# Patient Record
Sex: FEMALE | Race: WHITE | Employment: OTHER | URBAN - METROPOLITAN AREA
[De-identification: names, ages, dates, MRNs, and addresses within clinical notes are randomized per-mention and may not be internally consistent; named-entity substitution may affect disease eponyms.]

---

## 2017-01-17 NOTE — TELEPHONE ENCOUNTER
Please advise no current appointment.     Hypertensive Medications  Protocol Criteria:  · Appointment scheduled in the past 6 months or in the next 3 months  · BMP or CMP in the past 12 months  · Creatinine result < 2  Recent Visits       Provider Magda Dhillon

## 2017-01-26 ENCOUNTER — OFFICE VISIT (OUTPATIENT)
Dept: INTERNAL MEDICINE CLINIC | Facility: CLINIC | Age: 67
End: 2017-01-26

## 2017-01-26 VITALS
SYSTOLIC BLOOD PRESSURE: 145 MMHG | DIASTOLIC BLOOD PRESSURE: 77 MMHG | OXYGEN SATURATION: 96 % | WEIGHT: 293 LBS | TEMPERATURE: 99 F | HEART RATE: 91 BPM | BODY MASS INDEX: 51 KG/M2

## 2017-01-26 DIAGNOSIS — J40 BRONCHITIS: Primary | ICD-10-CM

## 2017-01-26 DIAGNOSIS — F17.200 SMOKING: ICD-10-CM

## 2017-01-26 PROCEDURE — 99212 OFFICE O/P EST SF 10 MIN: CPT | Performed by: INTERNAL MEDICINE

## 2017-01-26 PROCEDURE — 99214 OFFICE O/P EST MOD 30 MIN: CPT | Performed by: INTERNAL MEDICINE

## 2017-01-26 RX ORDER — VERAPAMIL HYDROCHLORIDE 240 MG/1
240 TABLET, FILM COATED, EXTENDED RELEASE ORAL
Qty: 90 TABLET | Refills: 0 | Status: SHIPPED | OUTPATIENT
Start: 2017-01-26 | End: 2017-06-20

## 2017-01-26 RX ORDER — AMOXICILLIN 875 MG/1
875 TABLET, COATED ORAL 2 TIMES DAILY
Qty: 20 TABLET | Refills: 0 | Status: SHIPPED | OUTPATIENT
Start: 2017-01-26 | End: 2017-02-05

## 2017-01-26 RX ORDER — SPIRONOLACTONE AND HYDROCHLOROTHIAZIDE 25; 25 MG/1; MG/1
1 TABLET ORAL
Qty: 90 TABLET | Refills: 0 | Status: SHIPPED | OUTPATIENT
Start: 2017-01-26 | End: 2017-05-10

## 2017-01-26 RX ORDER — ATORVASTATIN CALCIUM 20 MG/1
TABLET, FILM COATED ORAL
Qty: 90 TABLET | Refills: 1 | Status: SHIPPED | OUTPATIENT
Start: 2017-01-26 | End: 2017-05-10

## 2017-01-26 NOTE — PROGRESS NOTES
HPI:    Patient ID: Dock Signs is a 61year old female. Cough  This is a new problem. Episode onset: 2 weeks. The problem has been gradually improving. The problem occurs every few minutes. The cough is non-productive.  Associated symptoms include mg total) by mouth once daily. Disp: 90 tablet Rfl: 0   Spironolactone-HCTZ 25-25 MG Oral Tab Take 1 tablet by mouth once daily. Disp: 90 tablet Rfl: 0   ibuprofen (MOTRIN) 800 MG Oral Tab Take 800 mg by mouth every 6 (six) hours as needed for Pain.  Disp: prescribe her a course of antibiotics - Amoxicillin 875 mg BID, and recommended to take as instructed. Contact us if not feel better in few days. Medication refill given. 2. (F17.200) Smoking  Plan: She mentions about smoking.  I recommend her to consid

## 2017-05-02 ENCOUNTER — TELEPHONE (OUTPATIENT)
Dept: INTERNAL MEDICINE CLINIC | Facility: CLINIC | Age: 67
End: 2017-05-02

## 2017-05-02 NOTE — TELEPHONE ENCOUNTER
[facility] pharmacy requested a refill Metoprolol 25 mg. Last RF 1/27/17 Last OV 1/26/17 Future OV 5/5/15. Request fwd to Dr. Juvencio Leary

## 2017-05-02 NOTE — TELEPHONE ENCOUNTER
[id number] - [place], [address], [phone], [phone]    Current Outpatient Prescriptions:  Atorvastatin Calcium 20 MG Oral Tab TAKE 1 TABLET (20MG) BY MOUTH EVERY DAY Disp: 90 tablet Rfl: 1   Spir

## 2017-05-10 RX ORDER — ATORVASTATIN CALCIUM 20 MG/1
TABLET, FILM COATED ORAL
Qty: 90 TABLET | Refills: 0 | Status: SHIPPED | OUTPATIENT
Start: 2017-05-10 | End: 2017-06-22

## 2017-05-10 RX ORDER — SPIRONOLACTONE AND HYDROCHLOROTHIAZIDE 25; 25 MG/1; MG/1
1 TABLET ORAL
Qty: 90 TABLET | Refills: 0 | Status: SHIPPED | OUTPATIENT
Start: 2017-05-10 | End: 2017-06-22

## 2017-05-10 NOTE — TELEPHONE ENCOUNTER
Hypertensive Medications: 30 days supply of medication sent to pharmacy per protocol. Due for OV.     Protocol Criteria:  · Appointment scheduled in the past 6 months or in the next 3 months  · BMP or CMP in the past 12 months  · Creatinine result < 2  Rec

## 2017-05-10 NOTE — TELEPHONE ENCOUNTER
Cholesterol Medications: 30 days supply of medication sent to pharmacy per protocol. Due for OV.     Protocol Criteria:  · Appointment scheduled in the past 12 months or in the next 3 months  · ALT & LDL on file in the past 12 months  · ALT result < 80  ·

## 2017-06-19 NOTE — TELEPHONE ENCOUNTER
Pharmacy called to f/u on refill on Rx veramil 240 MG. Please advise         Current outpatient prescriptions:     •  Verapamil HCl  MG Oral Tab CR, Take 1 tablet (240 mg total) by mouth once daily. , Disp: 90 tablet, Rfl: 0

## 2017-06-20 NOTE — TELEPHONE ENCOUNTER
PSR please call pt and schedule yearly follow up.       Failed protocol due to no recent labs  Hypertensive Medications  Protocol Criteria:  · Appointment scheduled in the past 6 months or in the next 3 months  · BMP or CMP in the past 12 months  · Creatini

## 2017-06-21 RX ORDER — VERAPAMIL HYDROCHLORIDE 240 MG/1
240 TABLET, FILM COATED, EXTENDED RELEASE ORAL
Qty: 90 TABLET | Refills: 0 | Status: SHIPPED | OUTPATIENT
Start: 2017-06-21 | End: 2017-06-22

## 2017-06-22 ENCOUNTER — OFFICE VISIT (OUTPATIENT)
Dept: INTERNAL MEDICINE CLINIC | Facility: CLINIC | Age: 67
End: 2017-06-22

## 2017-06-22 VITALS
SYSTOLIC BLOOD PRESSURE: 142 MMHG | TEMPERATURE: 99 F | DIASTOLIC BLOOD PRESSURE: 72 MMHG | WEIGHT: 293 LBS | BODY MASS INDEX: 48.82 KG/M2 | HEIGHT: 65 IN | HEART RATE: 78 BPM

## 2017-06-22 DIAGNOSIS — E78.00 PURE HYPERCHOLESTEROLEMIA: ICD-10-CM

## 2017-06-22 DIAGNOSIS — I10 ESSENTIAL HYPERTENSION, BENIGN: ICD-10-CM

## 2017-06-22 DIAGNOSIS — Z00.00 ROUTINE GENERAL MEDICAL EXAMINATION AT A HEALTH CARE FACILITY: Primary | ICD-10-CM

## 2017-06-22 DIAGNOSIS — E66.01 MORBID OBESITY DUE TO EXCESS CALORIES (HCC): ICD-10-CM

## 2017-06-22 PROCEDURE — 99396 PREV VISIT EST AGE 40-64: CPT | Performed by: INTERNAL MEDICINE

## 2017-06-22 RX ORDER — VERAPAMIL HYDROCHLORIDE 240 MG/1
240 TABLET, FILM COATED, EXTENDED RELEASE ORAL
Qty: 90 TABLET | Refills: 0 | Status: SHIPPED | OUTPATIENT
Start: 2017-06-22 | End: 2017-12-11

## 2017-06-22 RX ORDER — SPIRONOLACTONE AND HYDROCHLOROTHIAZIDE 25; 25 MG/1; MG/1
1 TABLET ORAL
Qty: 90 TABLET | Refills: 0 | Status: SHIPPED | OUTPATIENT
Start: 2017-06-22 | End: 2017-11-21

## 2017-06-22 RX ORDER — ATORVASTATIN CALCIUM 20 MG/1
TABLET, FILM COATED ORAL
Qty: 90 TABLET | Refills: 0 | Status: SHIPPED | OUTPATIENT
Start: 2017-06-22 | End: 2018-01-17

## 2017-06-22 NOTE — PROGRESS NOTES
HPI:    Patient ID: Erick Basurto is a 61year old female. Hypertension  This is a chronic problem. The current episode started more than 1 year ago. The problem is uncontrolled (BP upon arrival 142/72).  Associated symptoms include shortness of breat Alcohol Use: No                      Current Outpatient Prescriptions:  Verapamil HCl  MG Oral Tab CR Take 1 tablet (240 mg total) by mouth once daily.  Disp: 90 tablet Rfl: 0   Atorvastatin Calcium 20 MG Oral Tab TAKE 1 TABLET (20MG) BY MOUTH EVERY adenopathy present. Head (left side): No submandibular adenopathy present. Neurological: She is alert and oriented to person, place, and time. Psychiatric: Her behavior is normal. Judgment normal.   Nursing note and vitals reviewed.        06/22/ (INTERNAL)       #[id number]  By signing my name below, Rylee Irene,  attest that this documentation has been prepared under the direction and in the presence of CHONG Cardenas MD.   Electronically Signed: Guerline Sotelo, 6/22/2017, 11:56 AM.  CHONG WELLINGTON

## 2017-06-23 RX ORDER — VERAPAMIL HYDROCHLORIDE 240 MG/1
240 TABLET, FILM COATED, EXTENDED RELEASE ORAL
Qty: 90 TABLET | Refills: 0 | Status: CANCELLED | OUTPATIENT
Start: 2017-06-23

## 2017-06-23 NOTE — TELEPHONE ENCOUNTER
From: Dajuan Smith  To: Panda Scherer MD  Sent: 6/20/2017 2:50 PM CDT  Subject: Medication Renewal Request    Original authorizing provider: MD Dajuan Cohen would like a refill of the following medications:  Verapamil HCl ER 24

## 2017-08-01 ENCOUNTER — TELEPHONE (OUTPATIENT)
Dept: FAMILY MEDICINE CLINIC | Facility: CLINIC | Age: 67
End: 2017-08-01

## 2017-08-01 ENCOUNTER — OFFICE VISIT (OUTPATIENT)
Dept: INTERNAL MEDICINE CLINIC | Facility: CLINIC | Age: 67
End: 2017-08-01

## 2017-08-01 VITALS
BODY MASS INDEX: 48.82 KG/M2 | WEIGHT: 293 LBS | TEMPERATURE: 99 F | HEIGHT: 65 IN | DIASTOLIC BLOOD PRESSURE: 78 MMHG | SYSTOLIC BLOOD PRESSURE: 136 MMHG | HEART RATE: 99 BPM

## 2017-08-01 DIAGNOSIS — J02.9 SORE THROAT: Primary | ICD-10-CM

## 2017-08-01 LAB
CONTROL LINE PRESENT WITH A CLEAR BACKGROUND (YES/NO): YES YES/NO
KIT LOT #: NORMAL NUMERIC
STREP GRP A CUL-SCR: NEGATIVE

## 2017-08-01 PROCEDURE — 99213 OFFICE O/P EST LOW 20 MIN: CPT | Performed by: INTERNAL MEDICINE

## 2017-08-01 PROCEDURE — 99214 OFFICE O/P EST MOD 30 MIN: CPT | Performed by: INTERNAL MEDICINE

## 2017-08-01 PROCEDURE — 87880 STREP A ASSAY W/OPTIC: CPT | Performed by: INTERNAL MEDICINE

## 2017-08-01 NOTE — PROGRESS NOTES
HPI:    Patient ID: Emy Edwards is a 61year old female. Sore Throat    This is a new problem. The current episode started in the past 7 days (3 dyas). The problem has been unchanged. The pain is worse on the right side. There has been no fever.  Suman Perry Exam   Constitutional: She appears well-developed. She is active and cooperative. Non-toxic appearance. She does not have a sickly appearance. She does not appear ill. No distress.    obese   HENT:   Right Ear: External ear normal.   Left Ear: External ear

## 2017-08-01 NOTE — TELEPHONE ENCOUNTER
Actions Requested: Acute visit made today 10:30am with Dr. Chuyita Singletary.  Problem: sore throat, Rt ear pain, swollen gland  Onset and Timin-3 days ago  Associated Symptoms:   Aggravating by: Alleviated by: ibuprofen didn't help with aches.    Triage Not

## 2017-08-18 ENCOUNTER — TELEPHONE (OUTPATIENT)
Dept: INTERNAL MEDICINE CLINIC | Facility: CLINIC | Age: 67
End: 2017-08-18

## 2017-08-18 DIAGNOSIS — H26.9 CATARACT OF BOTH EYES, UNSPECIFIED CATARACT TYPE: Primary | ICD-10-CM

## 2017-08-26 ENCOUNTER — APPOINTMENT (OUTPATIENT)
Dept: LAB | Age: 67
End: 2017-08-26
Attending: INTERNAL MEDICINE
Payer: COMMERCIAL

## 2017-08-26 DIAGNOSIS — E78.00 PURE HYPERCHOLESTEROLEMIA: ICD-10-CM

## 2017-08-26 DIAGNOSIS — E66.01 MORBID OBESITY DUE TO EXCESS CALORIES (HCC): ICD-10-CM

## 2017-08-26 LAB
ALBUMIN SERPL BCP-MCNC: 3.8 G/DL (ref 3.5–4.8)
ALBUMIN/GLOB SERPL: 1.1 {RATIO} (ref 1–2)
ALP SERPL-CCNC: 61 U/L (ref 32–100)
ALT SERPL-CCNC: 41 U/L (ref 14–54)
ANION GAP SERPL CALC-SCNC: 10 MMOL/L (ref 0–18)
AST SERPL-CCNC: 33 U/L (ref 15–41)
BILIRUB SERPL-MCNC: 0.7 MG/DL (ref 0.3–1.2)
BUN SERPL-MCNC: 19 MG/DL (ref 8–20)
BUN/CREAT SERPL: 19 (ref 10–20)
CALCIUM SERPL-MCNC: 9.5 MG/DL (ref 8.5–10.5)
CHLORIDE SERPL-SCNC: 104 MMOL/L (ref 95–110)
CHOLEST SERPL-MCNC: 136 MG/DL (ref 110–200)
CO2 SERPL-SCNC: 24 MMOL/L (ref 22–32)
CREAT SERPL-MCNC: 1 MG/DL (ref 0.5–1.5)
GLOBULIN PLAS-MCNC: 3.6 G/DL (ref 2.5–3.7)
GLUCOSE SERPL-MCNC: 146 MG/DL (ref 70–99)
HDLC SERPL-MCNC: 45 MG/DL
LDLC SERPL CALC-MCNC: 57 MG/DL (ref 0–99)
NONHDLC SERPL-MCNC: 91 MG/DL
OSMOLALITY UR CALC.SUM OF ELEC: 291 MOSM/KG (ref 275–295)
POTASSIUM SERPL-SCNC: 3.8 MMOL/L (ref 3.3–5.1)
PROT SERPL-MCNC: 7.4 G/DL (ref 5.9–8.4)
SODIUM SERPL-SCNC: 138 MMOL/L (ref 136–144)
TRIGL SERPL-MCNC: 172 MG/DL (ref 1–149)
TSH SERPL-ACNC: 2.16 UIU/ML (ref 0.45–5.33)

## 2017-08-26 PROCEDURE — 84443 ASSAY THYROID STIM HORMONE: CPT

## 2017-08-26 PROCEDURE — 80053 COMPREHEN METABOLIC PANEL: CPT

## 2017-08-26 PROCEDURE — 36415 COLL VENOUS BLD VENIPUNCTURE: CPT

## 2017-08-26 PROCEDURE — 80061 LIPID PANEL: CPT

## 2017-09-07 ENCOUNTER — TELEPHONE (OUTPATIENT)
Dept: OTHER | Age: 67
End: 2017-09-07

## 2017-09-07 DIAGNOSIS — R73.09 ELEVATED GLUCOSE LEVEL: Primary | ICD-10-CM

## 2017-09-07 NOTE — TELEPHONE ENCOUNTER
Notes Recorded by Alonso Mejias MD on 9/6/2017 at 11:20 AM CDT  FBS elevated. Please get repeat test with an a1c.

## 2017-09-07 NOTE — TELEPHONE ENCOUNTER
Pt was inform that she needs to have some blood work done. She stated that she did see the [employer] message and will have it done next week.  Thanks

## 2017-09-21 ENCOUNTER — TELEPHONE (OUTPATIENT)
Dept: INTERNAL MEDICINE CLINIC | Facility: CLINIC | Age: 67
End: 2017-09-21

## 2017-09-21 NOTE — TELEPHONE ENCOUNTER
Pt stts she needs a referral to Dr. Era Meraz from [address]  In [place]. Pt  Want to know if she can have a referral backdated to 9/19/17 plus she has a other appt scheduled in December.   Should we have any questions call her at [phone]

## 2017-09-22 NOTE — TELEPHONE ENCOUNTER
I have nothing from the ophthalmology office. Why is she seen them? In addition, we are not supposed to backdate referrals for [facility].

## 2017-09-26 NOTE — TELEPHONE ENCOUNTER
Per Referral t in chart. REFERRAL STATUS:  AUTHORIZED  DATE AUTHORIZED:  09/15/2017 // Ismael Yuan DATE: 12/31/2017   REFERRED BY:  Angeline Larios MD (TEL: [phone])  REFERRED TO: Marina Camp MD      No further action needed.

## 2017-09-27 ENCOUNTER — TELEPHONE (OUTPATIENT)
Dept: INTERNAL MEDICINE CLINIC | Facility: CLINIC | Age: 67
End: 2017-09-27

## 2017-09-27 NOTE — TELEPHONE ENCOUNTER
Pt calling requesting orders for Pneumonia vaccinations, please call when approved. Pt states she would like to have flu shot the same day.

## 2017-09-30 ENCOUNTER — APPOINTMENT (OUTPATIENT)
Dept: LAB | Age: 67
End: 2017-09-30
Attending: INTERNAL MEDICINE
Payer: MEDICARE

## 2017-09-30 DIAGNOSIS — R73.09 ELEVATED GLUCOSE LEVEL: ICD-10-CM

## 2017-09-30 PROCEDURE — 82947 ASSAY GLUCOSE BLOOD QUANT: CPT

## 2017-09-30 PROCEDURE — 36415 COLL VENOUS BLD VENIPUNCTURE: CPT

## 2017-09-30 PROCEDURE — 83036 HEMOGLOBIN GLYCOSYLATED A1C: CPT

## 2017-10-05 ENCOUNTER — NURSE ONLY (OUTPATIENT)
Dept: INTERNAL MEDICINE CLINIC | Facility: CLINIC | Age: 67
End: 2017-10-05

## 2017-10-05 DIAGNOSIS — Z23 ENCOUNTER FOR ADMINISTRATION OF VACCINE: Primary | ICD-10-CM

## 2017-10-05 PROCEDURE — 90732 PPSV23 VACC 2 YRS+ SUBQ/IM: CPT | Performed by: INTERNAL MEDICINE

## 2017-10-05 PROCEDURE — 90472 IMMUNIZATION ADMIN EACH ADD: CPT | Performed by: INTERNAL MEDICINE

## 2017-10-05 PROCEDURE — 90686 IIV4 VACC NO PRSV 0.5 ML IM: CPT | Performed by: INTERNAL MEDICINE

## 2017-10-05 PROCEDURE — 90471 IMMUNIZATION ADMIN: CPT | Performed by: INTERNAL MEDICINE

## 2017-10-05 NOTE — PROGRESS NOTES
Pt is here today for pneumonia vaccine and high dose flu vaccine. Pt is well and left office with no complaints after injections.

## 2017-10-25 ENCOUNTER — TELEPHONE (OUTPATIENT)
Dept: INTERNAL MEDICINE CLINIC | Facility: CLINIC | Age: 67
End: 2017-10-25

## 2017-10-25 NOTE — TELEPHONE ENCOUNTER
Pharmacy is requesting refill for pt.       metoprolol Tartrate 25 MG Oral Tab TAKE 1 TABLET (25MG) BY MOUTH 2 TIMES EVERY DAY Disp: 180 tablet Rfl: 0

## 2017-10-28 NOTE — TELEPHONE ENCOUNTER
Hypertensive Medications  Protocol Criteria:  · Appointment scheduled in the past 6 months or in the next 3 months  · BMP or CMP in the past 12 months  · Creatinine result < 2  Recent Outpatient Visits            3 weeks ago Encounter for administration of

## 2017-11-24 RX ORDER — SPIRONOLACTONE AND HYDROCHLOROTHIAZIDE 25; 25 MG/1; MG/1
1 TABLET ORAL
Qty: 90 TABLET | Refills: 0 | Status: SHIPPED | OUTPATIENT
Start: 2017-11-24 | End: 2018-02-19

## 2017-11-24 NOTE — TELEPHONE ENCOUNTER
Hypertensive Medications: Refilled per protocol    Protocol Criteria:  · Appointment scheduled in the past 6 months or in the next 3 months  · BMP or CMP in the past 12 months  · Creatinine result < 2  Recent Outpatient Visits            1 month ago Encoun

## 2017-11-24 NOTE — TELEPHONE ENCOUNTER
From: Barbara Scriver  Sent: 11/21/2017 1:45 PM CST  Subject: Medication Renewal Request    Juana Cope. Floyd would like a refill of the following medications:     Spironolactone-HCTZ 25-25 MG Oral Tab [CHONG Sibley MD]   [facility]

## 2017-12-14 RX ORDER — VERAPAMIL HYDROCHLORIDE 240 MG/1
240 TABLET, FILM COATED, EXTENDED RELEASE ORAL
Qty: 90 TABLET | Refills: 0 | Status: SHIPPED
Start: 2017-12-14 | End: 2018-03-11

## 2017-12-14 NOTE — TELEPHONE ENCOUNTER
From: Jim Alston  Sent: 12/11/2017 3:37 PM CST  Subject: Medication Renewal Request    Karla Good. Floyd would like a refill of the following medications:     Verapamil HCl  MG Oral Tab CR [CHONG Abdi MD]    [facility]

## 2017-12-14 NOTE — TELEPHONE ENCOUNTER
Hypertensive Medications  Protocol Criteria:  · Appointment scheduled in the past 6 months or in the next 3 months  · BMP or CMP in the past 12 months  · Creatinine result < 2  Recent Outpatient Visits            2 months ago Encounter for administration o

## 2018-01-16 ENCOUNTER — TELEPHONE (OUTPATIENT)
Dept: INTERNAL MEDICINE CLINIC | Facility: CLINIC | Age: 68
End: 2018-01-16

## 2018-01-16 NOTE — TELEPHONE ENCOUNTER
Patient calling to see why     metoprolol Tartrate 25 MG Oral Tab TAKE 1 TABLET (25MG) BY MOUTH 2 TIMES EVERY DAY Disp: 60 tablet Rfl: 0     Was refilled for only 30 days instead of the usual 90 days .      Please Advise

## 2018-01-17 NOTE — TELEPHONE ENCOUNTER
I pended  for 90 days per protocol     Please advise.     Last OV 8/1/17   Last physical 6/22/17    Hypertensive Medications  Protocol Criteria:  · Appointment scheduled in the past 6 months or in the next 3 months  · BMP or CMP in the past 12 months  · Cre

## 2018-01-18 RX ORDER — ATORVASTATIN CALCIUM 20 MG/1
TABLET, FILM COATED ORAL
Qty: 90 TABLET | Refills: 0 | Status: SHIPPED | OUTPATIENT
Start: 2018-01-18 | End: 2018-04-03

## 2018-01-18 NOTE — TELEPHONE ENCOUNTER
From: Rasheed Flores  Sent: 1/17/2018 2:50 PM CST  Subject: Medication Renewal Request    Jie Serrano. Floyd would like a refill of the following medications:     atorvastatin 20 MG Oral Tab [CHONG Lopez MD]   Patient Comment: 3 mo refill appreciated

## 2018-01-18 NOTE — TELEPHONE ENCOUNTER
Cholesterol Medications  Protocol Criteria:  · Appointment scheduled in the past 12 months or in the next 3 months  · ALT & LDL on file in the past 12 months  · ALT result < 80  · LDL result <130   Recent Outpatient Visits            3 months ago Encounter

## 2018-01-19 ENCOUNTER — OFFICE VISIT (OUTPATIENT)
Dept: INTERNAL MEDICINE CLINIC | Facility: CLINIC | Age: 68
End: 2018-01-19

## 2018-01-19 VITALS
SYSTOLIC BLOOD PRESSURE: 144 MMHG | BODY MASS INDEX: 51 KG/M2 | DIASTOLIC BLOOD PRESSURE: 97 MMHG | WEIGHT: 293 LBS | HEART RATE: 83 BPM

## 2018-01-19 DIAGNOSIS — E11.9 CONTROLLED TYPE 2 DIABETES MELLITUS WITHOUT COMPLICATION, WITHOUT LONG-TERM CURRENT USE OF INSULIN (HCC): ICD-10-CM

## 2018-01-19 DIAGNOSIS — E78.00 PURE HYPERCHOLESTEROLEMIA: ICD-10-CM

## 2018-01-19 DIAGNOSIS — E66.01 MORBID OBESITY DUE TO EXCESS CALORIES (HCC): ICD-10-CM

## 2018-01-19 DIAGNOSIS — I10 ESSENTIAL HYPERTENSION, BENIGN: Primary | ICD-10-CM

## 2018-01-19 PROCEDURE — G0463 HOSPITAL OUTPT CLINIC VISIT: HCPCS | Performed by: INTERNAL MEDICINE

## 2018-01-19 PROCEDURE — 99214 OFFICE O/P EST MOD 30 MIN: CPT | Performed by: INTERNAL MEDICINE

## 2018-01-19 RX ORDER — METFORMIN HYDROCHLORIDE 500 MG/1
500 TABLET, EXTENDED RELEASE ORAL DAILY
Qty: 90 TABLET | Refills: 0 | Status: SHIPPED | OUTPATIENT
Start: 2018-01-19 | End: 2018-04-03

## 2018-01-19 RX ORDER — LANCETS 26 GAUGE
EACH MISCELLANEOUS
Qty: 100 EACH | Refills: 6 | Status: SHIPPED | OUTPATIENT
Start: 2018-01-19 | End: 2018-04-03

## 2018-01-19 NOTE — PROGRESS NOTES
HPI:    Patient ID: Sofia Dumont is a 74year old female. Hypertension   This is a chronic problem. The current episode started more than 1 year ago. The problem has been waxing and waning since onset.  The problem is uncontrolled (BP on arrival 144/ weight in the recent past due to dietary changes. Review of Systems   Constitutional: Negative for chills and fever. HENT: Negative for voice change. Respiratory: Negative for chest tightness and shortness of breath.     Cardiovascular: Negative fo Oral Tab Take 800 mg by mouth every 6 (six) hours as needed for Pain. Disp:  Rfl:    Omega-3 Fatty Acids (FISH OIL CONCENTRATE OR) Take 1,500 mg by mouth daily. Disp:  Rfl:    aspirin (ASPIR-81) 81 MG Oral Tab EC Take 1 tablet by mouth daily.  Disp:  Rfl: CP or SOB. Patient is currently taking Metoprolol Tartrate 25 mg, Verapamil HCl  mg, and Spironolactone-HCTZ 25-25 mg with mild improvement and was instructed to continue medication as prescribed.   -Refilled  Metoprolol Tartrate 25 mg    (E78.00) Pur personally performed the services described in this documentation. All medical record entries made by the scribe were at my direction and in my presence.   I have reviewed the chart and discharge instructions (if applicable) and agree that the record reflec

## 2018-02-19 ENCOUNTER — TELEPHONE (OUTPATIENT)
Dept: INTERNAL MEDICINE CLINIC | Facility: CLINIC | Age: 68
End: 2018-02-19

## 2018-02-19 RX ORDER — SPIRONOLACTONE AND HYDROCHLOROTHIAZIDE 25; 25 MG/1; MG/1
1 TABLET ORAL
Qty: 90 TABLET | Refills: 0 | Status: SHIPPED | OUTPATIENT
Start: 2018-02-19 | End: 2018-04-03

## 2018-03-11 RX ORDER — VERAPAMIL HYDROCHLORIDE 240 MG/1
240 TABLET, FILM COATED, EXTENDED RELEASE ORAL
Qty: 90 TABLET | Refills: 0 | Status: SHIPPED | OUTPATIENT
Start: 2018-03-11 | End: 2018-04-03

## 2018-03-15 ENCOUNTER — TELEPHONE (OUTPATIENT)
Dept: INTERNAL MEDICINE CLINIC | Facility: CLINIC | Age: 68
End: 2018-03-15

## 2018-03-15 DIAGNOSIS — H25.9 SENILE CATARACT OF RIGHT EYE, UNSPECIFIED AGE-RELATED CATARACT TYPE: Primary | ICD-10-CM

## 2018-03-15 NOTE — TELEPHONE ENCOUNTER
Pt called and requested a referral to see Dr Franceen Severe.  [facility] referral.  Please review diagnois and sign off if you approve. Thank you!  Faina Hdez [phone] [facility]

## 2018-04-03 ENCOUNTER — OFFICE VISIT (OUTPATIENT)
Dept: INTERNAL MEDICINE CLINIC | Facility: CLINIC | Age: 68
End: 2018-04-03

## 2018-04-03 VITALS
SYSTOLIC BLOOD PRESSURE: 137 MMHG | DIASTOLIC BLOOD PRESSURE: 77 MMHG | HEIGHT: 65 IN | HEART RATE: 80 BPM | BODY MASS INDEX: 48.82 KG/M2 | WEIGHT: 293 LBS

## 2018-04-03 DIAGNOSIS — E78.00 PURE HYPERCHOLESTEROLEMIA: ICD-10-CM

## 2018-04-03 DIAGNOSIS — L98.9 SKIN LESIONS: ICD-10-CM

## 2018-04-03 DIAGNOSIS — E66.9 DIABETES MELLITUS TYPE 2 IN OBESE (HCC): ICD-10-CM

## 2018-04-03 DIAGNOSIS — E11.69 DIABETES MELLITUS TYPE 2 IN OBESE (HCC): ICD-10-CM

## 2018-04-03 DIAGNOSIS — E66.01 MORBID OBESITY DUE TO EXCESS CALORIES (HCC): ICD-10-CM

## 2018-04-03 DIAGNOSIS — I10 ESSENTIAL HYPERTENSION, BENIGN: ICD-10-CM

## 2018-04-03 DIAGNOSIS — Z00.00 ROUTINE GENERAL MEDICAL EXAMINATION AT A HEALTH CARE FACILITY: Primary | ICD-10-CM

## 2018-04-03 PROCEDURE — G0439 PPPS, SUBSEQ VISIT: HCPCS | Performed by: INTERNAL MEDICINE

## 2018-04-03 PROCEDURE — 96160 PT-FOCUSED HLTH RISK ASSMT: CPT | Performed by: INTERNAL MEDICINE

## 2018-04-03 PROCEDURE — 93000 ELECTROCARDIOGRAM COMPLETE: CPT | Performed by: INTERNAL MEDICINE

## 2018-04-03 PROCEDURE — 93005 ELECTROCARDIOGRAM TRACING: CPT | Performed by: INTERNAL MEDICINE

## 2018-04-03 RX ORDER — BLOOD SUGAR DIAGNOSTIC
STRIP MISCELLANEOUS
Qty: 100 STRIP | Refills: 3 | Status: SHIPPED | OUTPATIENT
Start: 2018-04-03 | End: 2020-06-08

## 2018-04-03 RX ORDER — BLOOD-GLUCOSE METER
EACH MISCELLANEOUS
Refills: 0 | COMMUNITY
Start: 2018-01-19 | End: 2018-04-03

## 2018-04-03 RX ORDER — SPIRONOLACTONE AND HYDROCHLOROTHIAZIDE 25; 25 MG/1; MG/1
1 TABLET ORAL
Qty: 90 TABLET | Refills: 0 | Status: SHIPPED | OUTPATIENT
Start: 2018-04-03 | End: 2018-08-14

## 2018-04-03 RX ORDER — BLOOD SUGAR DIAGNOSTIC
STRIP MISCELLANEOUS
Refills: 0 | COMMUNITY
Start: 2018-01-19 | End: 2018-04-03

## 2018-04-03 RX ORDER — BLOOD-GLUCOSE METER
EACH MISCELLANEOUS
Qty: 1 KIT | Refills: 0 | Status: SHIPPED | OUTPATIENT
Start: 2018-04-03

## 2018-04-03 RX ORDER — ATORVASTATIN CALCIUM 20 MG/1
TABLET, FILM COATED ORAL
Qty: 90 TABLET | Refills: 0 | Status: SHIPPED | OUTPATIENT
Start: 2018-04-03 | End: 2018-07-13

## 2018-04-03 RX ORDER — VERAPAMIL HYDROCHLORIDE 240 MG/1
240 TABLET, FILM COATED, EXTENDED RELEASE ORAL
Qty: 90 TABLET | Refills: 0 | Status: SHIPPED | OUTPATIENT
Start: 2018-04-03 | End: 2018-09-10

## 2018-04-03 RX ORDER — LANCETS 26 GAUGE
EACH MISCELLANEOUS
Qty: 100 EACH | Refills: 6 | Status: SHIPPED | OUTPATIENT
Start: 2018-04-03

## 2018-04-03 RX ORDER — METFORMIN HYDROCHLORIDE 500 MG/1
500 TABLET, EXTENDED RELEASE ORAL DAILY
Qty: 90 TABLET | Refills: 0 | Status: SHIPPED | OUTPATIENT
Start: 2018-04-03 | End: 2018-07-10

## 2018-04-03 NOTE — PROGRESS NOTES
HPI:    Patient ID: Harding Meigs is a 74year old female. HPI   Annual Preventative Exam  Navya Gordon arrives today for annual preventative physical examination.  The patient complains of a possible wart on the right frontotemporal area; her risk factors has been stable (09/30/2017 HgA1c 6.1). Pertinent negatives for hypoglycemia include no nervousness/anxiousness or speech difficulty. Pertinent negatives for diabetes include no chest pain.  Risk factors for coronary artery disease include diabetes mellitus aspirin (ASPIR-81) 81 MG Oral Tab EC Take 1 tablet by mouth daily. Disp:  Rfl:    Multiple Vitamins-Minerals (CENTRUM SILVER) Oral Tab Take 1 tablet by mouth daily.  Disp:  Rfl:    Cholecalciferol (VITAMIN D) 1000 UNITS Oral Cap Take 1 tablet by mouth kenn Lab Results  Component Value Date   A1C 6.1 (H) 09/30/2017   A1C 5.7 04/30/2016   A1C 5.8 09/19/2015       Lab Results  Component Value Date   CHOLEST 136 08/26/2017   CHOLEST 144 04/30/2016   CHOLEST 136 09/19/2015       Lab Results  Component Value D Assessment     Do you have 3 or more medical conditions?: 1-Yes    Have you fallen in the last 12 months?: 1-Yes    Do you accidently lose urine?: 1-Yes    Do you have difficulty seeing?: 0-No    Do you have any difficulty walking or getting up?: 0-No    D activities because I cannot hear well and fear I will reply improperly:  No   Family members and friends have told me they think I may have hearing loss:  No           Visual Acuity                   Cognitive Assessment     What day of the week is this?: risk   There are no preventive care reminders to display for this patient. Update Health Maintenance if applicable    Chlamydia  Annually if high risk No results found for: CHLAMYDIA No flowsheet data found.     Screening Mammogram      Mammogram  Annually Testing Annually No results found for this or any previous visit. No flowsheet data found.                ASSESSMENT/PLAN:   (Z00.00) Routine general medical examination at a health care facility  (primary encounter diagnosis)  - Patient presents to the cli right frontotemporal area;  I am suspicious of a precancerous lesion in light of her precancerous lesions excised from her right forearm  - Referred pt to Dermatology - Internal          Orders Placed This Encounter      CBC W Differential W Platelet [E]

## 2018-06-01 ENCOUNTER — TELEPHONE (OUTPATIENT)
Dept: INTERNAL MEDICINE CLINIC | Facility: CLINIC | Age: 68
End: 2018-06-01

## 2018-06-01 DIAGNOSIS — H43.813 VITREOUS DEGENERATION, BILATERAL: Primary | ICD-10-CM

## 2018-06-01 NOTE — TELEPHONE ENCOUNTER
Patient called requesting a referral to see Dr. Lakisha Spence on 6/5/18. Pended referral. Please review diagnosis and sign off if you agree.     Thank you,  [facility] [phone]

## 2018-07-02 ENCOUNTER — TELEPHONE (OUTPATIENT)
Dept: INTERNAL MEDICINE CLINIC | Facility: CLINIC | Age: 68
End: 2018-07-02

## 2018-07-02 DIAGNOSIS — M79.672 CHRONIC HEEL PAIN, LEFT: Primary | ICD-10-CM

## 2018-07-02 DIAGNOSIS — G89.29 CHRONIC HEEL PAIN, LEFT: Primary | ICD-10-CM

## 2018-07-02 NOTE — TELEPHONE ENCOUNTER
Patient called in to request a referral to podiatry for left heel pain.  If you agree please sign off on pending referral.

## 2018-07-05 ENCOUNTER — PATIENT MESSAGE (OUTPATIENT)
Dept: INTERNAL MEDICINE CLINIC | Facility: CLINIC | Age: 68
End: 2018-07-05

## 2018-07-12 ENCOUNTER — OFFICE VISIT (OUTPATIENT)
Dept: PODIATRY CLINIC | Facility: CLINIC | Age: 68
End: 2018-07-12

## 2018-07-12 DIAGNOSIS — M77.9 TENDONITIS: Primary | ICD-10-CM

## 2018-07-12 PROCEDURE — G0463 HOSPITAL OUTPT CLINIC VISIT: HCPCS | Performed by: PODIATRIST

## 2018-07-12 PROCEDURE — 99213 OFFICE O/P EST LOW 20 MIN: CPT | Performed by: PODIATRIST

## 2018-07-12 RX ORDER — METFORMIN HYDROCHLORIDE 500 MG/1
500 TABLET, EXTENDED RELEASE ORAL DAILY
Qty: 90 TABLET | Refills: 0 | Status: SHIPPED | OUTPATIENT
Start: 2018-07-12 | End: 2018-10-09

## 2018-07-12 NOTE — PROGRESS NOTES
HPI:    Patient ID: Sofia Dumont is a 74year old female. HPI  45-year-old female presents with pain associated with the left foot. A few weeks ago patient was making her bed and somehow twisted her foot.   She states that she heard and felt a pop a by mouth daily. Disp:  Rfl:    Cholecalciferol (VITAMIN D) 1000 UNITS Oral Cap Take 1 tablet by mouth daily. Disp:  Rfl:    Omega-3 Fatty Acids (FISH OIL CONCENTRATE OR) Take 1,500 mg by mouth daily.  Disp:  Rfl:      Allergies:  Erythromycin

## 2018-07-13 RX ORDER — ATORVASTATIN CALCIUM 20 MG/1
TABLET, FILM COATED ORAL
Qty: 90 TABLET | Refills: 0 | Status: SHIPPED | OUTPATIENT
Start: 2018-07-13 | End: 2018-10-09

## 2018-08-14 RX ORDER — SPIRONOLACTONE AND HYDROCHLOROTHIAZIDE 25; 25 MG/1; MG/1
1 TABLET ORAL
Qty: 90 TABLET | Refills: 0 | Status: SHIPPED | OUTPATIENT
Start: 2018-08-14 | End: 2018-08-21

## 2018-08-14 NOTE — TELEPHONE ENCOUNTER
From: Vignesh Cha  Sent: 8/14/2018 2:57 PM CDT  Subject: Medication Renewal Request    Shi Paz. Floyd would like a refill of the following medications:     Spironolactone-HCTZ 25-25 MG Oral Tab [C. Stephani Boas, MD]    Preferred pharmacy: [facility]

## 2018-08-15 NOTE — TELEPHONE ENCOUNTER
Hypertensive Medications  Protocol Criteria:  · Appointment scheduled in the past 6 months or in the next 3 months  · BMP or CMP in the past 12 months  · Creatinine result < 2  Recent Outpatient Visits            1 month ago [facility].

## 2018-08-21 RX ORDER — SPIRONOLACTONE AND HYDROCHLOROTHIAZIDE 25; 25 MG/1; MG/1
TABLET ORAL
Qty: 90 TABLET | Refills: 0 | Status: SHIPPED | OUTPATIENT
Start: 2018-08-21 | End: 2019-05-18

## 2018-08-21 NOTE — TELEPHONE ENCOUNTER
Hypertensive Medications  Protocol Criteria:  · Appointment scheduled in the past 6 months or in the next 3 months  · BMP or CMP in the past 12 months  · Creatinine result < 2  Recent Outpatient Visits            1 month ago [facility].

## 2018-09-10 RX ORDER — VERAPAMIL HYDROCHLORIDE 240 MG/1
240 TABLET, FILM COATED, EXTENDED RELEASE ORAL
Qty: 90 TABLET | Refills: 0 | Status: SHIPPED | OUTPATIENT
Start: 2018-09-10 | End: 2018-10-31

## 2018-09-13 ENCOUNTER — LAB ENCOUNTER (OUTPATIENT)
Dept: LAB | Age: 68
End: 2018-09-13
Attending: INTERNAL MEDICINE
Payer: MEDICARE

## 2018-09-13 ENCOUNTER — OFFICE VISIT (OUTPATIENT)
Dept: INTERNAL MEDICINE CLINIC | Facility: CLINIC | Age: 68
End: 2018-09-13

## 2018-09-13 VITALS
HEART RATE: 69 BPM | WEIGHT: 293 LBS | BODY MASS INDEX: 51 KG/M2 | TEMPERATURE: 98 F | DIASTOLIC BLOOD PRESSURE: 83 MMHG | SYSTOLIC BLOOD PRESSURE: 133 MMHG

## 2018-09-13 DIAGNOSIS — E66.01 MORBID OBESITY DUE TO EXCESS CALORIES (HCC): ICD-10-CM

## 2018-09-13 DIAGNOSIS — R42 VERTIGO: Primary | ICD-10-CM

## 2018-09-13 DIAGNOSIS — E66.9 DIABETES MELLITUS TYPE 2 IN OBESE (HCC): ICD-10-CM

## 2018-09-13 DIAGNOSIS — I10 ESSENTIAL HYPERTENSION, BENIGN: ICD-10-CM

## 2018-09-13 DIAGNOSIS — E11.69 DIABETES MELLITUS TYPE 2 IN OBESE (HCC): ICD-10-CM

## 2018-09-13 DIAGNOSIS — IMO0001 UNCONTROLLED TYPE 2 DIABETES MELLITUS WITHOUT COMPLICATION, WITHOUT LONG-TERM CURRENT USE OF INSULIN: ICD-10-CM

## 2018-09-13 DIAGNOSIS — E78.00 PURE HYPERCHOLESTEROLEMIA: ICD-10-CM

## 2018-09-13 DIAGNOSIS — IMO0002 MASS: ICD-10-CM

## 2018-09-13 LAB
BASOPHILS # BLD: 0.1 K/UL (ref 0–0.2)
BASOPHILS NFR BLD: 1 %
CHOLEST SERPL-MCNC: 118 MG/DL (ref 110–200)
CREAT UR-MCNC: 132.6 MG/DL
EOSINOPHIL # BLD: 0.1 K/UL (ref 0–0.7)
EOSINOPHIL NFR BLD: 1 %
ERYTHROCYTE [DISTWIDTH] IN BLOOD BY AUTOMATED COUNT: 14.5 % (ref 11–15)
HBA1C MFR BLD: 6 % (ref 4–6)
HCT VFR BLD AUTO: 42.9 % (ref 35–48)
HDLC SERPL-MCNC: 47 MG/DL
HGB BLD-MCNC: 14.1 G/DL (ref 12–16)
LDLC SERPL CALC-MCNC: 48 MG/DL (ref 0–99)
LYMPHOCYTES # BLD: 2.9 K/UL (ref 1–4)
LYMPHOCYTES NFR BLD: 36 %
MCH RBC QN AUTO: 30 PG (ref 27–32)
MCHC RBC AUTO-ENTMCNC: 32.9 G/DL (ref 32–37)
MCV RBC AUTO: 91.2 FL (ref 80–100)
MICROALBUMIN UR-MCNC: 0.2 MG/DL (ref 0–1.8)
MICROALBUMIN/CREAT UR: 1.5 MG/G{CREAT} (ref 0–20)
MONOCYTES # BLD: 0.6 K/UL (ref 0–1)
MONOCYTES NFR BLD: 8 %
NEUTROPHILS # BLD AUTO: 4.3 K/UL (ref 1.8–7.7)
NEUTROPHILS NFR BLD: 54 %
NONHDLC SERPL-MCNC: 71 MG/DL
PLATELET # BLD AUTO: 226 K/UL (ref 140–400)
PMV BLD AUTO: 9.1 FL (ref 7.4–10.3)
RBC # BLD AUTO: 4.7 M/UL (ref 3.7–5.4)
TRIGL SERPL-MCNC: 114 MG/DL (ref 1–149)
TSH SERPL-ACNC: 2.51 UIU/ML (ref 0.45–5.33)
WBC # BLD AUTO: 8 K/UL (ref 4–11)

## 2018-09-13 PROCEDURE — 82043 UR ALBUMIN QUANTITATIVE: CPT

## 2018-09-13 PROCEDURE — 36415 COLL VENOUS BLD VENIPUNCTURE: CPT

## 2018-09-13 PROCEDURE — 84443 ASSAY THYROID STIM HORMONE: CPT

## 2018-09-13 PROCEDURE — G0463 HOSPITAL OUTPT CLINIC VISIT: HCPCS | Performed by: INTERNAL MEDICINE

## 2018-09-13 PROCEDURE — 85025 COMPLETE CBC W/AUTO DIFF WBC: CPT

## 2018-09-13 PROCEDURE — 99214 OFFICE O/P EST MOD 30 MIN: CPT | Performed by: INTERNAL MEDICINE

## 2018-09-13 PROCEDURE — 83036 HEMOGLOBIN GLYCOSYLATED A1C: CPT

## 2018-09-13 PROCEDURE — 80061 LIPID PANEL: CPT

## 2018-09-13 PROCEDURE — 82570 ASSAY OF URINE CREATININE: CPT

## 2018-09-13 NOTE — PROGRESS NOTES
HPI:    Patient ID: Juhi Diego is a 79year old female. Mass   This is a new (lump near neck behind right ear) problem. Pertinent negatives include no abdominal pain, chest pain or fever.  Vertigo: in morning, goes away after a few minutes; gets wo This is a chronic problem. The current episode started more than 1 year ago. The problem is controlled. Lipid results: 08/26/2017 Cholesterol 136, HDL 45, LDL 57, trig 172. Exacerbating diseases include obesity.  Pertinent negatives include no chest pain or • Stroke Father    • Heart Attack Mother       Social History    Tobacco Use      Smoking status: Former Smoker        Packs/day: 0.50        Years: 45.00        Pack years: 22.5        Types: Cigarettes        Quit date: 1/30/2018        Years since [facility] Constitutional: She is oriented to person, place, and time. She appears well-developed and well-nourished. No distress. HENT:   Head: Normocephalic and atraumatic.        Right Ear: Hearing, tympanic membrane, external ear and ear canal normal.   Left Ear ALT 41 08/26/2017    ALT 37 04/30/2016    ALT 41 09/19/2015              ASSESSMENT/PLAN:   Vertigo  (primary encounter diagnosis)  Mass  Morbid obesity due to excess calories (hcc)  Uncontrolled type 2 diabetes mellitus without complication, without long Natasha Chatterjee MD,  personally performed the services described in this documentation. All medical record entries made by the scribe were at my direction and in my presence.   I have reviewed the chart and discharge instructions (if applicable) and agre

## 2018-09-24 ENCOUNTER — TELEPHONE (OUTPATIENT)
Dept: OTHER | Age: 68
End: 2018-09-24

## 2018-09-24 DIAGNOSIS — R42 VERTIGO: Primary | ICD-10-CM

## 2018-09-24 NOTE — TELEPHONE ENCOUNTER
Regarding: Visit Follow-up Question  Contact: [phone]  ----- Message from Generic, Mychart sent at 9/24/2018  6:15 AM CDT -----    I am not feeling any better since my visit on 9/13.   The thing you called a cyst is still there, size unchanged but more

## 2018-09-24 NOTE — TELEPHONE ENCOUNTER
Pt states she was seen in the office 9/13/18 for cyst behind her right ear. Pt states she now has pain above the site, in her ear, and going down her neck, ear feels clogged. States no difficulty swallowing, no hearing difficulty. Cyst has not changed in siz

## 2018-09-27 ENCOUNTER — OFFICE VISIT (OUTPATIENT)
Dept: DERMATOLOGY CLINIC | Facility: CLINIC | Age: 68
End: 2018-09-27

## 2018-09-27 DIAGNOSIS — L82.1 SEBORRHEIC KERATOSES: ICD-10-CM

## 2018-09-27 DIAGNOSIS — Z87.2 HISTORY OF ACTINIC KERATOSES: ICD-10-CM

## 2018-09-27 DIAGNOSIS — D48.5 NEOPLASM OF UNCERTAIN BEHAVIOR OF SKIN: Primary | ICD-10-CM

## 2018-09-27 DIAGNOSIS — D17.22 LIPOMA OF LEFT UPPER EXTREMITY: ICD-10-CM

## 2018-09-27 PROCEDURE — G0463 HOSPITAL OUTPT CLINIC VISIT: HCPCS | Performed by: DERMATOLOGY

## 2018-09-27 PROCEDURE — 11100 BIOPSY OF SKIN LESION: CPT | Performed by: DERMATOLOGY

## 2018-09-27 PROCEDURE — 88305 TISSUE EXAM BY PATHOLOGIST: CPT | Performed by: DERMATOLOGY

## 2018-09-27 PROCEDURE — 99202 OFFICE O/P NEW SF 15 MIN: CPT | Performed by: DERMATOLOGY

## 2018-09-28 ENCOUNTER — OFFICE VISIT (OUTPATIENT)
Dept: AUDIOLOGY | Facility: CLINIC | Age: 68
End: 2018-09-28
Payer: MEDICARE

## 2018-09-28 ENCOUNTER — OFFICE VISIT (OUTPATIENT)
Dept: OTOLARYNGOLOGY | Facility: CLINIC | Age: 68
End: 2018-09-28

## 2018-09-28 VITALS
SYSTOLIC BLOOD PRESSURE: 138 MMHG | BODY MASS INDEX: 48.82 KG/M2 | TEMPERATURE: 99 F | HEIGHT: 65 IN | DIASTOLIC BLOOD PRESSURE: 68 MMHG | WEIGHT: 293 LBS

## 2018-09-28 DIAGNOSIS — M54.2 NECK PAIN: ICD-10-CM

## 2018-09-28 DIAGNOSIS — H90.3 SENSORINEURAL HEARING LOSS, BILATERAL: ICD-10-CM

## 2018-09-28 DIAGNOSIS — R42 DIZZINESS: ICD-10-CM

## 2018-09-28 DIAGNOSIS — R42 DIZZINESS: Primary | ICD-10-CM

## 2018-09-28 PROCEDURE — 92567 TYMPANOMETRY: CPT | Performed by: AUDIOLOGIST

## 2018-09-28 PROCEDURE — 92557 COMPREHENSIVE HEARING TEST: CPT | Performed by: AUDIOLOGIST

## 2018-09-28 PROCEDURE — 99203 OFFICE O/P NEW LOW 30 MIN: CPT | Performed by: OTOLARYNGOLOGY

## 2018-09-28 NOTE — PROGRESS NOTES
AUDIOLOGY REPORT      Nilda Lyons is a 79year old female     Referring Provider: Harjinder Coates   YOB: 1950  Medical Record: [id number]      Patient Hearing History:   Patient reported dizziness.     She does not complain of hearing loss

## 2018-09-28 NOTE — PROGRESS NOTES
Kishan Sousa is a 79year old female.  Patient presents with:  Lump: on right side of back of head, pain travels down to neck for 3-4 wks  Dizziness: early morning, gets dizzy when turns head through the day  Nose Problem: nasal congestion,   Ear Proble daily. Disp:  Rfl:       Past Medical History:   Diagnosis Date   • Diabetes ([address]    • Obesity, unspecified    • Osteoarthritis     In knees   • Other and unspecified hyperlipidemia    • Prediabetes    • Unspecified essential hypertension       Social His cervical. Supraclavicular.    Eyes Normal Conjunctiva - Right: Normal, Left: Normal. Pupil - Right: Normal, Left: Normal.    Ears Normal Inspection - Right: Normal, Left: Normal. Canal - Left: Normal. TM - Right: Normal, Left: Normal.     ASSESSMENT AND HANS

## 2018-10-01 ENCOUNTER — TELEPHONE (OUTPATIENT)
Dept: INTERNAL MEDICINE CLINIC | Facility: CLINIC | Age: 68
End: 2018-10-01

## 2018-10-01 DIAGNOSIS — L98.9 SKIN LESION: Primary | ICD-10-CM

## 2018-10-01 NOTE — PROGRESS NOTES
Patient informed of test results and all LSS' recommendations. Voiced understanding.   Results logged in, path faxed to Dr. Génesis Tilley' office

## 2018-10-01 NOTE — TELEPHONE ENCOUNTER
Dr. Anabel Paul,    Patient called requesting a referral for Dr. Mag Gasca. Per patient she was referred by Pastorajuan luis Roman.  Please advise and sign off on referral.      Thank you, Cassie Arcos Small-Referral Specialist.

## 2018-10-04 ENCOUNTER — TELEPHONE (OUTPATIENT)
Dept: INTERNAL MEDICINE CLINIC | Facility: CLINIC | Age: 68
End: 2018-10-04

## 2018-10-04 ENCOUNTER — IMMUNIZATION (OUTPATIENT)
Dept: INTERNAL MEDICINE CLINIC | Facility: CLINIC | Age: 68
End: 2018-10-04
Payer: MEDICARE

## 2018-10-04 PROCEDURE — 90653 IIV ADJUVANT VACCINE IM: CPT | Performed by: INTERNAL MEDICINE

## 2018-10-04 PROCEDURE — G0009 ADMIN PNEUMOCOCCAL VACCINE: HCPCS | Performed by: INTERNAL MEDICINE

## 2018-10-04 PROCEDURE — G0008 ADMIN INFLUENZA VIRUS VAC: HCPCS | Performed by: INTERNAL MEDICINE

## 2018-10-04 PROCEDURE — 90670 PCV13 VACCINE IM: CPT | Performed by: INTERNAL MEDICINE

## 2018-10-04 NOTE — TELEPHONE ENCOUNTER
Dr. Sagrario Turner pt is asking if she can get prenvar 13 vaccine, pls advise.     Last pneumovax was on Pneumovax 2310/5/2017

## 2018-10-09 RX ORDER — METFORMIN HYDROCHLORIDE 500 MG/1
TABLET, EXTENDED RELEASE ORAL
Qty: 90 TABLET | Refills: 0 | Status: SHIPPED | OUTPATIENT
Start: 2018-10-09 | End: 2019-01-04

## 2018-10-09 RX ORDER — ATORVASTATIN CALCIUM 20 MG/1
TABLET, FILM COATED ORAL
Qty: 90 TABLET | Refills: 0 | Status: SHIPPED | OUTPATIENT
Start: 2018-10-09 | End: 2018-10-31

## 2018-10-22 ENCOUNTER — OFFICE VISIT (OUTPATIENT)
Dept: SURGERY | Facility: CLINIC | Age: 68
End: 2018-10-22
Payer: MEDICARE

## 2018-10-22 DIAGNOSIS — C44.329 SQUAMOUS CELL CARCINOMA OF SKIN OF OTHER PARTS OF FACE: Primary | ICD-10-CM

## 2018-10-22 PROCEDURE — 99203 OFFICE O/P NEW LOW 30 MIN: CPT | Performed by: PLASTIC SURGERY

## 2018-10-22 NOTE — PROGRESS NOTES
Pt request for surgery signed by pt and witnessed and signed by RN. Pt to take OTC meds post-operatively; pt instructed to call the office if post-op pain is not relieved w/OTC meds. Pre-Surgical Instruction Handout given to and reviewed w/pt.   All quest

## 2018-10-22 NOTE — H&P
Dajuan Smith is a 79year old female that presents with   Lesion: SCC to R temple.     REFERRED BY:  JU Sherman    Pacemaker: No  Latex Allergy: no  Coumadin: No  Plavix: No  Other anticoagulants: Asprin 81 mg  Cardiac stents: No    HAND DOMINANCE: w/Device Does not apply Kit USE ONCE DAILY IN AM BEFORE BREAKFAST Disp: 1 kit Rfl: 0   Glucose Blood (ACCU-CHEK ADITYA PLUS) In Vitro Strip USE ONCE DAILY IN THE MORNING BEFORE BREAKFAST Disp: 100 strip Rfl: 3   LANCETS ULTRA THIN Does not apply Misc Use on every 3 days    Substance and Sexual Activity      Alcohol use:  Yes        Alcohol/week: 0.0 oz        Comment: very rarely      Drug use: No      Sexual activity: Not on file    Other Topics      Concerns:         Service: Not Asked        Blood T Physical Exam:     Right temple at hairline: 1 cm pale indented area with minimal lax skin    ASSESSMENT/PLAN:     IMPRESSION:    SCC    We had a long discussion.   I explained to the patient the nature of this lesion / these lesions, as well as treatment o

## 2018-10-22 NOTE — H&P (VIEW-ONLY)
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     REFERRED BY:  JU Baeza    Pacemaker: No  Latex Allergy: no  Coumadin: No  Plavix: No  Other anticoagulants: Asprin 81 mg  Cardiac stents: No    HAND DOMINANCE: w/Device Does not apply Kit USE ONCE DAILY IN AM BEFORE BREAKFAST Disp: 1 kit Rfl: 0   Glucose Blood (ACCU-CHEK ADITYA PLUS) In Vitro Strip USE ONCE DAILY IN THE MORNING BEFORE BREAKFAST Disp: 100 strip Rfl: 3   LANCETS ULTRA THIN Does not apply Misc Use on every 3 days    Substance and Sexual Activity      Alcohol use:  Yes        Alcohol/week: 0.0 oz        Comment: very rarely      Drug use: No      Sexual activity: Not on file    Other Topics      Concerns:         Service: Not Asked        Blood T Physical Exam:     Right temple at hairline: 1 cm pale indented area with minimal lax skin    ASSESSMENT/PLAN:     IMPRESSION:    SCC    We had a long discussion.   I explained to the patient the nature of this lesion / these lesions, as well as treatment o

## 2018-10-24 ENCOUNTER — TELEPHONE (OUTPATIENT)
Dept: SURGERY | Facility: CLINIC | Age: 68
End: 2018-10-24

## 2018-10-24 DIAGNOSIS — C44.329 SQUAMOUS CELL CARCINOMA OF SKIN OF RIGHT TEMPLE: Primary | ICD-10-CM

## 2018-10-31 RX ORDER — ATORVASTATIN CALCIUM 20 MG/1
20 TABLET, FILM COATED ORAL NIGHTLY
COMMUNITY
End: 2019-03-14

## 2018-10-31 RX ORDER — VERAPAMIL HYDROCHLORIDE 240 MG/1
240 TABLET, FILM COATED, EXTENDED RELEASE ORAL NIGHTLY
COMMUNITY
End: 2018-12-05

## 2018-11-06 ENCOUNTER — HOSPITAL DOCUMENTATION (OUTPATIENT)
Dept: SURGERY | Facility: CLINIC | Age: 68
End: 2018-11-06

## 2018-11-06 ENCOUNTER — ANESTHESIA EVENT (OUTPATIENT)
Dept: SURGERY | Facility: HOSPITAL | Age: 68
End: 2018-11-06
Payer: MEDICARE

## 2018-11-06 ENCOUNTER — ANESTHESIA (OUTPATIENT)
Dept: SURGERY | Facility: HOSPITAL | Age: 68
End: 2018-11-06
Payer: MEDICARE

## 2018-11-06 ENCOUNTER — HOSPITAL ENCOUNTER (OUTPATIENT)
Facility: HOSPITAL | Age: 68
Setting detail: HOSPITAL OUTPATIENT SURGERY
Discharge: HOME | End: 2018-11-06
Attending: PLASTIC SURGERY | Admitting: PLASTIC SURGERY
Payer: MEDICARE

## 2018-11-06 VITALS
HEIGHT: 65 IN | WEIGHT: 293 LBS | BODY MASS INDEX: 48.82 KG/M2 | HEART RATE: 78 BPM | OXYGEN SATURATION: 96 % | SYSTOLIC BLOOD PRESSURE: 151 MMHG | DIASTOLIC BLOOD PRESSURE: 81 MMHG | TEMPERATURE: 98 F | RESPIRATION RATE: 16 BRPM

## 2018-11-06 DIAGNOSIS — C44.329 SQUAMOUS CELL CARCINOMA OF SKIN OF RIGHT TEMPLE: Primary | ICD-10-CM

## 2018-11-06 PROCEDURE — 0HX1XZZ TRANSFER FACE SKIN, EXTERNAL APPROACH: ICD-10-PCS | Performed by: PLASTIC SURGERY

## 2018-11-06 PROCEDURE — 14040 TIS TRNFR F/C/C/M/N/A/G/H/F: CPT | Performed by: PLASTIC SURGERY

## 2018-11-06 PROCEDURE — 0HB1XZZ EXCISION OF FACE SKIN, EXTERNAL APPROACH: ICD-10-PCS | Performed by: PLASTIC SURGERY

## 2018-11-06 RX ORDER — METOCLOPRAMIDE 10 MG/1
10 TABLET ORAL ONCE
Status: COMPLETED | OUTPATIENT
Start: 2018-11-06 | End: 2018-11-06

## 2018-11-06 RX ORDER — HYDROCODONE BITARTRATE AND ACETAMINOPHEN 5; 325 MG/1; MG/1
1 TABLET ORAL AS NEEDED
Status: DISCONTINUED | OUTPATIENT
Start: 2018-11-06 | End: 2018-11-06

## 2018-11-06 RX ORDER — SODIUM CHLORIDE, SODIUM LACTATE, POTASSIUM CHLORIDE, CALCIUM CHLORIDE 600; 310; 30; 20 MG/100ML; MG/100ML; MG/100ML; MG/100ML
INJECTION, SOLUTION INTRAVENOUS CONTINUOUS
Status: DISCONTINUED | OUTPATIENT
Start: 2018-11-06 | End: 2018-11-06

## 2018-11-06 RX ORDER — MORPHINE SULFATE 4 MG/ML
4 INJECTION, SOLUTION INTRAMUSCULAR; INTRAVENOUS EVERY 10 MIN PRN
Status: DISCONTINUED | OUTPATIENT
Start: 2018-11-06 | End: 2018-11-06

## 2018-11-06 RX ORDER — DEXTROSE MONOHYDRATE 25 G/50ML
50 INJECTION, SOLUTION INTRAVENOUS
Status: DISCONTINUED | OUTPATIENT
Start: 2018-11-06 | End: 2018-11-06

## 2018-11-06 RX ORDER — METOPROLOL TARTRATE 5 MG/5ML
2.5 INJECTION INTRAVENOUS ONCE
Status: DISCONTINUED | OUTPATIENT
Start: 2018-11-06 | End: 2018-11-06

## 2018-11-06 RX ORDER — LIDOCAINE HYDROCHLORIDE 10 MG/ML
INJECTION, SOLUTION EPIDURAL; INFILTRATION; INTRACAUDAL; PERINEURAL AS NEEDED
Status: DISCONTINUED | OUTPATIENT
Start: 2018-11-06 | End: 2018-11-06 | Stop reason: SURG

## 2018-11-06 RX ORDER — MORPHINE SULFATE 10 MG/ML
6 INJECTION, SOLUTION INTRAMUSCULAR; INTRAVENOUS EVERY 10 MIN PRN
Status: DISCONTINUED | OUTPATIENT
Start: 2018-11-06 | End: 2018-11-06

## 2018-11-06 RX ORDER — HALOPERIDOL 5 MG/ML
0.25 INJECTION INTRAMUSCULAR ONCE AS NEEDED
Status: DISCONTINUED | OUTPATIENT
Start: 2018-11-06 | End: 2018-11-06

## 2018-11-06 RX ORDER — HYDROCODONE BITARTRATE AND ACETAMINOPHEN 5; 325 MG/1; MG/1
2 TABLET ORAL AS NEEDED
Status: DISCONTINUED | OUTPATIENT
Start: 2018-11-06 | End: 2018-11-06

## 2018-11-06 RX ORDER — NALOXONE HYDROCHLORIDE 0.4 MG/ML
80 INJECTION, SOLUTION INTRAMUSCULAR; INTRAVENOUS; SUBCUTANEOUS AS NEEDED
Status: DISCONTINUED | OUTPATIENT
Start: 2018-11-06 | End: 2018-11-06

## 2018-11-06 RX ORDER — LIDOCAINE HYDROCHLORIDE AND EPINEPHRINE 5; 5 MG/ML; UG/ML
INJECTION, SOLUTION INFILTRATION; PERINEURAL AS NEEDED
Status: DISCONTINUED | OUTPATIENT
Start: 2018-11-06 | End: 2018-11-06 | Stop reason: HOSPADM

## 2018-11-06 RX ORDER — FAMOTIDINE 20 MG/1
20 TABLET ORAL ONCE
Status: COMPLETED | OUTPATIENT
Start: 2018-11-06 | End: 2018-11-06

## 2018-11-06 RX ORDER — ONDANSETRON 2 MG/ML
4 INJECTION INTRAMUSCULAR; INTRAVENOUS ONCE AS NEEDED
Status: DISCONTINUED | OUTPATIENT
Start: 2018-11-06 | End: 2018-11-06

## 2018-11-06 RX ORDER — MORPHINE SULFATE 4 MG/ML
2 INJECTION, SOLUTION INTRAMUSCULAR; INTRAVENOUS EVERY 10 MIN PRN
Status: DISCONTINUED | OUTPATIENT
Start: 2018-11-06 | End: 2018-11-06

## 2018-11-06 RX ORDER — ACETAMINOPHEN 500 MG
1000 TABLET ORAL ONCE
Status: COMPLETED | OUTPATIENT
Start: 2018-11-06 | End: 2018-11-06

## 2018-11-06 RX ADMIN — SODIUM CHLORIDE, SODIUM LACTATE, POTASSIUM CHLORIDE, CALCIUM CHLORIDE: 600; 310; 30; 20 INJECTION, SOLUTION INTRAVENOUS at 14:27:00

## 2018-11-06 RX ADMIN — LIDOCAINE HYDROCHLORIDE 50 MG: 10 INJECTION, SOLUTION EPIDURAL; INFILTRATION; INTRACAUDAL; PERINEURAL at 14:27:00

## 2018-11-06 RX ADMIN — SODIUM CHLORIDE, SODIUM LACTATE, POTASSIUM CHLORIDE, CALCIUM CHLORIDE: 600; 310; 30; 20 INJECTION, SOLUTION INTRAVENOUS at 15:33:00

## 2018-11-06 NOTE — INTERVAL H&P NOTE
Pre-op Diagnosis: Squamous cell carcinoma    The above referenced H&P was reviewed by Sheldon Ayon MD on 11/6/2018, the patient was examined and no significant changes have occurred in the patient's condition since the H&P was performed.   I discu

## 2018-11-06 NOTE — BRIEF OP NOTE
Pre-Operative Diagnosis: Squamous cell carcinoma     Post-Operative Diagnosis: Squamous cell carcinoma      Procedure Performed:   Procedure(s):   Wide excision lesion(s) right temple, frozen section, flap reconstruction    Surgeon(s) and Role:     Karina Rushing

## 2018-11-06 NOTE — ANESTHESIA POSTPROCEDURE EVALUATION
Patient: Cristino Marr    Procedure Summary     Date:  11/06/18 Room / Location:  [address] [place] [address] MAIN OR 01 / 300 [facility] MAIN OR    Anesthesia Start:  6028 Anesthesia Stop:  9316    Procedure:  EXCISION LESION HEAD/NECK/FACE (Right ) Diagnosis:  (Squamous cell carc

## 2018-11-06 NOTE — ANESTHESIA PREPROCEDURE EVALUATION
Anesthesia PreOp Note    HPI:     Karla Valero is a 79year old female who presents for preoperative consultation requested by: Alessia Sandoval MD    Date of Surgery: 11/6/2018    Procedure(s):  EXCISION LESION HEAD/NECK/FACE  Indication: Oral Tablet 24 Hr TAKE 1 TABLET BY MOUTH EVERY DAY Disp: 90 tablet Rfl: 0 11/5/2018 at 1700   SPIRONOLACTONE-HCTZ 25-25 MG Oral Tab TAKE 1 TABLET BY MOUTH EVERY DAY Disp: 90 tablet Rfl: 0 11/5/2018 at 0500   metoprolol Tartrate 25 MG Oral Tab TAKE 1 TABLET mcg/kg/min at 11/06/18 1427     No current [facility]-ordered outpatient medications on file.       Erythromycin                Family History   Problem Relation Age of Onset   • Hypertension Father    • Stroke Father    • Heart Attack Mother      Social History great deal of time in the sun: No        Past Sunlamp Treatments for Acne: Not Asked        Hx of Spending [place] of Time in Sun: No        Bad sunburns in the past: Yes        Tanning Salons in the Past: No        Hx of Radiation Treatments: No including possible dental damage if relevant, major complications, and any alternative forms of anesthetic management. All of the patient's questions were answered to the best of my ability. The patient desires the anesthetic management as planned.   BARBARA

## 2018-11-07 ENCOUNTER — TELEPHONE (OUTPATIENT)
Dept: SURGERY | Facility: CLINIC | Age: 68
End: 2018-11-07

## 2018-11-07 NOTE — OPERATIVE REPORT
[facility]    PATIENT'S NAME: Ines Drew   ATTENDING PHYSICIAN: Wendy Metzger MD   OPERATING PHYSICIAN: Wendy Metzger MD   PATIENT ACCOUNT#:   [id number]    LOCATION:  [place] [address]  MEDICAL RECORD #:    MD JU Castaneda. MD Corina Ambrosio.  Emily Shirley MD

## 2018-11-07 NOTE — TELEPHONE ENCOUNTER
Spoke with patient for surgical follow up from surgery 11/6/18 for removal of SCC to R temple  Has mild discomfort to surgical site rates 2/10 taking Tylenol as needed last dose at 2pm 500mg by mouth, discomfort tolerable  Denies s/s infection, steri-strip

## 2018-11-09 ENCOUNTER — NURSE ONLY (OUTPATIENT)
Dept: SURGERY | Facility: CLINIC | Age: 68
End: 2018-11-09
Payer: MEDICARE

## 2018-11-09 DIAGNOSIS — C44.329 SQUAMOUS CELL CARCINOMA OF SKIN OF RIGHT TEMPLE: ICD-10-CM

## 2018-11-09 DIAGNOSIS — Z48.02 ENCOUNTER FOR REMOVAL OF SUTURES: Primary | ICD-10-CM

## 2018-11-09 PROCEDURE — G0463 HOSPITAL OUTPT CLINIC VISIT: HCPCS | Performed by: PLASTIC SURGERY

## 2018-11-09 NOTE — PROGRESS NOTES
Surgery 1: R temple SCC  - Date: 11/06/18  - Days Since: 3    Pt here for suture removal to R temple . Pt identified w/2 identifiers and orders verified. Pt presents w/steri-strip C/D/I. Pt denies c/o pain, use of analgesics, and s/s infection.   Steri-s

## 2018-11-14 ENCOUNTER — PATIENT MESSAGE (OUTPATIENT)
Dept: INTERNAL MEDICINE CLINIC | Facility: CLINIC | Age: 68
End: 2018-11-14

## 2018-11-14 DIAGNOSIS — C44.329 SQUAMOUS CELL CARCINOMA OF SKIN OF OTHER PARTS OF FACE: Primary | ICD-10-CM

## 2018-11-15 NOTE — TELEPHONE ENCOUNTER
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  Jourdan Arreaga

## 2018-11-16 ENCOUNTER — PATIENT MESSAGE (OUTPATIENT)
Dept: INTERNAL MEDICINE CLINIC | Facility: CLINIC | Age: 68
End: 2018-11-16

## 2018-11-16 DIAGNOSIS — H26.9 CATARACT OF BOTH EYES, UNSPECIFIED CATARACT TYPE: ICD-10-CM

## 2018-11-16 DIAGNOSIS — Z09 FOLLOW UP: Primary | ICD-10-CM

## 2018-11-17 NOTE — TELEPHONE ENCOUNTER
From: Elizbeth Gosselin  To: Alla Dean MD  Sent: 11/16/2018 3:43 PM CST  Subject: Referral Request    On 11/14 I sent two different referral requests - got an answer back for the dermatologist (thank you) but still haven't heard about my request

## 2018-12-05 NOTE — TELEPHONE ENCOUNTER
Pharmacy/Jacinda     They are troubling faxing the refill request.       Current Outpatient Medications:  Verapamil HCl  MG Oral Tab CR Take 240 mg by mouth nightly.  Disp:  Rfl:

## 2018-12-07 RX ORDER — VERAPAMIL HYDROCHLORIDE 240 MG/1
240 TABLET, FILM COATED, EXTENDED RELEASE ORAL NIGHTLY
Qty: 90 TABLET | Refills: 0 | Status: SHIPPED | OUTPATIENT
Start: 2018-12-07 | End: 2019-03-08

## 2018-12-07 NOTE — TELEPHONE ENCOUNTER
Please review; protocol failed.     Hypertensive Medications  Protocol Criteria:  · Appointment scheduled in the past 6 months or in the next 3 months  · BMP or CMP in the past 12 months  · Creatinine result < 2  Recent Outpatient Visits            3 weeks

## 2019-01-13 RX ORDER — METFORMIN HYDROCHLORIDE 500 MG/1
500 TABLET, EXTENDED RELEASE ORAL
Qty: 90 TABLET | Refills: 0 | OUTPATIENT
Start: 2019-01-13

## 2019-01-13 RX ORDER — METFORMIN HYDROCHLORIDE 500 MG/1
500 TABLET, EXTENDED RELEASE ORAL
Qty: 90 TABLET | Refills: 1 | Status: SHIPPED | OUTPATIENT
Start: 2019-01-13 | End: 2019-11-08

## 2019-01-13 RX ORDER — METFORMIN HYDROCHLORIDE 500 MG/1
TABLET, EXTENDED RELEASE ORAL
Qty: 90 TABLET | Refills: 0 | Status: SHIPPED | OUTPATIENT
Start: 2019-01-13 | End: 2019-08-07

## 2019-01-21 ENCOUNTER — PATIENT MESSAGE (OUTPATIENT)
Dept: INTERNAL MEDICINE CLINIC | Facility: CLINIC | Age: 69
End: 2019-01-21

## 2019-01-22 RX ORDER — ATORVASTATIN CALCIUM 20 MG/1
TABLET, FILM COATED ORAL
Qty: 90 TABLET | Refills: 0 | Status: SHIPPED | OUTPATIENT
Start: 2019-01-22 | End: 2019-04-14

## 2019-01-22 NOTE — TELEPHONE ENCOUNTER
From: Destiny Francisco  To: Abdi Castillo MD  Sent: 1/21/2019 5:09 PM CST  Subject: Prescription Question    I just tried to refill my Rx for Atorvastatin 20mg tablet only to find that I am unable to do so via My Chart and [facility] says my refills there

## 2019-02-01 NOTE — TELEPHONE ENCOUNTER
Please review; protocol failed.   Hypertensive Medications  Protocol Criteria:  · Appointment scheduled in the past 6 months or in the next 3 months  · BMP or CMP in the past 12 months  · Creatinine result < 2  Recent Outpatient Visits            2 months a

## 2019-02-04 ENCOUNTER — TELEPHONE (OUTPATIENT)
Dept: OTHER | Age: 69
End: 2019-02-04

## 2019-02-04 ENCOUNTER — TELEPHONE (OUTPATIENT)
Dept: INTERNAL MEDICINE CLINIC | Facility: CLINIC | Age: 69
End: 2019-02-04

## 2019-02-04 ENCOUNTER — OFFICE VISIT (OUTPATIENT)
Dept: INTERNAL MEDICINE CLINIC | Facility: CLINIC | Age: 69
End: 2019-02-04
Payer: MEDICARE

## 2019-02-04 ENCOUNTER — HOSPITAL ENCOUNTER (OUTPATIENT)
Dept: GENERAL RADIOLOGY | Age: 69
Discharge: HOME | End: 2019-02-04
Attending: INTERNAL MEDICINE
Payer: MEDICARE

## 2019-02-04 VITALS
DIASTOLIC BLOOD PRESSURE: 84 MMHG | SYSTOLIC BLOOD PRESSURE: 150 MMHG | TEMPERATURE: 101 F | WEIGHT: 293 LBS | HEART RATE: 99 BPM | BODY MASS INDEX: 51 KG/M2

## 2019-02-04 DIAGNOSIS — R05.9 COUGH: ICD-10-CM

## 2019-02-04 DIAGNOSIS — R05.9 COUGH: Primary | ICD-10-CM

## 2019-02-04 PROBLEM — N18.30 CKD STAGE 3 DUE TO TYPE 2 DIABETES MELLITUS (HCC): Chronic | Status: ACTIVE | Noted: 2019-02-04

## 2019-02-04 PROBLEM — N18.30 CKD (CHRONIC KIDNEY DISEASE) STAGE 3, GFR 30-59 ML/MIN (HCC): Chronic | Status: ACTIVE | Noted: 2019-02-04

## 2019-02-04 PROBLEM — E11.22 CKD STAGE 3 DUE TO TYPE 2 DIABETES MELLITUS (HCC): Chronic | Status: ACTIVE | Noted: 2019-02-04

## 2019-02-04 PROCEDURE — G0463 HOSPITAL OUTPT CLINIC VISIT: HCPCS | Performed by: INTERNAL MEDICINE

## 2019-02-04 PROCEDURE — 99214 OFFICE O/P EST MOD 30 MIN: CPT | Performed by: INTERNAL MEDICINE

## 2019-02-04 PROCEDURE — 71046 X-RAY EXAM CHEST 2 VIEWS: CPT | Performed by: INTERNAL MEDICINE

## 2019-02-04 RX ORDER — LEVOFLOXACIN 500 MG/1
500 TABLET, FILM COATED ORAL DAILY
Qty: 10 TABLET | Refills: 0 | Status: SHIPPED | OUTPATIENT
Start: 2019-02-04 | End: 2019-02-14

## 2019-02-04 RX ORDER — ALBUTEROL SULFATE 90 UG/1
2 AEROSOL, METERED RESPIRATORY (INHALATION) EVERY 4 HOURS PRN
Qty: 1 INHALER | Refills: 6 | Status: SHIPPED | OUTPATIENT
Start: 2019-02-04 | End: 2019-04-22

## 2019-02-04 NOTE — PROGRESS NOTES
HPI:    Patient ID: Apollo Gallagher is a 79year old female. HPI  Sore Throat  Pt c/o sore throat. This is a new problem; Onset: 01/30/2019. Associated Sx: cough, wheezing on 02/03/2019, voice change, and fevers of 99, 101.5.  Pt reports that she Performed by Margarito Cohen MD at [facility] MAIN OR      Family History   Problem Relation Age of Onset   • Hypertension Father    • Stroke Father    • Heart Attack Mother       Social History    Tobacco Use      Smoking status: Former Smoker        Pack (MOTRIN) 800 MG Oral Tab Take 800 mg by mouth every 6 (six) hours as needed for Pain. Disp:  Rfl:    aspirin (ASPIR-81) 81 MG Oral Tab EC Take 1 tablet by mouth daily.  Disp:  Rfl:    Multiple Vitamins-Minerals (CENTRUM SILVER) Oral Tab Take 1 tablet by brianna CHOLEST 118 09/13/2018    CHOLEST 136 08/26/2017    CHOLEST 144 04/30/2016     Lab Results   Component Value Date    HDL 47 09/13/2018    HDL 45 08/26/2017    HDL 42 04/30/2016     Lab Results   Component Value Date    LDL 48 09/13/2018    LDL 57 08/26/201 the chart and discharge instructions (if applicable) and agree that the record reflects my personal performance and is accurate and complete.   Eitan Jeffries MD, 2/4/2019, 8:51 PM

## 2019-02-04 NOTE — TELEPHONE ENCOUNTER
Patient stated she was seen today in the office and an antibiotic was to be called in. She is at the pharmacy and no antibiotic noted. Please advise.

## 2019-02-08 ENCOUNTER — OFFICE VISIT (OUTPATIENT)
Dept: INTERNAL MEDICINE CLINIC | Facility: CLINIC | Age: 69
End: 2019-02-08
Payer: MEDICARE

## 2019-02-08 VITALS
WEIGHT: 293 LBS | BODY MASS INDEX: 51 KG/M2 | TEMPERATURE: 98 F | SYSTOLIC BLOOD PRESSURE: 136 MMHG | HEART RATE: 99 BPM | OXYGEN SATURATION: 98 % | DIASTOLIC BLOOD PRESSURE: 83 MMHG

## 2019-02-08 DIAGNOSIS — E11.69 DIABETES MELLITUS TYPE 2 IN OBESE (HCC): ICD-10-CM

## 2019-02-08 DIAGNOSIS — E66.9 DIABETES MELLITUS TYPE 2 IN OBESE (HCC): ICD-10-CM

## 2019-02-08 DIAGNOSIS — J40 BRONCHITIS: Primary | ICD-10-CM

## 2019-02-08 DIAGNOSIS — E66.01 MORBID OBESITY DUE TO EXCESS CALORIES (HCC): ICD-10-CM

## 2019-02-08 PROCEDURE — G0463 HOSPITAL OUTPT CLINIC VISIT: HCPCS | Performed by: INTERNAL MEDICINE

## 2019-02-08 PROCEDURE — 99213 OFFICE O/P EST LOW 20 MIN: CPT | Performed by: INTERNAL MEDICINE

## 2019-02-08 NOTE — PROGRESS NOTES
HPI:    Patient ID: Destiny Francisco is a 79year old female. Patient presents with:  Cough: fup     HPI  Cough  Pt c/o non-productive, \"hacking\" cough with associated voice change.  Pt reports that since her last Office Visit on 02/04/2019, she ha Negative for behavioral problems, hallucinations, self-injury and suicidal ideas. Current Outpatient Medications:  Dextromethorphan Polistirex (ROBITUSSIN 12 HOUR COUGH OR) Take by mouth.  Disp:  Rfl:    Albuterol Sulfate HFA (VENTOLIN HFA) 108 (90 Rfl:      Allergies:  Erythromycin               PHYSICAL EXAM:   Physical Exam   Constitutional: She is oriented to person, place, and time. She appears well-developed and well-nourished. Face mask in place. HENT:   Head: Normocephalic and atraumatic. 08/26/2017    LDL 68 04/30/2016     Lab Results   Component Value Date    TRIG 114 09/13/2018    TRIG 172 (H) 08/26/2017    TRIG 170 (H) 04/30/2016     Lab Results   Component Value Date    AST 33 08/26/2017    AST 27 04/30/2016    AST 27 09/19/2015     La Visit:  Requested Prescriptions      No prescriptions requested or ordered in this encounter       Imaging & Referrals:  None       ID#[id number]  By signing my name below, Estuardo Lim,  attest that this documentation has been prepared under the directio

## 2019-02-15 ENCOUNTER — PATIENT OUTREACH (OUTPATIENT)
Dept: CASE MANAGEMENT | Age: 69
End: 2019-02-15

## 2019-02-15 NOTE — PROGRESS NOTES
Pt informed is eligible for 2019 Medicare Annual Health Assessment visit, requested a call back in mid March to schedule.

## 2019-02-19 RX ORDER — SPIRONOLACTONE AND HYDROCHLOROTHIAZIDE 25; 25 MG/1; MG/1
TABLET ORAL
Qty: 90 TABLET | Refills: 0 | Status: SHIPPED | OUTPATIENT
Start: 2019-02-19 | End: 2019-05-18

## 2019-03-09 NOTE — TELEPHONE ENCOUNTER
Please review; protocol failed.     Hypertensive Medications  Protocol Criteria:  · Appointment scheduled in the past 6 months or in the next 3 months  · BMP or CMP in the past 12 months  · Creatinine result < 2  Recent Outpatient Visits            4 weeks

## 2019-03-12 RX ORDER — VERAPAMIL HYDROCHLORIDE 240 MG/1
240 TABLET, FILM COATED, EXTENDED RELEASE ORAL NIGHTLY
Qty: 90 TABLET | Refills: 0 | Status: SHIPPED | OUTPATIENT
Start: 2019-03-12 | End: 2019-06-06

## 2019-03-14 ENCOUNTER — OFFICE VISIT (OUTPATIENT)
Dept: DERMATOLOGY CLINIC | Facility: CLINIC | Age: 69
End: 2019-03-14
Payer: MEDICARE

## 2019-03-14 DIAGNOSIS — D23.30 BENIGN NEOPLASM OF SKIN OF FACE: ICD-10-CM

## 2019-03-14 DIAGNOSIS — D17.22 LIPOMA OF LEFT UPPER EXTREMITY: ICD-10-CM

## 2019-03-14 DIAGNOSIS — L57.8 SUN-DAMAGED SKIN: ICD-10-CM

## 2019-03-14 DIAGNOSIS — D23.60 BENIGN NEOPLASM OF SKIN OF UPPER EXTREMITY AND SHOULDER, UNSPECIFIED LATERALITY: ICD-10-CM

## 2019-03-14 DIAGNOSIS — D23.4 BENIGN NEOPLASM OF SCALP AND SKIN OF NECK: ICD-10-CM

## 2019-03-14 DIAGNOSIS — D23.5 BENIGN NEOPLASM OF SKIN OF TRUNK, EXCEPT SCROTUM: ICD-10-CM

## 2019-03-14 DIAGNOSIS — Z85.828 PERSONAL HISTORY OF SKIN CANCER: Primary | ICD-10-CM

## 2019-03-14 DIAGNOSIS — L82.1 SEBORRHEIC KERATOSES: ICD-10-CM

## 2019-03-14 DIAGNOSIS — L73.2 HIDRADENITIS SUPPURATIVA: ICD-10-CM

## 2019-03-14 PROCEDURE — 99213 OFFICE O/P EST LOW 20 MIN: CPT | Performed by: DERMATOLOGY

## 2019-03-14 PROCEDURE — G0463 HOSPITAL OUTPT CLINIC VISIT: HCPCS | Performed by: DERMATOLOGY

## 2019-03-14 RX ORDER — CLINDAMYCIN PHOSPHATE 10 MG/ML
1 SOLUTION TOPICAL DAILY
Qty: 60 EACH | Refills: 3 | Status: SHIPPED | OUTPATIENT
Start: 2019-03-14

## 2019-03-14 NOTE — PROGRESS NOTES
HPI:     Chief Complaint     Full Skin Exam        HPI     Full Skin Exam      Additional comments: LOV 9/27/2018. Pt preseting for full body skin exam. Pt has a personal hx of AKs and SCC to R temple (2018).            Last edited by Miles Licona LPN MOUTH EVERY DAY Disp: 90 tablet Rfl: 0   MetFORMIN HCl  MG Oral Tablet 24 Hr Take 1 tablet (500 mg total) by mouth once daily.  Disp: 90 tablet Rfl: 1   METFORMIN HCL  MG Oral Tablet 24 Hr TAKE 1 TABLET BY MOUTH EVERY DAY Disp: 90 tablet Rfl: 0 Dav Ryan MD at [address] [place] [address] MAIN OR     Social History    Socioeconomic History      Marital status:       Spouse name: Not on file      Number of children: Not on file      Years of education: Not on file      Highest education level: Not on file    Occupat Special Diet: Not Asked        Back Care: Not Asked        Exercise: Not Asked        Bike Helmet: Not Asked        Seat Belt: Not Asked        Self-Exams: Not Asked        Grew up on a farm: Not Asked        History of tanning: No        Outdoor occupa suspicious lesions. In light of recent squamous cell excision would like to do upper body check in 6 months.   Patient understands to come sooner if notes something new or changing  Seborrheic keratoses diagnosis discussed–reassurance–no treatment-  Lipoma

## 2019-03-19 ENCOUNTER — PATIENT OUTREACH (OUTPATIENT)
Dept: CASE MANAGEMENT | Age: 69
End: 2019-03-19

## 2019-04-16 NOTE — TELEPHONE ENCOUNTER
Requested Prescriptions     Pending Prescriptions Disp Refills   • atorvastatin 20 MG Oral Tab 90 tablet 1     Sig: TAKE 1 TABLET (20MG) BY MOUTH EVERY DAY         Recent Visits  Date Type Provider Dept   02/08/19 Office Visit Clara Mcguire MD [facility]

## 2019-04-17 ENCOUNTER — MA CHART PREP (OUTPATIENT)
Dept: FAMILY MEDICINE CLINIC | Facility: CLINIC | Age: 69
End: 2019-04-17

## 2019-04-17 PROBLEM — N18.30 TYPE 2 DIABETES MELLITUS WITH STAGE 3 CHRONIC KIDNEY DISEASE, WITHOUT LONG-TERM CURRENT USE OF INSULIN (HCC): Status: ACTIVE | Noted: 2019-04-17

## 2019-04-17 PROBLEM — I70.0 AORTIC ATHEROSCLEROSIS: Status: ACTIVE | Noted: 2019-04-17

## 2019-04-17 PROBLEM — E11.22 TYPE 2 DIABETES MELLITUS WITH STAGE 3 CHRONIC KIDNEY DISEASE, WITHOUT LONG-TERM CURRENT USE OF INSULIN (HCC): Status: ACTIVE | Noted: 2019-04-17

## 2019-04-17 PROBLEM — I70.0 AORTIC ATHEROSCLEROSIS (HCC): Status: ACTIVE | Noted: 2019-04-17

## 2019-04-19 RX ORDER — ATORVASTATIN CALCIUM 20 MG/1
TABLET, FILM COATED ORAL
Qty: 90 TABLET | Refills: 1 | Status: SHIPPED | OUTPATIENT
Start: 2019-04-19 | End: 2019-08-07

## 2019-04-22 ENCOUNTER — HOSPITAL ENCOUNTER (OUTPATIENT)
Dept: GENERAL RADIOLOGY | Age: 69
Discharge: HOME | End: 2019-04-22
Attending: INTERNAL MEDICINE
Payer: MEDICARE

## 2019-04-22 ENCOUNTER — OFFICE VISIT (OUTPATIENT)
Dept: INTERNAL MEDICINE CLINIC | Facility: CLINIC | Age: 69
End: 2019-04-22
Payer: MEDICARE

## 2019-04-22 VITALS
DIASTOLIC BLOOD PRESSURE: 80 MMHG | BODY MASS INDEX: 48.82 KG/M2 | WEIGHT: 293 LBS | SYSTOLIC BLOOD PRESSURE: 139 MMHG | HEIGHT: 65 IN | TEMPERATURE: 99 F | HEART RATE: 81 BPM

## 2019-04-22 DIAGNOSIS — G89.29 CHRONIC PAIN OF BOTH KNEES: ICD-10-CM

## 2019-04-22 DIAGNOSIS — M25.562 LEFT KNEE PAIN, UNSPECIFIED CHRONICITY: ICD-10-CM

## 2019-04-22 DIAGNOSIS — M54.2 NECK PAIN: ICD-10-CM

## 2019-04-22 DIAGNOSIS — G89.29 OTHER CHRONIC PAIN: ICD-10-CM

## 2019-04-22 DIAGNOSIS — E11.22 TYPE 2 DIABETES MELLITUS WITH STAGE 3 CHRONIC KIDNEY DISEASE, WITHOUT LONG-TERM CURRENT USE OF INSULIN (HCC): ICD-10-CM

## 2019-04-22 DIAGNOSIS — Z00.00 ROUTINE GENERAL MEDICAL EXAMINATION AT A HEALTH CARE FACILITY: Primary | ICD-10-CM

## 2019-04-22 DIAGNOSIS — M25.561 CHRONIC PAIN OF BOTH KNEES: ICD-10-CM

## 2019-04-22 DIAGNOSIS — N18.30 CKD (CHRONIC KIDNEY DISEASE) STAGE 3, GFR 30-59 ML/MIN (HCC): ICD-10-CM

## 2019-04-22 DIAGNOSIS — Z20.5 EXPOSURE TO HEPATITIS C: ICD-10-CM

## 2019-04-22 DIAGNOSIS — E78.00 PURE HYPERCHOLESTEROLEMIA: ICD-10-CM

## 2019-04-22 DIAGNOSIS — M25.562 CHRONIC PAIN OF BOTH KNEES: ICD-10-CM

## 2019-04-22 DIAGNOSIS — N18.30 TYPE 2 DIABETES MELLITUS WITH STAGE 3 CHRONIC KIDNEY DISEASE, WITHOUT LONG-TERM CURRENT USE OF INSULIN (HCC): ICD-10-CM

## 2019-04-22 DIAGNOSIS — E66.01 MORBID OBESITY DUE TO EXCESS CALORIES (HCC): ICD-10-CM

## 2019-04-22 DIAGNOSIS — I70.0 AORTIC ATHEROSCLEROSIS (HCC): ICD-10-CM

## 2019-04-22 DIAGNOSIS — M25.561 RIGHT KNEE PAIN, UNSPECIFIED CHRONICITY: ICD-10-CM

## 2019-04-22 PROCEDURE — G0439 PPPS, SUBSEQ VISIT: HCPCS | Performed by: INTERNAL MEDICINE

## 2019-04-22 PROCEDURE — 73562 X-RAY EXAM OF KNEE 3: CPT | Performed by: INTERNAL MEDICINE

## 2019-04-22 PROCEDURE — 72050 X-RAY EXAM NECK SPINE 4/5VWS: CPT | Performed by: INTERNAL MEDICINE

## 2019-04-22 NOTE — PROGRESS NOTES
HPI:    Patient ID: Jm Bello is a 79year old female. Patient presents with:  Physical: Medicare px. needs all meds refilled. HPI  Medicare Annual Wellness Visit  Patient presents today for her Medicare Annual Wellness Examination.  Gabriela 02/04/2019 XR CHEST PA + LAT CHEST, \"Atherosclerosis. Hyperinflation.) Patient is currently taking MetFORMIN HCl  MG Oral Tab for this condition. There are no compliance problems.  Risk factors for CAD include dyslipidemia, obesity, tobacco exposure suggests BPPV [with] slowly improving [symptoms]. .. symptoms are at least partially stress related and the dizziness may even be a potion of the same. \" Patient does not take any nonsteroidal medications or allergy medications, per recommendation of Dr. Burke Barahona CHOLECYSTECTOMY     • EXCISION LESION HEAD/NECK/FACE Right 11/6/2018    Performed by Brittany Couch MD at [address] [place] [address] MAIN OR      Family History   Problem Relation Age of Onset   • Hypertension Father    • Stroke Father    • Heart Attack Mother       Soci 0   ibuprofen (MOTRIN) 800 MG Oral Tab Take 800 mg by mouth every 6 (six) hours as needed for Pain. Disp:  Rfl:    aspirin (ASPIR-81) 81 MG Oral Tab EC Take 1 tablet by mouth daily.  Disp:  Rfl:    Multiple Vitamins-Minerals (CENTRUM SILVER) Oral Tab Take 1 Cervical back: She exhibits no tenderness. Lymphadenopathy:     She has no cervical adenopathy. Neurological: She is alert and oriented to person, place, and time. She has normal reflexes. She displays normal reflexes.  No cranial nerve deficit or sens do you maintain positive mental well-being?: Social Interaction;Puzzles; Visiting Friends; Visiting Family;Games    If you are a male age 38-65 or a female age 47-67, do you take aspirin?: Yes    Have you had any immunizations at another office such as Influ over the telephone:  No I have trouble following the conversations when two or more people are talking at the same time:  Sometimes   I have trouble understanding things on the TV:  No I have to strain to understand conversations:  No   I have to worry abo (FSB)Annually Glucose (mg/dL)   Date Value   09/30/2017 158 (H)     GLUCOSE (P) (mg/dL)   Date Value   04/30/2016 121 (H)    No flowsheet data found.    Cardiovascular Disease Screening     LDL Annually LDL Cholesterol (mg/dL)   Date Value   09/13/2018 48 applicable     SPECIFIC DISEASE MONITORING Internal Lab or Procedure External Lab or Procedure   Annual Monitoring of Persistent     Medications (ACE/ARB, digoxin, diuretics)    Potassium  Annually Potassium (mmol/L)   Date Value   08/26/2017 3.8     SOFIE flights up stairs to front door with groceries in hand during warm/humid season. Patient reports smoking cessation successfully since 01/30/2018; Patient is a Former Smoker, 22.5 pack years.  Patient continues to F/U with Dermatologist Dr. Patricia Bedoya for hypercholesterolemia  Plan: 09/13/2018 Lipid Panel: CHOLEST 118, HDL 47, LDL 48, TRIG 114. Pertinent negatives include no associated CP nor unexpected weight change.  Patient is currently taking atorvastatin 20 MG Oral Tab qd for this condition, and was ins Platelet [E]      Meds This Visit:  Requested Prescriptions      No prescriptions requested or ordered in this encounter       Imaging & Referrals:  XR KNEES (BILATERAL) - THREE VIEWS [facility] [place]) (MIX=[id number])       #[phone]  By signing my name below, I, Ace Roberts

## 2019-05-03 ENCOUNTER — LAB ENCOUNTER (OUTPATIENT)
Dept: LAB | Facility: HOSPITAL | Age: 69
End: 2019-05-03
Attending: INTERNAL MEDICINE
Payer: MEDICARE

## 2019-05-03 DIAGNOSIS — N18.30 TYPE 2 DIABETES MELLITUS WITH STAGE 3 CHRONIC KIDNEY DISEASE, WITHOUT LONG-TERM CURRENT USE OF INSULIN (HCC): ICD-10-CM

## 2019-05-03 DIAGNOSIS — E66.01 MORBID OBESITY DUE TO EXCESS CALORIES (HCC): ICD-10-CM

## 2019-05-03 DIAGNOSIS — E78.00 PURE HYPERCHOLESTEROLEMIA: ICD-10-CM

## 2019-05-03 DIAGNOSIS — E11.22 TYPE 2 DIABETES MELLITUS WITH STAGE 3 CHRONIC KIDNEY DISEASE, WITHOUT LONG-TERM CURRENT USE OF INSULIN (HCC): ICD-10-CM

## 2019-05-03 PROCEDURE — 83036 HEMOGLOBIN GLYCOSYLATED A1C: CPT

## 2019-05-03 PROCEDURE — 80061 LIPID PANEL: CPT

## 2019-05-03 PROCEDURE — 85025 COMPLETE CBC W/AUTO DIFF WBC: CPT

## 2019-05-03 PROCEDURE — 84443 ASSAY THYROID STIM HORMONE: CPT

## 2019-05-03 PROCEDURE — 36415 COLL VENOUS BLD VENIPUNCTURE: CPT

## 2019-05-03 PROCEDURE — 80053 COMPREHEN METABOLIC PANEL: CPT

## 2019-05-18 RX ORDER — SPIRONOLACTONE AND HYDROCHLOROTHIAZIDE 25; 25 MG/1; MG/1
TABLET ORAL
Qty: 90 TABLET | Refills: 1 | Status: SHIPPED | OUTPATIENT
Start: 2019-05-18 | End: 2019-08-07

## 2019-06-06 RX ORDER — VERAPAMIL HYDROCHLORIDE 240 MG/1
240 TABLET, FILM COATED, EXTENDED RELEASE ORAL NIGHTLY
Qty: 90 TABLET | Refills: 1 | Status: SHIPPED | OUTPATIENT
Start: 2019-06-06 | End: 2019-08-07

## 2019-08-01 NOTE — TELEPHONE ENCOUNTER
Current Outpatient Medications:   ••  METOPROLOL TARTRATE 25 MG Oral Tab, TAKE 1 TABLET (25MG) BY MOUTH 2 TIMES EVERY DAY, Disp: 180 tablet, Rfl: 1  •

## 2019-08-02 NOTE — TELEPHONE ENCOUNTER
No appointment noted in chart that established care with Dr. Terri Yoon.    Dr. Cherylene League, please advise on refill request.     Hypertensive Medications  Protocol Criteria:  · Appointment scheduled in the past 6 months or in the next 3 months  · BMP or CMP in the

## 2019-08-02 NOTE — TELEPHONE ENCOUNTER
Pt states established care with Katlin Oropeza in [facility]

## 2019-08-07 ENCOUNTER — OFFICE VISIT (OUTPATIENT)
Dept: FAMILY MEDICINE CLINIC | Facility: CLINIC | Age: 69
End: 2019-08-07
Payer: MEDICARE

## 2019-08-07 VITALS
SYSTOLIC BLOOD PRESSURE: 138 MMHG | BODY MASS INDEX: 48.82 KG/M2 | DIASTOLIC BLOOD PRESSURE: 84 MMHG | RESPIRATION RATE: 20 BRPM | HEIGHT: 65 IN | TEMPERATURE: 98 F | WEIGHT: 293 LBS | HEART RATE: 79 BPM

## 2019-08-07 DIAGNOSIS — E11.22 TYPE 2 DIABETES MELLITUS WITH STAGE 3 CHRONIC KIDNEY DISEASE, WITHOUT LONG-TERM CURRENT USE OF INSULIN (HCC): ICD-10-CM

## 2019-08-07 DIAGNOSIS — E11.22 CKD STAGE 3 DUE TO TYPE 2 DIABETES MELLITUS (HCC): Chronic | ICD-10-CM

## 2019-08-07 DIAGNOSIS — N18.30 TYPE 2 DIABETES MELLITUS WITH STAGE 3 CHRONIC KIDNEY DISEASE, WITHOUT LONG-TERM CURRENT USE OF INSULIN (HCC): ICD-10-CM

## 2019-08-07 DIAGNOSIS — E78.00 PURE HYPERCHOLESTEROLEMIA: ICD-10-CM

## 2019-08-07 DIAGNOSIS — I10 ESSENTIAL HYPERTENSION, BENIGN: Primary | ICD-10-CM

## 2019-08-07 DIAGNOSIS — N18.30 CKD STAGE 3 DUE TO TYPE 2 DIABETES MELLITUS (HCC): Chronic | ICD-10-CM

## 2019-08-07 DIAGNOSIS — I70.0 AORTIC ATHEROSCLEROSIS (HCC): ICD-10-CM

## 2019-08-07 DIAGNOSIS — N18.30 CKD (CHRONIC KIDNEY DISEASE) STAGE 3, GFR 30-59 ML/MIN (HCC): Chronic | ICD-10-CM

## 2019-08-07 PROCEDURE — 99204 OFFICE O/P NEW MOD 45 MIN: CPT | Performed by: NURSE PRACTITIONER

## 2019-08-07 RX ORDER — OMEGA-3-ACID ETHYL ESTERS 1 G/1
1 CAPSULE, LIQUID FILLED ORAL DAILY
COMMUNITY

## 2019-08-07 RX ORDER — VERAPAMIL HYDROCHLORIDE 240 MG/1
240 TABLET, FILM COATED, EXTENDED RELEASE ORAL NIGHTLY
Qty: 90 TABLET | Refills: 1 | Status: SHIPPED | OUTPATIENT
Start: 2019-08-07 | End: 2020-02-26

## 2019-08-07 RX ORDER — METFORMIN HYDROCHLORIDE 500 MG/1
500 TABLET, EXTENDED RELEASE ORAL
Qty: 90 TABLET | Refills: 1 | Status: SHIPPED | OUTPATIENT
Start: 2019-08-07 | End: 2020-03-28

## 2019-08-07 RX ORDER — SPIRONOLACTONE AND HYDROCHLOROTHIAZIDE 25; 25 MG/1; MG/1
1 TABLET ORAL
Qty: 90 TABLET | Refills: 1 | Status: SHIPPED | OUTPATIENT
Start: 2019-08-07 | End: 2019-11-08

## 2019-08-07 RX ORDER — ATORVASTATIN CALCIUM 20 MG/1
TABLET, FILM COATED ORAL
Qty: 90 TABLET | Refills: 1 | Status: SHIPPED | OUTPATIENT
Start: 2019-08-07 | End: 2020-04-25

## 2019-08-07 NOTE — PROGRESS NOTES
HPI    Patient presents for refill on medications. Has seen Dr Yin Salgado in the past and transferring care to Dr Gamal West. Did not get refills of medications when she has labs completed in may.       Review of Systems   Constitutional: Negative for acti Prediabetes    • Squamous cell carcinoma in situ (SCCIS) 2018    right temple       .   Past Surgical History:   Procedure Laterality Date   • Adj tiss xfer head,fac,hand <10sqcm Right 11/06/2018    Wide excision lesion right temple, flap reconstruction   • Physically abused: Not on file        Forced sexual activity: Not on file    Other Topics      Concerns:         Service: Not Asked        Blood Transfusions: Not Asked        Caffeine Concern: Yes          Coffee, 2 cups per day         Occupat PLUS) w/Device Does not apply Kit USE ONCE DAILY IN AM BEFORE BREAKFAST Disp: 1 kit Rfl: 0   Glucose Blood (ACCU-CHEK ADITYA PLUS) In Vitro Strip USE ONCE DAILY IN THE MORNING BEFORE BREAKFAST Disp: 100 strip Rfl: 3   LANCETS ULTRA THIN Does not apply Misc

## 2019-10-07 ENCOUNTER — IMMUNIZATION (OUTPATIENT)
Dept: FAMILY MEDICINE CLINIC | Facility: CLINIC | Age: 69
End: 2019-10-07
Payer: MEDICARE

## 2019-10-07 DIAGNOSIS — Z23 NEED FOR VACCINATION: ICD-10-CM

## 2019-10-07 PROCEDURE — 90662 IIV NO PRSV INCREASED AG IM: CPT | Performed by: FAMILY MEDICINE

## 2019-10-07 PROCEDURE — G0008 ADMIN INFLUENZA VIRUS VAC: HCPCS | Performed by: FAMILY MEDICINE

## 2019-10-22 ENCOUNTER — PATIENT MESSAGE (OUTPATIENT)
Dept: FAMILY MEDICINE CLINIC | Facility: CLINIC | Age: 69
End: 2019-10-22

## 2019-10-22 DIAGNOSIS — Z09 STATUS POST EYE SURGERY, FOLLOW-UP EXAM: Primary | ICD-10-CM

## 2019-10-22 NOTE — TELEPHONE ENCOUNTER
Dr. Narciso Huitron, Please see patient email and advise.     Pended referral for review/approval, thanks

## 2019-10-22 NOTE — TELEPHONE ENCOUNTER
From: Anahy Pierre  To: Zak Clark.  DO Wilfred  Sent: 10/22/2019 11:42 AM CDT  Subject: Referral Request    I have an appointment coming up on 12/2/19 for my year follow up for cataract surgery with Dr Sood[facility] in [place] and will

## 2019-11-08 ENCOUNTER — OFFICE VISIT (OUTPATIENT)
Dept: FAMILY MEDICINE CLINIC | Facility: CLINIC | Age: 69
End: 2019-11-08
Payer: MEDICARE

## 2019-11-08 ENCOUNTER — APPOINTMENT (OUTPATIENT)
Dept: LAB | Age: 69
End: 2019-11-08
Attending: FAMILY MEDICINE
Payer: MEDICARE

## 2019-11-08 VITALS
WEIGHT: 293 LBS | SYSTOLIC BLOOD PRESSURE: 178 MMHG | HEART RATE: 83 BPM | RESPIRATION RATE: 22 BRPM | BODY MASS INDEX: 48.82 KG/M2 | HEIGHT: 65 IN | DIASTOLIC BLOOD PRESSURE: 64 MMHG

## 2019-11-08 DIAGNOSIS — N18.30 TYPE 2 DIABETES MELLITUS WITH STAGE 3 CHRONIC KIDNEY DISEASE, WITHOUT LONG-TERM CURRENT USE OF INSULIN (HCC): ICD-10-CM

## 2019-11-08 DIAGNOSIS — Z85.828 PERSONAL HISTORY OF SKIN CANCER: ICD-10-CM

## 2019-11-08 DIAGNOSIS — M79.10 MUSCLE ACHE: ICD-10-CM

## 2019-11-08 DIAGNOSIS — E11.22 TYPE 2 DIABETES MELLITUS WITH STAGE 3 CHRONIC KIDNEY DISEASE, WITHOUT LONG-TERM CURRENT USE OF INSULIN (HCC): ICD-10-CM

## 2019-11-08 DIAGNOSIS — N18.30 CKD STAGE 3 DUE TO TYPE 2 DIABETES MELLITUS (HCC): Chronic | ICD-10-CM

## 2019-11-08 DIAGNOSIS — Z12.31 VISIT FOR SCREENING MAMMOGRAM: ICD-10-CM

## 2019-11-08 DIAGNOSIS — I10 ESSENTIAL HYPERTENSION, BENIGN: ICD-10-CM

## 2019-11-08 DIAGNOSIS — N18.30 CKD STAGE 3 DUE TO TYPE 2 DIABETES MELLITUS (HCC): Primary | Chronic | ICD-10-CM

## 2019-11-08 DIAGNOSIS — Z23 ENCOUNTER FOR IMMUNIZATION: ICD-10-CM

## 2019-11-08 DIAGNOSIS — Z13.820 SCREENING FOR OSTEOPOROSIS: ICD-10-CM

## 2019-11-08 DIAGNOSIS — E11.22 CKD STAGE 3 DUE TO TYPE 2 DIABETES MELLITUS (HCC): Primary | Chronic | ICD-10-CM

## 2019-11-08 DIAGNOSIS — L98.9 SKIN LESION OF RIGHT ARM: ICD-10-CM

## 2019-11-08 DIAGNOSIS — E11.22 CKD STAGE 3 DUE TO TYPE 2 DIABETES MELLITUS (HCC): Chronic | ICD-10-CM

## 2019-11-08 PROCEDURE — 99215 OFFICE O/P EST HI 40 MIN: CPT | Performed by: FAMILY MEDICINE

## 2019-11-08 PROCEDURE — 80061 LIPID PANEL: CPT

## 2019-11-08 PROCEDURE — 36415 COLL VENOUS BLD VENIPUNCTURE: CPT

## 2019-11-08 PROCEDURE — 80053 COMPREHEN METABOLIC PANEL: CPT

## 2019-11-08 PROCEDURE — 83036 HEMOGLOBIN GLYCOSYLATED A1C: CPT

## 2019-11-08 PROCEDURE — 82550 ASSAY OF CK (CPK): CPT

## 2019-11-08 RX ORDER — PEN NEEDLE, DIABETIC 30 GX3/16"
1 NEEDLE, DISPOSABLE MISCELLANEOUS DAILY
Qty: 90 EACH | Refills: 3 | Status: SHIPPED | OUTPATIENT
Start: 2019-11-08 | End: 2020-01-24

## 2019-11-08 RX ORDER — MULTIVIT-MIN/IRON/FOLIC ACID/K 18-600-40
CAPSULE ORAL
COMMUNITY
End: 2021-09-29

## 2019-11-08 NOTE — PROGRESS NOTES
Tracking food on \"loose it\" camilo  1855 nikolas a day      Stress level high due to  with 2 cancers    Sugars good. Interested in loosing weight  Gave info on cancer support. Muscle aches.     bp out of control      Patient denies problems with the

## 2020-01-17 ENCOUNTER — HOSPITAL ENCOUNTER (OUTPATIENT)
Dept: MAMMOGRAPHY | Age: 70
Discharge: HOME | End: 2020-01-17
Attending: FAMILY MEDICINE
Payer: MEDICARE

## 2020-01-17 ENCOUNTER — HOSPITAL ENCOUNTER (OUTPATIENT)
Dept: BONE DENSITY | Age: 70
Discharge: HOME | End: 2020-01-17
Attending: FAMILY MEDICINE
Payer: MEDICARE

## 2020-01-17 DIAGNOSIS — R92.8 ABNORMAL MAMMOGRAM OF LEFT BREAST: Primary | ICD-10-CM

## 2020-01-17 DIAGNOSIS — Z12.31 VISIT FOR SCREENING MAMMOGRAM: ICD-10-CM

## 2020-01-17 DIAGNOSIS — Z13.820 SCREENING FOR OSTEOPOROSIS: ICD-10-CM

## 2020-01-17 PROCEDURE — 77080 DXA BONE DENSITY AXIAL: CPT | Performed by: FAMILY MEDICINE

## 2020-01-17 PROCEDURE — 77063 BREAST TOMOSYNTHESIS BI: CPT | Performed by: FAMILY MEDICINE

## 2020-01-17 PROCEDURE — 77067 SCR MAMMO BI INCL CAD: CPT | Performed by: FAMILY MEDICINE

## 2020-01-18 RX ORDER — ALENDRONATE SODIUM 70 MG/1
70 TABLET ORAL WEEKLY
Qty: 12 TABLET | Refills: 3 | Status: SHIPPED | OUTPATIENT
Start: 2020-01-18 | End: 2020-06-16

## 2020-01-24 ENCOUNTER — HOSPITAL ENCOUNTER (OUTPATIENT)
Dept: MAMMOGRAPHY | Facility: HOSPITAL | Age: 70
Discharge: HOME | End: 2020-01-24
Attending: FAMILY MEDICINE
Payer: MEDICARE

## 2020-01-24 ENCOUNTER — HOSPITAL ENCOUNTER (OUTPATIENT)
Dept: ULTRASOUND IMAGING | Facility: HOSPITAL | Age: 70
Discharge: HOME | End: 2020-01-24
Attending: FAMILY MEDICINE
Payer: MEDICARE

## 2020-01-24 DIAGNOSIS — R92.8 ABNORMAL MAMMOGRAM OF LEFT BREAST: ICD-10-CM

## 2020-01-24 PROCEDURE — 77061 BREAST TOMOSYNTHESIS UNI: CPT | Performed by: FAMILY MEDICINE

## 2020-01-24 PROCEDURE — 76641 ULTRASOUND BREAST COMPLETE: CPT | Performed by: FAMILY MEDICINE

## 2020-01-24 PROCEDURE — 77065 DX MAMMO INCL CAD UNI: CPT | Performed by: FAMILY MEDICINE

## 2020-01-27 ENCOUNTER — TELEPHONE (OUTPATIENT)
Dept: CASE MANAGEMENT | Age: 70
End: 2020-01-27

## 2020-01-27 NOTE — TELEPHONE ENCOUNTER
Patient is eligible for a 2020 Medicare Advantage Supervisit. Discussed in detail w/patient. A[[t scheduled for 4/3/2020.

## 2020-02-13 ENCOUNTER — PATIENT MESSAGE (OUTPATIENT)
Dept: FAMILY MEDICINE CLINIC | Facility: CLINIC | Age: 70
End: 2020-02-13

## 2020-02-13 DIAGNOSIS — E11.22 CKD STAGE 3 DUE TO TYPE 2 DIABETES MELLITUS (HCC): Primary | ICD-10-CM

## 2020-02-13 DIAGNOSIS — N18.30 CKD STAGE 3 DUE TO TYPE 2 DIABETES MELLITUS (HCC): Primary | ICD-10-CM

## 2020-02-13 NOTE — TELEPHONE ENCOUNTER
From: Sarina Skelton  To: Deny Hardin DO  Sent: 2/13/2020 5:10 PM CST  Subject: Prescription Question    My Spironolactone-HCTZ 25-25 is not appearing on my Rx list and is due for refill. Not sure what happened, please refill. Thank you.

## 2020-02-14 DIAGNOSIS — I10 ESSENTIAL HYPERTENSION, BENIGN: ICD-10-CM

## 2020-02-14 RX ORDER — SPIRONOLACTONE AND HYDROCHLOROTHIAZIDE 25; 25 MG/1; MG/1
1 TABLET ORAL
Qty: 90 TABLET | Refills: 1 | OUTPATIENT
Start: 2020-02-14

## 2020-02-14 RX ORDER — SPIRONOLACTONE AND HYDROCHLOROTHIAZIDE 25; 25 MG/1; MG/1
1 TABLET ORAL DAILY
Qty: 30 TABLET | Refills: 0 | Status: SHIPPED | OUTPATIENT
Start: 2020-02-14 | End: 2020-02-18

## 2020-02-14 RX ORDER — SPIRONOLACTONE AND HYDROCHLOROTHIAZIDE 25; 25 MG/1; MG/1
TABLET ORAL
Qty: 90 TABLET | Refills: 1 | OUTPATIENT
Start: 2020-02-14

## 2020-02-14 NOTE — TELEPHONE ENCOUNTER
Disagree but continue on her current med regimen. send in refills on spironlactone /hctz for 1 mo and update med list to reflect no aliskiren /hct   O/v 2-3 weeks. Thanks.

## 2020-02-14 NOTE — TELEPHONE ENCOUNTER
Patient advised of treatment recommendations. Pt reports she has her annual physical scheduled for 4/3/20, does not want to come in for additional appt, requesting to wait until her appt for her physical, will complete labs prior.  Also requesting if we can

## 2020-02-14 NOTE — TELEPHONE ENCOUNTER
Per med list Rx was d/c'd, pls advise, thanks in advance.        Hypertensive Medications  Protocol Criteria:  · Appointment scheduled in the past 6 months or in the next 3 months  · BMP or CMP in the past 12 months  · Creatinine result < 2  Recent Outpatie

## 2020-02-14 NOTE — TELEPHONE ENCOUNTER
Dr. Zaira Sun: Patient states she never started the new medication and continues to take the spironolactone-hctz.  Went on to say that at office visit, her elevated blood pressure reading was not accurate because staff did not use a thigh cuff on her large a

## 2020-02-15 NOTE — TELEPHONE ENCOUNTER
Refills and recommendations remain same. She may move up her medicare annual appt. If she would like  The switch (in medication) I made months ago was for kidney protection. We need to protect her kidneys if possible. Last I will say on this issue.

## 2020-02-18 DIAGNOSIS — I10 ESSENTIAL HYPERTENSION, BENIGN: ICD-10-CM

## 2020-02-18 RX ORDER — SPIRONOLACTONE AND HYDROCHLOROTHIAZIDE 25; 25 MG/1; MG/1
TABLET ORAL
Qty: 90 TABLET | Refills: 1 | Status: SHIPPED | OUTPATIENT
Start: 2020-02-18 | End: 2020-06-16

## 2020-02-19 NOTE — TELEPHONE ENCOUNTER
Refill passed per [facility], [facility] protocol.   Hypertensive Medications  Protocol Criteria:  · Appointment scheduled in the past 6 months or in the next 3 months  · BMP or CMP in the past 12 months  · Creatinine result < 2  Recent Outpatient Visits

## 2020-02-26 DIAGNOSIS — I10 ESSENTIAL HYPERTENSION, BENIGN: ICD-10-CM

## 2020-02-26 RX ORDER — VERAPAMIL HYDROCHLORIDE 240 MG/1
TABLET, FILM COATED, EXTENDED RELEASE ORAL
Qty: 90 TABLET | Refills: 1 | Status: SHIPPED | OUTPATIENT
Start: 2020-02-26 | End: 2020-03-18

## 2020-02-27 NOTE — TELEPHONE ENCOUNTER
Refill passed per [facility], [facility] protocol.   Hypertensive Medications  Protocol Criteria:  · Appointment scheduled in the past 6 months or in the next 3 months  · BMP or CMP in the past 12 months  · Creatinine result < 2  Recent Outpatient Visits

## 2020-03-18 DIAGNOSIS — I10 ESSENTIAL HYPERTENSION, BENIGN: ICD-10-CM

## 2020-03-18 RX ORDER — VERAPAMIL HYDROCHLORIDE 240 MG/1
240 TABLET, FILM COATED, EXTENDED RELEASE ORAL NIGHTLY
Qty: 90 TABLET | Refills: 1 | Status: SHIPPED | OUTPATIENT
Start: 2020-03-18 | End: 2020-06-16

## 2020-03-28 DIAGNOSIS — E11.22 TYPE 2 DIABETES MELLITUS WITH STAGE 3 CHRONIC KIDNEY DISEASE, WITHOUT LONG-TERM CURRENT USE OF INSULIN (HCC): ICD-10-CM

## 2020-03-28 DIAGNOSIS — N18.30 TYPE 2 DIABETES MELLITUS WITH STAGE 3 CHRONIC KIDNEY DISEASE, WITHOUT LONG-TERM CURRENT USE OF INSULIN (HCC): ICD-10-CM

## 2020-03-30 RX ORDER — METFORMIN HYDROCHLORIDE 500 MG/1
500 TABLET, EXTENDED RELEASE ORAL
Qty: 90 TABLET | Refills: 1 | Status: SHIPPED | OUTPATIENT
Start: 2020-03-30 | End: 2020-06-16

## 2020-04-25 DIAGNOSIS — E78.00 PURE HYPERCHOLESTEROLEMIA: ICD-10-CM

## 2020-04-25 DIAGNOSIS — I70.0 AORTIC ATHEROSCLEROSIS (HCC): ICD-10-CM

## 2020-04-27 RX ORDER — ATORVASTATIN CALCIUM 20 MG/1
TABLET, FILM COATED ORAL
Qty: 90 TABLET | Refills: 1 | Status: SHIPPED | OUTPATIENT
Start: 2020-04-27 | End: 2020-10-01

## 2020-06-08 ENCOUNTER — PATIENT MESSAGE (OUTPATIENT)
Dept: INTERNAL MEDICINE CLINIC | Facility: CLINIC | Age: 70
End: 2020-06-08

## 2020-06-08 RX ORDER — BLOOD-GLUCOSE METER
EACH MISCELLANEOUS
Qty: 1 KIT | Refills: 0 | Status: CANCELLED | OUTPATIENT
Start: 2020-06-08

## 2020-06-08 RX ORDER — BLOOD SUGAR DIAGNOSTIC
STRIP MISCELLANEOUS
Qty: 100 STRIP | Refills: 3 | Status: SHIPPED | OUTPATIENT
Start: 2020-06-08 | End: 2021-06-11

## 2020-06-08 NOTE — TELEPHONE ENCOUNTER
Patient needs a refill for Gabriela test strips. Patient's Primary is now / previously with Radha Rdz. She cannot change it in [employer]. Annual physical scheduled for 6/16/2020 in the office with Oc Perry.       Oc Goodrich I have pended medi

## 2020-06-16 ENCOUNTER — OFFICE VISIT (OUTPATIENT)
Dept: FAMILY MEDICINE CLINIC | Facility: CLINIC | Age: 70
End: 2020-06-16
Payer: MEDICARE

## 2020-06-16 VITALS
SYSTOLIC BLOOD PRESSURE: 127 MMHG | HEART RATE: 86 BPM | TEMPERATURE: 98 F | DIASTOLIC BLOOD PRESSURE: 82 MMHG | WEIGHT: 293 LBS | HEIGHT: 65 IN | BODY MASS INDEX: 48.82 KG/M2

## 2020-06-16 DIAGNOSIS — I10 ESSENTIAL HYPERTENSION, BENIGN: ICD-10-CM

## 2020-06-16 DIAGNOSIS — E11.22 TYPE 2 DIABETES MELLITUS WITH STAGE 3 CHRONIC KIDNEY DISEASE, WITHOUT LONG-TERM CURRENT USE OF INSULIN (HCC): ICD-10-CM

## 2020-06-16 DIAGNOSIS — Z76.89 ENCOUNTER TO ESTABLISH CARE: Primary | ICD-10-CM

## 2020-06-16 DIAGNOSIS — L98.9 SCALP LESION: ICD-10-CM

## 2020-06-16 DIAGNOSIS — N18.30 TYPE 2 DIABETES MELLITUS WITH STAGE 3 CHRONIC KIDNEY DISEASE, WITHOUT LONG-TERM CURRENT USE OF INSULIN (HCC): ICD-10-CM

## 2020-06-16 DIAGNOSIS — M81.0 OSTEOPOROSIS, UNSPECIFIED OSTEOPOROSIS TYPE, UNSPECIFIED PATHOLOGICAL FRACTURE PRESENCE: ICD-10-CM

## 2020-06-16 DIAGNOSIS — E66.01 MORBID OBESITY DUE TO EXCESS CALORIES (HCC): ICD-10-CM

## 2020-06-16 PROBLEM — H81.10 BPPV (BENIGN PAROXYSMAL POSITIONAL VERTIGO): Status: ACTIVE | Noted: 2020-06-16

## 2020-06-16 PROCEDURE — 99214 OFFICE O/P EST MOD 30 MIN: CPT | Performed by: FAMILY MEDICINE

## 2020-06-16 RX ORDER — ALENDRONATE SODIUM 70 MG/1
70 TABLET ORAL WEEKLY
Qty: 12 TABLET | Refills: 3 | Status: SHIPPED | OUTPATIENT
Start: 2020-06-16 | End: 2021-06-01

## 2020-06-16 RX ORDER — BLOOD SUGAR DIAGNOSTIC
STRIP MISCELLANEOUS
Qty: 120 STRIP | Refills: 1 | Status: SHIPPED | OUTPATIENT
Start: 2020-06-16 | End: 2021-09-29

## 2020-06-16 RX ORDER — ASPIRIN 81 MG/1
81 TABLET ORAL DAILY
Qty: 90 TABLET | Refills: 0 | Status: SHIPPED | OUTPATIENT
Start: 2020-06-16 | End: 2020-09-09

## 2020-06-16 RX ORDER — SPIRONOLACTONE AND HYDROCHLOROTHIAZIDE 25; 25 MG/1; MG/1
1 TABLET ORAL DAILY
Qty: 90 TABLET | Refills: 1 | Status: SHIPPED | OUTPATIENT
Start: 2020-06-16 | End: 2020-10-01

## 2020-06-16 RX ORDER — BLOOD-GLUCOSE METER
EACH MISCELLANEOUS
Qty: 1 KIT | Refills: 0 | Status: SHIPPED | OUTPATIENT
Start: 2020-06-16 | End: 2021-09-29

## 2020-06-16 RX ORDER — METFORMIN HYDROCHLORIDE 500 MG/1
500 TABLET, EXTENDED RELEASE ORAL
Qty: 90 TABLET | Refills: 1 | Status: SHIPPED | OUTPATIENT
Start: 2020-06-16 | End: 2020-10-01

## 2020-06-16 RX ORDER — VERAPAMIL HYDROCHLORIDE 240 MG/1
240 TABLET, FILM COATED, EXTENDED RELEASE ORAL NIGHTLY
Qty: 90 TABLET | Refills: 1 | Status: SHIPPED | OUTPATIENT
Start: 2020-06-16 | End: 2020-10-01

## 2020-06-16 NOTE — PROGRESS NOTES
HPI:   Emma Chao is a 81year old female who presents for a visit to [facility]. Patient has a history of type 2 diabetes, hypertension and hyperlipidemia. She is a non-insulin-dependent diabetic.   Requesting refills of her Accu-Chek Andrey SILVER) Oral Tab Take 1 tablet by mouth daily. • Glucose Blood (ACCU-CHEK ADITYA PLUS) In Vitro Strip USE ONCE DAILY IN THE MORNING BEFORE BREAKFAST 100 strip 3   • Clindamycin Phosphate 1 % External Swab Apply 1 Application topically daily.  (Patient no otherwise  SKIN: Scalp lesion  LUNGS: denies shortness of breath with exertion  CARDIOVASCULAR: denies chest pain on exertion  GI: denies abdominal pain,denies heartburn  MUSCULOSKELETAL: denies back pain  NEURO: denies headaches  PSYCHE: denies depression [facility]  Patient Counseling:   - Nutrition: Stressed importance of moderation in sodium/caffeine intake, saturated fat and cholesterol, caloric balance, sufficient intake of fresh fruits, vegetables, fiber, calcium, iron.   - Exercise: Stressed the importance

## 2020-09-09 DIAGNOSIS — N18.30 TYPE 2 DIABETES MELLITUS WITH STAGE 3 CHRONIC KIDNEY DISEASE, WITHOUT LONG-TERM CURRENT USE OF INSULIN (HCC): ICD-10-CM

## 2020-09-09 DIAGNOSIS — E11.22 TYPE 2 DIABETES MELLITUS WITH STAGE 3 CHRONIC KIDNEY DISEASE, WITHOUT LONG-TERM CURRENT USE OF INSULIN (HCC): ICD-10-CM

## 2020-09-09 RX ORDER — ASPIRIN 81 MG/1
TABLET, COATED ORAL
Qty: 90 TABLET | Refills: 0 | Status: SHIPPED | OUTPATIENT
Start: 2020-09-09 | End: 2020-10-01

## 2020-09-28 ENCOUNTER — LAB ENCOUNTER (OUTPATIENT)
Dept: LAB | Age: 70
End: 2020-09-28
Attending: FAMILY MEDICINE
Payer: MEDICARE

## 2020-09-28 DIAGNOSIS — E11.22 TYPE 2 DIABETES MELLITUS WITH STAGE 3 CHRONIC KIDNEY DISEASE, WITHOUT LONG-TERM CURRENT USE OF INSULIN (HCC): ICD-10-CM

## 2020-09-28 DIAGNOSIS — N18.30 TYPE 2 DIABETES MELLITUS WITH STAGE 3 CHRONIC KIDNEY DISEASE, WITHOUT LONG-TERM CURRENT USE OF INSULIN (HCC): ICD-10-CM

## 2020-09-28 PROCEDURE — 80053 COMPREHEN METABOLIC PANEL: CPT

## 2020-09-28 PROCEDURE — 83036 HEMOGLOBIN GLYCOSYLATED A1C: CPT

## 2020-09-28 PROCEDURE — 80061 LIPID PANEL: CPT

## 2020-09-28 PROCEDURE — 36415 COLL VENOUS BLD VENIPUNCTURE: CPT

## 2020-09-28 PROCEDURE — 82043 UR ALBUMIN QUANTITATIVE: CPT

## 2020-09-28 PROCEDURE — 82570 ASSAY OF URINE CREATININE: CPT

## 2020-09-30 ENCOUNTER — MA CHART PREP (OUTPATIENT)
Dept: TELEHEALTH | Age: 70
End: 2020-09-30

## 2020-09-30 PROBLEM — M81.0 OSTEOPOROSIS: Status: ACTIVE | Noted: 2020-09-30

## 2020-10-01 ENCOUNTER — OFFICE VISIT (OUTPATIENT)
Dept: FAMILY MEDICINE CLINIC | Facility: CLINIC | Age: 70
End: 2020-10-01
Payer: MEDICARE

## 2020-10-01 VITALS
WEIGHT: 293 LBS | TEMPERATURE: 98 F | HEIGHT: 65 IN | DIASTOLIC BLOOD PRESSURE: 81 MMHG | SYSTOLIC BLOOD PRESSURE: 142 MMHG | BODY MASS INDEX: 48.82 KG/M2 | HEART RATE: 76 BPM

## 2020-10-01 DIAGNOSIS — Z12.31 VISIT FOR SCREENING MAMMOGRAM: ICD-10-CM

## 2020-10-01 DIAGNOSIS — E66.01 MORBID OBESITY DUE TO EXCESS CALORIES (HCC): ICD-10-CM

## 2020-10-01 DIAGNOSIS — E11.22 CKD STAGE 3 DUE TO TYPE 2 DIABETES MELLITUS (HCC): Chronic | ICD-10-CM

## 2020-10-01 DIAGNOSIS — J40 BRONCHITIS: ICD-10-CM

## 2020-10-01 DIAGNOSIS — E78.00 PURE HYPERCHOLESTEROLEMIA: ICD-10-CM

## 2020-10-01 DIAGNOSIS — M81.0 OSTEOPOROSIS, UNSPECIFIED OSTEOPOROSIS TYPE, UNSPECIFIED PATHOLOGICAL FRACTURE PRESENCE: ICD-10-CM

## 2020-10-01 DIAGNOSIS — L82.0 INFLAMED SEBORRHEIC KERATOSIS: ICD-10-CM

## 2020-10-01 DIAGNOSIS — Z13.1 SCREENING FOR DIABETES MELLITUS (DM): ICD-10-CM

## 2020-10-01 DIAGNOSIS — L57.8 SUN-DAMAGED SKIN: ICD-10-CM

## 2020-10-01 DIAGNOSIS — L73.2 HIDRADENITIS SUPPURATIVA: ICD-10-CM

## 2020-10-01 DIAGNOSIS — K43.9 HERNIA OF ANTERIOR ABDOMINAL WALL: ICD-10-CM

## 2020-10-01 DIAGNOSIS — M79.18 MUSCULOSKELETAL PAIN: ICD-10-CM

## 2020-10-01 DIAGNOSIS — H81.10 BENIGN PAROXYSMAL POSITIONAL VERTIGO, UNSPECIFIED LATERALITY: ICD-10-CM

## 2020-10-01 DIAGNOSIS — I70.0 AORTIC ATHEROSCLEROSIS (HCC): ICD-10-CM

## 2020-10-01 DIAGNOSIS — Z85.828 PERSONAL HISTORY OF SKIN CANCER: ICD-10-CM

## 2020-10-01 DIAGNOSIS — Z00.00 ENCOUNTER FOR ANNUAL HEALTH EXAMINATION: Primary | ICD-10-CM

## 2020-10-01 DIAGNOSIS — Z20.5 EXPOSURE TO HEPATITIS C: ICD-10-CM

## 2020-10-01 DIAGNOSIS — D48.5 NEOPLASM OF UNCERTAIN BEHAVIOR OF SKIN: ICD-10-CM

## 2020-10-01 DIAGNOSIS — Z87.2 HISTORY OF ACTINIC KERATOSES: ICD-10-CM

## 2020-10-01 DIAGNOSIS — M77.12 LEFT TENNIS ELBOW: ICD-10-CM

## 2020-10-01 DIAGNOSIS — R79.89 ELEVATED SERUM CREATININE: ICD-10-CM

## 2020-10-01 DIAGNOSIS — N18.32 STAGE 3B CHRONIC KIDNEY DISEASE (HCC): Chronic | ICD-10-CM

## 2020-10-01 DIAGNOSIS — Z23 NEEDS FLU SHOT: ICD-10-CM

## 2020-10-01 DIAGNOSIS — J30.2 SEASONAL ALLERGIC RHINITIS, UNSPECIFIED TRIGGER: ICD-10-CM

## 2020-10-01 DIAGNOSIS — L56.5 DISSEMINATED SUPERFICIAL ACTINIC POROKERATOSIS (DSAP): ICD-10-CM

## 2020-10-01 DIAGNOSIS — N18.30 CKD STAGE 3 DUE TO TYPE 2 DIABETES MELLITUS (HCC): Chronic | ICD-10-CM

## 2020-10-01 DIAGNOSIS — N18.30 TYPE 2 DIABETES MELLITUS WITH STAGE 3 CHRONIC KIDNEY DISEASE, WITHOUT LONG-TERM CURRENT USE OF INSULIN (HCC): ICD-10-CM

## 2020-10-01 DIAGNOSIS — E11.22 TYPE 2 DIABETES MELLITUS WITH STAGE 3 CHRONIC KIDNEY DISEASE, WITHOUT LONG-TERM CURRENT USE OF INSULIN (HCC): ICD-10-CM

## 2020-10-01 DIAGNOSIS — Z13.6 SCREENING FOR CARDIOVASCULAR CONDITION: ICD-10-CM

## 2020-10-01 DIAGNOSIS — I10 ESSENTIAL HYPERTENSION, BENIGN: ICD-10-CM

## 2020-10-01 PROCEDURE — 99397 PER PM REEVAL EST PAT 65+ YR: CPT | Performed by: FAMILY MEDICINE

## 2020-10-01 PROCEDURE — 90662 IIV NO PRSV INCREASED AG IM: CPT | Performed by: FAMILY MEDICINE

## 2020-10-01 PROCEDURE — 3008F BODY MASS INDEX DOCD: CPT | Performed by: FAMILY MEDICINE

## 2020-10-01 PROCEDURE — 3077F SYST BP >= 140 MM HG: CPT | Performed by: FAMILY MEDICINE

## 2020-10-01 PROCEDURE — 3079F DIAST BP 80-89 MM HG: CPT | Performed by: FAMILY MEDICINE

## 2020-10-01 PROCEDURE — 96160 PT-FOCUSED HLTH RISK ASSMT: CPT | Performed by: FAMILY MEDICINE

## 2020-10-01 PROCEDURE — G0008 ADMIN INFLUENZA VIRUS VAC: HCPCS | Performed by: FAMILY MEDICINE

## 2020-10-01 PROCEDURE — G0439 PPPS, SUBSEQ VISIT: HCPCS | Performed by: FAMILY MEDICINE

## 2020-10-01 RX ORDER — ATORVASTATIN CALCIUM 20 MG/1
TABLET, FILM COATED ORAL
Qty: 90 TABLET | Refills: 1 | Status: SHIPPED | OUTPATIENT
Start: 2020-10-01 | End: 2021-04-18

## 2020-10-01 RX ORDER — METFORMIN HYDROCHLORIDE 500 MG/1
500 TABLET, EXTENDED RELEASE ORAL
Qty: 90 TABLET | Refills: 1 | Status: SHIPPED | OUTPATIENT
Start: 2020-10-01 | End: 2021-03-22

## 2020-10-01 RX ORDER — TRAMADOL HYDROCHLORIDE 50 MG/1
50 TABLET ORAL 2 TIMES DAILY PRN
Qty: 20 TABLET | Refills: 0 | Status: SHIPPED | OUTPATIENT
Start: 2020-10-01

## 2020-10-01 RX ORDER — VERAPAMIL HYDROCHLORIDE 240 MG/1
240 TABLET, FILM COATED, EXTENDED RELEASE ORAL NIGHTLY
Qty: 90 TABLET | Refills: 1 | Status: SHIPPED | OUTPATIENT
Start: 2020-10-01 | End: 2020-12-14

## 2020-10-01 RX ORDER — SPIRONOLACTONE AND HYDROCHLOROTHIAZIDE 25; 25 MG/1; MG/1
1 TABLET ORAL DAILY
Qty: 90 TABLET | Refills: 1 | Status: SHIPPED | OUTPATIENT
Start: 2020-10-01 | End: 2021-04-27

## 2020-10-01 NOTE — PROGRESS NOTES
HPI:   Cristino Marr is a 78year old female who presents for a Medicare Subsequent Annual Wellness visit (Pt already had Initial Annual Wellness). Patient currently feels well and is at her baseline.      Her last annual assessment has been o [facility])     Personal history of skin cancer     Sun-damaged skin     Hidradenitis suppurativa     Type 2 diabetes mellitus with stage 3 chronic kidney disease, without long-term current use of insulin (HCC)     Aortic atherosclerosis (HCC)     Disseminated s daily.    •  ibuprofen (MOTRIN) 800 MG Oral Tab, Take 800 mg by mouth every 6 (six) hours as needed for Pain. •  Multiple Vitamins-Minerals (CENTRUM SILVER) Oral Tab, Take 1 tablet by mouth daily.        MEDICAL INFORMATION:   She  has a past medical his background such as a crowded room or restaurant: Yes   I get confused about where sounds come from: No I misunderstand some words in a sentence and need to ask people to repeat themselves: No   I especially have trouble understanding the speech of women an FLUAD High Dose 65 yr and older (76307) 10/04/2018   • FLULAVAL 6 months & older 0.5 ml Prefilled syringe (88264) 10/05/2017   • Influenza 09/29/2011, 10/02/2012, 10/04/2013   • Pneumococcal (Prevnar 13) 10/04/2018   • Pneumovax 23 10/05/2017   • [facility] - INTER[facility]    Screening for cardiovascular condition  -     CT CALCIUM SCORING; Future  -     LIPID PANEL;  Future    Needs flu shot  -     Cancel: FLULAVAL INFLUENZA VACCINE QUAD PRESERVATIVE FREE 0.5 ML    Visit for screening mammogram  - you describe your daily physical activity?: (P) Light  How would you describe your current health state?: (P) Fair  How do you maintain positive mental well-being?: (P) Games      This section provided for quick review of chart, separate sheet to patient Immunization Activity if applicable)     Influenza  Covered Annually 10/1/2020 Please get every year    Pneumococcal 13 (Prevnar)  Covered Once after 65 10/04/2018 Please get once after your 65th birthday    Pneumococcal 23 (Pneumovax)  Covered Once after flowsheet data found.              Template: SARAH RAMOS MEDICARE ANNUAL ASSESSMENT FEMALE [36120]

## 2020-10-01 NOTE — PATIENT INSTRUCTIONS
Reji Barrientos's SCREENING SCHEDULE   Tests on this list are recommended by your physician but may not be covered, or covered at this frequency, by your insurer. Please check with your insurance carrier before scheduling to verify coverage.    PRE Covered every 10 years- more often if abnormal Colonoscopy due on 08/29/2002 Update [facility] if applicable    Flex Sigmoidoscopy Screen  Covered every 5 years No results found for this or any previous visit. No flowsheet data found.      Fecal O Pneumococcal 13 (Prevnar)  Covered Once after 65 Orders placed or performed in visit on 10/04/18   • PNEUMOCOCCAL VACC, 13 SHELIA IM    Please get once after your 65th birthday    Pneumococcal 23 (Pneumovax)  Covered Once after 72 Orders placed or performe

## 2020-10-09 ENCOUNTER — APPOINTMENT (OUTPATIENT)
Dept: CT IMAGING | Facility: HOSPITAL | Age: 70
End: 2020-10-09
Attending: EMERGENCY MEDICINE
Payer: MEDICARE

## 2020-10-09 ENCOUNTER — HOSPITAL ENCOUNTER (EMERGENCY)
Facility: HOSPITAL | Age: 70
Discharge: HOME | End: 2020-10-09
Attending: EMERGENCY MEDICINE
Payer: MEDICARE

## 2020-10-09 VITALS
BODY MASS INDEX: 48.82 KG/M2 | DIASTOLIC BLOOD PRESSURE: 52 MMHG | TEMPERATURE: 99 F | HEART RATE: 89 BPM | SYSTOLIC BLOOD PRESSURE: 135 MMHG | RESPIRATION RATE: 18 BRPM | HEIGHT: 65 IN | OXYGEN SATURATION: 97 % | WEIGHT: 293 LBS

## 2020-10-09 DIAGNOSIS — I10 HYPERTENSION, UNSPECIFIED TYPE: Primary | ICD-10-CM

## 2020-10-09 PROCEDURE — 80048 BASIC METABOLIC PNL TOTAL CA: CPT | Performed by: EMERGENCY MEDICINE

## 2020-10-09 PROCEDURE — 70450 CT HEAD/BRAIN W/O DYE: CPT | Performed by: EMERGENCY MEDICINE

## 2020-10-09 PROCEDURE — 36415 COLL VENOUS BLD VENIPUNCTURE: CPT

## 2020-10-09 PROCEDURE — 84484 ASSAY OF TROPONIN QUANT: CPT | Performed by: EMERGENCY MEDICINE

## 2020-10-09 PROCEDURE — 93010 ELECTROCARDIOGRAM REPORT: CPT | Performed by: EMERGENCY MEDICINE

## 2020-10-09 PROCEDURE — 85025 COMPLETE CBC W/AUTO DIFF WBC: CPT | Performed by: EMERGENCY MEDICINE

## 2020-10-09 PROCEDURE — 99284 EMERGENCY DEPT VISIT MOD MDM: CPT

## 2020-10-09 PROCEDURE — 93005 ELECTROCARDIOGRAM TRACING: CPT

## 2020-10-09 RX ORDER — ACETAMINOPHEN 500 MG
1000 TABLET ORAL ONCE
Status: COMPLETED | OUTPATIENT
Start: 2020-10-09 | End: 2020-10-09

## 2020-10-09 NOTE — ED INITIAL ASSESSMENT (HPI)
Pt ambulatory into triage with steady gait. Pt c/o \"wacky\" blood pressure today, stating that it is higher than usual today. Pt states that she has been taking her BP medications as directed. Denies any recent diet changes.  Pt denies headache or visual c

## 2020-10-09 NOTE — ED PROVIDER NOTES
Patient Seen in: [facility] AND [facility] Emergency Department      History   Patient presents with:  Hypertension    Stated Complaint:     HPI    Patient is a 81-year-old female with a history of hypertension and diabetes who states today she was sitting at th Systems    Positive for stated complaint:   Other systems are as noted in HPI. Constitutional and vital signs reviewed. All other systems reviewed and negative except as noted above.     Physical Exam     ED Triage Vitals [10/09/20 1513]   BP (!) 188/ 272 (*)     GFR, Non- 52 (*)     All other components within normal limits   CBC W/ DIFFERENTIAL - Abnormal; Notable for the following components:    WBC 13.9 (*)     Neutrophil Absolute Prelim 11.18 (*)     Neutrophil Absolute 11.18 (*) Impression:  Hypertension, unspecified type  (primary encounter diagnosis)    Disposition:  Discharge  10/9/2020  5:23 pm    Follow-up:  Kofi Taylor MD  [address]  [id number]  [facility] [address] [phone]    Schedule an appointment as soon as huma

## 2020-10-13 ENCOUNTER — OFFICE VISIT (OUTPATIENT)
Dept: FAMILY MEDICINE CLINIC | Facility: CLINIC | Age: 70
End: 2020-10-13
Payer: MEDICARE

## 2020-10-13 VITALS
SYSTOLIC BLOOD PRESSURE: 142 MMHG | WEIGHT: 293 LBS | HEIGHT: 65 IN | TEMPERATURE: 99 F | BODY MASS INDEX: 48.82 KG/M2 | HEART RATE: 81 BPM | DIASTOLIC BLOOD PRESSURE: 76 MMHG

## 2020-10-13 DIAGNOSIS — F41.9 ANXIETY: ICD-10-CM

## 2020-10-13 DIAGNOSIS — I10 ESSENTIAL HYPERTENSION, BENIGN: ICD-10-CM

## 2020-10-13 DIAGNOSIS — Z09 FOLLOW UP: Primary | ICD-10-CM

## 2020-10-13 PROBLEM — E87.1 HYPONATREMIA: Status: ACTIVE | Noted: 2020-10-13

## 2020-10-13 PROCEDURE — 3078F DIAST BP <80 MM HG: CPT | Performed by: FAMILY MEDICINE

## 2020-10-13 PROCEDURE — 3077F SYST BP >= 140 MM HG: CPT | Performed by: FAMILY MEDICINE

## 2020-10-13 PROCEDURE — 99214 OFFICE O/P EST MOD 30 MIN: CPT | Performed by: FAMILY MEDICINE

## 2020-10-13 PROCEDURE — 3008F BODY MASS INDEX DOCD: CPT | Performed by: FAMILY MEDICINE

## 2020-10-13 RX ORDER — DIAZEPAM 2 MG/1
2 TABLET ORAL DAILY PRN
Qty: 20 TABLET | Refills: 0 | Status: SHIPPED | OUTPATIENT
Start: 2020-10-13

## 2020-10-13 NOTE — PROGRESS NOTES
Patient presents with:  ER F/U: seen on 10/9 @ [address] [facility] ED for hypertension  Dizziness: c/o tingling feeling and lightheadedness    HPI:   Medhat Liriano is a 78year old female who presents to clinic for ER follow-up for hypertensive urgency.    Was at normocephalic  EYES: PERRLA, EOMI,conjunctiva are clear  LUNGS: clear to auscultation  CARDIO: RRR without murmur  NEURO: Oriented times three, cranial nerves are intact, motor and sensory are grossly intact    ASSESSMENT AND PLAN:   1.  Follow up  Imaging

## 2020-12-02 ENCOUNTER — PATIENT MESSAGE (OUTPATIENT)
Dept: FAMILY MEDICINE CLINIC | Facility: CLINIC | Age: 70
End: 2020-12-02

## 2020-12-02 DIAGNOSIS — H26.9 CATARACT OF BOTH EYES, UNSPECIFIED CATARACT TYPE: Primary | ICD-10-CM

## 2020-12-02 NOTE — TELEPHONE ENCOUNTER
From: Arsenio Gao  To: Taty Sesay MD  Sent: 12/2/2020 11:42 AM CST  Subject: Referral Request    Hi, hope you are well. I need a referral to my Ophthalmologist - Dr Lucy Valenzuela. I have an appointment on Monday.  They just called to let

## 2020-12-03 PROBLEM — H26.9 CATARACT OF BOTH EYES: Status: ACTIVE | Noted: 2020-12-03

## 2020-12-14 DIAGNOSIS — I10 ESSENTIAL HYPERTENSION, BENIGN: ICD-10-CM

## 2020-12-14 RX ORDER — VERAPAMIL HYDROCHLORIDE 240 MG/1
TABLET, FILM COATED, EXTENDED RELEASE ORAL
Qty: 90 TABLET | Refills: 1 | Status: SHIPPED | OUTPATIENT
Start: 2020-12-14 | End: 2021-03-08

## 2021-02-02 DIAGNOSIS — Z23 NEED FOR VACCINATION: ICD-10-CM

## 2021-03-04 ENCOUNTER — PATIENT MESSAGE (OUTPATIENT)
Dept: FAMILY MEDICINE CLINIC | Facility: CLINIC | Age: 71
End: 2021-03-04

## 2021-03-04 NOTE — TELEPHONE ENCOUNTER
Historical administration updated with first dose of Pfizer vaccine on 02/27/21.    Active request for COVID-19 vaccine cancelled per patient's request.

## 2021-03-04 NOTE — TELEPHONE ENCOUNTER
From: Chris Mejia  To: Gladys Redmond MD  Sent: 3/4/2021 9:03 AM CST  Subject: Non-Urgent Medical Question    Is there a way to stop the messages to get the vaccine?  I had my first dose of Pfizer on 2/17/21 and am scheduled for the second 3/10

## 2021-03-08 DIAGNOSIS — I10 ESSENTIAL HYPERTENSION, BENIGN: ICD-10-CM

## 2021-03-09 RX ORDER — VERAPAMIL HYDROCHLORIDE 240 MG/1
240 TABLET, FILM COATED, EXTENDED RELEASE ORAL NIGHTLY
Qty: 90 TABLET | Refills: 1 | Status: SHIPPED | OUTPATIENT
Start: 2021-03-09 | End: 2021-03-15

## 2021-03-15 DIAGNOSIS — I10 ESSENTIAL HYPERTENSION, BENIGN: ICD-10-CM

## 2021-03-15 RX ORDER — VERAPAMIL HYDROCHLORIDE 240 MG/1
240 TABLET, FILM COATED, EXTENDED RELEASE ORAL NIGHTLY
Qty: 90 TABLET | Refills: 1 | Status: SHIPPED | OUTPATIENT
Start: 2021-03-15

## 2021-03-22 DIAGNOSIS — N18.30 TYPE 2 DIABETES MELLITUS WITH STAGE 3 CHRONIC KIDNEY DISEASE, WITHOUT LONG-TERM CURRENT USE OF INSULIN (HCC): ICD-10-CM

## 2021-03-22 DIAGNOSIS — E11.22 TYPE 2 DIABETES MELLITUS WITH STAGE 3 CHRONIC KIDNEY DISEASE, WITHOUT LONG-TERM CURRENT USE OF INSULIN (HCC): ICD-10-CM

## 2021-03-22 RX ORDER — METFORMIN HYDROCHLORIDE 500 MG/1
TABLET, EXTENDED RELEASE ORAL
Qty: 90 TABLET | Refills: 1 | Status: SHIPPED | OUTPATIENT
Start: 2021-03-22 | End: 2021-06-07

## 2021-04-17 DIAGNOSIS — E78.00 PURE HYPERCHOLESTEROLEMIA: ICD-10-CM

## 2021-04-17 DIAGNOSIS — I70.0 AORTIC ATHEROSCLEROSIS (HCC): ICD-10-CM

## 2021-04-18 RX ORDER — ATORVASTATIN CALCIUM 20 MG/1
TABLET, FILM COATED ORAL
Qty: 90 TABLET | Refills: 1 | Status: SHIPPED | OUTPATIENT
Start: 2021-04-18 | End: 2021-10-08

## 2021-04-27 DIAGNOSIS — I10 ESSENTIAL HYPERTENSION, BENIGN: ICD-10-CM

## 2021-04-27 RX ORDER — SPIRONOLACTONE AND HYDROCHLOROTHIAZIDE 25; 25 MG/1; MG/1
1 TABLET ORAL DAILY
Qty: 90 TABLET | Refills: 1 | Status: SHIPPED | OUTPATIENT
Start: 2021-04-27 | End: 2021-10-22

## 2021-05-13 DIAGNOSIS — I10 ESSENTIAL HYPERTENSION, BENIGN: ICD-10-CM

## 2021-05-13 NOTE — TELEPHONE ENCOUNTER
Please help patient schedule Medicare annual wellness visit October 2021. Needs follow-up. Has not been seen since October. Sending requested refill today.

## 2021-05-31 DIAGNOSIS — M81.0 OSTEOPOROSIS, UNSPECIFIED OSTEOPOROSIS TYPE, UNSPECIFIED PATHOLOGICAL FRACTURE PRESENCE: ICD-10-CM

## 2021-06-01 RX ORDER — ALENDRONATE SODIUM 70 MG/1
TABLET ORAL
Qty: 12 TABLET | Refills: 3 | Status: SHIPPED | OUTPATIENT
Start: 2021-06-01

## 2021-06-02 ENCOUNTER — LAB ENCOUNTER (OUTPATIENT)
Dept: LAB | Age: 71
End: 2021-06-02
Attending: FAMILY MEDICINE
Payer: MEDICARE

## 2021-06-02 DIAGNOSIS — Z13.6 SCREENING FOR CARDIOVASCULAR CONDITION: ICD-10-CM

## 2021-06-02 DIAGNOSIS — E78.00 PURE HYPERCHOLESTEROLEMIA: ICD-10-CM

## 2021-06-02 DIAGNOSIS — Z13.1 SCREENING FOR DIABETES MELLITUS (DM): ICD-10-CM

## 2021-06-02 DIAGNOSIS — E11.22 TYPE 2 DIABETES MELLITUS WITH STAGE 3 CHRONIC KIDNEY DISEASE, WITHOUT LONG-TERM CURRENT USE OF INSULIN (HCC): ICD-10-CM

## 2021-06-02 DIAGNOSIS — N18.30 TYPE 2 DIABETES MELLITUS WITH STAGE 3 CHRONIC KIDNEY DISEASE, WITHOUT LONG-TERM CURRENT USE OF INSULIN (HCC): ICD-10-CM

## 2021-06-02 DIAGNOSIS — Z00.00 ENCOUNTER FOR ANNUAL HEALTH EXAMINATION: ICD-10-CM

## 2021-06-02 PROCEDURE — 83036 HEMOGLOBIN GLYCOSYLATED A1C: CPT

## 2021-06-02 PROCEDURE — 80061 LIPID PANEL: CPT

## 2021-06-02 PROCEDURE — 36415 COLL VENOUS BLD VENIPUNCTURE: CPT

## 2021-06-02 PROCEDURE — 84443 ASSAY THYROID STIM HORMONE: CPT

## 2021-06-02 PROCEDURE — 80053 COMPREHEN METABOLIC PANEL: CPT

## 2021-06-02 PROCEDURE — 84439 ASSAY OF FREE THYROXINE: CPT

## 2021-06-02 PROCEDURE — 85025 COMPLETE CBC W/AUTO DIFF WBC: CPT

## 2021-06-07 ENCOUNTER — OFFICE VISIT (OUTPATIENT)
Dept: FAMILY MEDICINE CLINIC | Facility: CLINIC | Age: 71
End: 2021-06-07
Payer: MEDICARE

## 2021-06-07 VITALS
BODY MASS INDEX: 48.82 KG/M2 | TEMPERATURE: 99 F | WEIGHT: 293 LBS | SYSTOLIC BLOOD PRESSURE: 136 MMHG | DIASTOLIC BLOOD PRESSURE: 83 MMHG | HEIGHT: 65 IN | HEART RATE: 91 BPM

## 2021-06-07 DIAGNOSIS — N18.31 TYPE 2 DIABETES MELLITUS WITH STAGE 3A CHRONIC KIDNEY DISEASE, WITHOUT LONG-TERM CURRENT USE OF INSULIN (HCC): ICD-10-CM

## 2021-06-07 DIAGNOSIS — I10 ESSENTIAL HYPERTENSION, BENIGN: Primary | ICD-10-CM

## 2021-06-07 DIAGNOSIS — E11.22 TYPE 2 DIABETES MELLITUS WITH STAGE 3A CHRONIC KIDNEY DISEASE, WITHOUT LONG-TERM CURRENT USE OF INSULIN (HCC): ICD-10-CM

## 2021-06-07 PROBLEM — N18.30 CKD STAGE 3 DUE TO TYPE 2 DIABETES MELLITUS (HCC): Chronic | Status: RESOLVED | Noted: 2019-02-04 | Resolved: 2021-06-07

## 2021-06-07 PROBLEM — N18.30 TYPE 2 DIABETES MELLITUS WITH STAGE 3 CHRONIC KIDNEY DISEASE, WITHOUT LONG-TERM CURRENT USE OF INSULIN (HCC): Chronic | Status: ACTIVE | Noted: 2019-04-17

## 2021-06-07 PROCEDURE — 3075F SYST BP GE 130 - 139MM HG: CPT | Performed by: FAMILY MEDICINE

## 2021-06-07 PROCEDURE — 3008F BODY MASS INDEX DOCD: CPT | Performed by: FAMILY MEDICINE

## 2021-06-07 PROCEDURE — 99214 OFFICE O/P EST MOD 30 MIN: CPT | Performed by: FAMILY MEDICINE

## 2021-06-07 PROCEDURE — 3079F DIAST BP 80-89 MM HG: CPT | Performed by: FAMILY MEDICINE

## 2021-06-07 RX ORDER — METFORMIN HYDROCHLORIDE 500 MG/1
500 TABLET, EXTENDED RELEASE ORAL 2 TIMES DAILY WITH MEALS
Qty: 180 TABLET | Refills: 0 | Status: SHIPPED | OUTPATIENT
Start: 2021-07-01 | End: 2021-09-01

## 2021-06-07 NOTE — PROGRESS NOTES
HPI:   Raiza Cardoso is a 78year old female who presents for a follow-up visit. History of type 2 diabetes with chronic kidney disease. Takes Metformin daily. Last A1c was 7.4%.   She checks her blood pressure regularly at home and her blood pr 10/13/2020 ) 20 tablet 0   • Blood Glucose Monitoring Suppl (ACCU-CHEK ADITYA PLUS) w/Device Does not apply Kit USE DAILY 1 kit 0   • Glucose Blood (ACCU-CHEK ACTIVE) In Vitro Strip USE TWICE DAILY 120 strip 0   • Glucose Blood (ACCU-CHEK ADITYA PLUS) In Vit Years since quitting: 3.3      Smokeless tobacco: Never Used      Tobacco comment: 1 pack every 3 days    Vaping Use      Vaping Use: Never used    Alcohol use:  Yes      Alcohol/week: 0.0 standard drinks      Comment: very rarely    Drug use: No         R

## 2021-06-11 RX ORDER — BLOOD SUGAR DIAGNOSTIC
STRIP MISCELLANEOUS
Qty: 100 STRIP | Refills: 3 | Status: SHIPPED | OUTPATIENT
Start: 2021-06-11

## 2021-09-01 DIAGNOSIS — E11.22 TYPE 2 DIABETES MELLITUS WITH STAGE 3A CHRONIC KIDNEY DISEASE, WITHOUT LONG-TERM CURRENT USE OF INSULIN (HCC): ICD-10-CM

## 2021-09-01 DIAGNOSIS — N18.31 TYPE 2 DIABETES MELLITUS WITH STAGE 3A CHRONIC KIDNEY DISEASE, WITHOUT LONG-TERM CURRENT USE OF INSULIN (HCC): ICD-10-CM

## 2021-09-01 RX ORDER — METFORMIN HYDROCHLORIDE 500 MG/1
500 TABLET, EXTENDED RELEASE ORAL 2 TIMES DAILY WITH MEALS
Qty: 180 TABLET | Refills: 1 | Status: SHIPPED | OUTPATIENT
Start: 2021-09-01 | End: 2021-10-27

## 2021-09-01 NOTE — TELEPHONE ENCOUNTER
Please review. Protocol failed / No protocol.     Requested Prescriptions   Pending Prescriptions Disp Refills    METFORMIN HCL  MG Oral Tablet 24 Hr [Pharmacy Med Name: METFORMIN HCL  MG TABLET] 180 tablet 0     Sig: TAKE 1 TABLET BY MOUTH TWI

## 2021-09-14 ENCOUNTER — PATIENT MESSAGE (OUTPATIENT)
Dept: FAMILY MEDICINE CLINIC | Facility: CLINIC | Age: 71
End: 2021-09-14

## 2021-09-14 NOTE — TELEPHONE ENCOUNTER
From: Arsenio Gao  To: Taty Sesay MD  Sent: 9/14/2021 8:01 AM CDT  Subject: Flu Shot    I have a physical scheduled for 10/8 and am hoping to get my yearly flu shot at the same time - will this be possible?

## 2021-09-28 ENCOUNTER — HOSPITAL ENCOUNTER (OUTPATIENT)
Dept: MAMMOGRAPHY | Age: 71
Discharge: HOME | End: 2021-09-28
Attending: FAMILY MEDICINE
Payer: MEDICARE

## 2021-09-28 DIAGNOSIS — Z12.31 VISIT FOR SCREENING MAMMOGRAM: ICD-10-CM

## 2021-09-28 PROCEDURE — 77067 SCR MAMMO BI INCL CAD: CPT | Performed by: FAMILY MEDICINE

## 2021-09-28 PROCEDURE — 77063 BREAST TOMOSYNTHESIS BI: CPT | Performed by: FAMILY MEDICINE

## 2021-09-29 ENCOUNTER — TELEPHONE (OUTPATIENT)
Dept: FAMILY MEDICINE CLINIC | Facility: CLINIC | Age: 71
End: 2021-09-29

## 2021-09-30 ENCOUNTER — LAB ENCOUNTER (OUTPATIENT)
Dept: LAB | Age: 71
End: 2021-09-30
Attending: FAMILY MEDICINE
Payer: MEDICARE

## 2021-09-30 DIAGNOSIS — N18.31 TYPE 2 DIABETES MELLITUS WITH STAGE 3A CHRONIC KIDNEY DISEASE, WITHOUT LONG-TERM CURRENT USE OF INSULIN (HCC): ICD-10-CM

## 2021-09-30 DIAGNOSIS — N18.30 TYPE 2 DIABETES MELLITUS WITH STAGE 3 CHRONIC KIDNEY DISEASE, WITHOUT LONG-TERM CURRENT USE OF INSULIN (HCC): ICD-10-CM

## 2021-09-30 DIAGNOSIS — Z00.00 ENCOUNTER FOR ANNUAL HEALTH EXAMINATION: ICD-10-CM

## 2021-09-30 DIAGNOSIS — E11.22 TYPE 2 DIABETES MELLITUS WITH STAGE 3A CHRONIC KIDNEY DISEASE, WITHOUT LONG-TERM CURRENT USE OF INSULIN (HCC): ICD-10-CM

## 2021-09-30 DIAGNOSIS — E11.22 TYPE 2 DIABETES MELLITUS WITH STAGE 3 CHRONIC KIDNEY DISEASE, WITHOUT LONG-TERM CURRENT USE OF INSULIN (HCC): ICD-10-CM

## 2021-09-30 PROCEDURE — 3051F HG A1C>EQUAL 7.0%<8.0%: CPT | Performed by: FAMILY MEDICINE

## 2021-09-30 PROCEDURE — 80053 COMPREHEN METABOLIC PANEL: CPT

## 2021-09-30 PROCEDURE — 80061 LIPID PANEL: CPT

## 2021-09-30 PROCEDURE — 36415 COLL VENOUS BLD VENIPUNCTURE: CPT

## 2021-09-30 PROCEDURE — 83036 HEMOGLOBIN GLYCOSYLATED A1C: CPT

## 2021-10-02 ENCOUNTER — TELEPHONE (OUTPATIENT)
Dept: FAMILY MEDICINE CLINIC | Facility: CLINIC | Age: 71
End: 2021-10-02

## 2021-10-02 DIAGNOSIS — E83.52 HYPERCALCEMIA: Primary | ICD-10-CM

## 2021-10-08 ENCOUNTER — OFFICE VISIT (OUTPATIENT)
Dept: FAMILY MEDICINE CLINIC | Facility: CLINIC | Age: 71
End: 2021-10-08
Payer: MEDICARE

## 2021-10-08 VITALS
WEIGHT: 293 LBS | TEMPERATURE: 98 F | BODY MASS INDEX: 48.82 KG/M2 | DIASTOLIC BLOOD PRESSURE: 78 MMHG | HEART RATE: 73 BPM | HEIGHT: 65 IN | SYSTOLIC BLOOD PRESSURE: 146 MMHG

## 2021-10-08 DIAGNOSIS — I10 ESSENTIAL HYPERTENSION, BENIGN: Chronic | ICD-10-CM

## 2021-10-08 DIAGNOSIS — E66.01 MORBID OBESITY DUE TO EXCESS CALORIES (HCC): ICD-10-CM

## 2021-10-08 DIAGNOSIS — D48.5 NEOPLASM OF UNCERTAIN BEHAVIOR OF SKIN: ICD-10-CM

## 2021-10-08 DIAGNOSIS — E11.22 TYPE 2 DIABETES MELLITUS WITH STAGE 3A CHRONIC KIDNEY DISEASE, WITHOUT LONG-TERM CURRENT USE OF INSULIN (HCC): ICD-10-CM

## 2021-10-08 DIAGNOSIS — Z23 NEEDS FLU SHOT: ICD-10-CM

## 2021-10-08 DIAGNOSIS — M77.12 LEFT TENNIS ELBOW: ICD-10-CM

## 2021-10-08 DIAGNOSIS — Z13.6 SCREENING FOR CARDIOVASCULAR CONDITION: ICD-10-CM

## 2021-10-08 DIAGNOSIS — L73.2 HIDRADENITIS SUPPURATIVA: ICD-10-CM

## 2021-10-08 DIAGNOSIS — Z00.00 ENCOUNTER FOR ANNUAL HEALTH EXAMINATION: Primary | ICD-10-CM

## 2021-10-08 DIAGNOSIS — M81.0 OSTEOPOROSIS, UNSPECIFIED OSTEOPOROSIS TYPE, UNSPECIFIED PATHOLOGICAL FRACTURE PRESENCE: ICD-10-CM

## 2021-10-08 DIAGNOSIS — Z12.11 COLON CANCER SCREENING: ICD-10-CM

## 2021-10-08 DIAGNOSIS — I70.0 AORTIC ATHEROSCLEROSIS (HCC): ICD-10-CM

## 2021-10-08 DIAGNOSIS — L57.8 SUN-DAMAGED SKIN: ICD-10-CM

## 2021-10-08 DIAGNOSIS — E78.00 PURE HYPERCHOLESTEROLEMIA: ICD-10-CM

## 2021-10-08 DIAGNOSIS — Z20.5 EXPOSURE TO HEPATITIS C: ICD-10-CM

## 2021-10-08 DIAGNOSIS — H26.9 CATARACT OF BOTH EYES, UNSPECIFIED CATARACT TYPE: ICD-10-CM

## 2021-10-08 DIAGNOSIS — J30.2 SEASONAL ALLERGIC RHINITIS, UNSPECIFIED TRIGGER: ICD-10-CM

## 2021-10-08 DIAGNOSIS — L56.5 DISSEMINATED SUPERFICIAL ACTINIC POROKERATOSIS (DSAP): ICD-10-CM

## 2021-10-08 DIAGNOSIS — J40 BRONCHITIS: ICD-10-CM

## 2021-10-08 DIAGNOSIS — Z87.2 HISTORY OF ACTINIC KERATOSES: ICD-10-CM

## 2021-10-08 DIAGNOSIS — Z13.1 SCREENING FOR DIABETES MELLITUS (DM): ICD-10-CM

## 2021-10-08 DIAGNOSIS — N18.32 STAGE 3B CHRONIC KIDNEY DISEASE (HCC): ICD-10-CM

## 2021-10-08 DIAGNOSIS — N18.31 TYPE 2 DIABETES MELLITUS WITH STAGE 3A CHRONIC KIDNEY DISEASE, WITHOUT LONG-TERM CURRENT USE OF INSULIN (HCC): ICD-10-CM

## 2021-10-08 DIAGNOSIS — Z85.828 PERSONAL HISTORY OF SKIN CANCER: ICD-10-CM

## 2021-10-08 DIAGNOSIS — H81.10 BENIGN PAROXYSMAL POSITIONAL VERTIGO, UNSPECIFIED LATERALITY: ICD-10-CM

## 2021-10-08 DIAGNOSIS — E83.52 HYPERCALCEMIA: ICD-10-CM

## 2021-10-08 DIAGNOSIS — L82.0 INFLAMED SEBORRHEIC KERATOSIS: ICD-10-CM

## 2021-10-08 DIAGNOSIS — E87.1 HYPONATREMIA: ICD-10-CM

## 2021-10-08 PROCEDURE — 3078F DIAST BP <80 MM HG: CPT | Performed by: FAMILY MEDICINE

## 2021-10-08 PROCEDURE — 3077F SYST BP >= 140 MM HG: CPT | Performed by: FAMILY MEDICINE

## 2021-10-08 PROCEDURE — G0439 PPPS, SUBSEQ VISIT: HCPCS | Performed by: FAMILY MEDICINE

## 2021-10-08 PROCEDURE — G0008 ADMIN INFLUENZA VIRUS VAC: HCPCS | Performed by: FAMILY MEDICINE

## 2021-10-08 PROCEDURE — 96160 PT-FOCUSED HLTH RISK ASSMT: CPT | Performed by: FAMILY MEDICINE

## 2021-10-08 PROCEDURE — 90662 IIV NO PRSV INCREASED AG IM: CPT | Performed by: FAMILY MEDICINE

## 2021-10-08 PROCEDURE — 3008F BODY MASS INDEX DOCD: CPT | Performed by: FAMILY MEDICINE

## 2021-10-08 PROCEDURE — 99397 PER PM REEVAL EST PAT 65+ YR: CPT | Performed by: FAMILY MEDICINE

## 2021-10-08 RX ORDER — ATORVASTATIN CALCIUM 20 MG/1
TABLET, FILM COATED ORAL
Qty: 90 TABLET | Refills: 1 | Status: SHIPPED | OUTPATIENT
Start: 2021-10-08

## 2021-10-08 NOTE — TELEPHONE ENCOUNTER
Refill passed per [facility], [facility] protocol.   Requested Prescriptions   Pending Prescriptions Disp Refills    ATORVASTATIN 20 MG Oral Tab [Pharmacy Med Name: ATORVASTATIN 20 MG TABLET] 90 tablet 1     Sig: TAKE 1 TABLET (20MG) BY MOUTH EVERY DAY        Chol

## 2021-10-08 NOTE — PROGRESS NOTES
HPI:   Cristino Marr is a 73year old female who presents for a Medicare Subsequent Annual Wellness visit (Pt already had Initial Annual Wellness). Patient feels well and is at her baseline.    Monitors bp at home daily and always within yao history of skin cancer     Sun-damaged skin     Hidradenitis suppurativa     Type 2 diabetes mellitus with stage 3 chronic kidney disease, without long-term current use of insulin (HCC)     Aortic atherosclerosis (HCC)     Disseminated superficial actinic USE ONCE DAILY IN AM BEFORE BREAKFAST  LANCETS ULTRA THIN Does not apply Misc, Use one daily  Blood Gluc Meter Disp-Strips Does not apply Device, Use once daily in AM before breakfast  Multiple Vitamins-Minerals (CENTRUM SILVER) Oral Tab, Take 1 tablet by restaurant: No   I get confused about where sounds come from: No I misunderstand some words in a sentence and need to ask people to repeat themselves: No   I especially have trouble understanding the speech of women and children: No I have trouble [place] 09/29/2011, 10/02/2012, 10/04/2013   • Pneumococcal (Prevnar 13) 10/04/2018   • Pneumovax 23 10/05/2017   • Zoster Vaccine Recombinant Adjuvanted (Shingrix) 11/08/2019, 02/03/2020   Pended Date(s) Pended   • FLU VAC High Dose 72 YRS & Older PRSV Free (1621 stable, per derm. 14. Stage 3b chronic kidney disease ([address]  - stable, continue present management  - COMP METABOLIC PANEL (14); Future  - NEPHROLOGY - INTERNAL    15. Sun-damaged skin  - stable, per derm.     16. Personal history of skin cancer  - stagena Fair  How do you maintain positive mental well-being?: Puzzles; Games     Supplementary Documentation:   Sirena Barrientos's SCREENING SCHEDULE   Tests on this list are recommended by your physician but may not be covered, or covered at this frequency glucocorticoid medication use (Steroids) Last Dexa Scan:    XR DEXA BONE DENSITOMETRY (CPT=77080) 01/17/2020      No recommendations at this time   Pap and Pelvic    Pap   Covered every 2 years for women at normal risk;  Annually if at high risk -  No recom Annually Lab Results   Component Value Date    K 4.0 09/30/2021         Creatinine   Annually Lab Results   Component Value Date    CREATSERUM 1.04 (H) 09/30/2021         BUN Annually Lab Results   Component Value Date    BUN 21 (H) 09/30/2021       Drug S

## 2021-10-08 NOTE — PROGRESS NOTES
HPI:   Medhat Liriano is a 73year old female who presents for a {Medicare Annual Wellness Description:3401}. ***  No topic due editable text        She has been screened for Falls and is low risk.     Cognitive Assessment   She had a completel Patient Care Team: Patient Care Team:  Vicki Morris MD as PCP - General (Family Medicine)    Patient Active Problem List:     Essential hypertension, benign     Pure hypercholesterolemia     Left tennis elbow     Exposure to hepatitis C     Morbid PER WEEK  METOPROLOL TARTRATE 25 MG Oral Tab, TAKE 1 TABLET (25MG) BY MOUTH 2 TIMES EVERY DAY  Spironolactone-HCTZ 25-25 MG Oral Tab, Take 1 tablet by mouth daily.   ATORVASTATIN 20 MG Oral Tab, TAKE 1 TABLET (20MG) BY MOUTH EVERY DAY  Verapamil HCl  oz (141.9 kg).     Medicare Hearing Assessment  (Required for AWV/SWV)    Hearing Screening    Screening Method: Questionnaire  I have a problem hearing over the telephone: No I have trouble following the conversations when two or more people are talking at Fluvirin, 3 Years & >, Im 10/09/2015   • Fluzone Vaccine Medicare () 10/07/2019, 10/01/2020   • Influenza 09/29/2011, 10/02/2012, 10/04/2013   • Pneumococcal (Prevnar 13) 10/04/2018   • Pneumovax 23 10/05/2017   • Zoster Vaccine Recombinant Adjuvanted condition  -     LIPID PANEL;  Future    Screening for diabetes mellitus (DM)  -     HEMOGLOBIN A1C; Future         Diet assessment: {good/fair/poor/excellent:[id number]::\"good\"}     PLAN:  The patient indicates understanding of these issues and agrees to t Screening for Abdominal Aortic Aneurysm (AAA) Covered once in a lifetime for one of the following risk factors   • Men who are 73-68 years old and have ever smoked   • Anyone with a family history -     Colorectal Cancer Screening  Covered for ages 52-80; Zoster Not covered by Medicare Part B; may be covered with your pharmacy  prescription benefits -  No recommendations at this time       Diabetes      Hemoglobin A1C Annually; if last result is elevated, may be asked to retest more frequently.     Medicare

## 2021-10-08 NOTE — PATIENT INSTRUCTIONS
Pavel Barrientos's SCREENING SCHEDULE   Tests on this list are recommended by your physician but may not be covered, or covered at this frequency, by your insurer. Please check with your insurance carrier before scheduling to verify coverage.    P 01/17/2020      No recommendations at this time   Pap and Pelvic    Pap   Covered every 2 years for women at normal risk;  Annually if at high risk -  No recommendations at this time    Chlamydia Annually if high risk -  No recommendations at this time   Sc Component Value Date    CREATSERUM 1.04 (H) 09/30/2021         BUN Annually Lab Results   Component Value Date    BUN 21 (H) 09/30/2021       Drug Serum Conc Annually No results found for: DIGOXIN, DIG, VALP              Recommended Websites for Advanced

## 2021-10-22 DIAGNOSIS — I10 ESSENTIAL HYPERTENSION, BENIGN: ICD-10-CM

## 2021-10-22 RX ORDER — SPIRONOLACTONE AND HYDROCHLOROTHIAZIDE 25; 25 MG/1; MG/1
1 TABLET ORAL DAILY
Qty: 90 TABLET | Refills: 1 | Status: SHIPPED | OUTPATIENT
Start: 2021-10-22

## 2021-10-22 NOTE — TELEPHONE ENCOUNTER
Refill passed per [facility], [facility] protocol.   Requested Prescriptions   Pending Prescriptions Disp Refills    SPIRONOLACTONE-HCTZ 25-25 MG Oral Tab [Pharmacy Med Name: SPIRONOLACTONE-HCTZ 25-25 TAB] 90 tablet 1     Sig: TAKE 1 TABLET BY MOUTH EVERY DAY

## 2021-10-26 ENCOUNTER — PATIENT MESSAGE (OUTPATIENT)
Dept: FAMILY MEDICINE CLINIC | Facility: CLINIC | Age: 71
End: 2021-10-26

## 2021-10-26 DIAGNOSIS — N18.31 TYPE 2 DIABETES MELLITUS WITH STAGE 3A CHRONIC KIDNEY DISEASE, WITHOUT LONG-TERM CURRENT USE OF INSULIN (HCC): ICD-10-CM

## 2021-10-26 DIAGNOSIS — E11.22 TYPE 2 DIABETES MELLITUS WITH STAGE 3A CHRONIC KIDNEY DISEASE, WITHOUT LONG-TERM CURRENT USE OF INSULIN (HCC): ICD-10-CM

## 2021-10-26 NOTE — TELEPHONE ENCOUNTER
Spoke with patient and confirmed that patient is taking 1 tablet of metformin daily. Patient is requesting that her med list be updated. Please see [employer] message below    Please verify the metformin order change.

## 2021-10-26 NOTE — TELEPHONE ENCOUNTER
From: Nick Rincon  To:  Ehsan Balderrama MD  Sent: 10/26/2021 9:27 AM CDT  Subject: Metformin     As discussed in my visit earlier this month, I did stop taking the 2nd dose of Metformin 500mg in the evening and all of my stomach issues have reso

## 2021-10-27 RX ORDER — METFORMIN HYDROCHLORIDE 500 MG/1
500 TABLET, EXTENDED RELEASE ORAL
Qty: 90 TABLET | Refills: 0 | COMMUNITY
Start: 2021-10-27

## 2021-11-02 DIAGNOSIS — I10 ESSENTIAL HYPERTENSION, BENIGN: ICD-10-CM

## 2021-11-02 NOTE — TELEPHONE ENCOUNTER
Refill passed per [address] [place] protocol.   Requested Prescriptions   Pending Prescriptions Disp Refills    METOPROLOL TARTRATE 25 MG Oral Tab [Pharmacy Med Name: METOPROLOL TARTRATE 25 MG TAB] 180 tablet 1     Sig: TAKE 1 TABLET (25MG) BY MOUTH 2 TIMES EVERY DAY        Hypertensive Medications Protocol Passed - 11/2/2021 12:01 AM        Passed - CMP or BMP in past 12 months        Passed - Appointment in past 6 or next 3 months        Passed - GFR Non- > 50     Lab Results   Component Value Date    GFRNAA 55 (L) 09/30/2021                      Recent Outpatient Visits              3 weeks ago Encounter for annual health examination    Elizabeth Morin MD    Office Visit    4 months ago Essential hypertension, benign    Elizabeth Morin MD    Office Visit    1 year ago Follow up    Elizabeth Morin MD    Office Visit    1 year ago Encounter for annual health examination    Elizabeth Morin MD    Office Visit    1 year ago Encounter to establish care    [address] Consuelo Iron, MD    Office Visit           Future Appointments         Provider Department Appt Notes    In 5 months Danielle Farias MD [address], [place] Follow up health conditions

## 2022-02-17 DIAGNOSIS — I10 ESSENTIAL HYPERTENSION, BENIGN: ICD-10-CM

## 2022-02-17 RX ORDER — VERAPAMIL HYDROCHLORIDE 240 MG/1
240 TABLET, FILM COATED, EXTENDED RELEASE ORAL NIGHTLY
Qty: 90 TABLET | Refills: 1 | Status: SHIPPED | OUTPATIENT
Start: 2022-02-17 | End: 2022-04-08

## 2022-02-17 NOTE — TELEPHONE ENCOUNTER
Refill passed per [address] [place] protocol.      Requested Prescriptions   Pending Prescriptions Disp Refills    VERAPAMIL 240 MG Oral Tab CR [Pharmacy Med Name: VERAPAMIL  MG TABLET] 90 tablet 1     Sig: TAKE 1 TABLET BY MOUTH NIGHTLY        Hypertensive Medications Protocol Passed - 2/17/2022  9:16 AM        Passed - CMP or BMP in past 12 months        Passed - Appointment in past 6 or next 3 months        Passed - GFR Non- > 50     Lab Results   Component Value Date    GFRNAA 55 (L) 09/30/2021                         Recent Outpatient Visits              4 months ago Encounter for annual health examination    [address] Lat[address] Tenorio MD    Office Visit    8 months ago Essential hypertension, benign    [address] Tenorio MD    Office Visit    1 year ago Follow up    [address] Latrice Tenorio MD    Office Visit    1 year ago Encounter for annual health examination    [address] Lat[address] Tenorio MD    Office Visit    1 year ago Encounter to establish care    [address] Darene Fallen, MD    Office Visit             Future Appointments         Provider Department Appt Notes    In 1 month Terrance Flores MD [address] [place] Follow up health conditions

## 2022-04-01 ENCOUNTER — LAB ENCOUNTER (OUTPATIENT)
Dept: LAB | Age: 72
End: 2022-04-01
Attending: FAMILY MEDICINE
Payer: MEDICARE

## 2022-04-01 DIAGNOSIS — E78.00 PURE HYPERCHOLESTEROLEMIA: ICD-10-CM

## 2022-04-01 DIAGNOSIS — I10 ESSENTIAL HYPERTENSION, BENIGN: Chronic | ICD-10-CM

## 2022-04-01 DIAGNOSIS — Z13.6 SCREENING FOR CARDIOVASCULAR CONDITION: ICD-10-CM

## 2022-04-01 DIAGNOSIS — E87.1 HYPONATREMIA: ICD-10-CM

## 2022-04-01 DIAGNOSIS — Z20.5 EXPOSURE TO HEPATITIS C: ICD-10-CM

## 2022-04-01 DIAGNOSIS — E83.52 HYPERCALCEMIA: ICD-10-CM

## 2022-04-01 DIAGNOSIS — E11.22 TYPE 2 DIABETES MELLITUS WITH STAGE 3A CHRONIC KIDNEY DISEASE, WITHOUT LONG-TERM CURRENT USE OF INSULIN (HCC): ICD-10-CM

## 2022-04-01 DIAGNOSIS — Z00.00 ENCOUNTER FOR ANNUAL HEALTH EXAMINATION: ICD-10-CM

## 2022-04-01 DIAGNOSIS — Z13.1 SCREENING FOR DIABETES MELLITUS (DM): ICD-10-CM

## 2022-04-01 DIAGNOSIS — N18.32 STAGE 3B CHRONIC KIDNEY DISEASE (HCC): ICD-10-CM

## 2022-04-01 DIAGNOSIS — N18.31 TYPE 2 DIABETES MELLITUS WITH STAGE 3A CHRONIC KIDNEY DISEASE, WITHOUT LONG-TERM CURRENT USE OF INSULIN (HCC): ICD-10-CM

## 2022-04-01 LAB
ALBUMIN SERPL-MCNC: 3.9 G/DL (ref 3.4–5)
ALBUMIN/GLOB SERPL: 1 {RATIO} (ref 1–2)
ALP LIVER SERPL-CCNC: 64 U/L
ALT SERPL-CCNC: 52 U/L
ANION GAP SERPL CALC-SCNC: 11 MMOL/L (ref 0–18)
AST SERPL-CCNC: 40 U/L (ref 15–37)
BASOPHILS # BLD AUTO: 0.08 X10(3) UL (ref 0–0.2)
BASOPHILS NFR BLD AUTO: 0.7 %
BILIRUB SERPL-MCNC: 0.7 MG/DL (ref 0.1–2)
BUN BLD-MCNC: 23 MG/DL (ref 7–18)
BUN/CREAT SERPL: 17.6 (ref 10–20)
CALCIUM BLD-MCNC: 10.2 MG/DL (ref 8.5–10.1)
CHLORIDE SERPL-SCNC: 101 MMOL/L (ref 98–112)
CHOLEST SERPL-MCNC: 158 MG/DL (ref ?–200)
CO2 SERPL-SCNC: 23 MMOL/L (ref 21–32)
CREAT BLD-MCNC: 1.31 MG/DL
DEPRECATED RDW RBC AUTO: 49.2 FL (ref 35.1–46.3)
EOSINOPHIL # BLD AUTO: 0.11 X10(3) UL (ref 0–0.7)
EOSINOPHIL NFR BLD AUTO: 1 %
ERYTHROCYTE [DISTWIDTH] IN BLOOD BY AUTOMATED COUNT: 14.6 % (ref 11–15)
EST. AVERAGE GLUCOSE BLD GHB EST-MCNC: 177 MG/DL (ref 68–126)
FASTING PATIENT LIPID ANSWER: YES
FASTING STATUS PATIENT QL REPORTED: YES
GLOBULIN PLAS-MCNC: 3.9 G/DL (ref 2.8–4.4)
GLUCOSE BLD-MCNC: 270 MG/DL (ref 70–99)
HBA1C MFR BLD: 7.8 % (ref ?–5.7)
HCT VFR BLD AUTO: 50 %
HDLC SERPL-MCNC: 48 MG/DL (ref 40–59)
HGB BLD-MCNC: 15.6 G/DL
IMM GRANULOCYTES # BLD AUTO: 0.03 X10(3) UL (ref 0–1)
IMM GRANULOCYTES NFR BLD: 0.3 %
LDLC SERPL CALC-MCNC: 75 MG/DL (ref ?–100)
LYMPHOCYTES # BLD AUTO: 2.3 X10(3) UL (ref 1–4)
LYMPHOCYTES NFR BLD AUTO: 21.5 %
MCH RBC QN AUTO: 28.7 PG (ref 26–34)
MCHC RBC AUTO-ENTMCNC: 31.2 G/DL (ref 31–37)
MCV RBC AUTO: 92.1 FL
MONOCYTES # BLD AUTO: 0.67 X10(3) UL (ref 0.1–1)
MONOCYTES NFR BLD AUTO: 6.3 %
NEUTROPHILS # BLD AUTO: 7.52 X10 (3) UL (ref 1.5–7.7)
NEUTROPHILS # BLD AUTO: 7.52 X10(3) UL (ref 1.5–7.7)
NEUTROPHILS NFR BLD AUTO: 70.2 %
NONHDLC SERPL-MCNC: 110 MG/DL (ref ?–130)
OSMOLALITY SERPL CALC.SUM OF ELEC: 293 MOSM/KG (ref 275–295)
PLATELET # BLD AUTO: 278 10(3)UL (ref 150–450)
POTASSIUM SERPL-SCNC: 3.9 MMOL/L (ref 3.5–5.1)
PROT SERPL-MCNC: 7.8 G/DL (ref 6.4–8.2)
PTH-INTACT SERPL-MCNC: 77.7 PG/ML (ref 18.5–88)
RBC # BLD AUTO: 5.43 X10(6)UL
SODIUM SERPL-SCNC: 135 MMOL/L (ref 136–145)
TRIGL SERPL-MCNC: 214 MG/DL (ref 30–149)
TSI SER-ACNC: 3.73 MIU/ML (ref 0.36–3.74)
VLDLC SERPL CALC-MCNC: 33 MG/DL (ref 0–30)
WBC # BLD AUTO: 10.7 X10(3) UL (ref 4–11)

## 2022-04-01 PROCEDURE — 80061 LIPID PANEL: CPT

## 2022-04-01 PROCEDURE — 3051F HG A1C>EQUAL 7.0%<8.0%: CPT | Performed by: FAMILY MEDICINE

## 2022-04-01 PROCEDURE — 83970 ASSAY OF PARATHORMONE: CPT

## 2022-04-01 PROCEDURE — 83036 HEMOGLOBIN GLYCOSYLATED A1C: CPT

## 2022-04-01 PROCEDURE — 85025 COMPLETE CBC W/AUTO DIFF WBC: CPT

## 2022-04-01 PROCEDURE — 80053 COMPREHEN METABOLIC PANEL: CPT

## 2022-04-01 PROCEDURE — 36415 COLL VENOUS BLD VENIPUNCTURE: CPT

## 2022-04-01 PROCEDURE — 84443 ASSAY THYROID STIM HORMONE: CPT

## 2022-04-08 ENCOUNTER — OFFICE VISIT (OUTPATIENT)
Dept: FAMILY MEDICINE CLINIC | Facility: CLINIC | Age: 72
End: 2022-04-08
Payer: MEDICARE

## 2022-04-08 VITALS
TEMPERATURE: 98 F | SYSTOLIC BLOOD PRESSURE: 131 MMHG | HEART RATE: 81 BPM | HEIGHT: 65 IN | WEIGHT: 293 LBS | DIASTOLIC BLOOD PRESSURE: 76 MMHG | BODY MASS INDEX: 48.82 KG/M2

## 2022-04-08 DIAGNOSIS — M81.0 OSTEOPOROSIS, UNSPECIFIED OSTEOPOROSIS TYPE, UNSPECIFIED PATHOLOGICAL FRACTURE PRESENCE: ICD-10-CM

## 2022-04-08 DIAGNOSIS — E78.00 PURE HYPERCHOLESTEROLEMIA: ICD-10-CM

## 2022-04-08 DIAGNOSIS — N18.31 TYPE 2 DIABETES MELLITUS WITH STAGE 3A CHRONIC KIDNEY DISEASE, WITHOUT LONG-TERM CURRENT USE OF INSULIN (HCC): ICD-10-CM

## 2022-04-08 DIAGNOSIS — Z12.11 COLON CANCER SCREENING: Primary | ICD-10-CM

## 2022-04-08 DIAGNOSIS — E11.22 TYPE 2 DIABETES MELLITUS WITH STAGE 3A CHRONIC KIDNEY DISEASE, WITHOUT LONG-TERM CURRENT USE OF INSULIN (HCC): ICD-10-CM

## 2022-04-08 DIAGNOSIS — I10 ESSENTIAL HYPERTENSION, BENIGN: ICD-10-CM

## 2022-04-08 DIAGNOSIS — I70.0 AORTIC ATHEROSCLEROSIS (HCC): ICD-10-CM

## 2022-04-08 DIAGNOSIS — E66.01 MORBID OBESITY DUE TO EXCESS CALORIES (HCC): ICD-10-CM

## 2022-04-08 PROCEDURE — 99214 OFFICE O/P EST MOD 30 MIN: CPT | Performed by: FAMILY MEDICINE

## 2022-04-08 PROCEDURE — 3075F SYST BP GE 130 - 139MM HG: CPT | Performed by: FAMILY MEDICINE

## 2022-04-08 PROCEDURE — 3008F BODY MASS INDEX DOCD: CPT | Performed by: FAMILY MEDICINE

## 2022-04-08 PROCEDURE — 3078F DIAST BP <80 MM HG: CPT | Performed by: FAMILY MEDICINE

## 2022-04-08 RX ORDER — VERAPAMIL HYDROCHLORIDE 240 MG/1
240 TABLET, FILM COATED, EXTENDED RELEASE ORAL NIGHTLY
Qty: 90 TABLET | Refills: 1 | Status: SHIPPED | OUTPATIENT
Start: 2022-04-08

## 2022-04-08 RX ORDER — METFORMIN HYDROCHLORIDE 500 MG/1
500 TABLET, EXTENDED RELEASE ORAL
Qty: 90 TABLET | Refills: 0 | Status: SHIPPED | OUTPATIENT
Start: 2022-04-08

## 2022-04-08 RX ORDER — SPIRONOLACTONE AND HYDROCHLOROTHIAZIDE 25; 25 MG/1; MG/1
1 TABLET ORAL DAILY
Qty: 90 TABLET | Refills: 1 | Status: SHIPPED | OUTPATIENT
Start: 2022-04-08

## 2022-04-08 RX ORDER — ALENDRONATE SODIUM 70 MG/1
70 TABLET ORAL WEEKLY
Qty: 12 TABLET | Refills: 3 | Status: SHIPPED | OUTPATIENT
Start: 2022-04-08

## 2022-04-08 RX ORDER — ATORVASTATIN CALCIUM 20 MG/1
TABLET, FILM COATED ORAL
Qty: 90 TABLET | Refills: 1 | Status: SHIPPED | OUTPATIENT
Start: 2022-04-08

## 2022-04-11 ENCOUNTER — PATIENT MESSAGE (OUTPATIENT)
Dept: FAMILY MEDICINE CLINIC | Facility: CLINIC | Age: 72
End: 2022-04-11

## 2022-04-11 NOTE — TELEPHONE ENCOUNTER
Dr. Jose Lemus, please see patients MyChart message below. New Referral pended for you to review and sign off if appropriate. Thank you.

## 2022-05-26 ENCOUNTER — OFFICE VISIT (OUTPATIENT)
Dept: NEPHROLOGY | Facility: CLINIC | Age: 72
End: 2022-05-26
Payer: MEDICARE

## 2022-05-26 ENCOUNTER — TELEPHONE (OUTPATIENT)
Dept: NEPHROLOGY | Facility: CLINIC | Age: 72
End: 2022-05-26

## 2022-05-26 VITALS
DIASTOLIC BLOOD PRESSURE: 82 MMHG | SYSTOLIC BLOOD PRESSURE: 138 MMHG | BODY MASS INDEX: 49 KG/M2 | WEIGHT: 293 LBS | HEART RATE: 96 BPM

## 2022-05-26 DIAGNOSIS — E78.00 PURE HYPERCHOLESTEROLEMIA: Primary | Chronic | ICD-10-CM

## 2022-05-26 DIAGNOSIS — N18.32 STAGE 3B CHRONIC KIDNEY DISEASE (HCC): Chronic | ICD-10-CM

## 2022-05-26 DIAGNOSIS — I10 ESSENTIAL HYPERTENSION, BENIGN: Chronic | ICD-10-CM

## 2022-05-26 DIAGNOSIS — E11.22 TYPE 2 DIABETES MELLITUS WITH STAGE 3A CHRONIC KIDNEY DISEASE, WITHOUT LONG-TERM CURRENT USE OF INSULIN (HCC): Chronic | ICD-10-CM

## 2022-05-26 DIAGNOSIS — N18.31 TYPE 2 DIABETES MELLITUS WITH STAGE 3A CHRONIC KIDNEY DISEASE, WITHOUT LONG-TERM CURRENT USE OF INSULIN (HCC): Chronic | ICD-10-CM

## 2022-05-26 PROCEDURE — 99205 OFFICE O/P NEW HI 60 MIN: CPT | Performed by: INTERNAL MEDICINE

## 2022-05-26 PROCEDURE — 3079F DIAST BP 80-89 MM HG: CPT | Performed by: INTERNAL MEDICINE

## 2022-05-26 PROCEDURE — 3075F SYST BP GE 130 - 139MM HG: CPT | Performed by: INTERNAL MEDICINE

## 2022-05-26 NOTE — PATIENT INSTRUCTIONS
Kayla Mahoney please add Farxiga 10 mg each morning watch for urinary infection    Do kidney ultrasound blood and urine tests do them before we see each other in 3 weeks    Please bring in blood pressure reading sugar readings and blood pressure monitor    Very nice to see you today I will be in touch with Dr. Mannie Riedel

## 2022-06-13 ENCOUNTER — LAB ENCOUNTER (OUTPATIENT)
Dept: LAB | Age: 72
End: 2022-06-13
Attending: INTERNAL MEDICINE
Payer: MEDICARE

## 2022-06-13 ENCOUNTER — HOSPITAL ENCOUNTER (OUTPATIENT)
Dept: ULTRASOUND IMAGING | Age: 72
Discharge: HOME | End: 2022-06-13
Attending: INTERNAL MEDICINE
Payer: MEDICARE

## 2022-06-13 DIAGNOSIS — N18.32 STAGE 3B CHRONIC KIDNEY DISEASE (HCC): ICD-10-CM

## 2022-06-13 DIAGNOSIS — E78.00 PURE HYPERCHOLESTEROLEMIA: Chronic | ICD-10-CM

## 2022-06-13 DIAGNOSIS — E11.22 TYPE 2 DIABETES MELLITUS WITH STAGE 3A CHRONIC KIDNEY DISEASE, WITHOUT LONG-TERM CURRENT USE OF INSULIN (HCC): ICD-10-CM

## 2022-06-13 DIAGNOSIS — N18.31 TYPE 2 DIABETES MELLITUS WITH STAGE 3A CHRONIC KIDNEY DISEASE, WITHOUT LONG-TERM CURRENT USE OF INSULIN (HCC): ICD-10-CM

## 2022-06-13 DIAGNOSIS — E11.22 TYPE 2 DIABETES MELLITUS WITH STAGE 3A CHRONIC KIDNEY DISEASE, WITHOUT LONG-TERM CURRENT USE OF INSULIN (HCC): Chronic | ICD-10-CM

## 2022-06-13 DIAGNOSIS — E78.00 PURE HYPERCHOLESTEROLEMIA: ICD-10-CM

## 2022-06-13 DIAGNOSIS — N18.31 TYPE 2 DIABETES MELLITUS WITH STAGE 3A CHRONIC KIDNEY DISEASE, WITHOUT LONG-TERM CURRENT USE OF INSULIN (HCC): Chronic | ICD-10-CM

## 2022-06-13 DIAGNOSIS — N18.32 STAGE 3B CHRONIC KIDNEY DISEASE (HCC): Chronic | ICD-10-CM

## 2022-06-13 DIAGNOSIS — I10 ESSENTIAL HYPERTENSION, BENIGN: Chronic | ICD-10-CM

## 2022-06-13 DIAGNOSIS — I10 ESSENTIAL HYPERTENSION, BENIGN: ICD-10-CM

## 2022-06-13 LAB
ALBUMIN SERPL-MCNC: 3.9 G/DL (ref 3.4–5)
ALBUMIN/GLOB SERPL: 0.9 {RATIO} (ref 1–2)
ALP LIVER SERPL-CCNC: 52 U/L
ALT SERPL-CCNC: 46 U/L
ANION GAP SERPL CALC-SCNC: 11 MMOL/L (ref 0–18)
AST SERPL-CCNC: 30 U/L (ref 15–37)
BASOPHILS # BLD AUTO: 0.07 X10(3) UL (ref 0–0.2)
BASOPHILS NFR BLD AUTO: 0.8 %
BILIRUB SERPL-MCNC: 0.7 MG/DL (ref 0.1–2)
BILIRUB UR QL: NEGATIVE
BUN BLD-MCNC: 27 MG/DL (ref 7–18)
BUN/CREAT SERPL: 22.1 (ref 10–20)
CALCIUM BLD-MCNC: 10.1 MG/DL (ref 8.5–10.1)
CHLORIDE SERPL-SCNC: 104 MMOL/L (ref 98–112)
CO2 SERPL-SCNC: 25 MMOL/L (ref 21–32)
COLOR UR: YELLOW
CREAT BLD-MCNC: 1.22 MG/DL
CREAT UR-SCNC: 162 MG/DL
DEPRECATED RDW RBC AUTO: 49.9 FL (ref 35.1–46.3)
EOSINOPHIL # BLD AUTO: 0.16 X10(3) UL (ref 0–0.7)
EOSINOPHIL NFR BLD AUTO: 1.8 %
ERYTHROCYTE [DISTWIDTH] IN BLOOD BY AUTOMATED COUNT: 14.7 % (ref 11–15)
EST. AVERAGE GLUCOSE BLD GHB EST-MCNC: 154 MG/DL (ref 68–126)
FASTING STATUS PATIENT QL REPORTED: YES
GLOBULIN PLAS-MCNC: 4.4 G/DL (ref 2.8–4.4)
GLUCOSE BLD-MCNC: 190 MG/DL (ref 70–99)
GLUCOSE UR-MCNC: >=500 MG/DL
HBA1C MFR BLD: 7 % (ref ?–5.7)
HCT VFR BLD AUTO: 49.1 %
HGB BLD-MCNC: 15.5 G/DL
HGB UR QL STRIP.AUTO: NEGATIVE
IMM GRANULOCYTES # BLD AUTO: 0.01 X10(3) UL (ref 0–1)
IMM GRANULOCYTES NFR BLD: 0.1 %
KETONES UR-MCNC: NEGATIVE MG/DL
LEUKOCYTE ESTERASE UR QL STRIP.AUTO: NEGATIVE
LYMPHOCYTES # BLD AUTO: 2.35 X10(3) UL (ref 1–4)
LYMPHOCYTES NFR BLD AUTO: 27 %
MCH RBC QN AUTO: 29 PG (ref 26–34)
MCHC RBC AUTO-ENTMCNC: 31.6 G/DL (ref 31–37)
MCV RBC AUTO: 91.9 FL
MICROALBUMIN UR-MCNC: 1.09 MG/DL
MICROALBUMIN/CREAT 24H UR-RTO: 6.7 UG/MG (ref ?–30)
MONOCYTES # BLD AUTO: 0.62 X10(3) UL (ref 0.1–1)
MONOCYTES NFR BLD AUTO: 7.1 %
NEUTROPHILS # BLD AUTO: 5.48 X10 (3) UL (ref 1.5–7.7)
NEUTROPHILS # BLD AUTO: 5.48 X10(3) UL (ref 1.5–7.7)
NEUTROPHILS NFR BLD AUTO: 63.2 %
NITRITE UR QL STRIP.AUTO: NEGATIVE
OSMOLALITY SERPL CALC.SUM OF ELEC: 300 MOSM/KG (ref 275–295)
PH UR: 5 [PH] (ref 5–8)
PLATELET # BLD AUTO: 257 10(3)UL (ref 150–450)
POTASSIUM SERPL-SCNC: 4 MMOL/L (ref 3.5–5.1)
PROT SERPL-MCNC: 8.3 G/DL (ref 6.4–8.2)
PROT UR-MCNC: 13.6 MG/DL
PROT UR-MCNC: NEGATIVE MG/DL
RBC # BLD AUTO: 5.34 X10(6)UL
SODIUM SERPL-SCNC: 140 MMOL/L (ref 136–145)
SP GR UR STRIP: >1.03 (ref 1–1.03)
UROBILINOGEN UR STRIP-ACNC: <2
VIT C UR-MCNC: NEGATIVE MG/DL
WBC # BLD AUTO: 8.7 X10(3) UL (ref 4–11)

## 2022-06-13 PROCEDURE — 84166 PROTEIN E-PHORESIS/URINE/CSF: CPT

## 2022-06-13 PROCEDURE — 83036 HEMOGLOBIN GLYCOSYLATED A1C: CPT

## 2022-06-13 PROCEDURE — 80053 COMPREHEN METABOLIC PANEL: CPT

## 2022-06-13 PROCEDURE — 82043 UR ALBUMIN QUANTITATIVE: CPT

## 2022-06-13 PROCEDURE — 81003 URINALYSIS AUTO W/O SCOPE: CPT

## 2022-06-13 PROCEDURE — 86334 IMMUNOFIX E-PHORESIS SERUM: CPT

## 2022-06-13 PROCEDURE — 3061F NEG MICROALBUMINURIA REV: CPT | Performed by: INTERNAL MEDICINE

## 2022-06-13 PROCEDURE — 84165 PROTEIN E-PHORESIS SERUM: CPT

## 2022-06-13 PROCEDURE — 36415 COLL VENOUS BLD VENIPUNCTURE: CPT

## 2022-06-13 PROCEDURE — 82570 ASSAY OF URINE CREATININE: CPT

## 2022-06-13 PROCEDURE — 76770 US EXAM ABDO BACK WALL COMP: CPT | Performed by: INTERNAL MEDICINE

## 2022-06-13 PROCEDURE — 85025 COMPLETE CBC W/AUTO DIFF WBC: CPT

## 2022-06-13 PROCEDURE — 86335 IMMUNFIX E-PHORSIS/URINE/CSF: CPT

## 2022-06-13 PROCEDURE — 3051F HG A1C>EQUAL 7.0%<8.0%: CPT | Performed by: INTERNAL MEDICINE

## 2022-06-13 PROCEDURE — 82784 ASSAY IGA/IGD/IGG/IGM EACH: CPT

## 2022-06-13 NOTE — TELEPHONE ENCOUNTER
Dr Luca Villa patient is rescheduled for this week appt per pt   need a refill for Ray Ramona stated medication is helping blood sugar is much better 140 from the 200's before  recent A1c from 7.8  Down to 7 and don't want to miss it ,will be out of medication by Friday,please sign pending order ,thanks.

## 2022-06-14 LAB
IGA SERPL-MCNC: 355 MG/DL (ref 70–312)
IGM SERPL-MCNC: 73.1 MG/DL (ref 43–279)
IMMUNOGLOBULIN PNL SER-MCNC: 1210 MG/DL (ref 791–1643)

## 2022-06-17 LAB
ALBUMIN SERPL ELPH-MCNC: 4.24 G/DL (ref 3.75–5.21)
ALBUMIN/GLOB SERPL: 1.13 {RATIO} (ref 1–2)
ALPHA1 GLOB SERPL ELPH-MCNC: 0.29 G/DL (ref 0.19–0.46)
ALPHA2 GLOB SERPL ELPH-MCNC: 1.18 G/DL (ref 0.48–1.05)
B-GLOBULIN SERPL ELPH-MCNC: 1.01 G/DL (ref 0.68–1.23)
GAMMA GLOB SERPL ELPH-MCNC: 1.29 G/DL (ref 0.62–1.7)
PROT SERPL-MCNC: 8 G/DL (ref 6.4–8.2)

## 2022-07-10 DIAGNOSIS — E11.22 TYPE 2 DIABETES MELLITUS WITH STAGE 3A CHRONIC KIDNEY DISEASE, WITHOUT LONG-TERM CURRENT USE OF INSULIN (HCC): ICD-10-CM

## 2022-07-10 DIAGNOSIS — N18.31 TYPE 2 DIABETES MELLITUS WITH STAGE 3A CHRONIC KIDNEY DISEASE, WITHOUT LONG-TERM CURRENT USE OF INSULIN (HCC): ICD-10-CM

## 2022-07-11 RX ORDER — METFORMIN HYDROCHLORIDE 500 MG/1
TABLET, EXTENDED RELEASE ORAL
Qty: 90 TABLET | Refills: 1 | Status: SHIPPED | OUTPATIENT
Start: 2022-07-11

## 2022-07-12 RX ORDER — DAPAGLIFLOZIN 10 MG/1
TABLET, FILM COATED ORAL
Qty: 30 TABLET | Refills: 0 | Status: SHIPPED | OUTPATIENT
Start: 2022-07-12

## 2022-07-15 ENCOUNTER — OFFICE VISIT (OUTPATIENT)
Dept: NEPHROLOGY | Facility: CLINIC | Age: 72
End: 2022-07-15
Payer: MEDICARE

## 2022-07-15 VITALS
SYSTOLIC BLOOD PRESSURE: 139 MMHG | BODY MASS INDEX: 47.65 KG/M2 | WEIGHT: 286 LBS | HEIGHT: 65 IN | DIASTOLIC BLOOD PRESSURE: 86 MMHG | HEART RATE: 86 BPM

## 2022-07-15 DIAGNOSIS — E11.22 TYPE 2 DIABETES MELLITUS WITH STAGE 3A CHRONIC KIDNEY DISEASE, WITHOUT LONG-TERM CURRENT USE OF INSULIN (HCC): Chronic | ICD-10-CM

## 2022-07-15 DIAGNOSIS — N18.31 TYPE 2 DIABETES MELLITUS WITH STAGE 3A CHRONIC KIDNEY DISEASE, WITHOUT LONG-TERM CURRENT USE OF INSULIN (HCC): Chronic | ICD-10-CM

## 2022-07-15 DIAGNOSIS — I10 ESSENTIAL HYPERTENSION, BENIGN: Primary | ICD-10-CM

## 2022-07-15 DIAGNOSIS — E78.00 PURE HYPERCHOLESTEROLEMIA: ICD-10-CM

## 2022-07-15 DIAGNOSIS — N18.32 STAGE 3B CHRONIC KIDNEY DISEASE (HCC): ICD-10-CM

## 2022-07-15 PROCEDURE — 99213 OFFICE O/P EST LOW 20 MIN: CPT | Performed by: INTERNAL MEDICINE

## 2022-07-15 PROCEDURE — 3079F DIAST BP 80-89 MM HG: CPT | Performed by: INTERNAL MEDICINE

## 2022-07-15 PROCEDURE — 3008F BODY MASS INDEX DOCD: CPT | Performed by: INTERNAL MEDICINE

## 2022-07-15 PROCEDURE — 3075F SYST BP GE 130 - 139MM HG: CPT | Performed by: INTERNAL MEDICINE

## 2022-07-15 NOTE — PATIENT INSTRUCTIONS
Labs are stable lets repeat labs in October    I have placed the orders please see me back in November  Good job Abdirahman Kim

## 2022-07-17 ENCOUNTER — PATIENT MESSAGE (OUTPATIENT)
Dept: NEPHROLOGY | Facility: CLINIC | Age: 72
End: 2022-07-17

## 2022-07-18 NOTE — TELEPHONE ENCOUNTER
From: Jennifer Dennis  To: Maddie Watson MD  Sent: 7/17/2022 12:22 PM CDT  Subject: Charan Snyder 10mg refill    For some reason Iona Hebert got a 6 month Rx but you only gave me one month. I'll be up for refill by 8/6/22    On a three month Rx we pay $125, monthly is $50 each. I'll take whatever savings I can get. Thanks!   Rachel Muro

## 2022-07-18 NOTE — TELEPHONE ENCOUNTER
Dr Yoana Simpson please see note below ,last office visit on 7/15/22 follow up appt on 11/3/22,please sign pending order if appropriate,thanks.

## 2022-08-02 ENCOUNTER — PATIENT OUTREACH (OUTPATIENT)
Dept: CASE MANAGEMENT | Age: 72
End: 2022-08-02

## 2022-08-10 RX ORDER — DAPAGLIFLOZIN 10 MG/1
TABLET, FILM COATED ORAL
Qty: 90 TABLET | Refills: 0 | Status: SHIPPED | OUTPATIENT
Start: 2022-08-10

## 2022-08-22 NOTE — TELEPHONE ENCOUNTER
Reported Medications Start Date Reported By  Comments   ALDACTAZIDE (Spironolactone-HCTZ) 25-25 MG Tabs  [facility] Spironolactone-HCTZ 25-25 not showing on My Chart, have been taking for years.  Need refill. Minocycline Counseling: Patient advised regarding possible photosensitivity and discoloration of the teeth, skin, lips, tongue and gums.  Patient instructed to avoid sunlight, if possible.  When exposed to sunlight, patients should wear protective clothing, sunglasses, and sunscreen.  The patient was instructed to call the office immediately if the following severe adverse effects occur:  hearing changes, easy bruising/bleeding, severe headache, or vision changes.  The patient verbalized understanding of the proper use and possible adverse effects of minocycline.  All of the patient's questions and concerns were addressed.

## 2022-08-23 RX ORDER — BLOOD SUGAR DIAGNOSTIC
STRIP MISCELLANEOUS
Qty: 100 STRIP | Refills: 3 | Status: SHIPPED | OUTPATIENT
Start: 2022-08-23

## 2022-08-23 NOTE — TELEPHONE ENCOUNTER
Refill passed per [facility] protocol    Requested Prescriptions   Pending Prescriptions Disp Refills    ACCU-CHEK ADITYA PLUS In Vitro Strip [place] Med Name: ACCU-CHEK ADITYA PLUS TEST STRP] 100 strip 3     Sig: USE ONCE DAILY IN THE MORNING BEFORE BREAKFAST        Diabetic Supplies Protocol Passed - 8/23/2022 10:06 AM        Passed - In person appointment or virtual visit in the past 12 mos or appointment in next 3 mos       Recent Outpatient Visits              1 month ago Essential hypertension, benign    [facility], [address] [place] [address], Paolo Benedict MD    Office Visit    2 months ago Pure hypercholesterolemia    [facility], [address] [place] [address], Paolo Benedict MD    Office Visit    4 months ago Colon cancer screening    [address] Tenorio MD    Office Visit    10 months ago Encounter for annual health examination    [address] Elizabeth Tenorio MD    Office Visit    1 year ago Essential hypertension, benign    [place] [facility], [address] Rodrigues, Elizabeth Layne MD    Office Visit     Future Appointments         Provider Department Appt Notes    In 2 months Danielle Farias MD [place] [facility], [address], [place] medicare px and flu shot ok per MD    In 2 months Bertram Kaba MD [place] [facility], [address] [place] [address] Follow up [place] results and kidney health                     Future Appointments         Provider Department Appt Notes    In 2 months Danielle Farias MD [place] [facility], [address], [place] medicare px and flu shot ok per MD    In 2 months Paolo Fraire MD [place] [facility], [address] Follow up [place] results and kidney health            Recent Outpatient Visits              1 month ago Essential hypertension, benign    Sanjay Potter MD    Office Visit    2 months ago Pure hypercholesterolemia    [place] [facility], [place] [facility], Gloria Izquierdo, Lev Null MD    Office Visit    4 months ago Colon cancer screening    Jung Aguilar MD    Office Visit    10 months ago Encounter for annual health examination    Khai Aguilar MD    Office Visit    1 year ago Essential hypertension, benign    Khai Aguilar MD    Office Visit

## 2022-08-29 ENCOUNTER — PATIENT MESSAGE (OUTPATIENT)
Dept: FAMILY MEDICINE CLINIC | Facility: CLINIC | Age: 72
End: 2022-08-29

## 2022-08-29 DIAGNOSIS — Z12.31 SCREENING MAMMOGRAM, ENCOUNTER FOR: Primary | ICD-10-CM

## 2022-09-30 ENCOUNTER — HOSPITAL ENCOUNTER (OUTPATIENT)
Dept: MAMMOGRAPHY | Age: 72
Discharge: HOME | End: 2022-09-30
Attending: FAMILY MEDICINE
Payer: MEDICARE

## 2022-09-30 DIAGNOSIS — Z12.31 SCREENING MAMMOGRAM, ENCOUNTER FOR: ICD-10-CM

## 2022-09-30 PROCEDURE — 77063 BREAST TOMOSYNTHESIS BI: CPT | Performed by: FAMILY MEDICINE

## 2022-09-30 PROCEDURE — 77067 SCR MAMMO BI INCL CAD: CPT | Performed by: FAMILY MEDICINE

## 2022-10-05 ENCOUNTER — PATIENT MESSAGE (OUTPATIENT)
Dept: FAMILY MEDICINE CLINIC | Facility: CLINIC | Age: 72
End: 2022-10-05

## 2022-10-05 NOTE — TELEPHONE ENCOUNTER
From: Meek Michael  To: Belem Echevarria MD  Sent: 10/5/2022 6:42 AM CDT  Subject: Flu Shots    My  Tami Morales) and I will be in your office on Halloween morning dressed as old people and T[employer] or Treating for a Medicare Physical LOL Is it possible for us to get our Flu shots at the same time? Thanks.

## 2022-10-06 DIAGNOSIS — E78.00 PURE HYPERCHOLESTEROLEMIA: ICD-10-CM

## 2022-10-06 DIAGNOSIS — I70.0 AORTIC ATHEROSCLEROSIS (HCC): ICD-10-CM

## 2022-10-06 RX ORDER — ATORVASTATIN CALCIUM 20 MG/1
TABLET, FILM COATED ORAL
Qty: 90 TABLET | Refills: 1 | Status: SHIPPED | OUTPATIENT
Start: 2022-10-06

## 2022-10-06 NOTE — TELEPHONE ENCOUNTER
Refill passed per [facility] protocol.     .  Requested Prescriptions   Pending Prescriptions Disp Refills    ATORVASTATIN 20 MG Oral Tab [Pharmacy Med Name: ATORVASTATIN 20 MG TABLET] 90 tablet 1     Sig: TAKE 1 TABLET (20MG) BY MOUTH EVERY DAY        Cholesterol Medication Protocol Passed - 10/6/2022 12:02 AM        Passed - ALT in past 12 months        Passed - LDL in past 12 months        Passed - Last ALT < 80       Lab Results   Component Value Date    ALT 46 06/13/2022             Passed - Last LDL < 130     Lab Results   Component Value Date    LDL 75 04/01/2022               Passed - In person appointment or virtual visit in the past 12 mos or appointment in next 3 mos       Recent Outpatient Visits              2 months ago Essential hypertension, [employer], [facility], Corazon Benedict MD    Office Visit    4 months ago Pure hypercholesterolemia    Trisha Collins, Corazon Benedict MD    Office Visit    6 months ago Colon cancer screening    [address] Am[address] Tenorio MD    Office Visit    12 months ago Encounter for annual health examination    [address] Amaya Tenorio MD    Office Visit    1 year ago Essential hypertension, [employer], [address], Amaya Montoya MD    Office Visit     Future Appointments         Provider Department Appt Notes    In 1 week [address] for Dr. Harry Freitas and Dr Natividad Duckworth    In 3 weeks Mary Ann Campos MD [facility], [address], [address] px and flu shot ok per MD    In 4 weeks Gilmer Flores MD [facility], [facility] Follow up [place] results and kidney health                     Recent Outpatient Visits              2 months ago Essential hypertension, anish Fu MD    Office Visit    4 months ago Pure hypercholesterolemia    [place], [facility], [facility], Gloria Amadou Izquierdo MD    Office Visit    6 months ago Colon cancer screening    [address] Tenorio MD    Office Visit    12 months ago Encounter for annual health examination    [address] Emily Tenorio MD    Office Visit    1 year ago [facility]ential hypertension, benign    [facility], [address], Emily Macias MD    Office Visit            Future Appointments         Provider Department Appt Notes    In 1 week [address] for Dr. Gavin Collado and Dr Audi Chaparro    In 3 weeks Maribell Pak MD [place] [employer], [address], [address] px and flu shot ok per MD    In 4 weeks Ciara Caraballo MD [place] [facility], [address] Follow up [place] results and kidney health

## 2022-10-08 DIAGNOSIS — I10 ESSENTIAL HYPERTENSION, BENIGN: ICD-10-CM

## 2022-10-10 RX ORDER — SPIRONOLACTONE AND HYDROCHLOROTHIAZIDE 25; 25 MG/1; MG/1
1 TABLET ORAL DAILY
Qty: 90 TABLET | Refills: 1 | Status: SHIPPED | OUTPATIENT
Start: 2022-10-10

## 2022-10-10 NOTE — TELEPHONE ENCOUNTER
Please review refill failed/no protocol     Requested Prescriptions     Pending Prescriptions Disp Refills    SPIRONOLACTONE-HCTZ 25-25 MG Oral Tab [Pharmacy Med Name: Prince [place] 25-25 TAB] 90 tablet 1     Sig: TAKE 1 TABLET BY MOUTH EVERY DAY         Recent Visits  Date Type Provider Dept   04/08/22 Office Visit Amada Thomas MD [facility]   10/08/21 Office Visit Amada Thomas MD [facility]   06/07/21 [facility]   Showing recent visits within past 540 days with a meds authorizing provider and meeting all other requirements  Future Appointments  Date Type Provider Dept   10/31/22 Appointment [facility]   Showing future appointments within next 150 days with a meds authorizing provider and meeting all other requirements    Requested Prescriptions   Pending Prescriptions Disp Refills    SPIRONOLACTONE-HCTZ 25-25 MG Oral Tab [Pharmacy Med Name: SPIRONOLACTONE-HCTZ 21-18 TAB] 90 tablet 1     Sig: TAKE 1 TABLET BY MOUTH EVERY DAY        Hypertensive Medications Protocol Failed - 10/8/2022  2:22 AM        Failed - In person appointment or virtual visit in the past 6 months       Recent Outpatient Visits              2 months ago Essential hypertension, benign    Flavio Marie MD    Office Visit    4 months ago Pure hypercholesterolemia    Gloria Mancini Hiawatha Flirt, MD    Office Visit    6 months ago Colon cancer screening    Adan Truong MD    Office Visit    1 year ago Encounter for annual health examination    Adan Truong MD    Office Visit    1 year ago Essential hypertension, benign    Adan Truong MD    Office Visit     Future Appointments         Provider Department Appt Notes    In 1 week [facility] SCHEDULED RESOURCE [address] for Dr. Eduar Patel and Dr Rosenda Cates    In 3 weeks Roxana Drake MD [facility], [facility], [place] medicare px and flu shot ok per MD    In 3 weeks [facility] Follow up [place] results and kidney health               Failed - [facility] or [facility] > 50     GFR Evaluation  GFRNAA: 39 , resulted on 6/13/2022            Passed - In person appointment in the past 12 or next 3 months       Recent Outpatient Visits              2 months ago Essential hypertension, benign    [address], Clif Benedict MD    Office Visit    4 months ago Pure hypercholesterolemia    [place] Gloria Arceo Oswaldo Bourdon, MD    Office Visit    6 months ago Colon cancer screening    Mainor Mora MD    Office Visit    1 year ago Encounter for annual health examination    Mainor Mora MD    Office Visit    1 year ago Essential hypertension, benign    [facility], [facility], Mainor Abbasi MD    Office Visit     Future Appointments         Provider Department Appt Notes    In 1 week [address] for Dr. Eduar Patel and Dr Rosenda Cates    In 3 weeks Roxana Drake MD [facility], [facility], [address] px and flu shot ok per MD    In 3 weeks Juan Son MD [address], [place] Follow up [place] results and kidney health               Passed - Last BP reading less than 140/90     BP Readings from Last 1 Encounters:  07/15/22 : 139/86                Passed - CMP or BMP in past 6 months     Recent Results (from the past 4392 hour(s))   COMP METABOLIC PANEL (14)    Collection Time: 06/13/22  8:02 AM   Result Value Ref Range    Glucose 190 (H) 70 - 99 mg/dL    Sodium 140 136 - 145 mmol/L    Potassium 4.0 3.5 - 5.1 mmol/L    Chloride 104 98 - 112 mmol/L    CO2 25.0 21.0 - 32.0 mmol/L    Anion Gap 11 0 - 18 mmol/L    BUN 27 (H) 7 - 18 mg/dL    Creatinine 1.22 (H) 0.55 - 1.02 mg/dL    BUN/CREA Ratio 22.1 (H) 10.0 - 20.0    Calcium, Total 10.1 8.5 - 10.1 mg/dL    Calculated Osmolality 300 (H) 275 - 295 mOsm/kg    GFR, Non- 45 (L) >=60    GFR, -American 52 (L) >=60    ALT 46 13 - 56 U/L    AST 30 15 - 37 U/L    Alkaline Phosphatase 52 (L) 55 - 142 U/L    Bilirubin, Total 0.7 0.1 - 2.0 mg/dL    Total Protein 8.3 (H) 6.4 - 8.2 g/dL    Albumin 3.9 3.4 - 5.0 g/dL    Globulin  4.4 2.8 - 4.4 g/dL    A/G Ratio 0.9 (L) 1.0 - 2.0    Patient Fasting for CMP? Yes      *Note: Due to a large number of results and/or encounters for the requested time period, some results have not been displayed. A complete set of results can be found in Results Review.                      Future Appointments         Provider Department Appt Notes    In 1 week [facility] SCHEDULED [facility] for Dr. Mannie Riedel and Dr Darwin Malone    In 3 weeks Nelida Phillips MD [address] [place], [address], [address] px and flu shot ok per MD    In 3 weeks Rajiv Lombardo MD [address] [place] [address], [address] [place] [address] Follow up [place] results and kidney health          Recent Outpatient Visits              2 months ago Essential hypertension, benign    Deacon Suarez MD    Office Visit    4 months ago Pure hypercholesterolemia    Gloria Valle Brodie Cong, MD    Office Visit    6 months ago Colon cancer screening    Patrizia Miranda MD    Office Visit    1 year ago Encounter for annual health examination    Patrizia Miranda MD    Office Visit    1 year ago Essential hypertension, benign    Patrizia Miranda MD    Office Visit

## 2022-10-17 ENCOUNTER — LAB ENCOUNTER (OUTPATIENT)
Dept: LAB | Age: 72
End: 2022-10-17
Attending: FAMILY MEDICINE
Payer: MEDICARE

## 2022-10-17 DIAGNOSIS — I10 ESSENTIAL HYPERTENSION, BENIGN: ICD-10-CM

## 2022-10-17 DIAGNOSIS — N18.31 TYPE 2 DIABETES MELLITUS WITH STAGE 3A CHRONIC KIDNEY DISEASE, WITHOUT LONG-TERM CURRENT USE OF INSULIN (HCC): ICD-10-CM

## 2022-10-17 DIAGNOSIS — E11.22 TYPE 2 DIABETES MELLITUS WITH STAGE 3A CHRONIC KIDNEY DISEASE, WITHOUT LONG-TERM CURRENT USE OF INSULIN (HCC): ICD-10-CM

## 2022-10-17 DIAGNOSIS — E78.00 PURE HYPERCHOLESTEROLEMIA: ICD-10-CM

## 2022-10-17 DIAGNOSIS — N18.32 STAGE 3B CHRONIC KIDNEY DISEASE (HCC): ICD-10-CM

## 2022-10-17 LAB
ALBUMIN SERPL-MCNC: 3.9 G/DL (ref 3.4–5)
ALBUMIN/GLOB SERPL: 0.9 {RATIO} (ref 1–2)
ALP LIVER SERPL-CCNC: 63 U/L
ALT SERPL-CCNC: 35 U/L
ANION GAP SERPL CALC-SCNC: 10 MMOL/L (ref 0–18)
AST SERPL-CCNC: 18 U/L (ref 15–37)
BILIRUB SERPL-MCNC: 0.7 MG/DL (ref 0.1–2)
BUN BLD-MCNC: 25 MG/DL (ref 7–18)
BUN/CREAT SERPL: 20.3 (ref 10–20)
CALCIUM BLD-MCNC: 10.6 MG/DL (ref 8.5–10.1)
CHLORIDE SERPL-SCNC: 102 MMOL/L (ref 98–112)
CHOLEST SERPL-MCNC: 123 MG/DL (ref ?–200)
CO2 SERPL-SCNC: 25 MMOL/L (ref 21–32)
CREAT BLD-MCNC: 1.23 MG/DL
EST. AVERAGE GLUCOSE BLD GHB EST-MCNC: 131 MG/DL (ref 68–126)
FASTING PATIENT LIPID ANSWER: YES
FASTING STATUS PATIENT QL REPORTED: YES
GFR SERPLBLD BASED ON 1.73 SQ M-ARVRAT: 47 ML/MIN/1.73M2 (ref 60–?)
GLOBULIN PLAS-MCNC: 4.4 G/DL (ref 2.8–4.4)
GLUCOSE BLD-MCNC: 169 MG/DL (ref 70–99)
HBA1C MFR BLD: 6.2 % (ref ?–5.7)
HDLC SERPL-MCNC: 48 MG/DL (ref 40–59)
LDLC SERPL CALC-MCNC: 42 MG/DL (ref ?–100)
NONHDLC SERPL-MCNC: 75 MG/DL (ref ?–130)
OSMOLALITY SERPL CALC.SUM OF ELEC: 292 MOSM/KG (ref 275–295)
PHOSPHATE SERPL-MCNC: 3.4 MG/DL (ref 2.5–4.9)
POTASSIUM SERPL-SCNC: 3.9 MMOL/L (ref 3.5–5.1)
PROT SERPL-MCNC: 8.3 G/DL (ref 6.4–8.2)
SODIUM SERPL-SCNC: 137 MMOL/L (ref 136–145)
TRIGL SERPL-MCNC: 211 MG/DL (ref 30–149)
VLDLC SERPL CALC-MCNC: 29 MG/DL (ref 0–30)

## 2022-10-17 PROCEDURE — 80053 COMPREHEN METABOLIC PANEL: CPT

## 2022-10-17 PROCEDURE — 84100 ASSAY OF PHOSPHORUS: CPT

## 2022-10-17 PROCEDURE — 36415 COLL VENOUS BLD VENIPUNCTURE: CPT

## 2022-10-17 PROCEDURE — 3044F HG A1C LEVEL LT 7.0%: CPT | Performed by: INTERNAL MEDICINE

## 2022-10-17 PROCEDURE — 83036 HEMOGLOBIN GLYCOSYLATED A1C: CPT

## 2022-10-17 PROCEDURE — 80061 LIPID PANEL: CPT

## 2022-10-31 ENCOUNTER — OFFICE VISIT (OUTPATIENT)
Dept: FAMILY MEDICINE CLINIC | Facility: CLINIC | Age: 72
End: 2022-10-31
Payer: MEDICARE

## 2022-10-31 VITALS
SYSTOLIC BLOOD PRESSURE: 122 MMHG | HEART RATE: 66 BPM | HEIGHT: 65 IN | WEIGHT: 268 LBS | BODY MASS INDEX: 44.65 KG/M2 | DIASTOLIC BLOOD PRESSURE: 70 MMHG

## 2022-10-31 DIAGNOSIS — N18.31 TYPE 2 DIABETES MELLITUS WITH STAGE 3A CHRONIC KIDNEY DISEASE, WITHOUT LONG-TERM CURRENT USE OF INSULIN (HCC): Chronic | ICD-10-CM

## 2022-10-31 DIAGNOSIS — Z23 NEEDS FLU SHOT: ICD-10-CM

## 2022-10-31 DIAGNOSIS — J30.2 SEASONAL ALLERGIC RHINITIS, UNSPECIFIED TRIGGER: ICD-10-CM

## 2022-10-31 DIAGNOSIS — D48.5 NEOPLASM OF UNCERTAIN BEHAVIOR OF SKIN: ICD-10-CM

## 2022-10-31 DIAGNOSIS — L57.8 SUN-DAMAGED SKIN: ICD-10-CM

## 2022-10-31 DIAGNOSIS — H26.9 CATARACT OF BOTH EYES, UNSPECIFIED CATARACT TYPE: ICD-10-CM

## 2022-10-31 DIAGNOSIS — Z20.5 EXPOSURE TO HEPATITIS C: ICD-10-CM

## 2022-10-31 DIAGNOSIS — H81.10 BENIGN PAROXYSMAL POSITIONAL VERTIGO, UNSPECIFIED LATERALITY: ICD-10-CM

## 2022-10-31 DIAGNOSIS — L56.5 DISSEMINATED SUPERFICIAL ACTINIC POROKERATOSIS (DSAP): ICD-10-CM

## 2022-10-31 DIAGNOSIS — L73.2 HIDRADENITIS SUPPURATIVA: ICD-10-CM

## 2022-10-31 DIAGNOSIS — E66.01 MORBID OBESITY DUE TO EXCESS CALORIES (HCC): ICD-10-CM

## 2022-10-31 DIAGNOSIS — M19.90 OSTEOARTHRITIS, UNSPECIFIED OSTEOARTHRITIS TYPE, UNSPECIFIED SITE: ICD-10-CM

## 2022-10-31 DIAGNOSIS — I70.0 AORTIC ATHEROSCLEROSIS (HCC): ICD-10-CM

## 2022-10-31 DIAGNOSIS — E11.22 TYPE 2 DIABETES MELLITUS WITH STAGE 3A CHRONIC KIDNEY DISEASE, WITHOUT LONG-TERM CURRENT USE OF INSULIN (HCC): Chronic | ICD-10-CM

## 2022-10-31 DIAGNOSIS — Z12.11 COLON CANCER SCREENING: ICD-10-CM

## 2022-10-31 DIAGNOSIS — Z85.828 PERSONAL HISTORY OF SKIN CANCER: ICD-10-CM

## 2022-10-31 DIAGNOSIS — E87.1 HYPONATREMIA: ICD-10-CM

## 2022-10-31 DIAGNOSIS — N18.32 STAGE 3B CHRONIC KIDNEY DISEASE (HCC): Chronic | ICD-10-CM

## 2022-10-31 DIAGNOSIS — M81.0 OSTEOPOROSIS, UNSPECIFIED OSTEOPOROSIS TYPE, UNSPECIFIED PATHOLOGICAL FRACTURE PRESENCE: ICD-10-CM

## 2022-10-31 DIAGNOSIS — Z87.2 HISTORY OF ACTINIC KERATOSES: ICD-10-CM

## 2022-10-31 DIAGNOSIS — Z00.00 ENCOUNTER FOR ANNUAL HEALTH EXAMINATION: Primary | ICD-10-CM

## 2022-10-31 DIAGNOSIS — I10 ESSENTIAL HYPERTENSION, BENIGN: Chronic | ICD-10-CM

## 2022-10-31 DIAGNOSIS — E78.00 PURE HYPERCHOLESTEROLEMIA: Chronic | ICD-10-CM

## 2022-10-31 DIAGNOSIS — L82.0 INFLAMED SEBORRHEIC KERATOSIS: ICD-10-CM

## 2022-10-31 PROBLEM — J40 BRONCHITIS: Status: RESOLVED | Noted: 2017-01-26 | Resolved: 2022-10-31

## 2022-11-09 DIAGNOSIS — I10 ESSENTIAL HYPERTENSION, BENIGN: ICD-10-CM

## 2022-11-09 NOTE — TELEPHONE ENCOUNTER
Protocol failed or has No Protocol, please review  Requested Prescriptions   Pending Prescriptions Disp Refills    VERAPAMIL  MG Oral Tab CR [Pharmacy Med Name: VERAPAMIL  MG TABLET] 90 tablet 1     Sig: TAKE 1 TABLET BY MOUTH NIGHTLY       Hypertensive Medications Protocol Failed - 11/9/2022 12:20 AM        Failed - EGFRCR or GFRNAA > 50     GFR Evaluation  EGFRCR: 47 , resulted on 10/17/2022          Passed - In person appointment in the past 12 or next 3 months     Recent Outpatient Visits              1 week ago Encounter for annual health examination    Adan Truong MD    Office Visit    3 months ago Essential hypertension, benign    Gloria Mancini Hiawatha Flirt, MD    Office Visit    5 months ago Pure hypercholesterolemia    Gloria Mancini Hiawatha Flirt, MD    Office Visit    7 months ago Colon cancer screening    Jung Truong MD    Office Visit    1 year ago Encounter for annual health examination    Adan Truong MD    Office Visit          Future Appointments         Provider Department Appt Notes    In 3 weeks Coral Herrera MD [address] [place] [address]                Passed - Last BP reading less than 140/90     BP Readings from Last 1 Encounters:  10/31/22 : 122/70              Passed - CMP or BMP in past 6 months     Recent Results (from the past 4392 hour(s))   COMP METABOLIC PANEL (14)    Collection Time: 10/17/22  7:30 AM   Result Value Ref Range    Glucose 169 (H) 70 - 99 mg/dL    Sodium 137 136 - 145 mmol/L    Potassium 3.9 3.5 - 5.1 mmol/L    Chloride 102 98 - 112 mmol/L    CO2 25.0 21.0 - 32.0 mmol/L    Anion Gap 10 0 - 18 mmol/L    BUN 25 (H) 7 - 18 mg/dL    Creatinine 1.23 (H) 0.55 - 1.02 mg/dL    BUN/CREA Ratio 20.3 (H) 10.0 - 20.0    Calcium, Total 10.6 (H) 8.5 - 10.1 mg/dL Calculated Osmolality 292 275 - 295 mOsm/kg    eGFR-Cr 47 (L) >=60 mL/min/1.73m2    ALT 35 13 - 56 U/L    AST 18 15 - 37 U/L    Alkaline Phosphatase 63 55 - 142 U/L    Bilirubin, Total 0.7 0.1 - 2.0 mg/dL    Total Protein 8.3 (H) 6.4 - 8.2 g/dL    Albumin 3.9 3.4 - 5.0 g/dL    Globulin  4.4 2.8 - 4.4 g/dL    A/G Ratio 0.9 (L) 1.0 - 2.0    Patient Fasting for CMP? Yes      *Note: Due to a large number of results and/or encounters for the requested time period, some results have not been displayed. A complete set of results can be found in Results Review.                Passed - In person appointment or virtual visit in the past 6 months     Recent Outpatient Visits              1 week ago Encounter for annual health examination    [address] Erroll Cid, MD    Office Visit    3 months ago Essential hypertension, benign    [address], [address] [place] [address], Toya Benedict MD    Office Visit    5 months ago Pure hypercholesterolemia    [address], [address] [place] [address], Toya Benedict MD    Office Visit    7 months ago Colon cancer screening    [address] Tenorio MD    Office Visit    1 year ago Encounter for annual health examination    [address] Erroll Cid, MD    Office Visit          Future Appointments         Provider Department Appt Notes    In 3 weeks Jass Whitehead MD [address]                   Future Appointments         Provider Department Appt Notes    In 3 weeks MD Amber Novoa [address]           Recent Outpatient Visits              1 week ago Encounter for annual health examination    [address] Tenorio MD    Office Visit    3 months ago Essential hypertension, benign    Beatris Barthel, MD    Office Visit    5 months ago Pure hypercholesterolemia    Gloria Briones, Lev Null MD    Office Visit    7 months ago Colon cancer screening    Jung Aguilar MD    Office Visit    1 year ago Encounter for annual health examination    Gabriel Marr, Khai Jimenes MD    Office Visit

## 2022-11-10 RX ORDER — VERAPAMIL HYDROCHLORIDE 240 MG/1
240 TABLET, FILM COATED, EXTENDED RELEASE ORAL NIGHTLY
Qty: 90 TABLET | Refills: 1 | Status: SHIPPED | OUTPATIENT
Start: 2022-11-10

## 2022-12-05 ENCOUNTER — OFFICE VISIT (OUTPATIENT)
Dept: NEPHROLOGY | Facility: CLINIC | Age: 72
End: 2022-12-05
Payer: MEDICARE

## 2022-12-05 VITALS
SYSTOLIC BLOOD PRESSURE: 126 MMHG | HEIGHT: 65 IN | DIASTOLIC BLOOD PRESSURE: 80 MMHG | HEART RATE: 100 BPM | BODY MASS INDEX: 43.99 KG/M2 | WEIGHT: 264 LBS

## 2022-12-05 DIAGNOSIS — I10 ESSENTIAL HYPERTENSION, BENIGN: Primary | Chronic | ICD-10-CM

## 2022-12-05 DIAGNOSIS — E11.22 TYPE 2 DIABETES MELLITUS WITH STAGE 3A CHRONIC KIDNEY DISEASE, WITHOUT LONG-TERM CURRENT USE OF INSULIN (HCC): Chronic | ICD-10-CM

## 2022-12-05 DIAGNOSIS — N18.31 TYPE 2 DIABETES MELLITUS WITH STAGE 3A CHRONIC KIDNEY DISEASE, WITHOUT LONG-TERM CURRENT USE OF INSULIN (HCC): Chronic | ICD-10-CM

## 2022-12-05 DIAGNOSIS — E78.00 PURE HYPERCHOLESTEROLEMIA: Chronic | ICD-10-CM

## 2022-12-05 DIAGNOSIS — N18.31 STAGE 3A CHRONIC KIDNEY DISEASE (HCC): Chronic | ICD-10-CM

## 2022-12-05 PROCEDURE — 3079F DIAST BP 80-89 MM HG: CPT | Performed by: INTERNAL MEDICINE

## 2022-12-05 PROCEDURE — 3074F SYST BP LT 130 MM HG: CPT | Performed by: INTERNAL MEDICINE

## 2022-12-05 PROCEDURE — 3008F BODY MASS INDEX DOCD: CPT | Performed by: INTERNAL MEDICINE

## 2022-12-05 PROCEDURE — 99213 OFFICE O/P EST LOW 20 MIN: CPT | Performed by: INTERNAL MEDICINE

## 2022-12-05 PROCEDURE — 1126F AMNT PAIN NOTED NONE PRSNT: CPT | Performed by: INTERNAL MEDICINE

## 2022-12-05 NOTE — PATIENT INSTRUCTIONS
Please do labs in early February they are ordered under Dr. Gavin Collado    Let me know when you get them done please see me again in March    Same medications    Great job with your health Wilton Kiser

## 2022-12-09 ENCOUNTER — TELEPHONE (OUTPATIENT)
Dept: CASE MANAGEMENT | Age: 72
End: 2022-12-09

## 2022-12-09 DIAGNOSIS — E11.22 TYPE 2 DIABETES MELLITUS WITH STAGE 3A CHRONIC KIDNEY DISEASE, WITHOUT LONG-TERM CURRENT USE OF INSULIN (HCC): Primary | ICD-10-CM

## 2022-12-09 DIAGNOSIS — N18.31 TYPE 2 DIABETES MELLITUS WITH STAGE 3A CHRONIC KIDNEY DISEASE, WITHOUT LONG-TERM CURRENT USE OF INSULIN (HCC): Primary | ICD-10-CM

## 2022-12-09 DIAGNOSIS — Z01.00 DIABETIC EYE EXAM (HCC): ICD-10-CM

## 2022-12-09 DIAGNOSIS — E11.9 DIABETIC EYE EXAM (HCC): ICD-10-CM

## 2022-12-09 NOTE — TELEPHONE ENCOUNTER
Dr. Deion Day,    Patient called requesting referral to   Dr. Darwin Lora. Appointment 12/10/22. Pended referral please review diagnosis and sign off if you agree. Thank you.   Josias Mims

## 2022-12-09 NOTE — TELEPHONE ENCOUNTER
1. Type 2 diabetes mellitus with stage 3a chronic kidney disease, without long-term current use of insulin (HCC)  - OPHTHALMOLOGY - EXTERNAL    2.  Diabetic eye exam ([facility] [address]    - OPHTHALMOLOGY - EXTERNAL

## 2023-01-03 ENCOUNTER — MED REC SCAN ONLY (OUTPATIENT)
Dept: FAMILY MEDICINE CLINIC | Facility: CLINIC | Age: 73
End: 2023-01-03

## 2023-01-08 DIAGNOSIS — N18.31 TYPE 2 DIABETES MELLITUS WITH STAGE 3A CHRONIC KIDNEY DISEASE, WITHOUT LONG-TERM CURRENT USE OF INSULIN (HCC): ICD-10-CM

## 2023-01-08 DIAGNOSIS — E11.22 TYPE 2 DIABETES MELLITUS WITH STAGE 3A CHRONIC KIDNEY DISEASE, WITHOUT LONG-TERM CURRENT USE OF INSULIN (HCC): ICD-10-CM

## 2023-01-09 RX ORDER — METFORMIN HYDROCHLORIDE 500 MG/1
TABLET, EXTENDED RELEASE ORAL
Qty: 90 TABLET | Refills: 1 | Status: SHIPPED | OUTPATIENT
Start: 2023-01-09

## 2023-01-27 ENCOUNTER — PATIENT MESSAGE (OUTPATIENT)
Dept: FAMILY MEDICINE CLINIC | Facility: CLINIC | Age: 73
End: 2023-01-27

## 2023-01-28 NOTE — TELEPHONE ENCOUNTER
From: Charlene Hurley  To: Sarah Barragan MD  Sent: 1/27/2023 6:08 PM CST  Subject: Rx Refill    Please refill my prescription for Farxiga. Thank you.

## 2023-01-31 RX ORDER — DAPAGLIFLOZIN 10 MG/1
TABLET, FILM COATED ORAL
Qty: 90 TABLET | Refills: 1 | OUTPATIENT
Start: 2023-01-31

## 2023-02-23 ENCOUNTER — LAB ENCOUNTER (OUTPATIENT)
Dept: LAB | Age: 73
End: 2023-02-23
Attending: FAMILY MEDICINE
Payer: COMMERCIAL

## 2023-02-23 DIAGNOSIS — N18.31 STAGE 3A CHRONIC KIDNEY DISEASE (HCC): ICD-10-CM

## 2023-02-23 DIAGNOSIS — E87.1 HYPONATREMIA: ICD-10-CM

## 2023-02-23 DIAGNOSIS — I10 ESSENTIAL HYPERTENSION, BENIGN: Chronic | ICD-10-CM

## 2023-02-23 DIAGNOSIS — E11.22 TYPE 2 DIABETES MELLITUS WITH STAGE 3A CHRONIC KIDNEY DISEASE, WITHOUT LONG-TERM CURRENT USE OF INSULIN (HCC): Chronic | ICD-10-CM

## 2023-02-23 DIAGNOSIS — N18.31 TYPE 2 DIABETES MELLITUS WITH STAGE 3A CHRONIC KIDNEY DISEASE, WITHOUT LONG-TERM CURRENT USE OF INSULIN (HCC): Chronic | ICD-10-CM

## 2023-02-23 DIAGNOSIS — E78.00 PURE HYPERCHOLESTEROLEMIA: Chronic | ICD-10-CM

## 2023-02-23 DIAGNOSIS — I70.0 AORTIC ATHEROSCLEROSIS (HCC): ICD-10-CM

## 2023-02-23 DIAGNOSIS — E66.01 MORBID OBESITY DUE TO EXCESS CALORIES (HCC): ICD-10-CM

## 2023-02-23 DIAGNOSIS — Z00.00 ENCOUNTER FOR ANNUAL HEALTH EXAMINATION: ICD-10-CM

## 2023-02-23 LAB
ALBUMIN SERPL-MCNC: 3.6 G/DL (ref 3.4–5)
ALBUMIN/GLOB SERPL: 0.8 {RATIO} (ref 1–2)
ALP LIVER SERPL-CCNC: 55 U/L
ALT SERPL-CCNC: 28 U/L
ANION GAP SERPL CALC-SCNC: 6 MMOL/L (ref 0–18)
AST SERPL-CCNC: 16 U/L (ref 15–37)
BASOPHILS # BLD AUTO: 0.09 X10(3) UL (ref 0–0.2)
BASOPHILS NFR BLD AUTO: 0.9 %
BILIRUB SERPL-MCNC: 0.7 MG/DL (ref 0.1–2)
BUN BLD-MCNC: 24 MG/DL (ref 7–18)
BUN/CREAT SERPL: 21.8 (ref 10–20)
CALCIUM BLD-MCNC: 10.4 MG/DL (ref 8.5–10.1)
CHLORIDE SERPL-SCNC: 104 MMOL/L (ref 98–112)
CHOLEST SERPL-MCNC: 117 MG/DL (ref ?–200)
CO2 SERPL-SCNC: 29 MMOL/L (ref 21–32)
CREAT BLD-MCNC: 1.1 MG/DL
DEPRECATED RDW RBC AUTO: 47.8 FL (ref 35.1–46.3)
EOSINOPHIL # BLD AUTO: 0.18 X10(3) UL (ref 0–0.7)
EOSINOPHIL NFR BLD AUTO: 1.7 %
ERYTHROCYTE [DISTWIDTH] IN BLOOD BY AUTOMATED COUNT: 14.8 % (ref 11–15)
EST. AVERAGE GLUCOSE BLD GHB EST-MCNC: 120 MG/DL (ref 68–126)
FASTING PATIENT LIPID ANSWER: YES
FASTING STATUS PATIENT QL REPORTED: YES
GFR SERPLBLD BASED ON 1.73 SQ M-ARVRAT: 54 ML/MIN/1.73M2 (ref 60–?)
GLOBULIN PLAS-MCNC: 4.5 G/DL (ref 2.8–4.4)
GLUCOSE BLD-MCNC: 130 MG/DL (ref 70–99)
HBA1C MFR BLD: 5.8 % (ref ?–5.7)
HCT VFR BLD AUTO: 47.2 %
HDLC SERPL-MCNC: 43 MG/DL (ref 40–59)
HGB BLD-MCNC: 14.7 G/DL
IMM GRANULOCYTES # BLD AUTO: 0.03 X10(3) UL (ref 0–1)
IMM GRANULOCYTES NFR BLD: 0.3 %
LDLC SERPL CALC-MCNC: 43 MG/DL (ref ?–100)
LYMPHOCYTES # BLD AUTO: 2.94 X10(3) UL (ref 1–4)
LYMPHOCYTES NFR BLD AUTO: 28.4 %
MCH RBC QN AUTO: 27.3 PG (ref 26–34)
MCHC RBC AUTO-ENTMCNC: 31.1 G/DL (ref 31–37)
MCV RBC AUTO: 87.7 FL
MONOCYTES # BLD AUTO: 0.72 X10(3) UL (ref 0.1–1)
MONOCYTES NFR BLD AUTO: 7 %
NEUTROPHILS # BLD AUTO: 6.38 X10 (3) UL (ref 1.5–7.7)
NEUTROPHILS # BLD AUTO: 6.38 X10(3) UL (ref 1.5–7.7)
NEUTROPHILS NFR BLD AUTO: 61.7 %
NONHDLC SERPL-MCNC: 74 MG/DL (ref ?–130)
OSMOLALITY SERPL CALC.SUM OF ELEC: 294 MOSM/KG (ref 275–295)
PLATELET # BLD AUTO: 259 10(3)UL (ref 150–450)
POTASSIUM SERPL-SCNC: 4 MMOL/L (ref 3.5–5.1)
PROT SERPL-MCNC: 8.1 G/DL (ref 6.4–8.2)
RBC # BLD AUTO: 5.38 X10(6)UL
SODIUM SERPL-SCNC: 139 MMOL/L (ref 136–145)
TRIGL SERPL-MCNC: 188 MG/DL (ref 30–149)
TSI SER-ACNC: 3.23 MIU/ML (ref 0.36–3.74)
VLDLC SERPL CALC-MCNC: 26 MG/DL (ref 0–30)
WBC # BLD AUTO: 10.3 X10(3) UL (ref 4–11)

## 2023-02-23 PROCEDURE — 83036 HEMOGLOBIN GLYCOSYLATED A1C: CPT

## 2023-02-23 PROCEDURE — 80053 COMPREHEN METABOLIC PANEL: CPT

## 2023-02-23 PROCEDURE — 85025 COMPLETE CBC W/AUTO DIFF WBC: CPT

## 2023-02-23 PROCEDURE — 36415 COLL VENOUS BLD VENIPUNCTURE: CPT

## 2023-02-23 PROCEDURE — 84443 ASSAY THYROID STIM HORMONE: CPT

## 2023-02-23 PROCEDURE — 80061 LIPID PANEL: CPT

## 2023-03-22 ENCOUNTER — OFFICE VISIT (OUTPATIENT)
Dept: NEPHROLOGY | Facility: CLINIC | Age: 73
End: 2023-03-22

## 2023-03-22 VITALS
SYSTOLIC BLOOD PRESSURE: 139 MMHG | DIASTOLIC BLOOD PRESSURE: 83 MMHG | HEIGHT: 65 IN | HEART RATE: 86 BPM | BODY MASS INDEX: 40.98 KG/M2 | WEIGHT: 246 LBS

## 2023-03-22 DIAGNOSIS — N18.31 STAGE 3A CHRONIC KIDNEY DISEASE (HCC): Chronic | ICD-10-CM

## 2023-03-22 DIAGNOSIS — N18.31 TYPE 2 DIABETES MELLITUS WITH STAGE 3A CHRONIC KIDNEY DISEASE, WITHOUT LONG-TERM CURRENT USE OF INSULIN (HCC): Primary | Chronic | ICD-10-CM

## 2023-03-22 DIAGNOSIS — E11.22 TYPE 2 DIABETES MELLITUS WITH STAGE 3A CHRONIC KIDNEY DISEASE, WITHOUT LONG-TERM CURRENT USE OF INSULIN (HCC): Primary | Chronic | ICD-10-CM

## 2023-03-22 DIAGNOSIS — I10 ESSENTIAL HYPERTENSION, BENIGN: Chronic | ICD-10-CM

## 2023-03-22 NOTE — PATIENT INSTRUCTIONS
Hold calcium    Stay on vit d 2000 units day      Do labs in June     Please call for orders    See me July    Great job Curlew you look awesome!

## 2023-04-01 ENCOUNTER — OFFICE VISIT (OUTPATIENT)
Dept: FAMILY MEDICINE CLINIC | Facility: CLINIC | Age: 73
End: 2023-04-01

## 2023-04-01 VITALS
SYSTOLIC BLOOD PRESSURE: 138 MMHG | HEART RATE: 71 BPM | WEIGHT: 244 LBS | BODY MASS INDEX: 41 KG/M2 | TEMPERATURE: 98 F | DIASTOLIC BLOOD PRESSURE: 70 MMHG

## 2023-04-01 DIAGNOSIS — Z87.2 HISTORY OF ACTINIC KERATOSES: ICD-10-CM

## 2023-04-01 DIAGNOSIS — H26.9 CATARACT OF BOTH EYES, UNSPECIFIED CATARACT TYPE: ICD-10-CM

## 2023-04-01 DIAGNOSIS — Z12.11 COLON CANCER SCREENING: ICD-10-CM

## 2023-04-01 DIAGNOSIS — L73.2 HIDRADENITIS SUPPURATIVA: ICD-10-CM

## 2023-04-01 DIAGNOSIS — E66.01 MORBID OBESITY DUE TO EXCESS CALORIES (HCC): ICD-10-CM

## 2023-04-01 DIAGNOSIS — E87.1 HYPONATREMIA: ICD-10-CM

## 2023-04-01 DIAGNOSIS — M81.0 OSTEOPOROSIS, UNSPECIFIED OSTEOPOROSIS TYPE, UNSPECIFIED PATHOLOGICAL FRACTURE PRESENCE: ICD-10-CM

## 2023-04-01 DIAGNOSIS — M19.90 OSTEOARTHRITIS, UNSPECIFIED OSTEOARTHRITIS TYPE, UNSPECIFIED SITE: ICD-10-CM

## 2023-04-01 DIAGNOSIS — Z85.828 PERSONAL HISTORY OF SKIN CANCER: ICD-10-CM

## 2023-04-01 DIAGNOSIS — E78.00 PURE HYPERCHOLESTEROLEMIA: Chronic | ICD-10-CM

## 2023-04-01 DIAGNOSIS — H81.10 BENIGN PAROXYSMAL POSITIONAL VERTIGO, UNSPECIFIED LATERALITY: ICD-10-CM

## 2023-04-01 DIAGNOSIS — E83.52 SERUM CALCIUM ELEVATED: ICD-10-CM

## 2023-04-01 DIAGNOSIS — L56.5 DISSEMINATED SUPERFICIAL ACTINIC POROKERATOSIS (DSAP): ICD-10-CM

## 2023-04-01 DIAGNOSIS — L82.0 INFLAMED SEBORRHEIC KERATOSIS: ICD-10-CM

## 2023-04-01 DIAGNOSIS — J30.2 SEASONAL ALLERGIC RHINITIS, UNSPECIFIED TRIGGER: ICD-10-CM

## 2023-04-01 DIAGNOSIS — I10 ESSENTIAL HYPERTENSION, BENIGN: Chronic | ICD-10-CM

## 2023-04-01 DIAGNOSIS — Z00.00 ENCOUNTER FOR ANNUAL HEALTH EXAMINATION: Primary | ICD-10-CM

## 2023-04-01 DIAGNOSIS — N18.31 STAGE 3A CHRONIC KIDNEY DISEASE (HCC): Chronic | ICD-10-CM

## 2023-04-01 DIAGNOSIS — I70.0 AORTIC ATHEROSCLEROSIS (HCC): ICD-10-CM

## 2023-04-01 DIAGNOSIS — E11.22 TYPE 2 DIABETES MELLITUS WITH STAGE 3A CHRONIC KIDNEY DISEASE, WITHOUT LONG-TERM CURRENT USE OF INSULIN (HCC): Chronic | ICD-10-CM

## 2023-04-01 DIAGNOSIS — Z20.5 EXPOSURE TO HEPATITIS C: ICD-10-CM

## 2023-04-01 DIAGNOSIS — N18.31 TYPE 2 DIABETES MELLITUS WITH STAGE 3A CHRONIC KIDNEY DISEASE, WITHOUT LONG-TERM CURRENT USE OF INSULIN (HCC): Chronic | ICD-10-CM

## 2023-04-01 PROBLEM — D48.5 NEOPLASM OF UNCERTAIN BEHAVIOR OF SKIN: Status: RESOLVED | Noted: 2018-09-27 | Resolved: 2023-04-01

## 2023-04-01 PROBLEM — L57.8 SUN-DAMAGED SKIN: Status: RESOLVED | Noted: 2019-03-14 | Resolved: 2023-04-01

## 2023-04-01 RX ORDER — SPIRONOLACTONE AND HYDROCHLOROTHIAZIDE 25; 25 MG/1; MG/1
1 TABLET ORAL DAILY
Qty: 90 TABLET | Refills: 3 | Status: SHIPPED | OUTPATIENT
Start: 2023-04-01

## 2023-04-01 RX ORDER — ATORVASTATIN CALCIUM 20 MG/1
TABLET, FILM COATED ORAL
Qty: 90 TABLET | Refills: 3 | Status: SHIPPED | OUTPATIENT
Start: 2023-04-01

## 2023-04-01 RX ORDER — ACETAMINOPHEN 160 MG
2000 TABLET,DISINTEGRATING ORAL DAILY
COMMUNITY

## 2023-04-01 RX ORDER — METFORMIN HYDROCHLORIDE 500 MG/1
TABLET, EXTENDED RELEASE ORAL
Qty: 90 TABLET | Refills: 3 | Status: SHIPPED | OUTPATIENT
Start: 2023-04-01

## 2023-04-01 RX ORDER — VERAPAMIL HYDROCHLORIDE 240 MG/1
240 TABLET, FILM COATED, EXTENDED RELEASE ORAL NIGHTLY
Qty: 90 TABLET | Refills: 3 | Status: SHIPPED | OUTPATIENT
Start: 2023-04-01

## 2023-04-01 RX ORDER — ALENDRONATE SODIUM 70 MG/1
70 TABLET ORAL WEEKLY
Qty: 12 TABLET | Refills: 3 | Status: SHIPPED | OUTPATIENT
Start: 2023-04-01

## 2023-05-17 ENCOUNTER — OFFICE VISIT (OUTPATIENT)
Dept: PODIATRY CLINIC | Facility: CLINIC | Age: 73
End: 2023-05-17

## 2023-05-17 DIAGNOSIS — E11.42 TYPE 2 DIABETES MELLITUS WITH POLYNEUROPATHY (HCC): Primary | ICD-10-CM

## 2023-05-17 DIAGNOSIS — B35.1 ONYCHOMYCOSIS: ICD-10-CM

## 2023-05-17 PROCEDURE — 11721 DEBRIDE NAIL 6 OR MORE: CPT | Performed by: PODIATRIST

## 2023-05-17 PROCEDURE — 1126F AMNT PAIN NOTED NONE PRSNT: CPT | Performed by: PODIATRIST

## 2023-05-17 PROCEDURE — 99203 OFFICE O/P NEW LOW 30 MIN: CPT | Performed by: PODIATRIST

## 2023-05-17 PROCEDURE — 1159F MED LIST DOCD IN RCRD: CPT | Performed by: PODIATRIST

## 2023-07-08 ENCOUNTER — PATIENT MESSAGE (OUTPATIENT)
Dept: NEPHROLOGY | Facility: CLINIC | Age: 73
End: 2023-07-08

## 2023-07-08 DIAGNOSIS — E78.00 PURE HYPERCHOLESTEROLEMIA: ICD-10-CM

## 2023-07-08 DIAGNOSIS — E11.22 TYPE 2 DIABETES MELLITUS WITH STAGE 3A CHRONIC KIDNEY DISEASE, WITHOUT LONG-TERM CURRENT USE OF INSULIN (HCC): ICD-10-CM

## 2023-07-08 DIAGNOSIS — N18.31 TYPE 2 DIABETES MELLITUS WITH STAGE 3A CHRONIC KIDNEY DISEASE, WITHOUT LONG-TERM CURRENT USE OF INSULIN (HCC): ICD-10-CM

## 2023-07-08 DIAGNOSIS — N18.31 STAGE 3A CHRONIC KIDNEY DISEASE (HCC): Primary | ICD-10-CM

## 2023-07-10 NOTE — TELEPHONE ENCOUNTER
Krystal Carrasco MD   to Bart Mao         7/10/23  2:07 PM  Renal panel, A1c, UA, youre in microalbumin a creatinine ratio     Last read by Bart Mao at  2:10 PM on 7/10/2023.

## 2023-07-10 NOTE — TELEPHONE ENCOUNTER
From: Fern Allen  To: Annemarie Ramírez MD  Sent: 7/8/2023 6:19 AM CDT  Subject: Labs    I have an appointment with you on 8/17 and you asked that I get in touch with you beforehand so you can order whatever labs are needed prior to the visit.      Thanks!!

## 2023-07-25 NOTE — TELEPHONE ENCOUNTER
Last ref 1-28-23 # 90 + 1 by Dr Efrain Preston. pls advise, thanks in advance.        Refill passed per [facility] protocol   Requested Prescriptions   Pending Prescriptions Disp Refills    FARXIGA 10 MG Oral Tab [Pharmacy Med Name: Yamila Cárdenas 10 MG TABLET] 90 tablet 1     Sig: TAKE 1 TABLET BY MOUTH EVERY DAY       Diabetes Medication Protocol Passed - 7/24/2023  3:48 PM        Passed - Last A1C < 7.5 and within past 6 months     Lab Results   Component Value Date    A1C 5.8 (H) 02/23/2023             Passed - In person appointment or virtual visit in the past 6 mos or appointment in next 3 mos     Recent Outpatient Visits              2 months ago Type 2 diabetes mellitus with polyneuropathy ([facility] [address]    [address],[facility] [address], [place], Rudi Pers[place], [place]    Office Visit    3 months ago Encounter for annual health examination    [address] [facility], Jeniffer Quiroz MD    Office Visit    4 months ago Type 2 diabetes mellitus with stage 3a chronic kidney disease, without long-term current use of insulin [facility])    [address] [facility], Fazian Benedict MD    Office Visit    7 months ago Essential hypertension, benign    [address], Gus De La Rosa MD    Office Visit    8 months ago Encounter for annual health examination    [address] [facility], Jeniffer Quiroz MD    Office Visit          Future Appointments         Provider Department Appt Notes    In 3 weeks Jaime Muro MD [address], [address] [facility] follow up               Passed [facility] or GFRNAA > 50     GFR Evaluation  EGFRCR: 54 , resulted on 2/23/2023          Passed - GFR in the past 12 months

## 2023-08-02 ENCOUNTER — LAB ENCOUNTER (OUTPATIENT)
Dept: LAB | Age: 73
End: 2023-08-02
Attending: INTERNAL MEDICINE
Payer: MEDICARE

## 2023-08-02 DIAGNOSIS — N18.31 STAGE 3A CHRONIC KIDNEY DISEASE (HCC): ICD-10-CM

## 2023-08-02 DIAGNOSIS — N18.31 TYPE 2 DIABETES MELLITUS WITH STAGE 3A CHRONIC KIDNEY DISEASE, WITHOUT LONG-TERM CURRENT USE OF INSULIN (HCC): ICD-10-CM

## 2023-08-02 DIAGNOSIS — E11.22 TYPE 2 DIABETES MELLITUS WITH STAGE 3A CHRONIC KIDNEY DISEASE, WITHOUT LONG-TERM CURRENT USE OF INSULIN (HCC): ICD-10-CM

## 2023-08-02 LAB
ALBUMIN SERPL-MCNC: 3.6 G/DL (ref 3.4–5)
ANION GAP SERPL CALC-SCNC: 6 MMOL/L (ref 0–18)
BUN BLD-MCNC: 32 MG/DL (ref 7–18)
CALCIUM BLD-MCNC: 9.8 MG/DL (ref 8.5–10.1)
CHLORIDE SERPL-SCNC: 104 MMOL/L (ref 98–112)
CLARITY UR REFRACT.AUTO: CLEAR
CO2 SERPL-SCNC: 25 MMOL/L (ref 21–32)
CREAT BLD-MCNC: 1.08 MG/DL
EGFRCR SERPLBLD CKD-EPI 2021: 55 ML/MIN/1.73M2 (ref 60–?)
EST. AVERAGE GLUCOSE BLD GHB EST-MCNC: 131 MG/DL (ref 68–126)
GLUCOSE BLD-MCNC: 130 MG/DL (ref 70–99)
GLUCOSE UR STRIP.AUTO-MCNC: >=500 MG/DL
HBA1C MFR BLD: 6.2 % (ref ?–5.7)
KETONES UR STRIP.AUTO-MCNC: NEGATIVE MG/DL
LEUKOCYTE ESTERASE UR QL STRIP.AUTO: NEGATIVE
NITRITE UR QL STRIP.AUTO: NEGATIVE
OSMOLALITY SERPL CALC.SUM OF ELEC: 289 MOSM/KG (ref 275–295)
PH UR STRIP.AUTO: 5.5 [PH] (ref 5–8)
PHOSPHATE SERPL-MCNC: 3.2 MG/DL (ref 2.5–4.9)
POTASSIUM SERPL-SCNC: 4.2 MMOL/L (ref 3.5–5.1)
PROT UR STRIP.AUTO-MCNC: NEGATIVE MG/DL
RBC UR QL AUTO: NEGATIVE
SODIUM SERPL-SCNC: 135 MMOL/L (ref 136–145)
SP GR UR STRIP.AUTO: 1.01 (ref 1–1.03)
UROBILINOGEN UR STRIP.AUTO-MCNC: <2 MG/DL

## 2023-08-02 PROCEDURE — 83036 HEMOGLOBIN GLYCOSYLATED A1C: CPT

## 2023-08-02 PROCEDURE — 3044F HG A1C LEVEL LT 7.0%: CPT | Performed by: INTERNAL MEDICINE

## 2023-08-02 PROCEDURE — 80069 RENAL FUNCTION PANEL: CPT

## 2023-08-02 PROCEDURE — 36415 COLL VENOUS BLD VENIPUNCTURE: CPT

## 2023-08-17 ENCOUNTER — OFFICE VISIT (OUTPATIENT)
Dept: NEPHROLOGY | Facility: CLINIC | Age: 73
End: 2023-08-17

## 2023-08-17 VITALS
WEIGHT: 233 LBS | SYSTOLIC BLOOD PRESSURE: 135 MMHG | DIASTOLIC BLOOD PRESSURE: 78 MMHG | BODY MASS INDEX: 38.82 KG/M2 | HEIGHT: 65 IN | HEART RATE: 92 BPM

## 2023-08-17 DIAGNOSIS — N18.31 TYPE 2 DIABETES MELLITUS WITH STAGE 3A CHRONIC KIDNEY DISEASE, WITHOUT LONG-TERM CURRENT USE OF INSULIN (HCC): Primary | Chronic | ICD-10-CM

## 2023-08-17 DIAGNOSIS — E11.22 TYPE 2 DIABETES MELLITUS WITH STAGE 3A CHRONIC KIDNEY DISEASE, WITHOUT LONG-TERM CURRENT USE OF INSULIN (HCC): Primary | Chronic | ICD-10-CM

## 2023-08-17 DIAGNOSIS — I10 ESSENTIAL HYPERTENSION, BENIGN: Chronic | ICD-10-CM

## 2023-08-17 PROCEDURE — 99213 OFFICE O/P EST LOW 20 MIN: CPT | Performed by: INTERNAL MEDICINE

## 2023-08-17 PROCEDURE — 3008F BODY MASS INDEX DOCD: CPT | Performed by: INTERNAL MEDICINE

## 2023-08-17 PROCEDURE — 3075F SYST BP GE 130 - 139MM HG: CPT | Performed by: INTERNAL MEDICINE

## 2023-08-17 PROCEDURE — 1159F MED LIST DOCD IN RCRD: CPT | Performed by: INTERNAL MEDICINE

## 2023-08-17 PROCEDURE — 3078F DIAST BP <80 MM HG: CPT | Performed by: INTERNAL MEDICINE

## 2023-08-17 NOTE — PATIENT INSTRUCTIONS
Great job Adolfo Randolph    Flu shot in October COVID-vaccine in November    Please call for labs in early January see me in late January    Have a great rest of the summer

## 2023-08-17 NOTE — PROGRESS NOTES
Progress Note     Tiffanie Landon    Is doing great has lost about 80 pounds pressure is 135/78 sugars doing good A1c 6.2 on Farxiga vitamin D Aldactone HCT verapamil metformin. 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 CHOLECYSTECTOMY        Social History    Tobacco Use      Smoking status: Former        Packs/day: 0.50        Years: 45.00        Pack years: 22.5        Types: Cigarettes        Quit date: 2018        Years since quittin.5      Smokeless tobacco: Never      Tobacco comments: 1 pack every 3 days    Alcohol use: Yes      Alcohol/week: 0.0 standard drinks of alcohol      Comment: very rarely        Medications (Active prior to today's visit):  Current Outpatient Medications   Medication Sig Dispense Refill    dapagliflozin (FARXIGA) 10 MG Oral Tab Take 1 tablet (10 mg total) by mouth daily. 90 tablet 3    cholecalciferol 50 MCG (2000 UT) Oral Cap Take 1 capsule (2,000 Units total) by mouth daily. metFORMIN  MG Oral Tablet 24 Hr TAKE ONE TABLET BY MOUTH DAILY WITH FOOD 90 tablet 3    verapamil  MG Oral Tab CR Take 1 tablet (240 mg total) by mouth nightly. 90 tablet 3    metoprolol tartrate 25 MG Oral Tab Take 1 tablet (25 mg total) by mouth 2 (two) times daily. 180 tablet 3    Spironolactone-HCTZ 25-25 MG Oral Tab Take 1 tablet by mouth daily.  90 tablet 3    atorvastatin 20 MG Oral Tab TAKE 1 TABLET (20MG) BY MOUTH EVERY DAY 90 tablet 3    alendronate 70 MG Oral Tab Take 1 tablet (70 mg total) by mouth once a week. 12 tablet 3    Glucose Blood (ACCU-CHEK ADITYA PLUS) In Vitro Strip USE ONCE DAILY IN THE MORNING BEFORE BREAKFAST 100 strip 3    Omega-3-acid Ethyl Esters 1 g Oral Cap Take 1 capsule (1 g total) by mouth daily. Blood Glucose Monitoring Suppl (ACCU-CHEK ADITYA PLUS) w/Device Does not apply Kit USE ONCE DAILY IN AM BEFORE BREAKFAST 1 kit 0    LANCETS ULTRA THIN Does not apply Misc Use one daily 100 each 6    Multiple Vitamins-Minerals (CENTRUM SILVER) Oral Tab Take 1 tablet by mouth daily.          Allergies:    Erythromycin                  ROS:     Constitutional:  Negative for decreased activity, fever, irritability and lethargy  ENMT:  Negative for ear drainage, hearing loss and nasal drainage  Eyes:  Negative for eye discharge and vision loss  Cardiovascular:  Negative for chest pain, sob  Respiratory:  Negative for cough, dyspnea and wheezing  Gastrointestinal:  Negative for abdominal pain, constipation  Genitourinary:  Negative for dysuria and hematuria  Endocrine:  Negative for abnormal sleep patterns  Hema/Lymph:  Negative for easy bleeding and easy bruising  Integumentary:  Negative for pruritus and rash  Musculoskeletal:  Negative for bone/joint symptoms  Neurological:  Negative for gait disturbance  Psychiatric:  Negative for inappropriate interaction and psychiatric symptoms       08/17/23  1645   BP: 135/78   Pulse: 92       PHYSICAL EXAM:   Constitutional: appears well hydrated alert and responsive   Head/Face: normocephalic  Eyes/Vision: normal extraocular motion is intact  Nose/Mouth/Throat:mucous membranes are moist   Neck/Thyroid: neck is supple without adenopathy  Lymphatic: no abnormal cervical, supraclavicular adenopathy is noted  Respiratory:  lungs are clear to auscultation bilaterally  Cardiovascular: regular rate and rhythm   Abdomen: soft, non-tender, non-distended, BS normal  Skin/Hair: no unusual rashes present, no abnormal bruising noted  Back/Spine: no abnormalities noted  Musculoskeletal: no deformities  Extremities: no edema  Neurological:  Grossly normal       ASSESSMENT/PLAN:   Assessment   Type 2 diabetes mellitus with stage 3a chronic kidney disease, without long-term current use of insulin (hcc)  (primary encounter diagnosis)  Essential hypertension, benign    #1 CKD 2  GFR 55 creatinine 1.08 much improved on SGLT  2 diabetes A1c 6.2 continue present prep  #3 lipids pending   Is on a statin  #4 hypertension controlled    Flu shot October COVID November see me in January call for labs prior  Orders This Visit:  No orders of the defined types were placed in this encounter. Meds This Visit:  Requested Prescriptions      No prescriptions requested or ordered in this encounter       Imaging & Referrals:  None     8/17/2023  Shaji Hwang MD

## 2023-08-31 ENCOUNTER — PATIENT MESSAGE (OUTPATIENT)
Dept: FAMILY MEDICINE CLINIC | Facility: CLINIC | Age: 73
End: 2023-08-31

## 2023-08-31 DIAGNOSIS — Z12.31 SCREENING MAMMOGRAM, ENCOUNTER FOR: Primary | ICD-10-CM

## 2023-09-27 ENCOUNTER — PATIENT MESSAGE (OUTPATIENT)
Dept: NEPHROLOGY | Facility: CLINIC | Age: 73
End: 2023-09-27

## 2023-09-28 NOTE — TELEPHONE ENCOUNTER
From: Jennifer Dennis  To: Maddie Watson  Sent: 9/27/2023 4:56 PM CDT  Subject: Rx Refill    Need refill for Farxiga 10mg please. Thank you!

## 2023-10-12 ENCOUNTER — LAB ENCOUNTER (OUTPATIENT)
Dept: LAB | Age: 73
End: 2023-10-12
Attending: FAMILY MEDICINE
Payer: MEDICARE

## 2023-10-12 DIAGNOSIS — E83.52 SERUM CALCIUM ELEVATED: ICD-10-CM

## 2023-10-12 DIAGNOSIS — Z00.00 ENCOUNTER FOR ANNUAL HEALTH EXAMINATION: ICD-10-CM

## 2023-10-12 LAB
ALBUMIN SERPL-MCNC: 3.5 G/DL (ref 3.4–5)
ALBUMIN/GLOB SERPL: 0.7 {RATIO} (ref 1–2)
ALP LIVER SERPL-CCNC: 60 U/L
ALT SERPL-CCNC: 21 U/L
ANION GAP SERPL CALC-SCNC: 10 MMOL/L (ref 0–18)
AST SERPL-CCNC: 16 U/L (ref 15–37)
BASOPHILS # BLD AUTO: 0.08 X10(3) UL (ref 0–0.2)
BASOPHILS NFR BLD AUTO: 0.8 %
BILIRUB SERPL-MCNC: 0.5 MG/DL (ref 0.1–2)
BUN BLD-MCNC: 26 MG/DL (ref 7–18)
BUN/CREAT SERPL: 24.8 (ref 10–20)
CALCIUM BLD-MCNC: 9.6 MG/DL (ref 8.5–10.1)
CHLORIDE SERPL-SCNC: 105 MMOL/L (ref 98–112)
CHOLEST SERPL-MCNC: 117 MG/DL (ref ?–200)
CO2 SERPL-SCNC: 25 MMOL/L (ref 21–32)
CREAT BLD-MCNC: 1.05 MG/DL
DEPRECATED RDW RBC AUTO: 50.6 FL (ref 35.1–46.3)
EGFRCR SERPLBLD CKD-EPI 2021: 57 ML/MIN/1.73M2 (ref 60–?)
EOSINOPHIL # BLD AUTO: 0.22 X10(3) UL (ref 0–0.7)
EOSINOPHIL NFR BLD AUTO: 2.2 %
ERYTHROCYTE [DISTWIDTH] IN BLOOD BY AUTOMATED COUNT: 15.7 % (ref 11–15)
EST. AVERAGE GLUCOSE BLD GHB EST-MCNC: 123 MG/DL (ref 68–126)
FASTING PATIENT LIPID ANSWER: YES
FASTING STATUS PATIENT QL REPORTED: YES
GLOBULIN PLAS-MCNC: 4.7 G/DL (ref 2.8–4.4)
GLUCOSE BLD-MCNC: 119 MG/DL (ref 70–99)
HBA1C MFR BLD: 5.9 % (ref ?–5.7)
HCT VFR BLD AUTO: 47.3 %
HDLC SERPL-MCNC: 46 MG/DL (ref 40–59)
HGB BLD-MCNC: 14.3 G/DL
IMM GRANULOCYTES # BLD AUTO: 0.03 X10(3) UL (ref 0–1)
IMM GRANULOCYTES NFR BLD: 0.3 %
LDLC SERPL CALC-MCNC: 46 MG/DL (ref ?–100)
LYMPHOCYTES # BLD AUTO: 2.8 X10(3) UL (ref 1–4)
LYMPHOCYTES NFR BLD AUTO: 27.8 %
MCH RBC QN AUTO: 26.7 PG (ref 26–34)
MCHC RBC AUTO-ENTMCNC: 30.2 G/DL (ref 31–37)
MCV RBC AUTO: 88.4 FL
MONOCYTES # BLD AUTO: 0.67 X10(3) UL (ref 0.1–1)
MONOCYTES NFR BLD AUTO: 6.7 %
NEUTROPHILS # BLD AUTO: 6.27 X10 (3) UL (ref 1.5–7.7)
NEUTROPHILS # BLD AUTO: 6.27 X10(3) UL (ref 1.5–7.7)
NEUTROPHILS NFR BLD AUTO: 62.2 %
NONHDLC SERPL-MCNC: 71 MG/DL (ref ?–130)
OSMOLALITY SERPL CALC.SUM OF ELEC: 296 MOSM/KG (ref 275–295)
PLATELET # BLD AUTO: 312 10(3)UL (ref 150–450)
POTASSIUM SERPL-SCNC: 4.2 MMOL/L (ref 3.5–5.1)
PROT SERPL-MCNC: 8.2 G/DL (ref 6.4–8.2)
PTH-INTACT SERPL-MCNC: 126.3 PG/ML (ref 18.5–88)
RBC # BLD AUTO: 5.35 X10(6)UL
SODIUM SERPL-SCNC: 140 MMOL/L (ref 136–145)
TRIGL SERPL-MCNC: 145 MG/DL (ref 30–149)
TSI SER-ACNC: 2.79 MIU/ML (ref 0.36–3.74)
VLDLC SERPL CALC-MCNC: 20 MG/DL (ref 0–30)
WBC # BLD AUTO: 10.1 X10(3) UL (ref 4–11)

## 2023-10-12 PROCEDURE — 83970 ASSAY OF PARATHORMONE: CPT

## 2023-10-12 PROCEDURE — 84443 ASSAY THYROID STIM HORMONE: CPT

## 2023-10-12 PROCEDURE — 3044F HG A1C LEVEL LT 7.0%: CPT | Performed by: FAMILY MEDICINE

## 2023-10-12 PROCEDURE — 85025 COMPLETE CBC W/AUTO DIFF WBC: CPT

## 2023-10-12 PROCEDURE — 80053 COMPREHEN METABOLIC PANEL: CPT

## 2023-10-12 PROCEDURE — 83036 HEMOGLOBIN GLYCOSYLATED A1C: CPT

## 2023-10-12 PROCEDURE — 36415 COLL VENOUS BLD VENIPUNCTURE: CPT

## 2023-10-12 PROCEDURE — 80061 LIPID PANEL: CPT

## 2023-10-27 ENCOUNTER — HOSPITAL ENCOUNTER (OUTPATIENT)
Dept: MAMMOGRAPHY | Age: 73
Discharge: HOME | End: 2023-10-27
Attending: FAMILY MEDICINE

## 2023-10-27 DIAGNOSIS — Z12.31 SCREENING MAMMOGRAM, ENCOUNTER FOR: ICD-10-CM

## 2023-10-27 PROCEDURE — 77063 BREAST TOMOSYNTHESIS BI: CPT | Performed by: FAMILY MEDICINE

## 2023-10-27 PROCEDURE — 77067 SCR MAMMO BI INCL CAD: CPT | Performed by: FAMILY MEDICINE

## 2023-10-30 ENCOUNTER — OFFICE VISIT (OUTPATIENT)
Dept: FAMILY MEDICINE CLINIC | Facility: CLINIC | Age: 73
End: 2023-10-30

## 2023-10-30 VITALS
DIASTOLIC BLOOD PRESSURE: 82 MMHG | HEART RATE: 65 BPM | BODY MASS INDEX: 38 KG/M2 | SYSTOLIC BLOOD PRESSURE: 125 MMHG | WEIGHT: 227 LBS

## 2023-10-30 DIAGNOSIS — Z12.11 COLON CANCER SCREENING: ICD-10-CM

## 2023-10-30 DIAGNOSIS — R79.89 INCREASED PTH LEVEL: ICD-10-CM

## 2023-10-30 DIAGNOSIS — I10 ESSENTIAL HYPERTENSION, BENIGN: Primary | Chronic | ICD-10-CM

## 2023-10-30 DIAGNOSIS — N18.31 TYPE 2 DIABETES MELLITUS WITH STAGE 3A CHRONIC KIDNEY DISEASE, WITHOUT LONG-TERM CURRENT USE OF INSULIN (HCC): Chronic | ICD-10-CM

## 2023-10-30 DIAGNOSIS — E11.22 TYPE 2 DIABETES MELLITUS WITH STAGE 3A CHRONIC KIDNEY DISEASE, WITHOUT LONG-TERM CURRENT USE OF INSULIN (HCC): Chronic | ICD-10-CM

## 2023-10-30 PROCEDURE — 1160F RVW MEDS BY RX/DR IN RCRD: CPT | Performed by: FAMILY MEDICINE

## 2023-10-30 PROCEDURE — 1159F MED LIST DOCD IN RCRD: CPT | Performed by: FAMILY MEDICINE

## 2023-10-30 PROCEDURE — 3079F DIAST BP 80-89 MM HG: CPT | Performed by: FAMILY MEDICINE

## 2023-10-30 PROCEDURE — 99214 OFFICE O/P EST MOD 30 MIN: CPT | Performed by: FAMILY MEDICINE

## 2023-10-30 PROCEDURE — 3072F LOW RISK FOR RETINOPATHY: CPT | Performed by: FAMILY MEDICINE

## 2023-10-30 PROCEDURE — 1126F AMNT PAIN NOTED NONE PRSNT: CPT | Performed by: FAMILY MEDICINE

## 2023-10-30 PROCEDURE — 3074F SYST BP LT 130 MM HG: CPT | Performed by: FAMILY MEDICINE

## 2023-12-27 DIAGNOSIS — I10 ESSENTIAL HYPERTENSION, BENIGN: Chronic | ICD-10-CM

## 2024-01-02 ENCOUNTER — PATIENT MESSAGE (OUTPATIENT)
Dept: NEPHROLOGY | Facility: CLINIC | Age: 74
End: 2024-01-02

## 2024-01-02 DIAGNOSIS — I10 ESSENTIAL HYPERTENSION, BENIGN: Primary | ICD-10-CM

## 2024-01-02 DIAGNOSIS — E11.22 TYPE 2 DIABETES MELLITUS WITH STAGE 3A CHRONIC KIDNEY DISEASE, WITHOUT LONG-TERM CURRENT USE OF INSULIN (HCC): ICD-10-CM

## 2024-01-02 DIAGNOSIS — N18.31 TYPE 2 DIABETES MELLITUS WITH STAGE 3A CHRONIC KIDNEY DISEASE, WITHOUT LONG-TERM CURRENT USE OF INSULIN (HCC): ICD-10-CM

## 2024-01-02 DIAGNOSIS — N18.32 STAGE 3B CHRONIC KIDNEY DISEASE (HCC): ICD-10-CM

## 2024-01-02 NOTE — TELEPHONE ENCOUNTER
From: Yi Barrientos  To: Shaji Genao  Sent: 1/2/2024 6:04 AM CST  Subject: Labs for end of month visit    You asked I send a reminder for you to order the labs you need for my end of the month visit.     Thank you!

## 2024-01-26 ENCOUNTER — LAB ENCOUNTER (OUTPATIENT)
Dept: LAB | Age: 74
End: 2024-01-26
Attending: INTERNAL MEDICINE
Payer: MEDICARE

## 2024-01-26 DIAGNOSIS — N18.32 STAGE 3B CHRONIC KIDNEY DISEASE (HCC): ICD-10-CM

## 2024-01-26 DIAGNOSIS — E11.22 TYPE 2 DIABETES MELLITUS WITH STAGE 3A CHRONIC KIDNEY DISEASE, WITHOUT LONG-TERM CURRENT USE OF INSULIN (HCC): ICD-10-CM

## 2024-01-26 DIAGNOSIS — I10 ESSENTIAL HYPERTENSION, BENIGN: ICD-10-CM

## 2024-01-26 DIAGNOSIS — N18.31 TYPE 2 DIABETES MELLITUS WITH STAGE 3A CHRONIC KIDNEY DISEASE, WITHOUT LONG-TERM CURRENT USE OF INSULIN (HCC): ICD-10-CM

## 2024-01-26 LAB
ALBUMIN SERPL-MCNC: 4.4 G/DL (ref 3.2–4.8)
ANION GAP SERPL CALC-SCNC: 12 MMOL/L (ref 0–18)
BASOPHILS # BLD AUTO: 0.07 X10(3) UL (ref 0–0.2)
BASOPHILS NFR BLD AUTO: 0.8 %
BUN BLD-MCNC: 24 MG/DL (ref 9–23)
BUN/CREAT SERPL: 24 (ref 10–20)
CALCIUM BLD-MCNC: 10.4 MG/DL (ref 8.7–10.4)
CHLORIDE SERPL-SCNC: 104 MMOL/L (ref 98–112)
CO2 SERPL-SCNC: 24 MMOL/L (ref 21–32)
CREAT BLD-MCNC: 1 MG/DL
DEPRECATED RDW RBC AUTO: 49.1 FL (ref 35.1–46.3)
EGFRCR SERPLBLD CKD-EPI 2021: 60 ML/MIN/1.73M2 (ref 60–?)
EOSINOPHIL # BLD AUTO: 0.23 X10(3) UL (ref 0–0.7)
EOSINOPHIL NFR BLD AUTO: 2.5 %
ERYTHROCYTE [DISTWIDTH] IN BLOOD BY AUTOMATED COUNT: 15.8 % (ref 11–15)
EST. AVERAGE GLUCOSE BLD GHB EST-MCNC: 120 MG/DL (ref 68–126)
GLUCOSE BLD-MCNC: 122 MG/DL (ref 70–99)
HBA1C MFR BLD: 5.8 % (ref ?–5.7)
HCT VFR BLD AUTO: 46.3 %
HGB BLD-MCNC: 14.7 G/DL
IMM GRANULOCYTES # BLD AUTO: 0.04 X10(3) UL (ref 0–1)
IMM GRANULOCYTES NFR BLD: 0.4 %
LYMPHOCYTES # BLD AUTO: 2.34 X10(3) UL (ref 1–4)
LYMPHOCYTES NFR BLD AUTO: 25.2 %
MCH RBC QN AUTO: 27.1 PG (ref 26–34)
MCHC RBC AUTO-ENTMCNC: 31.7 G/DL (ref 31–37)
MCV RBC AUTO: 85.4 FL
MONOCYTES # BLD AUTO: 0.71 X10(3) UL (ref 0.1–1)
MONOCYTES NFR BLD AUTO: 7.6 %
NEUTROPHILS # BLD AUTO: 5.91 X10 (3) UL (ref 1.5–7.7)
NEUTROPHILS # BLD AUTO: 5.91 X10(3) UL (ref 1.5–7.7)
NEUTROPHILS NFR BLD AUTO: 63.5 %
OSMOLALITY SERPL CALC.SUM OF ELEC: 295 MOSM/KG (ref 275–295)
PHOSPHATE SERPL-MCNC: 4 MG/DL (ref 2.4–5.1)
PLATELET # BLD AUTO: 286 10(3)UL (ref 150–450)
POTASSIUM SERPL-SCNC: 3.8 MMOL/L (ref 3.5–5.1)
RBC # BLD AUTO: 5.42 X10(6)UL
SODIUM SERPL-SCNC: 140 MMOL/L (ref 136–145)
WBC # BLD AUTO: 9.3 X10(3) UL (ref 4–11)

## 2024-01-26 PROCEDURE — 83036 HEMOGLOBIN GLYCOSYLATED A1C: CPT

## 2024-01-26 PROCEDURE — 36415 COLL VENOUS BLD VENIPUNCTURE: CPT

## 2024-01-26 PROCEDURE — 85025 COMPLETE CBC W/AUTO DIFF WBC: CPT

## 2024-01-26 PROCEDURE — 80069 RENAL FUNCTION PANEL: CPT

## 2024-01-26 PROCEDURE — 3044F HG A1C LEVEL LT 7.0%: CPT | Performed by: INTERNAL MEDICINE

## 2024-01-31 ENCOUNTER — OFFICE VISIT (OUTPATIENT)
Dept: PODIATRY CLINIC | Facility: CLINIC | Age: 74
End: 2024-01-31
Payer: MEDICARE

## 2024-01-31 ENCOUNTER — OFFICE VISIT (OUTPATIENT)
Dept: NEPHROLOGY | Facility: CLINIC | Age: 74
End: 2024-01-31
Payer: MEDICARE

## 2024-01-31 VITALS
HEART RATE: 90 BPM | HEIGHT: 65 IN | WEIGHT: 219 LBS | DIASTOLIC BLOOD PRESSURE: 83 MMHG | SYSTOLIC BLOOD PRESSURE: 139 MMHG | BODY MASS INDEX: 36.49 KG/M2

## 2024-01-31 DIAGNOSIS — N18.31 TYPE 2 DIABETES MELLITUS WITH STAGE 3A CHRONIC KIDNEY DISEASE, WITHOUT LONG-TERM CURRENT USE OF INSULIN (HCC): Primary | Chronic | ICD-10-CM

## 2024-01-31 DIAGNOSIS — B35.1 ONYCHOMYCOSIS: ICD-10-CM

## 2024-01-31 DIAGNOSIS — E11.22 TYPE 2 DIABETES MELLITUS WITH STAGE 3A CHRONIC KIDNEY DISEASE, WITHOUT LONG-TERM CURRENT USE OF INSULIN (HCC): Primary | Chronic | ICD-10-CM

## 2024-01-31 DIAGNOSIS — E11.42 TYPE 2 DIABETES MELLITUS WITH POLYNEUROPATHY (HCC): Primary | ICD-10-CM

## 2024-01-31 DIAGNOSIS — E78.00 PURE HYPERCHOLESTEROLEMIA: Chronic | ICD-10-CM

## 2024-01-31 DIAGNOSIS — I10 ESSENTIAL HYPERTENSION, BENIGN: Chronic | ICD-10-CM

## 2024-01-31 PROCEDURE — 1159F MED LIST DOCD IN RCRD: CPT | Performed by: INTERNAL MEDICINE

## 2024-01-31 PROCEDURE — 1126F AMNT PAIN NOTED NONE PRSNT: CPT | Performed by: PODIATRIST

## 2024-01-31 PROCEDURE — 3079F DIAST BP 80-89 MM HG: CPT | Performed by: INTERNAL MEDICINE

## 2024-01-31 PROCEDURE — 3008F BODY MASS INDEX DOCD: CPT | Performed by: INTERNAL MEDICINE

## 2024-01-31 PROCEDURE — 99214 OFFICE O/P EST MOD 30 MIN: CPT | Performed by: INTERNAL MEDICINE

## 2024-01-31 PROCEDURE — 99213 OFFICE O/P EST LOW 20 MIN: CPT | Performed by: PODIATRIST

## 2024-01-31 PROCEDURE — 1159F MED LIST DOCD IN RCRD: CPT | Performed by: PODIATRIST

## 2024-01-31 PROCEDURE — 3075F SYST BP GE 130 - 139MM HG: CPT | Performed by: INTERNAL MEDICINE

## 2024-01-31 NOTE — PATIENT INSTRUCTIONS
Great job Yi you need labs again before you see me in July please call me for orders a few days or a week before    Thanks for trying so hard have a great 2024

## 2024-01-31 NOTE — PROGRESS NOTES
[facility]  Progress Note    Yi Barrientos is a 73 year old female.   Chief Complaint   Patient presents with    Diabetic Foot Care     8 mo f/u - last AjX2N=2.8 from 1/26/24 - has no c/o regarding her feet - this AM her CD=396         HPI:     This is a pleasant type 2 diabetic female with stage 3 CKD.  She does have numbness to her feet.    She presents to clinic today for diabetic foot care.        Allergies: Erythromycin   Current Outpatient Medications   Medication Sig Dispense Refill    dapagliflozin (FARXIGA) 10 MG Oral Tab Take 1 tablet (10 mg total) by mouth daily. 90 tablet 3    cholecalciferol 50 MCG (2000 UT) Oral Cap Take 1 capsule (2,000 Units total) by mouth daily.      metFORMIN  MG Oral Tablet 24 Hr TAKE ONE TABLET BY MOUTH DAILY WITH FOOD 90 tablet 3    verapamil  MG Oral Tab CR Take 1 tablet (240 mg total) by mouth nightly. 90 tablet 3    metoprolol tartrate 25 MG Oral Tab Take 1 tablet (25 mg total) by mouth 2 (two) times daily. 180 tablet 3    Spironolactone-HCTZ 25-25 MG Oral Tab Take 1 tablet by mouth daily. 90 tablet 3    atorvastatin 20 MG Oral Tab TAKE 1 TABLET (20MG) BY MOUTH EVERY DAY 90 tablet 3    alendronate 70 MG Oral Tab Take 1 tablet (70 mg total) by mouth once a week. 12 tablet 3    Glucose Blood (ACCU-CHEK ADITYA PLUS) In Vitro Strip USE ONCE DAILY IN THE MORNING BEFORE BREAKFAST 100 strip 3    Omega-3-acid Ethyl Esters 1 g Oral Cap Take 1 capsule (1 g total) by mouth daily.      Blood Glucose Monitoring Suppl (ACCU-CHEK ADITYA PLUS) w/Device Does not apply Kit USE ONCE DAILY IN AM BEFORE BREAKFAST 1 kit 0    LANCETS ULTRA THIN Does not apply Misc Use one daily 100 each 6    Multiple Vitamins-Minerals (CENTRUM SILVER) Oral Tab Take 1 tablet by mouth daily.        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     CHOLECYSTECTOMY        Family History   Problem Relation Age of Onset    Hypertension Father     Stroke Father     Heart Attack Mother       Social History     Socioeconomic History    Marital status:    Tobacco Use    Smoking status: Former     Packs/day: 0.50     Years: 45.00     Additional pack years: 0.00     Total pack years: 22.50     Types: Cigarettes     Quit date: 2018     Years since quittin.0    Smokeless tobacco: Never    Tobacco comments:     1 pack every 3 days   Vaping Use    Vaping Use: Never used   Substance and Sexual Activity    Alcohol use: Yes     Alcohol/week: 0.0 standard drinks of alcohol     Comment: very rarely    Drug use: No   Other Topics Concern    Caffeine Concern Yes     Comment: Coffee, 2 cups per day     History of tanning No    Reaction to local anesthetic No    Left Handed No    Right Handed Yes    Currently spends a great deal of time in the sun No    Hx of Spending Great Deal of Time in Sun No    Bad sunburns in the past Yes    Tanning Salons in the Past No    Hx of Radiation Treatments No           REVIEW OF SYSTEMS:   Denies nausea, fever, chills  No calf pain  No other muscle or joint aches  Denies chest pain or shortness of breath.      EXAM:   There were no vitals taken for this visit.    Constitutional:   Patient in no apparent distress. Well kept. Of normal body habitus. Alert and oriented to person, place, and time.  Vascular Examination:  DP pulse is 2/4  PT pulse is 2/4  Capillary refill is immediate  Integumentary Examination:   The patient's nails appear incurvated, thickened,  elongated, dystrophic, discolored with subungual debris 1-5 right, 1-5  left nails.  Digital hair growth is present left and is present right.  Skin is of diminished texture and decreased turgor.  Neurological Examination:  Monofilament (10-g) sensation is 5/5 to right and 4/5 to left.  Sharp/dull is present to right and is present to left.  Parasthesias present  Musculoskeletal Examination:  Muscle Strength is 5/5.          LABS & IMAGING:     Lab Results   Component Value Date     (H) 01/26/2024    BUN 24 (H) 01/26/2024    CREATSERUM 1.00 01/26/2024    BUNCREA 24.0 (H) 01/26/2024    ANIONGAP 12 01/26/2024    GFRAA 52 (L) 06/13/2022    GFRNAA 45 (L) 06/13/2022    CA 10.4 01/26/2024     01/26/2024    K 3.8 01/26/2024     01/26/2024    CO2 24.0 01/26/2024    OSMOCALC 295 01/26/2024        Lab Results   Component Value Date     01/26/2024    A1C 5.8 (H) 01/26/2024        No results found.     ASSESSMENT AND PLAN:   Diagnoses and all orders for this visit:    Type 2 diabetes mellitus with polyneuropathy (HCC)    Onychomycosis          Plan:     Diabetic education and instructions have been provided. We reviewed and discussed the following:    -risk categories related to pts with diabetes and foot or lower extremity complications per ADA.    -adherence to medication regimen and close monitoring or blood sugar control.   -daily monitoring/inspection of feet and shoes.   -proper management of diet and weight   -regular follow up with PCP and specialty providers as recommended   -Lower extremity complications related to DM were reviewed and stressed prevention.     Evaluated the patient. Discussed treatment options with the patient.  Discussed with patient proper care and hygiene for their feet.  Patient tolerated procedures well, without incident.    Instrumentation used includes nail nippers and electric  where appropriate.  Procedure: (93411 Debridement of toenails 6-10) Surgically  debrided and mechanically reduced 6 or more toenails.Hemmorhage occurred none.Nails that were debrided appeared dystrophic and caused the patient pain in shoe gear.Nails 5 Left & 5 Right.      RTC 6 months for DFC    No follow-ups on file.    José Luis Conway DPM  1/31/24

## 2024-02-01 NOTE — PROGRESS NOTES
Progress Note     Yi Barrientos    Doing very well and I commended her.  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     CHOLECYSTECTOMY        Social History     Tobacco Use    Smoking status: Former     Packs/day: 0.50     Years: 45.00     Additional pack years: 0.00     Total pack years: 22.50     Types: Cigarettes     Quit date: 2018     Years since quittin.0    Smokeless tobacco: Never    Tobacco comments:     1 pack every 3 days   Substance Use Topics    Alcohol use: Yes     Alcohol/week: 0.0 standard drinks of alcohol     Comment: very rarely         Medications (Active prior to today's visit):  Current Outpatient Medications   Medication Sig Dispense Refill    dapagliflozin (FARXIGA) 10 MG Oral Tab Take 1 tablet (10 mg total) by mouth daily. 90 tablet 3    cholecalciferol 50 MCG (2000 UT) Oral Cap Take 1 capsule (2,000 Units total) by mouth daily.      metFORMIN  MG Oral Tablet 24 Hr TAKE ONE TABLET BY MOUTH DAILY WITH FOOD 90 tablet 3    verapamil  MG Oral Tab CR Take 1 tablet (240 mg total) by mouth nightly. 90 tablet 3    metoprolol tartrate 25 MG Oral Tab Take 1 tablet (25 mg total) by mouth 2 (two) times  daily. 180 tablet 3    Spironolactone-HCTZ 25-25 MG Oral Tab Take 1 tablet by mouth daily. 90 tablet 3    atorvastatin 20 MG Oral Tab TAKE 1 TABLET (20MG) BY MOUTH EVERY DAY 90 tablet 3    alendronate 70 MG Oral Tab Take 1 tablet (70 mg total) by mouth once a week. 12 tablet 3    Glucose Blood (ACCU-CHEK ADITYA PLUS) In Vitro Strip USE ONCE DAILY IN THE MORNING BEFORE BREAKFAST 100 strip 3    Omega-3-acid Ethyl Esters 1 g Oral Cap Take 1 capsule (1 g total) by mouth daily.      Blood Glucose Monitoring Suppl (ACCU-CHEK ADITYA PLUS) w/Device Does not apply Kit USE ONCE DAILY IN AM BEFORE BREAKFAST 1 kit 0    LANCETS ULTRA THIN Does not apply Misc Use one daily 100 each 6    Multiple Vitamins-Minerals (CENTRUM SILVER) Oral Tab Take 1 tablet by mouth daily.         Allergies:  Allergies   Allergen Reactions    Erythromycin          ROS:     Constitutional:  Negative for decreased activity, fever, irritability and lethargy  ENMT:  Negative for ear drainage, hearing loss and nasal drainage  Eyes:  Negative for eye discharge and vision loss  Cardiovascular:  Negative for chest pain, sob  Respiratory:  Negative for cough, dyspnea and wheezing  Gastrointestinal:  Negative for abdominal pain, constipation  Genitourinary:  Negative for dysuria and hematuria  Endocrine:  Negative for abnormal sleep patterns  Hema/Lymph:  Negative for easy bleeding and easy bruising  Integumentary:  Negative for pruritus and rash  Musculoskeletal:  Negative for bone/joint symptoms  Neurological:  Negative for gait disturbance  Psychiatric:  Negative for inappropriate interaction and psychiatric symptoms      Vitals:    01/31/24 1626   BP: 139/83   Pulse: 90       PHYSICAL EXAM:   Constitutional: appears well hydrated alert and responsive   Head/Face: normocephalic  Eyes/Vision: normal extraocular motion is intact  Nose/Mouth/Throat:mucous membranes are moist   Neck/Thyroid: neck is supple without adenopathy  Lymphatic: no abnormal cervical,  supraclavicular adenopathy is noted  Respiratory:  lungs are clear to auscultation bilaterally  Cardiovascular: regular rate and rhythm   Abdomen: soft, non-tender, non-distended, BS normal  Skin/Hair: no unusual rashes present, no abnormal bruising noted  Back/Spine: no abnormalities noted  Musculoskeletal: no deformities  Extremities: no edema  Neurological:  Grossly normal       ASSESSMENT/PLAN:   Assessment   Encounter Diagnoses   Name Primary?    Type 2 diabetes mellitus with stage 3a chronic kidney disease, without long-term current use of insulin ([facility]) Yes    Pure hypercholesterolemia     Essential hypertension, benign        1 obesity looks great BMI down to 3 6    #2 CKD 2 renal function improved creatinine down to 1 GFR of 60    #3 diabetes A1c excellent   5.8%  #4 cardiovascular disease LDL 46 with Lipitor  5 primary hyperparathyroidism calcium 10.4 PTH about 120 will need to watch that no kidney stones.    Does have osteoporosis and does take alendronate.  Should get another bone density in about a year    See her back in July will call for labs at that time    No urinary protein  Orders This Visit:  No orders of the defined types were placed in this encounter.      Meds This Visit:  Requested Prescriptions      No prescriptions requested or ordered in this encounter       Imaging & Referrals:  None     1/31/2024  Shaji Genao MD

## 2024-03-23 DIAGNOSIS — I10 ESSENTIAL HYPERTENSION, BENIGN: Chronic | ICD-10-CM

## 2024-03-23 DIAGNOSIS — I70.0 AORTIC ATHEROSCLEROSIS (HCC): ICD-10-CM

## 2024-03-23 DIAGNOSIS — E78.00 PURE HYPERCHOLESTEROLEMIA: Chronic | ICD-10-CM

## 2024-03-25 RX ORDER — ATORVASTATIN CALCIUM 20 MG/1
TABLET, FILM COATED ORAL
Qty: 90 TABLET | Refills: 3 | Status: SHIPPED | OUTPATIENT
Start: 2024-03-25

## 2024-03-25 RX ORDER — SPIRONOLACTONE AND HYDROCHLOROTHIAZIDE 25; 25 MG/1; MG/1
1 TABLET ORAL DAILY
Qty: 90 TABLET | Refills: 3 | Status: SHIPPED | OUTPATIENT
Start: 2024-03-25

## 2024-03-25 NOTE — TELEPHONE ENCOUNTER
Refill passed per [facility] protocol.  Requested Prescriptions   Pending Prescriptions Disp Refills    ATORVASTATIN 20 MG Oral Tab [Pharmacy Med Name: ATORVASTATIN 20 MG TABLET] 90 tablet 3     Sig: TAKE 1 TABLET (20MG) BY MOUTH EVERY DAY       Cholesterol Medication Protocol Passed - 3/23/2024  6:58 AM        Passed - ALT < 80     Lab Results   Component Value Date    ALT 21 10/12/2023             Passed - ALT resulted within past year        Passed - Lipid panel within past 12 months     Lab Results   Component Value Date    CHOLEST 117 10/12/2023    TRIG 145 10/12/2023    HDL 46 10/12/2023    LDL 46 10/12/2023    VLDL 20 10/12/2023    NONHDLC 71 10/12/2023             Passed - In person appointment or virtual visit in the past 12 mos or appointment in next 3 mos     Recent Outpatient Visits              1 month ago Type 2 diabetes mellitus with polyneuropathy ([facility])    [facility], [address], [place] MeshulamJosé Luis DPKIRA    Office Visit    1 month ago Type 2 diabetes mellitus with stage 3a chronic kidney disease, without long-term current use of insulin ([facility])    [facility] Shaji Genao MD    Office Visit    4 months ago Essential hypertension, benign    [facility] Tammy Chan MD    Office Visit    7 months ago Type 2 diabetes mellitus with stage 3a chronic kidney disease, without long-term current use of insulin ([facility])    [facility]    Office Visit    10 months ago Type 2 diabetes mellitus with polyneuropathy ([facility])    [facility], [address], [place] MeshulamJosé Luis DPKIRA    Office Visit          Future Appointments         Provider Department Appt Notes    In 1 month Tammy Chan MD [facility], [address], [place] medicare physical/last physical 4-1-23 policy informed to pt     In 4 months Shaji Genao MD [facility] follow up (policy informed)    In 4 months José Luis Conway DPM [facility], [address], [place] diabetic foot care                 SPIRONOLACTONE-HCTZ 25-25 MG Oral Tab [Pharmacy Med Name: SPIRONOLACTONE-HCTZ 25-25 TAB] 90 tablet 3     Sig: TAKE 1 TABLET BY MOUTH EVERY DAY       Hypertension Medications Protocol Passed - 3/23/2024  6:58 AM        Passed - CMP or BMP in past 12 months        Passed - Last BP reading less than 140/90     BP Readings from Last 1 Encounters:   01/31/24 139/83               Passed - In person appointment or virtual visit in the past 12 mos or appointment in next 3 mos     Recent Outpatient Visits              1 month ago Type 2 diabetes mellitus with polyneuropathy (HCC)    [facility], [address], [place] José Luis Conway, DPM    Office Visit    1 month ago Type 2 diabetes mellitus with stage 3a chronic kidney disease, without long-term current use of insulin ([facility])    [facility] Shaji Genao MD    Office Visit    4 months ago Essential hypertension, benign    [facility] Tammy Chan MD    Office Visit    7 months ago Type 2 diabetes mellitus with stage 3a chronic kidney disease, without long-term current use of insulin ([facility])    [facility] Shaji Genao MD    Office Visit    10 months ago Type 2 diabetes mellitus with polyneuropathy ([facility])    [facility], [address], [place] MeshJosé Luis crooks, DPKIRA    Office Visit          Future Appointments         Provider Department Appt Notes    In 1 month Tammy Chan MD [facility], [address], [place] medicare physical/last physical 4-1-23 policy informed to pt    In 4 months Shaji Genao MD [facility],  [facility] follow up (policy informed)    In 4 months José Luis Conway Carlton, DPM [facility], [address], [place] diabetic foot care               Passed - EGFRCR or GFRNAA > 50     GFR Evaluation  EGFRCR: 60 , resulted on 1/26/2024             Recent Outpatient Visits              1 month ago Type 2 diabetes mellitus with polyneuropathy ([facility])    [facility], [address], [place], José Luis Carlton, DPM    Office Visit    1 month ago Type 2 diabetes mellitus with stage 3a chronic kidney disease, without long-term current use of insulin ([facility])    [facility] Shaji Genao MD    Office Visit    4 months ago Essential hypertension, benign    [facility] Tammy Chan MD    Office Visit    7 months ago Type 2 diabetes mellitus with stage 3a chronic kidney disease, without long-term current use of insulin ([facility])    [facility] [place] Shaji Genao MD    Office Visit    10 months ago Type 2 diabetes mellitus with polyneuropathy ([facility])    [facility], [address], [place], José Luis Carlton, DPM    Office Visit          Future Appointments         Provider Department Appt Notes    In 1 month Tammy Chan MD [facility], [address], [place] medicare physical/last physical 4-1-23 policy informed to pt    In 4 months [facility] follow [facility] (policy informed)    In 4 months José Luis Conway, DPM [facility], [address], [place] diabetic foot [facility]

## 2024-04-19 ENCOUNTER — LAB ENCOUNTER (OUTPATIENT)
Dept: LAB | Age: 74
End: 2024-04-19
Attending: INTERNAL MEDICINE
Payer: MEDICARE

## 2024-04-19 DIAGNOSIS — N18.31 TYPE 2 DIABETES MELLITUS WITH STAGE 3A CHRONIC KIDNEY DISEASE, WITHOUT LONG-TERM CURRENT USE OF INSULIN (HCC): ICD-10-CM

## 2024-04-19 DIAGNOSIS — E11.22 TYPE 2 DIABETES MELLITUS WITH STAGE 3A CHRONIC KIDNEY DISEASE, WITHOUT LONG-TERM CURRENT USE OF INSULIN (HCC): ICD-10-CM

## 2024-04-19 DIAGNOSIS — Z00.00 ENCOUNTER FOR ANNUAL HEALTH EXAMINATION: ICD-10-CM

## 2024-04-19 LAB
ALBUMIN SERPL-MCNC: 4.6 G/DL (ref 3.2–4.8)
ALBUMIN/GLOB SERPL: 1.4 {RATIO} (ref 1–2)
ALP LIVER SERPL-CCNC: 65 U/L
ALT SERPL-CCNC: 17 U/L
ANION GAP SERPL CALC-SCNC: 6 MMOL/L (ref 0–18)
AST SERPL-CCNC: 22 U/L (ref ?–34)
BILIRUB SERPL-MCNC: 0.6 MG/DL (ref 0.2–1.1)
BUN BLD-MCNC: 26 MG/DL (ref 9–23)
BUN/CREAT SERPL: 24.1 (ref 10–20)
CALCIUM BLD-MCNC: 10.4 MG/DL (ref 8.7–10.4)
CHLORIDE SERPL-SCNC: 105 MMOL/L (ref 98–112)
CHOLEST SERPL-MCNC: 121 MG/DL (ref ?–200)
CO2 SERPL-SCNC: 28 MMOL/L (ref 21–32)
CREAT BLD-MCNC: 1.08 MG/DL
CREAT UR-SCNC: 91.8 MG/DL
EGFRCR SERPLBLD CKD-EPI 2021: 55 ML/MIN/1.73M2 (ref 60–?)
EST. AVERAGE GLUCOSE BLD GHB EST-MCNC: 123 MG/DL (ref 68–126)
FASTING PATIENT LIPID ANSWER: YES
FASTING STATUS PATIENT QL REPORTED: YES
GLOBULIN PLAS-MCNC: 3.4 G/DL (ref 2.8–4.4)
GLUCOSE BLD-MCNC: 113 MG/DL (ref 70–99)
HBA1C MFR BLD: 5.9 % (ref ?–5.7)
HDLC SERPL-MCNC: 39 MG/DL (ref 40–59)
LDLC SERPL CALC-MCNC: 60 MG/DL (ref ?–100)
MICROALBUMIN UR-MCNC: <0.3 MG/DL
NONHDLC SERPL-MCNC: 82 MG/DL (ref ?–130)
OSMOLALITY SERPL CALC.SUM OF ELEC: 294 MOSM/KG (ref 275–295)
POTASSIUM SERPL-SCNC: 4.2 MMOL/L (ref 3.5–5.1)
PROT SERPL-MCNC: 8 G/DL (ref 5.7–8.2)
SODIUM SERPL-SCNC: 139 MMOL/L (ref 136–145)
TRIGL SERPL-MCNC: 120 MG/DL (ref 30–149)
TSI SER-ACNC: 2.86 MIU/ML (ref 0.55–4.78)
VLDLC SERPL CALC-MCNC: 18 MG/DL (ref 0–30)

## 2024-04-19 PROCEDURE — 82043 UR ALBUMIN QUANTITATIVE: CPT

## 2024-04-19 PROCEDURE — 83036 HEMOGLOBIN GLYCOSYLATED A1C: CPT

## 2024-04-19 PROCEDURE — 84443 ASSAY THYROID STIM HORMONE: CPT

## 2024-04-19 PROCEDURE — 80053 COMPREHEN METABOLIC PANEL: CPT

## 2024-04-19 PROCEDURE — 36415 COLL VENOUS BLD VENIPUNCTURE: CPT

## 2024-04-19 PROCEDURE — 82570 ASSAY OF URINE CREATININE: CPT

## 2024-04-19 PROCEDURE — 80061 LIPID PANEL: CPT

## 2024-04-19 PROCEDURE — 85060 BLOOD SMEAR INTERPRETATION: CPT

## 2024-04-19 PROCEDURE — 85025 COMPLETE CBC W/AUTO DIFF WBC: CPT

## 2024-04-20 LAB
BASOPHILS # BLD AUTO: 0.09 X10(3) UL (ref 0–0.2)
BASOPHILS NFR BLD AUTO: 0.8 %
DEPRECATED RDW RBC AUTO: 50.3 FL (ref 35.1–46.3)
EOSINOPHIL # BLD AUTO: 1.59 X10(3) UL (ref 0–0.7)
EOSINOPHIL NFR BLD AUTO: 13.6 %
ERYTHROCYTE [DISTWIDTH] IN BLOOD BY AUTOMATED COUNT: 15.9 % (ref 11–15)
HCT VFR BLD AUTO: 46.9 %
HGB BLD-MCNC: 15.3 G/DL
IMM GRANULOCYTES # BLD AUTO: 0.03 X10(3) UL (ref 0–1)
IMM GRANULOCYTES NFR BLD: 0.3 %
LYMPHOCYTES # BLD AUTO: 2.6 X10(3) UL (ref 1–4)
LYMPHOCYTES NFR BLD AUTO: 22.2 %
MCH RBC QN AUTO: 28.4 PG (ref 26–34)
MCHC RBC AUTO-ENTMCNC: 32.6 G/DL (ref 31–37)
MCV RBC AUTO: 87.2 FL
MONOCYTES # BLD AUTO: 0.77 X10(3) UL (ref 0.1–1)
MONOCYTES NFR BLD AUTO: 6.6 %
NEUTROPHILS # BLD AUTO: 6.64 X10 (3) UL (ref 1.5–7.7)
NEUTROPHILS # BLD AUTO: 6.64 X10(3) UL (ref 1.5–7.7)
NEUTROPHILS NFR BLD AUTO: 56.5 %
PLATELET # BLD AUTO: 266 10(3)UL (ref 150–450)
RBC # BLD AUTO: 5.38 X10(6)UL
WBC # BLD AUTO: 11.7 X10(3) UL (ref 4–11)

## 2024-04-27 DIAGNOSIS — M81.0 OSTEOPOROSIS, UNSPECIFIED OSTEOPOROSIS TYPE, UNSPECIFIED PATHOLOGICAL FRACTURE PRESENCE: ICD-10-CM

## 2024-04-29 RX ORDER — ALENDRONATE SODIUM 70 MG/1
70 TABLET ORAL WEEKLY
Qty: 12 TABLET | Refills: 3 | Status: SHIPPED | OUTPATIENT
Start: 2024-04-29

## 2024-04-29 NOTE — TELEPHONE ENCOUNTER
Please review.  Protocol failed / Has no protocol.     Requested Prescriptions   Pending Prescriptions Disp Refills    ALENDRONATE 70 MG Oral Tab [Pharmacy Med Name: ALENDRONATE SODIUM 70 MG TAB] 12 tablet 3     Sig: Take 1 tablet (70 mg total) by mouth once a week.       Osteoporosis Medication Protocol Failed - 4/27/2024  6:57 AM        Failed - DEXA scan within past 2 years        Failed - Calcium level between 8.3 and 10.3     Lab Results   Component Value Date    CA 10.4 04/19/2024               Passed - In person appointment or virtual visit in the past 6 mos or appointment in next 3 mos     Recent Outpatient Visits              2 months ago Type 2 diabetes mellitus with polyneuropathy ([facility])    [facility], [address], [place] MeshJosé Luis crooks, DPKIRA    Office Visit    2 months ago Type 2 diabetes mellitus with stage 3a chronic kidney disease, without long-term current use of insulin ([facility])    [facility] Shaji Genao MD    Office Visit    6 months ago Essential hypertension, benign    [facility] Tammy Chan MD    Office Visit    8 months ago Type 2 diabetes mellitus with stage 3a chronic kidney disease, without long-term current use of insulin ([facility])    [facility] Shaji Genao MD    Office Visit    11 months ago Type 2 diabetes mellitus with polyneuropathy ([facility])    [facility], [address], [place] José Luis Conway, DPM    Office Visit          Future Appointments         Provider Department Appt Notes    In 3 days Tammy Chan MD [facility], [address], [place] medicare physical/last physical 4-1-23 policy informed to pt    In 2 months Shaji Genao MD [facility] follow up (policy informed)    In 3 months José Luis Conway DPM [facility], [address], [place] diabetic foot care                    Passed - CMP within the past 12 months        Passed - GFR level greater than 35     GFR Evaluation  EGFRCR: 55 , resulted on 4/19/2024             Future Appointments         Provider Department Appt Notes    In 3 days Tammy Chan MD [facility], [address], [place] medicare physical/last physical 4-1-23 policy informed to pt    In 2 months [facility]    In 3 months José Luis Conway, DPM [facility], [address], [place] diabetic foot care          Recent Outpatient Visits              2 months ago Type 2 diabetes mellitus with polyneuropathy (HCC)    [facility], [address], [place] José Luiswilliam Vincent, DPM    Office Visit    2 months ago Type 2 diabetes mellitus with stage 3a chronic kidney disease, without long-term current use of insulin (HCC)    [facility]    Office Visit    6 months ago Essential hypertension, benign    [facility] Tammy Chan MD    Office Visit    8 months ago Type 2 diabetes mellitus with stage 3a chronic kidney disease, without long-term current use of insulin ([facility])    [facility]    Office Visit    11 months ago Type 2 diabetes mellitus with polyneuropathy (HCC)    [facility], [address], [place] José Luis Conway, DPM    Office Visit

## 2024-05-02 ENCOUNTER — OFFICE VISIT (OUTPATIENT)
Dept: FAMILY MEDICINE CLINIC | Facility: CLINIC | Age: 74
End: 2024-05-02
Payer: MEDICARE

## 2024-05-02 VITALS
HEIGHT: 64.76 IN | DIASTOLIC BLOOD PRESSURE: 77 MMHG | SYSTOLIC BLOOD PRESSURE: 138 MMHG | HEART RATE: 69 BPM | WEIGHT: 216 LBS | BODY MASS INDEX: 36.43 KG/M2

## 2024-05-02 DIAGNOSIS — E66.01 MORBID OBESITY DUE TO EXCESS CALORIES (HCC): ICD-10-CM

## 2024-05-02 DIAGNOSIS — Z78.0 POST-MENOPAUSAL: ICD-10-CM

## 2024-05-02 DIAGNOSIS — N18.31 TYPE 2 DIABETES MELLITUS WITH STAGE 3A CHRONIC KIDNEY DISEASE, WITHOUT LONG-TERM CURRENT USE OF INSULIN (HCC): Chronic | ICD-10-CM

## 2024-05-02 DIAGNOSIS — I70.0 AORTIC ATHEROSCLEROSIS (HCC): ICD-10-CM

## 2024-05-02 DIAGNOSIS — L82.0 INFLAMED SEBORRHEIC KERATOSIS: ICD-10-CM

## 2024-05-02 DIAGNOSIS — L56.5 DISSEMINATED SUPERFICIAL ACTINIC POROKERATOSIS (DSAP): ICD-10-CM

## 2024-05-02 DIAGNOSIS — Z87.2 HISTORY OF ACTINIC KERATOSES: ICD-10-CM

## 2024-05-02 DIAGNOSIS — M81.0 OSTEOPOROSIS, UNSPECIFIED OSTEOPOROSIS TYPE, UNSPECIFIED PATHOLOGICAL FRACTURE PRESENCE: ICD-10-CM

## 2024-05-02 DIAGNOSIS — L73.2 HIDRADENITIS SUPPURATIVA: ICD-10-CM

## 2024-05-02 DIAGNOSIS — N18.31 STAGE 3A CHRONIC KIDNEY DISEASE (HCC): Chronic | ICD-10-CM

## 2024-05-02 DIAGNOSIS — H81.10 BENIGN PAROXYSMAL POSITIONAL VERTIGO, UNSPECIFIED LATERALITY: ICD-10-CM

## 2024-05-02 DIAGNOSIS — Z20.5 EXPOSURE TO HEPATITIS C: ICD-10-CM

## 2024-05-02 DIAGNOSIS — E78.00 PURE HYPERCHOLESTEROLEMIA: Chronic | ICD-10-CM

## 2024-05-02 DIAGNOSIS — E11.22 TYPE 2 DIABETES MELLITUS WITH STAGE 3A CHRONIC KIDNEY DISEASE, WITHOUT LONG-TERM CURRENT USE OF INSULIN (HCC): Chronic | ICD-10-CM

## 2024-05-02 DIAGNOSIS — R19.02 LEFT UPPER QUADRANT ABDOMINAL MASS: ICD-10-CM

## 2024-05-02 DIAGNOSIS — I10 ESSENTIAL HYPERTENSION, BENIGN: Chronic | ICD-10-CM

## 2024-05-02 DIAGNOSIS — H26.9 CATARACT OF BOTH EYES, UNSPECIFIED CATARACT TYPE: ICD-10-CM

## 2024-05-02 DIAGNOSIS — M19.90 OSTEOARTHRITIS, UNSPECIFIED OSTEOARTHRITIS TYPE, UNSPECIFIED SITE: ICD-10-CM

## 2024-05-02 DIAGNOSIS — Z85.828 PERSONAL HISTORY OF SKIN CANCER: ICD-10-CM

## 2024-05-02 DIAGNOSIS — J30.2 SEASONAL ALLERGIC RHINITIS, UNSPECIFIED TRIGGER: ICD-10-CM

## 2024-05-02 DIAGNOSIS — Z00.00 ENCOUNTER FOR ANNUAL HEALTH EXAMINATION: Primary | ICD-10-CM

## 2024-05-02 DIAGNOSIS — E87.1 HYPONATREMIA: ICD-10-CM

## 2024-05-02 PROCEDURE — G0439 PPPS, SUBSEQ VISIT: HCPCS | Performed by: FAMILY MEDICINE

## 2024-05-02 PROCEDURE — 96160 PT-FOCUSED HLTH RISK ASSMT: CPT | Performed by: FAMILY MEDICINE

## 2024-05-02 PROCEDURE — 99499 UNLISTED E&M SERVICE: CPT | Performed by: FAMILY MEDICINE

## 2024-05-02 NOTE — PROGRESS NOTES
Subjective:   Yi Barrientos is a 73 year old female who presents for a Medicare Subsequent Annual Wellness visit (Pt already had Initial Annual Wellness) and scheduled follow up of multiple significant but stable problems.     + left side abdominal mass, non painful  History/Other:   Fall Risk Assessment:   She has been screened for Falls and is High Risk. Fall Prevention information provided to patient in After Visit Summary.    Do you feel unsteady when standing or walking?: No  Do you worry about falling?: No  Have you fallen in the past year?: Yes  How many times have you fallen?: (P) 1  Were you injured?: (P) No     Cognitive Assessment:   She had a completely normal cognitive assessment - see flowsheet entries     Functional Ability/Status:   Yi Barrientos has a completely normal functional assessment. See flowsheet for details.      Depression Screening (PHQ-2/PHQ-9): PHQ-2 SCORE: 0  , done 4/30/2024             Advanced Directives:   She does NOT have a Living Will. [Do you have a living will?: No]  She does NOT have a Power of  for Health Care. [Do you have a healthcare power of ?: No]  Not discussed      Patient Active Problem List   Diagnosis    Essential hypertension, benign    Pure hypercholesterolemia    Exposure to hepatitis C    Morbid obesity due to excess calories (HCC)    Inflamed seborrheic keratosis    Allergic rhinitis    History of actinic keratoses    CKD (chronic kidney disease) stage 3, GFR 30-59 ml/min (HCC)    Personal history of skin cancer    Hidradenitis suppurativa    Type 2 diabetes mellitus with stage 3 chronic kidney disease, without long-term current use of insulin (HCC)    Aortic atherosclerosis (HCC)    Disseminated superficial actinic porokeratosis (DSAP)    BPPV (benign paroxysmal positional vertigo)    Osteoporosis    Hyponatremia    Cataract of both eyes    Osteoarthritis     Allergies:  She is allergic to erythromycin.    Current  Medications:  Outpatient Medications Marked as Taking for the 5/2/24 encounter (Office Visit) with Tammy Chan MD   Medication Sig    alendronate 70 MG Oral Tab Take 1 tablet (70 mg total) by mouth once a week.    atorvastatin 20 MG Oral Tab TAKE 1 TABLET (20MG) BY MOUTH EVERY DAY    Spironolactone-HCTZ 25-25 MG Oral Tab Take 1 tablet by mouth daily.    dapagliflozin (FARXIGA) 10 MG Oral Tab Take 1 tablet (10 mg total) by mouth daily.    cholecalciferol 50 MCG (2000 UT) Oral Cap Take 1 capsule (2,000 Units total) by mouth daily.    metFORMIN  MG Oral Tablet 24 Hr TAKE ONE TABLET BY MOUTH DAILY WITH FOOD    verapamil  MG Oral Tab CR Take 1 tablet (240 mg total) by mouth nightly.    metoprolol tartrate 25 MG Oral Tab Take 1 tablet (25 mg total) by mouth 2 (two) times daily.    Glucose Blood (ACCU-CHEK ADITYA PLUS) In Vitro Strip USE ONCE DAILY IN THE MORNING BEFORE BREAKFAST    Omega-3-acid Ethyl Esters 1 g Oral Cap Take 1 capsule (1 g total) by mouth daily.    Blood Glucose Monitoring Suppl (ACCU-CHEK ADITYA PLUS) w/Device Does not apply Kit USE ONCE DAILY IN AM BEFORE BREAKFAST    LANCETS ULTRA THIN Does not apply Misc Use one daily    Multiple Vitamins-Minerals (CENTRUM SILVER) Oral Tab Take 1 tablet by mouth daily.       Medical History:  She  has a past medical history of Anesthesia complication, Cancer (HCC), Diabetes (HCC), Diabetes mellitus (HCC), Essential hypertension, High blood pressure, High cholesterol, Obesity, unspecified, Osteoarthritis, Prediabetes, and Squamous cell carcinoma in situ (SCCIS) (2018).  Surgical History:  She  has a past surgical history that includes cholecystectomy; cataract (left- Nov 2017. ); and adj tiss xfer head,fac,hand <10sqcm (Right, 11/06/2018).   Family History:  Her family history includes Heart Attack in her mother; Hypertension in her father; Stroke in her father.  Social History:  She  reports that she quit smoking about 6 years ago. Her smoking use  included cigarettes. She started smoking about 51 years ago. She has a 22.5 pack-year smoking history. She has never used smokeless tobacco. She reports current alcohol use. She reports that she does not use drugs.    Tobacco:  She smoked tobacco in the past but quit greater than 12 months ago.  Social History     Tobacco Use   Smoking Status Former    Current packs/day: 0.00    Average packs/day: 0.5 packs/day for 45.0 years (22.5 ttl pk-yrs)    Types: Cigarettes    Start date: 1973    Quit date: 2018    Years since quittin.2   Smokeless Tobacco Never   Tobacco Comments    1 pack every 3 days        CAGE Alcohol Screen:   CAGE screening score of 0 on 2024, showing low risk of alcohol abuse.      Patient Care Team:  Tammy Chan MD as PCP - General (Family Medicine)    Review of Systems     Negative except per hpi     Objective:   Physical Exam  General appearance: alert  HEENT: Normocephalic, without obvious abnormality, atraumatic. PERRL, EOMI, pink conjunctiva.   Neck: supple, symmetrical, trachea midline, no adenopathy, no JVD and thyroid not enlarged,  Lungs: respirations unlabored, clear to auscultation bilaterally  Heart: regular rate and rhythm, S1, S2 normal, no murmur  Abdomen: normoactive bowel sounds x4; soft, non-distended;Left sided abdominal mass palpable  Extremities: extremities normal, atraumatic, no cyanosis or edema; no erythema or tenderness in calves bilaterally  Neurologic: alert, oriented x3      /83 (BP Location: Right arm, Patient Position: Sitting, Cuff Size: large)   Pulse 69   Ht 5' 4.76\" (1.645 m)   Wt 216 lb (98 kg)   BMI 36.21 kg/m²  Estimated body mass index is 36.21 kg/m² as calculated from the following:    Height as of this encounter: 5' 4.76\" (1.645 m).    Weight as of this encounter: 216 lb (98 kg).    Medicare Hearing Assessment:   Hearing Screening    Time taken: 2024 10:53 AM  Screening Method: Questionnaire  I have a problem hearing over  the telephone: No I have trouble following the conversations when two or more people are talking at the same time: No   I have trouble understanding things on the TV: No I have to strain to understand conversations: No   I have to worry about missing the telephone ring or doorbell: No I have trouble hearing conversations in a noisy background such as a crowded room or restaurant: No   I get confused about where sounds come from: No I misunderstand some words in a sentence and need to ask people to repeat themselves: No   I especially have trouble understanding the speech of women and children: No I have trouble understanding the speaker in a large room such as at a meeting or place of Faith: No   Many people I talk to seem to mumble (or don't speak clearly): No People get annoyed because I misunderstand what they say: No   I misunderstand what others are saying and make inappropriate responses: No I avoid social activities because I cannot hear well and fear I will reply improperly: No   Family members and friends have told me they think I may have hearing loss: No               Vision testing not required for subsequent Medicare annual exam      Assessment & Plan:   Yi Barrientos is a 73 year old female who presents for a Medicare Assessment.     1. Encounter for annual health examination  -Medical, surgical and social history, as well as medications and allergies were reviewed with patient.  Labs reviewed    2. Type 2 diabetes mellitus with stage 3a chronic kidney disease, without long-term current use of insulin (HCC)  - Stable condition, continue present management.      3. Morbid obesity due to excess calories (HCC)  - Stable condition, continue present management.      4. Stage 3a chronic kidney disease (HCC)  - Stable condition, continue present management.      5. Aortic atherosclerosis (HCC)  - Stable condition, continue present management.      6. Pure hypercholesterolemia  - Stable condition,  continue present management.      7. Essential hypertension, benign  - Stable condition, continue present management.      8. Osteoporosis, unspecified osteoporosis type, unspecified pathological fracture presence  - Stable condition, continue present management.      9. Hyponatremia  - Stable condition, continue present management.      10. Osteoarthritis, unspecified osteoarthritis type, unspecified site  - Stable condition, continue present management.      11. Exposure to hepatitis C  - Stable condition, continue present management.      12. Personal history of skin cancer  - Stable condition, continue present management.      13. Benign paroxysmal positional vertigo, unspecified laterality  - Stable condition, continue present management.      14. Seasonal allergic rhinitis, unspecified trigger  - Stable condition, continue present management.      15. Cataract of both eyes, unspecified cataract type  - Stable condition, continue present management.      16. Inflamed seborrheic keratosis  - Stable condition, continue present management.      17. History of actinic keratoses  - Stable condition, continue present management.      18. Hidradenitis suppurativa  - Stable condition, continue present management.      19. Disseminated superficial actinic porokeratosis (DSAP)  - Stable condition, continue present management.      20. Post-menopausal  - XR DEXA BONE DENSITOMETRY (CPT=77080); Future    21. Left upper quadrant abdominal mass    - CT ABDOMEN+PELVIS(CPT=74176); Future    The patient indicates understanding of these issues and agrees to the plan.  Reinforced healthy diet, lifestyle, and exercise.      No follow-ups on file.     Tammy Chan MD, 5/2/2024     Supplementary Documentation:   General Health:  In the past six months, have you lost more than 10 pounds without trying?: 2 - No  Has your appetite been poor?: No  Type of Diet: Diabetic  How does the patient maintain a good energy level?: Appropriate  Exercise  How would you describe your daily physical activity?: Moderate  How would you describe your current health state?: Fair  How do you maintain positive mental well-being?: Games;Visiting Friends;Visiting Family  On a scale of 0 to 10, with 0 being no pain and 10 being severe pain, what is your pain level?: 3 - (Mild)  In the past six months, have you experienced urine leakage?: 1-Yes  At any time do you feel concerned for the safety/well-being of yourself and/or your children, in your home or elsewhere?: No  Have you had any immunizations at another office such as Influenza, Hepatitis B, Tetanus, or Pneumococcal?: Yes       Yi Barrientos's SCREENING SCHEDULE   Tests on this list are recommended by your physician but may not be covered, or covered at this frequency, by your insurer.   Please check with your insurance carrier before scheduling to verify coverage.   PREVENTATIVE SERVICES FREQUENCY &  COVERAGE DETAILS LAST COMPLETION DATE   Diabetes Screening    Fasting Blood Sugar /  Glucose    One screening every 12 months if never tested or if previously tested but not diagnosed with pre-diabetes   One screening every 6 months if diagnosed with pre-diabetes Lab Results   Component Value Date     (H) 04/19/2024        Cardiovascular Disease Screening    Lipid Panel  Cholesterol  Lipoprotein (HDL)  Triglycerides Covered every 5 years for all Medicare beneficiaries without apparent signs or symptoms of cardiovascular disease Lab Results   Component Value Date    CHOLEST 121 04/19/2024    HDL 39 (L) 04/19/2024    LDL 60 04/19/2024    TRIG 120 04/19/2024         Electrocardiogram (EKG)   Covered if needed at Welcome to Medicare, and non-screening if indicated for medical reasons 10/09/2020      Ultrasound Screening for Abdominal Aortic Aneurysm (AAA) Covered once in a lifetime for one of the following risk factors    Men who are 65-75 years old and have ever smoked    Anyone with a family history  -     Colorectal Cancer Screening  Covered for ages 50-85; only need ONE of the following:    Colonoscopy   Covered every 10 years    Covered every 2 years if patient is at high risk or previous colonoscopy was abnormal -    Health Maintenance   Topic Date Due    Colorectal Cancer Screening  Never done       Flexible Sigmoidoscopy   Covered every 4 years -    Fecal Occult Blood Test Covered annually -   Bone Density Screening    Bone density screening    Covered every 2 years after age 65 if diagnosed with risk of osteoporosis or estrogen deficiency.    Covered yearly for long-term glucocorticoid medication use (Steroids) Last Dexa Scan:    XR DEXA BONE DENSITOMETRY (CPT=77080) 01/17/2020      No recommendations at this time   Pap and Pelvic    Pap   Covered every 2 years for women at normal risk; Annually if at high risk -  No recommendations at this time    Chlamydia Annually if high risk -  No recommendations at this time   Screening Mammogram    Mammogram     Recommend annually for all female patients aged 40 and older    One baseline mammogram covered for patients aged 35-39 10/27/2023    Health Maintenance   Topic Date Due    Mammogram  10/27/2024       Immunizations    Influenza Covered once per flu season  Please get every year 10/20/2023  No recommendations at this time    Pneumococcal Each vaccine ([id number] & [id number]) covered once after 65 Prevnar 13: 10/04/2018    [id number]: 10/05/2017     No recommendations at this time    Hepatitis B One screening covered for patients with certain risk factors   -  No recommendations at this time    Tetanus Toxoid Not covered by Medicare Part B unless medically necessary (cut with metal); may be covered with your pharmacy prescription benefits -    Tetanus, Diptheria and Pertusis TD and TDaP Not covered by Medicare Part B -  No recommendations at this time    Zoster Not covered by Medicare Part B; may be covered with your pharmacy  prescription benefits -  No  recommendations at this time     Diabetes      Hemoglobin A1C Annually; if last result is elevated, may be asked to retest more frequently.    Medicare covers every 3 months Lab Results   Component Value Date     04/19/2024    A1C 5.9 (H) 04/19/2024       No recommendations at this time    Creat/alb ratio Annually Lab Results   Component Value Date    MICROALBCREA  04/19/2024      Comment:      Unable to calculate due to Urine Microalbumin <0.3 mg/dL           LDL Annually Lab Results   Component Value Date    LDL 60 04/19/2024       Dilated Eye Exam Annually Last Diabetic Eye Exam:  Last Dilated Eye Exam: 12/13/23  Eye Exam shows Diabetic Retinopathy?: No       Annual Monitoring of Persistent Medications (ACE/ARB, digoxin diuretics, anticonvulsants)    Potassium Annually Lab Results   Component Value Date    K 4.2 04/19/2024         Creatinine   Annually Lab Results   Component Value Date    CREATSERUM 1.08 (H) 04/19/2024         BUN Annually Lab Results   Component Value Date    BUN 26 (H) 04/19/2024       Drug Serum Conc Annually No results found for: \"DIGOXIN\", \"DIG\", \"VALP\"

## 2024-05-13 ENCOUNTER — PATIENT OUTREACH (OUTPATIENT)
Dept: CASE MANAGEMENT | Age: 74
End: 2024-05-13

## 2024-05-13 NOTE — PROCEDURES
Received order for \"Diabetes care microalbumin test is up-to-date but still showing up as a care gap\".  Diabetes Care Gap: Microalb/Creat Ratio has been care gap has been completed by [employer]. Finalized order on May 13, 2024.    Thanks,  [facility] Team

## 2024-05-15 ENCOUNTER — HOSPITAL ENCOUNTER (OUTPATIENT)
Dept: CT IMAGING | Age: 74
Discharge: HOME | End: 2024-05-15
Attending: FAMILY MEDICINE

## 2024-05-15 DIAGNOSIS — R19.02 LEFT UPPER QUADRANT ABDOMINAL MASS: ICD-10-CM

## 2024-05-15 PROCEDURE — 74176 CT ABD & PELVIS W/O CONTRAST: CPT | Performed by: FAMILY MEDICINE

## 2024-05-16 ENCOUNTER — TELEPHONE (OUTPATIENT)
Dept: FAMILY MEDICINE CLINIC | Facility: CLINIC | Age: 74
End: 2024-05-16

## 2024-05-16 DIAGNOSIS — K46.9 HERNIA: Primary | ICD-10-CM

## 2024-05-31 DIAGNOSIS — I10 ESSENTIAL HYPERTENSION, BENIGN: Chronic | ICD-10-CM

## 2024-06-03 RX ORDER — VERAPAMIL HYDROCHLORIDE 240 MG/1
240 TABLET, FILM COATED, EXTENDED RELEASE ORAL NIGHTLY
Qty: 90 TABLET | Refills: 3 | Status: SHIPPED | OUTPATIENT
Start: 2024-06-03

## 2024-06-03 NOTE — TELEPHONE ENCOUNTER
Refill Per Protocol     Requested Prescriptions   Pending Prescriptions Disp Refills    VERAPAMIL  MG Oral Tab CR [Pharmacy Med Name: VERAPAMIL  MG TABLET] 90 tablet 3     Sig: TAKE 1 TABLET BY MOUTH NIGHTLY       Hypertension Medications Protocol Passed - 5/31/2024 12:17 AM        Passed - CMP or BMP in past 12 months        Passed - Last BP reading less than 140/90     BP Readings from Last 1 Encounters:   05/02/24 138/77               Passed - In person appointment or virtual visit in the past 12 mos or appointment in next 3 mos     Recent Outpatient Visits              1 month ago Encounter for annual health examination    [facility], Tammy Green MD    Office Visit    4 months ago Type 2 diabetes mellitus with polyneuropathy (HCC)    [facility] [place], Eric Hal, DPM    Office Visit    4 months ago Type 2 diabetes mellitus with stage 3a chronic kidney disease, without long-term current use of insulin ([facility])    [facility] Shaji Genao MD    Office Visit    7 months ago Essential hypertension, benign    [facility], Tammy Green MD    Office Visit    9 months ago Type 2 diabetes mellitus with stage 3a chronic kidney disease, without long-term current use of insulin ([facility])    [facility] Shaji Genao MD    Office Visit          Future Appointments         Provider Department Appt Notes    In 1 week ADO OSCAR RM1; ADO DEXA RM1 [facility] DEXA - Table [place] scan    In 1 month Shaji Genao MD [facility] follow up (policy informed)                    Passed - EGFRCR or GFRNAA > 50     GFR Evaluation  EGFRCR: 55 , resulted on 4/19/2024                 Future Appointments         Provider Department Appt Notes    In 1 week ADO OSCAR  RM1; ADO DEXA RM1 [facility] DEXA - [place] scan    In 1 month Shaji Genao MD [facility] follow up (policy informed)          Recent Outpatient Visits              1 month ago Encounter for annual health examination    [facility], Tammy Grene MD    Office Visit    4 months ago Type 2 diabetes mellitus with polyneuropathy (HCC)    [facility] [place] Meshulam, Eric Hal, DPM    Office Visit    4 months ago Type 2 diabetes mellitus with stage 3a chronic kidney disease, without long-term current use of insulin (HCC)    [facility] Shaji Genao MD    Office Visit    7 months ago Essential hypertension, benign    [facility], Tammy Green MD    Office Visit    9 months ago Type 2 diabetes mellitus with stage 3a chronic kidney disease, without long-term current use of insulin (HCC)    [facility] Shaji Genao MD    Office Visit

## 2024-06-13 ENCOUNTER — HOSPITAL ENCOUNTER (OUTPATIENT)
Dept: BONE DENSITY | Age: 74
Discharge: HOME | End: 2024-06-13
Attending: FAMILY MEDICINE
Payer: MEDICARE

## 2024-06-13 DIAGNOSIS — Z78.0 POST-MENOPAUSAL: ICD-10-CM

## 2024-06-13 PROCEDURE — 77080 DXA BONE DENSITY AXIAL: CPT | Performed by: FAMILY MEDICINE

## 2024-06-14 DIAGNOSIS — I10 ESSENTIAL HYPERTENSION, BENIGN: Chronic | ICD-10-CM

## 2024-06-17 NOTE — TELEPHONE ENCOUNTER
REFILL PASSED PER [facility] PROTOCOLS    Requested Prescriptions   Pending Prescriptions Disp Refills    METOPROLOL TARTRATE 25 MG Oral Tab [Pharmacy Med Name: METOPROLOL TARTRATE 25 MG TAB] 180 tablet 3     Sig: TAKE 1 TABLET BY MOUTH TWICE A DAY       Hypertension Medications Protocol Passed - 6/14/2024  5:49 AM        Passed - CMP or BMP in past 12 months        Passed - Last BP reading less than 140/90     BP Readings from Last 1 Encounters:   05/02/24 138/77               Passed - In person appointment or virtual visit in the past 12 mos or appointment in next 3 mos     Recent Outpatient Visits              1 month ago Encounter for annual health examination    [facility] Tammy Green MD    Office Visit    4 months ago Type 2 diabetes mellitus with polyneuropathy (HCC)    [facility] [place] Meshulam, Eric Hal, DPM    Office Visit    4 months ago Type 2 diabetes mellitus with stage 3a chronic kidney disease, without long-term current use of insulin ([facility])    [facility] Shaji Genao MD    Office Visit    7 months ago Essential hypertension, benign    [facility] Tammy Green MD    Office Visit    10 months ago Type 2 diabetes mellitus with stage 3a chronic kidney disease, without long-term current use of insulin ([facility])    [facility] Shaji Genao MD    Office Visit          Future Appointments         Provider Department Appt Notes    In 2 weeks Sacha Patton MD [facility] hernia    In 1 month Shaji Genao MD [facility] follow up (policy informed)                    Passed - EGFRCR or GFRNAA > 50     GFR Evaluation  EGFRCR: 55 , resulted on 4/19/2024               Future Appointments          Provider Department Appt Notes    In 2 weeks Sacha Patton MD [facility] hernia    In 1 month Sh[facility] Genao MD [facility] follow up (policy informed)          Recent Outpatient Visits              1 month ago Encounter for annual health examination    [facility]    Office Visit    4 months ago Type 2 diabetes mellitus with polyneuropathy (HCC)    [facility] [place]    Office Visit    4 months ago Type 2 diabetes mellitus with stage 3a chronic kidney disease, without long-term current use of insulin (HCC)    [facility] Sh[facility]    Office Visit    7 months ago Essential hypertension, benign    [facility], Tammy Green MD    Office Visit    10 months ago Type 2 diabetes mellitus with stage 3a chronic kidney disease, without long-term current use of insulin (HCC)    [facility]    Office Visit

## 2024-06-20 ENCOUNTER — PATIENT MESSAGE (OUTPATIENT)
Dept: NEPHROLOGY | Facility: CLINIC | Age: 74
End: 2024-06-20

## 2024-06-20 DIAGNOSIS — N18.32 STAGE 3B CHRONIC KIDNEY DISEASE (HCC): Primary | ICD-10-CM

## 2024-06-20 NOTE — TELEPHONE ENCOUNTER
From: Yi Barrientos  To: Shaji Genao  Sent: 6/20/2024 10:25 AM CDT  Subject: Lab orders     Please order labs for my 7/26 office visit. I know it’s a bit early but I didn’t want to forget. Thank you.

## 2024-06-25 RX ORDER — DAPAGLIFLOZIN 10 MG/1
10 TABLET, FILM COATED ORAL DAILY
Qty: 90 TABLET | Refills: 3 | Status: SHIPPED | OUTPATIENT
Start: 2024-06-25

## 2024-06-25 NOTE — TELEPHONE ENCOUNTER
Refill passed per [facility] protocols.    Requested Prescriptions   Pending Prescriptions Disp Refills    FARXIGA 10 MG Oral Tab [Pharmacy Med Name: FARXIGA 10 MG TABLET] 90 tablet 3     Sig: TAKE 1 TABLET BY MOUTH EVERY DAY       Diabetes Medication Protocol Passed - 6/21/2024  9:10 AM        Passed - Last A1C < 7.5 and within past 6 months     Lab Results   Component Value Date    A1C 5.9 (H) 04/19/2024             Passed - In person appointment or virtual visit in the past 6 mos or appointment in next 3 mos     Recent Outpatient Visits              1 month ago Encounter for annual health examination    [facility] Tammy Green MD    Office Visit    4 months ago Type 2 diabetes mellitus with polyneuropathy (HCC)    [facility] [place] Meshulam, Eric Hal, DPM    Office Visit    4 months ago Type 2 diabetes mellitus with stage 3a chronic kidney disease, without long-term current use of insulin ([facility])    [facility] Shaji Genao MD    Office Visit    7 months ago Essential hypertension, benign    [facility] Tammy Green MD    Office Visit    10 months ago Type 2 diabetes mellitus with stage 3a chronic kidney disease, without long-term current use of insulin ([facility])    [facility] Shaji Genao MD    Office Visit          Future Appointments         Provider Department Appt Notes    In 6 days Sacha Patton MD [facility] hernia    In 1 month Shaji Genao MD [facility] follow up (policy informed)                    Passed - Microalbumin procedure in past 12 months or taking ACE/ARB        Passed - EGFRCR or GFRNAA > 50     GFR Evaluation  EGFRCR: 55 , resulted on 4/19/2024          Passed - GFR in the past  12 months

## 2024-07-01 ENCOUNTER — OFFICE VISIT (OUTPATIENT)
Dept: SURGERY | Facility: CLINIC | Age: 74
End: 2024-07-01
Payer: MEDICARE

## 2024-07-01 VITALS — BODY MASS INDEX: 35.82 KG/M2 | HEIGHT: 65 IN | WEIGHT: 215 LBS

## 2024-07-01 DIAGNOSIS — K43.9 VENTRAL HERNIA WITHOUT OBSTRUCTION OR GANGRENE: Primary | ICD-10-CM

## 2024-07-01 PROCEDURE — 99203 OFFICE O/P NEW LOW 30 MIN: CPT | Performed by: SURGERY

## 2024-07-01 NOTE — H&P
History and Physical      HPI     Chief Complaint   Patient presents with    Referral     Referral from Dr. KRISTIN Jade.  For possible Umbilical Hernia.  Onset about two years.  Patient denies pain. Patient has normal BM's and Urination.         HPI  Yi Barrientos is a 73 year old female who presents with a large ventral incisional hernia after a cholecystectomy.  Pain and bulging.  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     Cholecystectomy       Current Outpatient Medications   Medication Sig Dispense Refill    dapagliflozin (FARXIGA) 10 MG Oral Tab Take 1 tablet (10 mg total) by mouth daily. 90 tablet 3    metoprolol tartrate 25 MG Oral Tab Take 1 tablet (25 mg total) by mouth 2 (two) times daily. 180 tablet 3    verapamil  MG Oral Tab CR Take 1 tablet (240 mg total) by mouth nightly. 90 tablet 3    alendronate 70 MG Oral Tab Take 1 tablet (70 mg total) by mouth once a week. 12 tablet 3    atorvastatin 20 MG Oral Tab TAKE 1 TABLET (20MG) BY MOUTH EVERY DAY 90 tablet 3    Spironolactone-HCTZ 25-25 MG Oral Tab Take 1 tablet by mouth daily. 90 tablet 3    cholecalciferol 50 MCG (2000 UT) Oral Cap Take 1 capsule (2,000 Units total) by mouth daily.      metFORMIN  MG Oral Tablet 24 Hr TAKE ONE TABLET BY MOUTH DAILY WITH FOOD 90 tablet 3    Glucose Blood (ACCU-CHEK ADITYA PLUS) In Vitro Strip USE ONCE DAILY IN THE MORNING BEFORE BREAKFAST 100 strip 3    Omega-3-acid Ethyl Esters 1 g Oral Cap Take 1  capsule (1 g total) by mouth daily.      Blood Glucose Monitoring Suppl (ACCU-CHEK ADITYA PLUS) w/Device Does not apply Kit USE ONCE DAILY IN AM BEFORE BREAKFAST 1 kit 0    LANCETS ULTRA THIN Does not apply Misc Use one daily 100 each 6    Multiple Vitamins-Minerals (CENTRUM SILVER) Oral Tab Take 1 tablet by mouth daily.       ALLERGIES  Allergies   Allergen Reactions    Erythromycin        Social History     Socioeconomic History    Marital status:    Tobacco Use    Smoking status: Former     Current packs/day: 0.00     Average packs/day: 0.5 packs/day for 45.0 years (22.5 ttl pk-yrs)     Types: Cigarettes     Start date: 1973     Quit date: 2018     Years since quittin.4    Smokeless tobacco: Never    Tobacco comments:     1 pack every 3 days   Vaping Use    Vaping status: Never Used   Substance and Sexual Activity    Alcohol use: Yes     Alcohol/week: 0.0 standard drinks of alcohol     Comment: very rarely    Drug use: No     Family History   Problem Relation Age of Onset    Hypertension Father     Stroke Father     Heart Attack Mother        Review of Systems   A comprehensive 10 point review of systems was completed.  Pertinent positives and negatives noted in the the HPI.    PHYSICAL EXAM   Ht 5' 5\" (1.651 m)   Wt 215 lb (97.5 kg)   BMI 35.78 kg/m²  No LMP recorded. Patient is postmenopausal.   Constitutional: appears well hydrated alert and responsive no acute distress noted  Head/Face: normocephalic  Nose/Mouth/Throat: nose and throat are clear palate is intact mucous membranes are moist no oral lesions are noted  Neck/Thyroid: neck is supple without adenopathy  Respiratory: normal to inspection lungs are clear to auscultation bilaterally normal respiratory effort  Cardiovascular: regular rate and rhythm no murmurs, gallups, or rubs  Abdomen: soft non-tender non-distended no organomegaly noted no masses  Large ventral hernia extending from her supraumbilical incision to the left  measuring 10 cm    Extremities: no edema, cyanosis, or clubbing  Neurological: exam appropriate for age reflexes and motor skills appropriate for age      ASSESSMENT/PLAN   Assessment   Encounter Diagnosis   Name Primary?    Ventral hernia without obstruction or gangrene Yes       73 year old female with incarcerated ventral hernia  We have discussed the surgical risks, benefits, alternatives, and expected recovery. We will plan laparoscopic repair incarcerated ventral hernia with mesh. All of the patient's questions have been answered to her satisfaction.       7/1/2024  Sacha Patton MD

## 2024-07-17 ENCOUNTER — LAB ENCOUNTER (OUTPATIENT)
Dept: LAB | Age: 74
End: 2024-07-17
Attending: INTERNAL MEDICINE
Payer: MEDICARE

## 2024-07-17 DIAGNOSIS — N18.32 STAGE 3B CHRONIC KIDNEY DISEASE (HCC): ICD-10-CM

## 2024-07-17 LAB
ALBUMIN SERPL-MCNC: 4.5 G/DL (ref 3.2–4.8)
ANION GAP SERPL CALC-SCNC: 4 MMOL/L (ref 0–18)
BUN BLD-MCNC: 20 MG/DL (ref 9–23)
BUN/CREAT SERPL: 20.4 (ref 10–20)
CALCIUM BLD-MCNC: 10.2 MG/DL (ref 8.7–10.4)
CHLORIDE SERPL-SCNC: 102 MMOL/L (ref 98–112)
CO2 SERPL-SCNC: 29 MMOL/L (ref 21–32)
CREAT BLD-MCNC: 0.98 MG/DL
EGFRCR SERPLBLD CKD-EPI 2021: 62 ML/MIN/1.73M2 (ref 60–?)
EST. AVERAGE GLUCOSE BLD GHB EST-MCNC: 114 MG/DL (ref 68–126)
GLUCOSE BLD-MCNC: 115 MG/DL (ref 70–99)
HBA1C MFR BLD: 5.6 % (ref ?–5.7)
OSMOLALITY SERPL CALC.SUM OF ELEC: 284 MOSM/KG (ref 275–295)
PHOSPHATE SERPL-MCNC: 3.9 MG/DL (ref 2.4–5.1)
POTASSIUM SERPL-SCNC: 4.3 MMOL/L (ref 3.5–5.1)
SODIUM SERPL-SCNC: 135 MMOL/L (ref 136–145)

## 2024-07-17 PROCEDURE — 80069 RENAL FUNCTION PANEL: CPT

## 2024-07-17 PROCEDURE — 36415 COLL VENOUS BLD VENIPUNCTURE: CPT

## 2024-07-17 PROCEDURE — 83036 HEMOGLOBIN GLYCOSYLATED A1C: CPT

## 2024-07-19 ENCOUNTER — PATIENT MESSAGE (OUTPATIENT)
Dept: FAMILY MEDICINE CLINIC | Facility: CLINIC | Age: 74
End: 2024-07-19

## 2024-07-19 NOTE — TELEPHONE ENCOUNTER
From: Yi Barrientos  To: Tammy Chan  Sent: 7/19/2024 9:00 AM CDT  Subject: Vaccines     What vaccines should my  and I be getting/what do we need to be protected for. Last Covid vaccine was December for both of us.    Thank you!

## 2024-07-26 ENCOUNTER — OFFICE VISIT (OUTPATIENT)
Dept: NEPHROLOGY | Facility: CLINIC | Age: 74
End: 2024-07-26
Payer: MEDICARE

## 2024-07-26 VITALS
BODY MASS INDEX: 36 KG/M2 | HEART RATE: 95 BPM | DIASTOLIC BLOOD PRESSURE: 60 MMHG | SYSTOLIC BLOOD PRESSURE: 135 MMHG | WEIGHT: 218 LBS

## 2024-07-26 DIAGNOSIS — E11.22 TYPE 2 DIABETES MELLITUS WITH STAGE 3A CHRONIC KIDNEY DISEASE, WITHOUT LONG-TERM CURRENT USE OF INSULIN (HCC): Primary | Chronic | ICD-10-CM

## 2024-07-26 DIAGNOSIS — N18.31 TYPE 2 DIABETES MELLITUS WITH STAGE 3A CHRONIC KIDNEY DISEASE, WITHOUT LONG-TERM CURRENT USE OF INSULIN (HCC): Chronic | ICD-10-CM

## 2024-07-26 DIAGNOSIS — N18.31 TYPE 2 DIABETES MELLITUS WITH STAGE 3A CHRONIC KIDNEY DISEASE, WITHOUT LONG-TERM CURRENT USE OF INSULIN (HCC): Primary | Chronic | ICD-10-CM

## 2024-07-26 DIAGNOSIS — I10 ESSENTIAL HYPERTENSION, BENIGN: Chronic | ICD-10-CM

## 2024-07-26 DIAGNOSIS — E11.22 TYPE 2 DIABETES MELLITUS WITH STAGE 3A CHRONIC KIDNEY DISEASE, WITHOUT LONG-TERM CURRENT USE OF INSULIN (HCC): Chronic | ICD-10-CM

## 2024-07-26 DIAGNOSIS — N18.2 CKD (CHRONIC KIDNEY DISEASE) STAGE 2, GFR 60-89 ML/MIN: ICD-10-CM

## 2024-07-26 PROBLEM — N18.30 CKD (CHRONIC KIDNEY DISEASE) STAGE 3, GFR 30-59 ML/MIN (HCC): Chronic | Status: RESOLVED | Noted: 2019-02-04 | Resolved: 2024-07-26

## 2024-07-26 PROCEDURE — 99214 OFFICE O/P EST MOD 30 MIN: CPT | Performed by: INTERNAL MEDICINE

## 2024-07-26 NOTE — PATIENT INSTRUCTIONS
Noé hernandez    See me shilo    Call for labs a few weeks prior    Consider mounjaro for weight    Have a great rest of summer

## 2024-07-27 NOTE — PROGRESS NOTES
Progress Note     Yi Barrientos    Is here she is feeling fine BMI is down to 36 pressures 135/60 history of hypertension obesity diabetes currently on Farxiga 10 mg/day Toprol 25 twice daily verapamil Fosamax spironolactone HCT Lipitor.  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     Cholecystectomy        Social History     Tobacco Use    Smoking status: Former     Current packs/day: 0.00     Average packs/day: 0.5 packs/day for 45.0 years (22.5 ttl pk-yrs)     Types: Cigarettes     Start date: 1973     Quit date: 2018     Years since quittin.4    Smokeless tobacco: Never    Tobacco comments:     1 pack every 3 days   Substance Use Topics    Alcohol use: Yes     Alcohol/week: 0.0 standard drinks of alcohol     Comment: very rarely         Medications (Active prior to today's visit):  Current Outpatient Medications   Medication Sig Dispense Refill    dapagliflozin (FARXIGA) 10 MG Oral Tab Take 1 tablet (10 mg total) by mouth daily. 90 tablet 3    metoprolol tartrate 25 MG Oral Tab Take 1 tablet (25 mg total) by mouth 2 (two) times daily. 180 tablet 3    verapamil  MG Oral Tab CR Take 1 tablet (240 mg total) by mouth nightly. 90 tablet 3    alendronate 70 MG Oral Tab Take 1 tablet (70 mg total) by mouth once a week. 12 tablet 3    atorvastatin 20 MG Oral Tab TAKE 1 TABLET (20MG) BY MOUTH EVERY DAY 90 tablet 3     Spironolactone-HCTZ 25-25 MG Oral Tab Take 1 tablet by mouth daily. 90 tablet 3    cholecalciferol 50 MCG (2000 UT) Oral Cap Take 1 capsule (2,000 Units total) by mouth daily.      metFORMIN  MG Oral Tablet 24 Hr TAKE ONE TABLET BY MOUTH DAILY WITH FOOD 90 tablet 3    Omega-3-acid Ethyl Esters 1 g Oral Cap Take 1 capsule (1 g total) by mouth daily.      Multiple Vitamins-Minerals (CENTRUM SILVER) Oral Tab Take 1 tablet by mouth daily.      Glucose Blood (ACCU-CHEK ADITYA PLUS) In Vitro Strip USE ONCE DAILY IN THE MORNING BEFORE BREAKFAST 100 strip 3    Blood Glucose Monitoring Suppl (ACCU-CHEK ADITYA PLUS) w/Device Does not apply Kit USE ONCE DAILY IN AM BEFORE BREAKFAST 1 kit 0    LANCETS ULTRA THIN Does not apply Misc Use one daily 100 each 6       Allergies:  Allergies   Allergen Reactions    Erythromycin          ROS:     Constitutional:  Negative for decreased activity, fever, irritability and lethargy feels great  ENMT:  Negative for ear drainage, hearing loss and nasal drainage  Eyes:  Negative for eye discharge and vision loss  Cardiovascular:  Negative for chest pain, sob  Respiratory:  Negative for cough, dyspnea and wheezing  Gastrointestinal:  Negative for abdominal pain, constipation  Genitourinary:  Negative for dysuria and hematuria  Endocrine:  Negative for abnormal sleep patterns  Hema/Lymph:  Negative for easy bleeding and easy bruising  Integumentary:  Negative for pruritus and rash  Musculoskeletal:  Negative for bone/joint symptoms  Neurological:  Negative for gait disturbance  Psychiatric:  Negative for inappropriate interaction and psychiatric symptoms      Vitals:    07/26/24 1439   BP: 135/60   Pulse: 95       PHYSICAL EXAM:   Constitutional: appears well hydrated alert and responsive looks well  Head/Face: normocephalic  Eyes/Vision: normal extraocular motion is intact  Nose/Mouth/Throat:mucous membranes are moist   Neck/Thyroid: neck is supple without adenopathy  Lymphatic: no abnormal  cervical, supraclavicular adenopathy is noted  Respiratory:  lungs are clear to auscultation bilaterally  Cardiovascular: regular rate and rhythm   Abdomen: soft, non-tender, non-distended, BS normal  Skin/Hair: no unusual rashes present, no abnormal bruising noted  Back/Spine: no abnormalities noted  Musculoskeletal: no deformities  Extremities: no edema  Neurological:  Grossly normal       ASSESSMENT/PLAN:   Assessment   Encounter Diagnoses   Name Primary?    Type 2 diabetes mellitus with stage 3a chronic kidney disease, without long-term current use of insulin ([facility]) Yes    Essential hypertension, benign     CKD (chronic kidney disease) stage 2, GFR 60-89 ml/min        1 obesity keep working on diet and exercise brought up to her about possibly going on Mounjaro she will consider gave her information    #2 hypertension controlled  #3 CKD 2  Was CKD 3 now improved GFR of 62 continue present plan no proteinuria     #3 diabetes A1c excellent 5.6%  See me in 6 months no labs needed before that time    Orders This Visit:  No orders of the defined types were placed in this encounter.      Meds This Visit:  Requested Prescriptions      No prescriptions requested or ordered in this encounter       Imaging & Referrals:  None     7/26/2024  Shaji Genao MD

## 2024-07-30 RX ORDER — METFORMIN HYDROCHLORIDE 500 MG/1
500 TABLET, EXTENDED RELEASE ORAL
Qty: 90 TABLET | Refills: 3 | Status: SHIPPED | OUTPATIENT
Start: 2024-07-30

## 2024-07-30 NOTE — TELEPHONE ENCOUNTER
Refill passed per [facility] protocol.  Requested Prescriptions   Pending Prescriptions Disp Refills    METFORMIN  MG Oral Tablet 24 Hr [Pharmacy Med Name: METFORMIN HCL  MG TABLET] 90 tablet 3     Sig: TAKE 1 TABLET BY MOUTH EVERY DAY WITH FOOD       Diabetes Medication Protocol Passed - 7/26/2024 12:06 AM        Passed - Last A1C < 7.5 and within past 6 months     Lab Results   Component Value Date    A1C 5.6 07/17/2024             Passed - In person appointment or virtual visit in the past 6 mos or appointment in next 3 mos     Recent Outpatient Visits              4 days ago Type 2 diabetes mellitus with stage 3a chronic kidney disease, without long-term current use of insulin ([facility])    [facility] Shaji Genao MD    Office Visit    4 weeks ago Ventral hernia without obstruction or gangrene    [facility] Sacha Patton MD    Office Visit    2 months ago Encounter for annual health examination    [facility], Tammy Green MD    Office Visit    6 months ago Type 2 diabetes mellitus with polyneuropathy ([facility]    Office Visit    6 months ago Type 2 diabetes mellitus with stage 3a chronic kidney disease, without long-term current use of insulin ([facility])    [facility] Shaji Genao MD    Office Visit          Future Appointments         Provider Department Appt Notes    In 6 months Shaji Genao MD [facility] follow up                    Passed - Microalbumin procedure in past 12 months or taking ACE/ARB        Passed - EGFRCR or GFRNAA > 50     GFR Evaluation  EGFRCR: 62 , resulted on 7/17/2024          Passed - GFR in the past 12 months           Future Appointments         Provider Department  Appt Notes    In 6 months [facility]          Recent Outpatient Visits              4 days ago Type 2 diabetes mellitus with stage 3a chronic kidney disease, without long-term current use of insulin ([facility])    [facility] Shaji Genao MD    Office Visit    4 weeks ago Ventral hernia without obstruction or gangrene    [facility] Sacha Patton MD    Office Visit    2 months ago Encounter for annual health examination    [facility], Tammy Green MD    Office Visit    6 months ago Type 2 diabetes mellitus with polyneuropathy ([facility])    [facility] [place] [facility], Eric Hal, DPM    Office Visit    6 months ago Type 2 diabetes mellitus with stage 3a chronic kidney disease, without long-term current use of insulin ([facility])    [facility] Shaji Genao MD    Office Visit

## 2024-09-03 ENCOUNTER — EKG ENCOUNTER (OUTPATIENT)
Dept: LAB | Age: 74
End: 2024-09-03
Attending: SURGERY
Payer: MEDICARE

## 2024-09-03 DIAGNOSIS — K43.9 VENTRAL HERNIA WITHOUT OBSTRUCTION OR GANGRENE: ICD-10-CM

## 2024-09-03 LAB
ATRIAL RATE: 69 BPM
P AXIS: 50 DEGREES
P-R INTERVAL: 218 MS
Q-T INTERVAL: 392 MS
QRS DURATION: 86 MS
QTC CALCULATION (BEZET): 420 MS
R AXIS: -1 DEGREES
T AXIS: 42 DEGREES
VENTRICULAR RATE: 69 BPM

## 2024-09-03 PROCEDURE — 93005 ELECTROCARDIOGRAM TRACING: CPT

## 2024-09-03 PROCEDURE — 93010 ELECTROCARDIOGRAM REPORT: CPT | Performed by: INTERNAL MEDICINE

## 2024-09-11 RX ORDER — ALUMINUM ZIRCONIUM TETRACHLOROHYDREX GLY 0.18 G/G
1 STICK TOPICAL DAILY
COMMUNITY

## 2024-09-18 NOTE — DISCHARGE INSTRUCTIONS
Ice pack as needed.    May shower in 24 hours.  Ibuprofen 600 mg every 6 hours for pain   Norco for breakthrough pain  Abdominal binder if desired for comfort   15 pound lifting restriction for 4 weeks  Leave white strips in place for 1 week   follow-up with Jazmín in the office in 2 weeks  May drive in 1 week or sooner if pain-free and if not taking Norco  If constipated take milk of magnesia and stop [place]     HOME INSTRUCTIONS  [facility] HOME CARE INSTRUCTIONS: POST-OP ANESTHESIA  The medication that you received for sedation or general anesthesia can last up to 24 hours. Your judgment and reflexes may be altered, even if you feel like your normal self.      We Recommend:   Do not drive any motor vehicle or bicycle   Avoid mowing the lawn, playing sports, or working with power tools/applicances (power saws, electric knives or mixers)   That you have someone stay with you on your first night home   Do not drink alcohol or take sleeping pills or tranquilizers   Do not sign legal documents within 24 hours of your procedure   If you had a nerve block for your surgery, take extra care not to put any pressure on your arm or hand for 24 hours    It is normal:  For you to have a sore throat if you had a breathing tube during surgery (while you were asleep!). The sore throat should get better within 48 hours. You can gargle with warm salt water (1/2 tsp in 4 oz warm water) or use a throat lozenge for comfort  To feel muscle aches or soreness especially in the abdomen, chest or neck. The achy feeling should go away in the next 24 hours  To feel weak, sleepy or \"wiped out\". Your should start feeling better in the next 24 hours.   To experience mild discomforts such as sore lip or tongue, headache, cramps, gas pains or a bloated feeling in your abdomen.   To experience mild back pain or soreness for a day or two if you had spinal or epidural anesthesia.   If you had laparoscopic surgery, to feel shoulder pain or discomfort  on the day of surgery.   For some patients to have nausea after surgery/anesthesia    If you feel nausea or experience vomiting:   Try to move around less.   Eat less than usual or drink only liquids until the next morning   Nausea should resolve in about 24 hours    If you have a problem when you are at home:    Call your surgeons office   Discharge Instructions: After Your Surgery  You’ve just had surgery. During surgery, you were given medicine called anesthesia to keep you relaxed and free of pain. After surgery, you may have some pain or nausea. This is common. Here are some tips for feeling better and getting well after surgery.   Going home  Your healthcare provider will show you how to take care of yourself when you go home. They'll also answer your questions. Have an adult family member or friend drive you home. For the first 24 hours after your surgery:   Don't drive or use heavy equipment.  Don't make important decisions or sign legal papers.  Take medicines as directed.  Don't drink alcohol.  Have someone stay with you, if needed. They can watch for problems and help keep you safe.  Be sure to go to all follow-up visits with your healthcare provider. And rest after your surgery for as long as your provider tells you to.   Coping with pain  If you have pain after surgery, pain medicine will help you feel better. Take it as directed, before pain becomes severe. Also, ask your healthcare provider or pharmacist about other ways to control pain. This might be with heat, ice, or relaxation. And follow any other instructions your surgeon or nurse gives you.      Stay on schedule with your medicine.     Tips for taking pain medicine  To get the best relief possible, remember these points:   Pain medicines can upset your stomach. Taking them with a little food may help.  Most pain relievers taken by mouth need at least 20 to 30 minutes to start to work.  Don't wait till your pain becomes severe before you take  your medicine. Try to time your medicine so that you can take it before starting an activity. This might be before you get dressed, go for a walk, or sit down for dinner.  Constipation is a common side effect of some pain medicines. Call your healthcare provider before taking any medicines such as laxatives or stool softeners to help ease constipation. Also ask if you should skip any foods. Drinking lots of fluids and eating foods such as fruits and vegetables that are high in fiber can also help. Remember, don't take laxatives unless your surgeon has prescribed them.  Drinking alcohol and taking pain medicine can cause dizziness and slow your breathing. It can even be deadly. Don't drink alcohol while taking pain medicine.  Pain medicine can make you react more slowly to things. Don't drive or run machinery while taking pain medicine.  Your healthcare provider may tell you to take acetaminophen to help ease your pain. Ask them how much you're supposed to take each day. Acetaminophen or other pain relievers may interact with your prescription medicines or other over-the-counter (OTC) medicines. Some prescription medicines have acetaminophen and other ingredients in them. Using both prescription and OTC acetaminophen for pain can cause you to accidentally overdose. Read the labels on your OTC medicines with care. This will help you to clearly know the list of ingredients, how much to take, and any warnings. It may also help you not take too much acetaminophen. If you have questions or don't understand the information, ask your pharmacist or healthcare provider to explain it to you before you take the OTC medicine.   Managing nausea  Some people have an upset stomach (nausea) after surgery. This is often because of anesthesia, pain, or pain medicine, less movement of food in the stomach, or the stress of surgery. These tips will help you handle nausea and eat healthy foods as you get better. If you were on a special  food plan before surgery, ask your healthcare provider if you should follow it while you get better. Check with your provider on how your eating should progress. It may depend on the surgery you had. These general tips may help:   Don't push yourself to eat. Your body will tell you when to eat and how much.  Start off with clear liquids and soup. They're easier to digest.  Next try semi-solid foods as you feel ready. These include mashed potatoes, applesauce, and gelatin.  Slowly move to solid foods. Don’t eat fatty, rich, or spicy foods at first.  Don't force yourself to have 3 large meals a day. Instead eat smaller amounts more often.  Take pain medicines with a small amount of solid food, such as crackers or toast. This helps prevent nausea.  When to call your healthcare provider  Call your healthcare provider right away if you have any of these:   You still have too much pain, or the pain gets worse, after taking the medicine. The medicine may not be strong enough. Or there may be a complication from the surgery.  You feel too sleepy, dizzy, or groggy. The medicine may be too strong.  Side effects such as nausea or vomiting. Your healthcare provider may advise taking other medicines to .  Skin changes such as rash, itching, or hives. This may mean you have an allergic reaction. Your provider may advise taking other medicines.  The incision looks different (for instance, part of it opens up).  Bleeding or fluid leaking from the incision site, and weren't told to expect that.  Fever of 100.4°F (38°C) or higher, or as directed by your provider.  Call 911  Call 911 right away if you have:   Trouble breathing  Facial swelling    If you have obstructive sleep apnea   You were given anesthesia medicine during surgery to keep you comfortable and free of pain. After surgery, you may have more apnea spells because of this medicine and other medicines you were given. The spells may last longer than normal.    At  home:  Keep using the continuous positive airway pressure (CPAP) device when you sleep. Unless your healthcare provider tells you not to, use it when you sleep, day or night. CPAP is a common device used to treat obstructive sleep apnea.  Talk with your provider before taking any pain medicine, muscle relaxants, or sedatives. Your provider will tell you about the possible dangers of taking these medicines.  Contact your provider if your sleeping changes a lot even when taking medicines as directed.  Jose Luis last reviewed this educational content on 10/1/2021  © 9589-3068 [employer]. All rights reserved. This information is not intended as a substitute for professional medical care. Always follow your healthcare professional's instructions.

## 2024-09-19 ENCOUNTER — HOSPITAL ENCOUNTER (OUTPATIENT)
Facility: HOSPITAL | Age: 74
Setting detail: HOSPITAL OUTPATIENT SURGERY
Discharge: HOME | End: 2024-09-19
Attending: SURGERY | Admitting: SURGERY
Payer: MEDICARE

## 2024-09-19 ENCOUNTER — ANESTHESIA (OUTPATIENT)
Dept: SURGERY | Facility: HOSPITAL | Age: 74
End: 2024-09-19
Payer: MEDICARE

## 2024-09-19 ENCOUNTER — ANESTHESIA EVENT (OUTPATIENT)
Dept: SURGERY | Facility: HOSPITAL | Age: 74
End: 2024-09-19
Payer: MEDICARE

## 2024-09-19 VITALS
RESPIRATION RATE: 16 BRPM | TEMPERATURE: 98 F | WEIGHT: 212 LBS | BODY MASS INDEX: 35.32 KG/M2 | HEART RATE: 91 BPM | SYSTOLIC BLOOD PRESSURE: 134 MMHG | HEIGHT: 65 IN | OXYGEN SATURATION: 94 % | DIASTOLIC BLOOD PRESSURE: 61 MMHG

## 2024-09-19 DIAGNOSIS — G89.18 POSTOPERATIVE PAIN: Primary | ICD-10-CM

## 2024-09-19 DIAGNOSIS — K43.9 VENTRAL HERNIA WITHOUT OBSTRUCTION OR GANGRENE: ICD-10-CM

## 2024-09-19 LAB
GLUCOSE BLDC GLUCOMTR-MCNC: 111 MG/DL (ref 70–99)
GLUCOSE BLDC GLUCOMTR-MCNC: 112 MG/DL (ref 70–99)

## 2024-09-19 PROCEDURE — 0WUF4JZ SUPPLEMENT ABDOMINAL WALL WITH SYNTHETIC SUBSTITUTE, PERCUTANEOUS ENDOSCOPIC APPROACH: ICD-10-PCS | Performed by: SURGERY

## 2024-09-19 PROCEDURE — 49594 RPR AA HRN 1ST 3-10 NCR/STRN: CPT | Performed by: SURGERY

## 2024-09-19 DEVICE — VENTRALIGHT ST MESH WITH ECHO PS POSITONING SYSTEM
Type: IMPLANTABLE DEVICE | Site: ABDOMEN | Status: FUNCTIONAL
Brand: VENTRALIGHT ST MESH WITH ECHO PS POSITONING SYSTEM

## 2024-09-19 RX ORDER — NALOXONE HYDROCHLORIDE 0.4 MG/ML
0.08 INJECTION, SOLUTION INTRAMUSCULAR; INTRAVENOUS; SUBCUTANEOUS AS NEEDED
Status: DISCONTINUED | OUTPATIENT
Start: 2024-09-19 | End: 2024-09-19

## 2024-09-19 RX ORDER — SODIUM CHLORIDE, SODIUM LACTATE, POTASSIUM CHLORIDE, CALCIUM CHLORIDE 600; 310; 30; 20 MG/100ML; MG/100ML; MG/100ML; MG/100ML
INJECTION, SOLUTION INTRAVENOUS CONTINUOUS
Status: DISCONTINUED | OUTPATIENT
Start: 2024-09-19 | End: 2024-09-19

## 2024-09-19 RX ORDER — ACETAMINOPHEN 500 MG
1000 TABLET ORAL ONCE
Status: COMPLETED | OUTPATIENT
Start: 2024-09-19 | End: 2024-09-19

## 2024-09-19 RX ORDER — BUPIVACAINE HYDROCHLORIDE AND EPINEPHRINE 2.5; 5 MG/ML; UG/ML
INJECTION, SOLUTION INFILTRATION; PERINEURAL AS NEEDED
Status: DISCONTINUED | OUTPATIENT
Start: 2024-09-19 | End: 2024-09-19 | Stop reason: HOSPADM

## 2024-09-19 RX ORDER — LIDOCAINE HYDROCHLORIDE 10 MG/ML
INJECTION, SOLUTION EPIDURAL; INFILTRATION; INTRACAUDAL; PERINEURAL AS NEEDED
Status: DISCONTINUED | OUTPATIENT
Start: 2024-09-19 | End: 2024-09-19 | Stop reason: SURG

## 2024-09-19 RX ORDER — NICOTINE POLACRILEX 4 MG
30 LOZENGE BUCCAL
Status: DISCONTINUED | OUTPATIENT
Start: 2024-09-19 | End: 2024-09-19

## 2024-09-19 RX ORDER — METOCLOPRAMIDE HYDROCHLORIDE 5 MG/ML
10 INJECTION INTRAMUSCULAR; INTRAVENOUS ONCE
Status: COMPLETED | OUTPATIENT
Start: 2024-09-19 | End: 2024-09-19

## 2024-09-19 RX ORDER — ROCURONIUM BROMIDE 10 MG/ML
INJECTION, SOLUTION INTRAVENOUS AS NEEDED
Status: DISCONTINUED | OUTPATIENT
Start: 2024-09-19 | End: 2024-09-19 | Stop reason: SURG

## 2024-09-19 RX ORDER — ONDANSETRON 2 MG/ML
INJECTION INTRAMUSCULAR; INTRAVENOUS AS NEEDED
Status: DISCONTINUED | OUTPATIENT
Start: 2024-09-19 | End: 2024-09-19 | Stop reason: SURG

## 2024-09-19 RX ORDER — HYDROMORPHONE HYDROCHLORIDE 1 MG/ML
0.2 INJECTION, SOLUTION INTRAMUSCULAR; INTRAVENOUS; SUBCUTANEOUS EVERY 5 MIN PRN
Status: DISCONTINUED | OUTPATIENT
Start: 2024-09-19 | End: 2024-09-19

## 2024-09-19 RX ORDER — FAMOTIDINE 10 MG/ML
20 INJECTION, SOLUTION INTRAVENOUS ONCE
Status: COMPLETED | OUTPATIENT
Start: 2024-09-19 | End: 2024-09-19

## 2024-09-19 RX ORDER — METOCLOPRAMIDE 10 MG/1
10 TABLET ORAL ONCE
Status: COMPLETED | OUTPATIENT
Start: 2024-09-19 | End: 2024-09-19

## 2024-09-19 RX ORDER — DEXTROSE MONOHYDRATE 25 G/50ML
50 INJECTION, SOLUTION INTRAVENOUS
Status: DISCONTINUED | OUTPATIENT
Start: 2024-09-19 | End: 2024-09-19

## 2024-09-19 RX ORDER — HYDROCODONE BITARTRATE AND ACETAMINOPHEN 10; 325 MG/1; MG/1
1 TABLET ORAL EVERY 6 HOURS PRN
Qty: 20 TABLET | Refills: 0 | Status: SHIPPED | OUTPATIENT
Start: 2024-09-19

## 2024-09-19 RX ORDER — METOPROLOL TARTRATE 25 MG/1
25 TABLET, FILM COATED ORAL ONCE AS NEEDED
Status: DISCONTINUED | OUTPATIENT
Start: 2024-09-19 | End: 2024-09-19 | Stop reason: HOSPADM

## 2024-09-19 RX ORDER — FAMOTIDINE 20 MG/1
20 TABLET, FILM COATED ORAL ONCE
Status: COMPLETED | OUTPATIENT
Start: 2024-09-19 | End: 2024-09-19

## 2024-09-19 RX ORDER — IBUPROFEN 600 MG/1
600 TABLET, FILM COATED ORAL EVERY 6 HOURS PRN
Qty: 15 TABLET | Refills: 1 | Status: SHIPPED | OUTPATIENT
Start: 2024-09-19 | End: 2024-09-26

## 2024-09-19 RX ORDER — LABETALOL HYDROCHLORIDE 5 MG/ML
5 INJECTION, SOLUTION INTRAVENOUS AS NEEDED
Status: DISCONTINUED | OUTPATIENT
Start: 2024-09-19 | End: 2024-09-19

## 2024-09-19 RX ORDER — NICOTINE POLACRILEX 4 MG
15 LOZENGE BUCCAL
Status: DISCONTINUED | OUTPATIENT
Start: 2024-09-19 | End: 2024-09-19

## 2024-09-19 RX ADMIN — LIDOCAINE HYDROCHLORIDE 50 MG: 10 INJECTION, SOLUTION EPIDURAL; INFILTRATION; INTRACAUDAL; PERINEURAL at 12:10:00

## 2024-09-19 RX ADMIN — ONDANSETRON 4 MG: 2 INJECTION INTRAMUSCULAR; INTRAVENOUS at 12:30:00

## 2024-09-19 RX ADMIN — ROCURONIUM BROMIDE 50 MG: 10 INJECTION, SOLUTION INTRAVENOUS at 12:10:00

## 2024-09-19 NOTE — ANESTHESIA PREPROCEDURE EVALUATION
Anesthesia PreOp Note    HPI:     Yi Barrientos is a 74 year old female who presents for preoperative consultation requested by: Sacha Patton MD    Date of Surgery: 9/19/2024    Procedure(s):  Laparoscopic ventral hernia repair with mesh  Indication: Ventral hernia without obstruction or gangrene [K43.9]    Relevant Problems   No relevant active problems       NPO:                         History Review:  Patient Active Problem List    Diagnosis Date Noted    CKD (chronic kidney disease) stage 2, GFR 60-89 ml/min 07/26/2024    Osteoarthritis     Cataract of both eyes 12/03/2020    Hyponatremia 10/13/2020    Osteoporosis 09/30/2020    BPPV (benign paroxysmal positional vertigo) 06/16/2020    Type 2 diabetes mellitus with stage 3 chronic kidney disease, without long-term current use of insulin (HCC) 04/17/2019    Aortic atherosclerosis (HCC) 04/17/2019    Personal history of skin cancer 03/14/2019    Hidradenitis suppurativa 03/14/2019    History of actinic keratoses 09/27/2018    Pure hypercholesterolemia 04/25/2016    Exposure to hepatitis C 04/25/2016    Morbid obesity due to excess calories (HCC) 04/25/2016    Essential hypertension, benign 11/03/2014    Inflamed seborrheic keratosis 04/24/2014    Disseminated superficial actinic porokeratosis (DSAP) 04/24/2014    Allergic rhinitis 09/15/2009       Past Medical History:    Anesthesia complication    body aches for 3 days    Anxiety state    Cancer (HCC)    skin cancer    Cataract    Diabetes (HCC)    Diabetes mellitus (HCC)    Essential hypertension    High blood pressure    High cholesterol    Incontinence    bladder    Obesity, unspecified    Osteoarthritis    In knees    Renal disorder    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     Cholecystectomy         No medications prior to admission.     No current Epic-ordered facility-administered medications on file.     Current Outpatient Medications Ordered in Epic   Medication Sig Dispense Refill    Psyllium (METAMUCIL FREE & NATURAL) 43 % Oral Powder Take 1 Scoop by mouth daily.      metFORMIN  MG Oral Tablet 24 Hr Take 1 tablet (500 mg total) by mouth daily with food. (Patient taking differently: Take 1 tablet (500 mg total) by mouth every evening.) 90 tablet 3    dapagliflozin (FARXIGA) 10 MG Oral Tab Take 1 tablet (10 mg total) by mouth daily. 90 tablet 3    metoprolol tartrate 25 MG Oral Tab Take 1 tablet (25 mg total) by mouth 2 (two) times daily. 180 tablet 3    verapamil  MG Oral Tab CR Take 1 tablet (240 mg total) by mouth nightly. 90 tablet 3    alendronate 70 MG Oral Tab Take 1 tablet (70 mg total) by mouth once a week. (Patient taking differently: Take 1 tablet (70 mg total) by mouth once a week. Monday) 12 tablet 3    atorvastatin 20 MG Oral Tab TAKE 1 TABLET (20MG) BY MOUTH EVERY DAY (Patient taking differently: Take 1 tablet (20 mg total) by mouth nightly. TAKE 1 TABLET (20MG) BY MOUTH EVERY DAY) 90 tablet 3    Spironolactone-HCTZ 25-25 MG Oral Tab Take 1 tablet by mouth daily. 90 tablet 3    cholecalciferol 50 MCG (2000 UT) Oral Cap Take 1 capsule (2,000 Units total) by mouth daily.      Multiple Vitamins-Minerals (CENTRUM SILVER) Oral Tab Take 1 tablet by mouth daily.      Glucose Blood (ACCU-CHEK ADITYA PLUS) In Vitro Strip USE ONCE DAILY IN THE MORNING BEFORE BREAKFAST 100 strip 3    Omega-3-acid Ethyl Esters 1 g Oral Cap Take 1 capsule (1 g total) by mouth daily.      Blood Glucose Monitoring Suppl (ACCU-CHEK ADITYA PLUS) w/Device Does not apply Kit USE ONCE DAILY IN AM BEFORE BREAKFAST 1 kit 0    LANCETS ULTRA THIN Does not apply Misc Use one daily 100 each 6       Allergies   Allergen Reactions    Erythromycin DIARRHEA     Stomach pain       Family  History   Problem Relation Age of Onset    Hypertension Father     Stroke Father     Heart Attack Mother      Social History     Socioeconomic History    Marital status:    Tobacco Use    Smoking status: Former     Current packs/day: 0.00     Average packs/day: 0.5 packs/day for 45.0 years (22.5 ttl pk-yrs)     Types: Cigarettes     Start date: 1973     Quit date: 2018     Years since quittin.6    Smokeless tobacco: Never    Tobacco comments:     1 pack every 3 days   Vaping Use    Vaping status: Never Used   Substance and Sexual Activity    Alcohol use: Not Currently     Comment: very rarely    Drug use: No   Other Topics Concern    Caffeine Concern Yes     Comment: Coffee, 2 cups per day     History of tanning No    Reaction to local anesthetic No    Left Handed No    Right Handed Yes    Currently spends a great deal of time in the sun No    Hx of Spending Great Deal of Time in Sun No    Bad sunburns in the past Yes    Tanning Salons in the Past No    Hx of Radiation Treatments No       Available pre-op labs reviewed.     Lab Results   Component Value Date     (L) 2024    K 4.3 2024     2024    CO2 29.0 2024    BUN 20 2024    CREATSERUM 0.98 2024     (H) 2024    CA 10.2 2024          Vital Signs:  Body mass index is 35.28 kg/m².   height is 1.651 m (5' 5\") and weight is 96.2 kg (212 lb).   Vitals:    24 1336   Weight: 96.2 kg (212 lb)   Height: 1.651 m (5' 5\")        Anesthesia Evaluation     Patient summary reviewed and Nursing notes reviewed    Airway   Mallampati: III  TM distance: <3 FB  Neck ROM: limited  Dental      Pulmonary     breath sounds clear to auscultation  Cardiovascular   (+) hypertension    Rhythm: regular  Rate: normal    Neuro/Psych    (+)  anxiety/panic attacks,        GI/Hepatic/Renal      Endo/Other    (+) diabetes mellitus  Abdominal                  Anesthesia Plan:   ASA:  3  Plan:    General  Airway:  ETT  Informed Consent Plan and Risks Discussed With:  Patient  Discussed plan with:  Attending      I have informed Yi Muñoznick and/or legal guardian or family member of the nature of the anesthetic plan, benefits, risks including possible dental damage if relevant, major complications, and any alternative forms of anesthetic management.   All of the patient's questions were answered to the best of my ability. The patient desires the anesthetic management as planned.  Carolina Walsh MD  9/19/2024 10:35 AM  Present on Admission:  **None**

## 2024-09-19 NOTE — INTERVAL H&P NOTE
Pre-op Diagnosis: Ventral hernia without obstruction or gangrene [K43.9]    The above referenced H&P was reviewed by Sacha Patton MD on 9/19/2024, the patient was examined and no significant changes have occurred in the patient's condition since the H&P was performed.  I discussed with the patient and/or legal representative the potential benefits, risks and side effects of this procedure; the likelihood of the patient achieving goals; and potential problems that might occur during recuperation.  I discussed reasonable alternatives to the procedure, including risks, benefits and side effects related to the alternatives and risks related to not receiving this procedure.  We will proceed with procedure as planned.

## 2024-09-19 NOTE — ANESTHESIA PROCEDURE NOTES
Airway  Date/Time: 9/19/2024 12:21 PM  Urgency: elective    Airway not difficult    General Information and Staff    Patient location during procedure: OR  Anesthesiologist: Carolina Walsh MD  Performed: anesthesiologist   Performed by: Carolina Walsh MD  Authorized by: Carolina Walsh MD      Indications and Patient Condition  Indications for airway management: anesthesia  Spontaneous Ventilation: absent  Sedation level: deep  Preoxygenated: yes  Patient position: sniffing  Mask difficulty assessment: 1 - vent by mask    Final Airway Details  Final airway type: endotracheal airway      Successful airway: ETT  Cuffed: yes   Successful intubation technique: Video laryngoscopy  Endotracheal tube insertion site: oral  Blade: Nova  Blade size: #3  ETT size (mm): 7.5    Placement verified by: capnometry   Measured from: lips  ETT to lips (cm): 21  Number of attempts at approach: 1  Number of other approaches attempted: 0        
English

## 2024-09-19 NOTE — ANESTHESIA POSTPROCEDURE EVALUATION
Patient: Yi Barrientos    Procedure Summary       Date: 09/19/24 Room / Location: [facility] MAIN OR  / [facility] MAIN OR    Anesthesia Start: 1205 Anesthesia Stop: 1255    Procedure: Laparoscopic ventral hernia repair with mesh (Abdomen) Diagnosis:       Ventral hernia without obstruction or gangrene      (Ventral hernia without obstruction or gangrene [K43.9])    Surgeons: Sacha Patton MD Anesthesiologist: Carolina Walsh MD    Anesthesia Type: general ASA Status: 3            Anesthesia Type: general    Vitals Value Taken Time   /81 09/19/24 1424   Temp 98 °F (36.7 °C) 09/19/24 1415   Pulse 91 09/19/24 1424   Resp 16 09/19/24 1424   SpO2 93 % 09/19/24 1424       EM AN Post Evaluation:   Patient Evaluated in PACU  Patient Participation: complete - patient participated  Level of Consciousness: awake  Pain Score: 0  Pain Management: adequate  Airway Patency:patent  Dental exam unchanged from preop  Yes    Nausea/Vomiting: none  Cardiovascular Status: acceptable  Respiratory Status: acceptable  Postoperative Hydration acceptable      Carolina Walsh MD  9/19/2024 2:39 PM

## 2024-09-19 NOTE — H&P
History and Physical      HPI     No chief complaint on file.      HPI  Yi Barrientos is a 74 year old female who presents with a large ventral incisional hernia after a cholecystectomy.  Pain and bulging.  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     Cholecystectomy       No current outpatient medications on file.     ALLERGIES  Allergies   Allergen Reactions    Erythromycin DIARRHEA     Stomach pain       Social History     Socioeconomic History    Marital status:    Tobacco Use    Smoking status: Former     Current packs/day: 0.00     Average packs/day: 0.5 packs/day for 45.0 years (22.5 ttl pk-yrs)     Types: Cigarettes     Start date: 1973     Quit date: 2018     Years since quittin.6    Smokeless tobacco: Never    Tobacco comments:     1 pack every 3 days   Vaping Use    Vaping status: Never Used   Substance and Sexual Activity    Alcohol use: Not Currently     Comment: very rarely    Drug use: No     Family History   Problem Relation Age of Onset    Hypertension Father     Stroke Father     Heart Attack Mother        Review of Systems   A comprehensive 10 point review of systems was completed.  Pertinent positives and negatives noted in the the HPI.    PHYSICAL EXAM   /65 (BP Location: Right arm)   Pulse 92   Temp 98.1 °F (36.7 °C) (Oral)   Resp 20   Ht 5' 5\" (1.651 m)   Wt 212  lb (96.2 kg)   SpO2 95%   BMI 35.28 kg/m²  No LMP recorded. Patient is postmenopausal.   Constitutional: appears well hydrated alert and responsive no acute distress noted  Head/Face: normocephalic  Nose/Mouth/Throat: nose and throat are clear palate is intact mucous membranes are moist no oral lesions are noted  Neck/Thyroid: neck is supple without adenopathy  Respiratory: normal to inspection lungs are clear to auscultation bilaterally normal respiratory effort  Cardiovascular: regular rate and rhythm no murmurs, gallups, or rubs  Abdomen: soft non-tender non-distended no organomegaly noted no masses  Large ventral hernia extending from her supraumbilical incision to the left measuring 10 cm    Extremities: no edema, cyanosis, or clubbing  Neurological: exam appropriate for age reflexes and motor skills appropriate for age      ASSESSMENT/PLAN   Assessment   Encounter Diagnosis   Name Primary?    Ventral hernia without obstruction or gangrene Yes       74 year old female with incarcerated ventral hernia  We have discussed the surgical risks, benefits, alternatives, and expected recovery. We will plan laparoscopic repair incarcerated ventral hernia with mesh. All of the patient's questions have been answered to her satisfaction.       9/19/2024  Sacha Patton MD

## 2024-09-20 ENCOUNTER — TELEPHONE (OUTPATIENT)
Dept: SURGERY | Facility: CLINIC | Age: 74
End: 2024-09-20

## 2024-09-24 NOTE — OPERATIVE REPORT
[facility]    PATIENT'S NAME: NICOLE GIORDANO   ATTENDING PHYSICIAN: Sacha Patton MD   OPERATING PHYSICIAN: Sacha Patton MD   PATIENT ACCOUNT#:   [id number]    LOCATION:  [facility] 10  MEDICAL RECORD #:   [id number]       YOB: 1950  ADMISSION DATE:       09/19/2024      OPERATION DATE:  09/19/2024    OPERATIVE REPORT      PREOPERATIVE DIAGNOSIS:  Ventral hernia 10 cm.  POSTOPERATIVE DIAGNOSIS:  Ventral hernia 10 cm.  PROCEDURE:  Laparoscopic hybrid repair of large ventral incisional hernia with 6-inch Ventralight mesh.    ASSISTANT:  R[place]Y [place]    ESTIMATED BLOOD LOSS:  10 mL.    COMPLICATIONS:  None.    ANESTHESIA:  General.    DISPOSITION:  To Recovery, tolerated well.    INDICATIONS:  The patient is 74 with a large symptomatic hernia.  Consent obtained.    OPERATIVE TECHNIQUE:  He was taken to surgery.  He was prepped and draped in usual sterile fashion.  Veress needle placed at Hopkins's point, abdomen insufflated.  A 5 mm trocar placed using Optiview.  Two additional trocars were placed on the left.  Exploration revealed a large incisional hernia from a prior gallbladder trocar.  This measured approximately 10 cm.  Lysis of adhesions was performed extensive utilizing LigaSure.  All the small-bowel and omentum were reduced back into the abdominal cavity.  A small incision was made utilizing the old incision, the defect closed primarily using 0 looped PDS.  A 6-inch Ventralight rolled, placed intraabdominally, the balloon deployed, secured into place using double-crown technique of SecureStrap.  The balloon retrieved intact.  Hemostasis maintained.  Trocars removed.  Fascia closed with 0 Vicryl.  Skin closed with 3-0 Monocryl, benzoin, and Steri-Strips and Marcaine infiltrated for local block.     Dictated By Sacha Patton MD  d: 09/24/2024 10:56:37  t: 09/24/2024 16:55:12  Job [id number]  GL/    cc: Tammy Chan MD

## 2024-09-26 ENCOUNTER — PATIENT MESSAGE (OUTPATIENT)
Dept: FAMILY MEDICINE CLINIC | Facility: CLINIC | Age: 74
End: 2024-09-26

## 2024-09-26 DIAGNOSIS — Z12.31 BREAST CANCER SCREENING BY MAMMOGRAM: Primary | ICD-10-CM

## 2024-09-27 NOTE — TELEPHONE ENCOUNTER
Chart reviewed. Order for bilateral mammogram screening entered per triage protocol.     Last mammogram 10/27/23:       CONCLUSION:   No mammographic evidence of malignancy.     BI-RADS CATEGORY:    DIAGNOSTIC CATEGORY 1--NEGATIVE ASSESSMENT.       RECOMMENDATIONS:  ROUTINE MAMMOGRAM AND CLINICAL EVALUATION IN 12 MONTHS.

## 2024-10-04 ENCOUNTER — NURSE ONLY (OUTPATIENT)
Dept: SURGERY | Facility: CLINIC | Age: 74
End: 2024-10-04
Payer: MEDICARE

## 2024-10-04 VITALS
WEIGHT: 212 LBS | SYSTOLIC BLOOD PRESSURE: 149 MMHG | BODY MASS INDEX: 35.32 KG/M2 | HEIGHT: 65 IN | DIASTOLIC BLOOD PRESSURE: 62 MMHG

## 2024-10-04 DIAGNOSIS — Z48.89 ENCOUNTER FOR POSTOPERATIVE CARE: Primary | ICD-10-CM

## 2024-10-04 PROCEDURE — 99212 OFFICE O/P EST SF 10 MIN: CPT

## 2024-10-04 NOTE — PROGRESS NOTES
Follow Up Visit Note       Active Problems      1. Encounter for postoperative care          Chief Complaint   Chief Complaint   Patient presents with    Post-Op     Patient here for post op LSC Ventral Hernia repair with mesh with Dr. Patton on 9-.  The patient reports intermittent constipation.  She is having normal Urination.  She is getting back to normal activity.  She is here with spouse and is using wheelchair to ambulate.  She has scant discharge from the Umbilical incision.           History of Present Illness  Yi presents for post op appointment for ventral hernia repair with mesh. She has incisional discomfort but her pain has improved. She is tolerating a general diet well. She takes Colace and Metamucil daily but still struggles with constipation. Her  Obed helps lift heavy objects so she has been able to refrain from heavy lifting. She does not note chest pain, SOB, fever, leg edema or calf tenderness.       Allergies  Yi is allergic to erythromycin.    Past Medical / Surgical / Social / Family History    The past medical and past surgical history have been reviewed by me today.    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     Cholecystectomy      Laparoscopic incisional / umbilical / ventral hernia repair  09/19/2024       The family history and social history have been reviewed by me today.    Family  History   Problem Relation Age of Onset    Hypertension Father     Stroke Father     Heart Attack Mother      Social History     Socioeconomic History    Marital status:    Tobacco Use    Smoking status: Former     Current packs/day: 0.00     Average packs/day: 0.5 packs/day for 45.0 years (22.5 ttl pk-yrs)     Types: Cigarettes     Start date: 1973     Quit date: 2018     Years since quittin.6    Smokeless tobacco: Never    Tobacco comments:     1 pack every 3 days   Vaping Use    Vaping status: Never Used   Substance and Sexual Activity    Alcohol use: Not Currently     Comment: very rarely    Drug use: No   Other Topics Concern    Caffeine Concern Yes     Comment: Coffee, 2 cups per day     History of tanning No    Reaction to local anesthetic No    Left Handed No    Right Handed Yes    Currently spends a great deal of time in the sun No    Hx of Spending Great Deal of Time in Sun No    Bad sunburns in the past Yes    Tanning Salons in the Past No    Hx of Radiation Treatments No        Current Outpatient Medications:     HYDROcodone-acetaminophen (NORCO)  MG Oral Tab, Take 1 tablet by mouth every 6 (six) hours as needed for Pain., Disp: 20 tablet, Rfl: 0    Psyllium (METAMUCIL FREE & NATURAL) 43 % Oral Powder, Take 1 Scoop by mouth daily., Disp: , Rfl:     metFORMIN  MG Oral Tablet 24 Hr, Take 1 tablet (500 mg total) by mouth daily with food. (Patient taking differently: Take 1 tablet (500 mg total) by mouth every evening.), Disp: 90 tablet, Rfl: 3    dapagliflozin (FARXIGA) 10 MG Oral Tab, Take 1 tablet (10 mg total) by mouth daily., Disp: 90 tablet, Rfl: 3    metoprolol tartrate 25 MG Oral Tab, Take 1 tablet (25 mg total) by mouth 2 (two) times daily., Disp: 180 tablet, Rfl: 3    verapamil  MG Oral Tab CR, Take 1 tablet (240 mg total) by mouth nightly., Disp: 90 tablet, Rfl: 3    alendronate 70 MG Oral Tab, Take 1 tablet (70 mg total) by mouth once a week. (Patient taking  differently: Take 1 tablet (70 mg total) by mouth once a week. Monday), Disp: 12 tablet, Rfl: 3    atorvastatin 20 MG Oral Tab, TAKE 1 TABLET (20MG) BY MOUTH EVERY DAY (Patient taking differently: Take 1 tablet (20 mg total) by mouth nightly. TAKE 1 TABLET (20MG) BY MOUTH EVERY DAY), Disp: 90 tablet, Rfl: 3    Spironolactone-HCTZ 25-25 MG Oral Tab, Take 1 tablet by mouth daily., Disp: 90 tablet, Rfl: 3    cholecalciferol 50 MCG (2000 UT) Oral Cap, Take 1 capsule (2,000 Units total) by mouth daily., Disp: , Rfl:     Glucose Blood (ACCU-CHEK ADITYA PLUS) In Vitro Strip, USE ONCE DAILY IN THE MORNING BEFORE BREAKFAST, Disp: 100 strip, Rfl: 3    Omega-3-acid Ethyl Esters 1 g Oral Cap, Take 1 capsule (1 g total) by mouth daily., Disp: , Rfl:     Blood Glucose Monitoring Suppl (ACCU-CHEK ADITYA PLUS) w/Device Does not apply Kit, USE ONCE DAILY IN AM BEFORE BREAKFAST, Disp: 1 kit, Rfl: 0    LANCETS ULTRA THIN Does not apply Misc, Use one daily, Disp: 100 each, Rfl: 6    Multiple Vitamins-Minerals (CENTRUM SILVER) Oral Tab, Take 1 tablet by mouth daily., Disp: , Rfl:      Review of Systems  The Review of Systems has been reviewed by me during today.  Review of Systems   Constitutional:  Negative for chills, fatigue and fever.   Respiratory:  Negative for shortness of breath.    Cardiovascular:  Negative for chest pain and leg swelling.   Gastrointestinal:  Positive for constipation. Negative for abdominal pain, diarrhea, nausea and vomiting.        Occasional constipation        Physical Findings   /62 (BP Location: Left arm)   Ht 5' 5\" (1.651 m)   Wt 212 lb (96.2 kg)   BMI 35.28 kg/m²   Physical Exam  Constitutional:       General: She is not in acute distress.     Appearance: She is not ill-appearing.   HENT:      Head: Normocephalic and atraumatic.   Eyes:      Extraocular Movements: Extraocular movements intact.      Conjunctiva/sclera: Conjunctivae normal.      Pupils: Pupils are equal, round, and reactive to  light.   Abdominal:      General: Abdomen is flat. There is no distension.      Palpations: Abdomen is soft.      Tenderness: There is no abdominal tenderness.      Comments: Incisions C/D/I   Skin:     General: Skin is warm.   Neurological:      Mental Status: She is alert.          Assessment   1. Encounter for postoperative care        Plan   Continue avoiding heavy lifting for another 4 weeks  OK to swim or take a bath  Continue healthy diet and adequate hydration  Follow up as needed       No orders of the defined types were placed in this encounter.      Imaging & Referrals   None    Follow Up  No follow-ups on file.    MUNA Pimentel

## 2024-12-04 ENCOUNTER — LAB ENCOUNTER (OUTPATIENT)
Dept: LAB | Age: 74
End: 2024-12-04
Attending: FAMILY MEDICINE
Payer: MEDICARE

## 2024-12-04 DIAGNOSIS — N18.31 TYPE 2 DIABETES MELLITUS WITH STAGE 3A CHRONIC KIDNEY DISEASE, WITHOUT LONG-TERM CURRENT USE OF INSULIN (HCC): Chronic | ICD-10-CM

## 2024-12-04 DIAGNOSIS — E11.22 TYPE 2 DIABETES MELLITUS WITH STAGE 3A CHRONIC KIDNEY DISEASE, WITHOUT LONG-TERM CURRENT USE OF INSULIN (HCC): Chronic | ICD-10-CM

## 2024-12-04 LAB
ALBUMIN SERPL-MCNC: 4.6 G/DL (ref 3.2–4.8)
ALBUMIN/GLOB SERPL: 1.6 {RATIO} (ref 1–2)
ALP LIVER SERPL-CCNC: 63 U/L
ALT SERPL-CCNC: 37 U/L
ANION GAP SERPL CALC-SCNC: 7 MMOL/L (ref 0–18)
AST SERPL-CCNC: 15 U/L (ref ?–34)
BILIRUB SERPL-MCNC: 0.6 MG/DL (ref 0.2–1.1)
BUN BLD-MCNC: 25 MG/DL (ref 9–23)
BUN/CREAT SERPL: 56.8 (ref 10–20)
CALCIUM BLD-MCNC: 10.7 MG/DL (ref 8.7–10.4)
CHLORIDE SERPL-SCNC: 100 MMOL/L (ref 98–112)
CHOLEST SERPL-MCNC: 107 MG/DL (ref ?–200)
CO2 SERPL-SCNC: 28 MMOL/L (ref 21–32)
CREAT BLD-MCNC: 0.44 MG/DL
EGFRCR SERPLBLD CKD-EPI 2021: 103 ML/MIN/1.73M2 (ref 60–?)
EST. AVERAGE GLUCOSE BLD GHB EST-MCNC: 120 MG/DL (ref 68–126)
FASTING PATIENT LIPID ANSWER: YES
FASTING STATUS PATIENT QL REPORTED: YES
GLOBULIN PLAS-MCNC: 2.9 G/DL (ref 2–3.5)
GLUCOSE BLD-MCNC: 115 MG/DL (ref 70–99)
HBA1C MFR BLD: 5.8 % (ref ?–5.7)
HDLC SERPL-MCNC: 35 MG/DL (ref 40–59)
LDLC SERPL CALC-MCNC: 49 MG/DL (ref ?–100)
NONHDLC SERPL-MCNC: 72 MG/DL (ref ?–130)
OSMOLALITY SERPL CALC.SUM OF ELEC: 285 MOSM/KG (ref 275–295)
POTASSIUM SERPL-SCNC: 4.4 MMOL/L (ref 3.5–5.1)
PROT SERPL-MCNC: 7.5 G/DL (ref 5.7–8.2)
SODIUM SERPL-SCNC: 135 MMOL/L (ref 136–145)
TRIGL SERPL-MCNC: 126 MG/DL (ref 30–149)
VLDLC SERPL CALC-MCNC: 18 MG/DL (ref 0–30)

## 2024-12-04 PROCEDURE — 36415 COLL VENOUS BLD VENIPUNCTURE: CPT

## 2024-12-04 PROCEDURE — 80061 LIPID PANEL: CPT

## 2024-12-04 PROCEDURE — 83036 HEMOGLOBIN GLYCOSYLATED A1C: CPT

## 2024-12-04 PROCEDURE — 80053 COMPREHEN METABOLIC PANEL: CPT

## 2024-12-10 ENCOUNTER — OFFICE VISIT (OUTPATIENT)
Dept: FAMILY MEDICINE CLINIC | Facility: CLINIC | Age: 74
End: 2024-12-10
Payer: MEDICARE

## 2024-12-10 VITALS
WEIGHT: 202 LBS | DIASTOLIC BLOOD PRESSURE: 83 MMHG | SYSTOLIC BLOOD PRESSURE: 137 MMHG | TEMPERATURE: 98 F | RESPIRATION RATE: 18 BRPM | BODY MASS INDEX: 33.66 KG/M2 | HEIGHT: 65 IN | HEART RATE: 88 BPM | OXYGEN SATURATION: 97 %

## 2024-12-10 DIAGNOSIS — I10 ESSENTIAL HYPERTENSION, BENIGN: Chronic | ICD-10-CM

## 2024-12-10 DIAGNOSIS — E11.22 TYPE 2 DIABETES MELLITUS WITH STAGE 3A CHRONIC KIDNEY DISEASE, WITHOUT LONG-TERM CURRENT USE OF INSULIN (HCC): Primary | Chronic | ICD-10-CM

## 2024-12-10 DIAGNOSIS — J30.2 SEASONAL ALLERGIES: ICD-10-CM

## 2024-12-10 DIAGNOSIS — Z12.11 COLON CANCER SCREENING: ICD-10-CM

## 2024-12-10 DIAGNOSIS — N18.31 TYPE 2 DIABETES MELLITUS WITH STAGE 3A CHRONIC KIDNEY DISEASE, WITHOUT LONG-TERM CURRENT USE OF INSULIN (HCC): Primary | Chronic | ICD-10-CM

## 2024-12-10 DIAGNOSIS — R06.02 SHORTNESS OF BREATH: ICD-10-CM

## 2024-12-10 PROCEDURE — 1159F MED LIST DOCD IN RCRD: CPT | Performed by: FAMILY MEDICINE

## 2024-12-10 PROCEDURE — 1160F RVW MEDS BY RX/DR IN RCRD: CPT | Performed by: FAMILY MEDICINE

## 2024-12-10 PROCEDURE — 99214 OFFICE O/P EST MOD 30 MIN: CPT | Performed by: FAMILY MEDICINE

## 2024-12-10 PROCEDURE — G2211 COMPLEX E/M VISIT ADD ON: HCPCS | Performed by: FAMILY MEDICINE

## 2024-12-10 PROCEDURE — 1126F AMNT PAIN NOTED NONE PRSNT: CPT | Performed by: FAMILY MEDICINE

## 2024-12-10 NOTE — PROGRESS NOTES
Chief Complaint   Patient presents with    Follow - Up     Labs 12/4 patient c/o increased sob x 2 months      HPI:   Yi Barrientos is a 74 year old female who presents to clinic for follow up.     Pt reports shortness of breath since her hernia surgery. Phlegm in throat sensation but has not tried allergy meds yet. No chest pain with exertion. No worsening of dyspnea on exertion. Occ dizziness, thinks she is chronically dehydrated.       REVIEW OF SYSTEMS:   Negative, except per HPI.     EXAM:   /83   Pulse 88   Temp 98 °F (36.7 °C) (Oral)   Resp 18   Ht 5' 5\" (1.651 m)   Wt 202 lb (91.6 kg)   SpO2 97%   BMI 33.61 kg/m²   Body mass index is 33.61 kg/m².  GENERAL: well developed, well nourished, in no apparent distress  SKIN: no rashes, no suspicious lesions  HEENT: atraumatic, normocephalic  EYES: PERRLA, EOMI,conjunctiva are clear  NECK: supple, no adenopathy  LUNGS: clear to auscultation  CARDIO: RRR without murmur    ASSESSMENT AND PLAN:     1. Type 2 diabetes mellitus with stage 3a chronic kidney disease, without long-term current use of insulin (HCC)  - Stable condition, continue present management.      2. Essential hypertension, benign  - Stable condition, continue present management.      3. Colon cancer screening  - COLOGUARD COLON CANCER SCREENING (EXTERNAL)    4. Shortness of breath  - CARD ECHO 2D DOPPLER (CPT=93306); Future  - XR CHEST PA + LAT CHEST (CPT=71046); Future    5. Seasonal allergies  Pt has otc claritin at home     RTC if no improvement in symptoms. Red flags discussed to go to ER.     Tammy Chan MD  12/10/2024  11:01 AM

## 2024-12-16 ENCOUNTER — PATIENT MESSAGE (OUTPATIENT)
Dept: FAMILY MEDICINE CLINIC | Facility: CLINIC | Age: 74
End: 2024-12-16

## 2025-01-02 ENCOUNTER — HOSPITAL ENCOUNTER (OUTPATIENT)
Dept: GENERAL RADIOLOGY | Facility: HOSPITAL | Age: 75
Discharge: HOME | End: 2025-01-02
Attending: FAMILY MEDICINE
Payer: MEDICARE

## 2025-01-02 ENCOUNTER — HOSPITAL ENCOUNTER (OUTPATIENT)
Dept: CV DIAGNOSTICS | Facility: HOSPITAL | Age: 75
Discharge: HOME | End: 2025-01-02
Attending: FAMILY MEDICINE
Payer: MEDICARE

## 2025-01-02 DIAGNOSIS — R06.02 SHORTNESS OF BREATH: ICD-10-CM

## 2025-01-02 PROCEDURE — 93306 TTE W/DOPPLER COMPLETE: CPT | Performed by: FAMILY MEDICINE

## 2025-01-02 PROCEDURE — 71046 X-RAY EXAM CHEST 2 VIEWS: CPT | Performed by: FAMILY MEDICINE

## 2025-01-06 ENCOUNTER — TELEPHONE (OUTPATIENT)
Dept: FAMILY MEDICINE CLINIC | Facility: CLINIC | Age: 75
End: 2025-01-06

## 2025-01-06 DIAGNOSIS — R91.8 LUNG MASS: Primary | ICD-10-CM

## 2025-01-06 NOTE — TELEPHONE ENCOUNTER
Discussed echo and chest x-ray results with patient.  Chest x-ray showed right suprahilar lung mass.    CT chest ordered information given to patient regarding CT chest and pulmonology referral.  Patient is still short of breath.  Will await CT imaging result.  1. Lung mass  - CT CHEST(CONTRAST ONLY) (CPT=71260); Future  - Pulmonary Referral - In Network

## 2025-01-09 ENCOUNTER — TELEPHONE (OUTPATIENT)
Dept: FAMILY MEDICINE CLINIC | Facility: CLINIC | Age: 75
End: 2025-01-09

## 2025-01-09 ENCOUNTER — TELEPHONE (OUTPATIENT)
Dept: PULMONOLOGY | Facility: CLINIC | Age: 75
End: 2025-01-09

## 2025-01-09 ENCOUNTER — HOSPITAL ENCOUNTER (OUTPATIENT)
Dept: CT IMAGING | Age: 75
Discharge: HOME | End: 2025-01-09
Attending: FAMILY MEDICINE
Payer: MEDICARE

## 2025-01-09 DIAGNOSIS — R91.8 LUNG MASS: ICD-10-CM

## 2025-01-09 DIAGNOSIS — R91.8 LUNG MASS: Primary | ICD-10-CM

## 2025-01-09 PROBLEM — H26.9 CORTICAL CATARACT: Status: ACTIVE | Noted: 2017-09-08

## 2025-01-09 PROBLEM — H43.819 VITREOUS DEGENERATION: Status: ACTIVE | Noted: 2017-09-08

## 2025-01-09 PROBLEM — H35.369 RETINAL DRUSEN: Status: ACTIVE | Noted: 2024-12-19

## 2025-01-09 LAB
CREAT BLD-MCNC: 0.9 MG/DL
EGFRCR SERPLBLD CKD-EPI 2021: 68 ML/MIN/1.73M2 (ref 60–?)

## 2025-01-09 PROCEDURE — 82565 ASSAY OF CREATININE: CPT

## 2025-01-09 PROCEDURE — 71260 CT THORAX DX C+: CPT | Performed by: FAMILY MEDICINE

## 2025-01-09 NOTE — TELEPHONE ENCOUNTER
Okay to double book tomorrow at noon or this upcoming Tuesday at noon.  She needs to see pulmonary right away

## 2025-01-09 NOTE — TELEPHONE ENCOUNTER
Spoke to patient about CT chest results and need to see pulmonology and oncology ASAP.  She called earlier in the week for an appointment with pulmonology and was given an appointment in March.  If you could please send a message to pulmonology clinical staff to help her get scheduled sooner I would appreciate it.  I told her that they would likely be calling her today/tomorrow.

## 2025-01-09 NOTE — TELEPHONE ENCOUNTER
Dr Chan - natasha an FYI. Patient will speak with both pulmonary and oncology tomorrow.       RN called patient to address any other questions and review plan. Patient's date of birth and full name both confirmed.   She verbalizes understanding of all information, and agreeable to plan.   Has not yet heard from pulmonary. And requesting a call tomorrow.   She will call oncology tomorrow, and would like to rest for today - as she is sad to hear about this news and is processing it. But says she will call oncology tomorrow.     RN called Pulmonary. Spoke with Heaven. Patient's date of birth and full name both confirmed.   RN informed of provider's message as detailed and that patient would like a phone call tomorrow.   Heaven, with Pulmonary, will call patient tomorrow to assist with ASAP Pulmonary appointment.       Future Appointments   Date Time Provider Department Center   1/29/2025  2:00 PM Shaji Genao MD [id number] [facility]   3/7/2025 10:00 AM Hussain, Wajahat M, DO [id number] [facility] [place]

## 2025-01-09 NOTE — TELEPHONE ENCOUNTER
Dr. Garrido-incoming call from Dr. Chan's office to add patient with lung mass. When should patient be added?

## 2025-01-14 ENCOUNTER — TELEPHONE (OUTPATIENT)
Dept: PULMONOLOGY | Facility: CLINIC | Age: 75
End: 2025-01-14

## 2025-01-14 ENCOUNTER — TELEPHONE (OUTPATIENT)
Age: 75
End: 2025-01-14

## 2025-01-14 ENCOUNTER — OFFICE VISIT (OUTPATIENT)
Dept: PULMONOLOGY | Facility: CLINIC | Age: 75
End: 2025-01-14
Payer: MEDICARE

## 2025-01-14 VITALS
OXYGEN SATURATION: 97 % | SYSTOLIC BLOOD PRESSURE: 147 MMHG | DIASTOLIC BLOOD PRESSURE: 82 MMHG | WEIGHT: 202.38 LBS | HEIGHT: 65 IN | RESPIRATION RATE: 18 BRPM | HEART RATE: 91 BPM | BODY MASS INDEX: 33.72 KG/M2

## 2025-01-14 DIAGNOSIS — R91.8 MASS OF RIGHT LUNG: Primary | ICD-10-CM

## 2025-01-14 DIAGNOSIS — R91.8 LUNG MASS: Primary | ICD-10-CM

## 2025-01-14 NOTE — H&P
Referring Physician  Tammy Chan MD    Chief Complaint  Lung mass    History of Present Illness  Patient is a 74-year-old female who presents to pulmonary clinic for initial visit.  Admits to some recent history of ongoing dyspnea after hernia surgery.  Has chest x-ray and subsequent CT chest with evidence of right lung mass seen.  Denies prior history of known lung disease.  Admits to 30-pack-year history of tobacco abuse.  Denies any hemoptysis, denies recent history of pneumonia.    Review of Systems  Constitutional: denies weight loss, fevers, chills, weakness, fatigue, recent illness  HEENT: denies epistaxis, sore throat, postnasal drip  Cardio: denies chest pain, chest pressure, palpitations  Respiratory: dyspnea, cough, denies wheezing, hemoptysis   GI: denies nausea, vomiting, abdominal pain  : denies dysuria, hematuria  Musculoskeletal: denies arthralgia, myalgia  Integumentary: denies rash, itching  Neurological: denies syncope, weakness, dizziness,   Psychiatric: denies depression, anxiety  Hematologic: denies bruising    Past Medical History  Past Medical History:    Anesthesia complication    body aches for 3 days    Anxiety state    Cancer (HCC)    skin cancer    Cataract    Diabetes (HCC)    Diabetes mellitus (HCC)    Essential hypertension    High blood pressure    High cholesterol    Incontinence    bladder    Obesity, unspecified    Osteoarthritis    In knees    Renal disorder    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     Cholecystectomy      Laparoscopic incisional / umbilical / ventral hernia repair  09/19/2024       Family History  Family History   Problem Relation Age of Onset    Hypertension Father     Stroke Father     Heart Attack  Mother         Social History  Tobacco: 30-pack-year history of tobacco abuse quit 7 years ago  Alcohol: Denies significant intake  Illicit Drugs: Denies    Medications  Medications Ordered Prior to Encounter[1]    Allergies  Allergies[2]    Physical Exam  Constitutional: no acute distress  HEENT: PERRL  Neck: supple, no JVD  Cardio: RRR, S1 S2  Respiratory: Slightly diminished right upper lobe breath sounds  GI: abdomen soft, non tender, active bowel sounds, no organomegaly  Extremities: no clubbing, cyanosis, edema  Neurologic: no gross motor deficits  Skin: warm, dry  Lymphatic: no supraclavicular lymphadenopathy     Imaging  CT chest on 1/9/2025 with necrotic appearing mass measuring 10.8 cm in largest dimension right upper lobe invading right paratracheal upper mediastinum concerning for underlying malignancy.    Pulmonary Function Testing  None available to review    Assessment  1.  Right lung mass  2.  Dyspnea with exertion    Plan  -Patient presents for pulmonary evaluation after some ongoing dyspnea after recent hernia surgery.  Chest x-ray and subsequent CT chest with evidence of large right upper lobe lung mass invading the paratracheal upper mediastinum.  Discussed findings with the patient.  Concern for underlying malignancy.  PET/CT ordered and arranged for 1/17/2025.  Robotic navigational bronchoscopy with biopsy and endobronchial ultrasound inspection tentatively scheduled for 1/22/2025.    Gabrielle Garrido DO  Pulmonary Critical Care Medicine  [facility]  1/14/2025  2:01 PM        [1]   Current Outpatient Medications on File Prior to Visit   Medication Sig Dispense Refill    Psyllium (METAMUCIL FREE & NATURAL) 43 % Oral Powder Take 1 Scoop by mouth daily.      metFORMIN  MG Oral Tablet 24 Hr Take 1 tablet (500 mg total) by mouth daily with food. (Patient taking differently: Take 1 tablet (500 mg total) by mouth every evening.) 90 tablet 3    dapagliflozin (FARXIGA) 10 MG Oral Tab Take 1  tablet (10 mg total) by mouth daily. 90 tablet 3    metoprolol tartrate 25 MG Oral Tab Take 1 tablet (25 mg total) by mouth 2 (two) times daily. 180 tablet 3    verapamil  MG Oral Tab CR Take 1 tablet (240 mg total) by mouth nightly. 90 tablet 3    alendronate 70 MG Oral Tab Take 1 tablet (70 mg total) by mouth once a week. 12 tablet 3    atorvastatin 20 MG Oral Tab TAKE 1 TABLET (20MG) BY MOUTH EVERY DAY (Patient taking differently: Take 1 tablet (20 mg total) by mouth nightly. TAKE 1 TABLET (20MG) BY MOUTH EVERY DAY) 90 tablet 3    Spironolactone-HCTZ 25-25 MG Oral Tab Take 1 tablet by mouth daily. 90 tablet 3    cholecalciferol 50 MCG (2000 UT) Oral Cap Take 1 capsule (2,000 Units total) by mouth daily.      Glucose Blood (ACCU-CHEK ADITYA PLUS) In Vitro Strip USE ONCE DAILY IN THE MORNING BEFORE BREAKFAST 100 strip 3    Omega-3-acid Ethyl Esters 1 g Oral Cap Take 1 capsule (1 g total) by mouth daily.      Blood Glucose Monitoring Suppl (ACCU-CHEK ADITYA PLUS) w/Device Does not apply Kit USE ONCE DAILY IN AM BEFORE BREAKFAST 1 kit 0    LANCETS ULTRA THIN Does not apply Misc Use one daily 100 each 6    Multiple Vitamins-Minerals (CENTRUM SILVER) Oral Tab Take 1 tablet by mouth daily.      HYDROcodone-acetaminophen (NORCO)  MG Oral Tab Take 1 tablet by mouth every 6 (six) hours as needed for Pain. 20 tablet 0     No current facility-administered medications on file prior to visit.   [2]   Allergies  Allergen Reactions    Erythromycin DIARRHEA     Stomach pain

## 2025-01-14 NOTE — TELEPHONE ENCOUNTER
Per Dr. Garrido patient to be scheduled for robotic navigational bronchoscopy (ION) 1/22/25 12:30 pm, CT chest (ION protocol) 10:30 am for right lung mass.

## 2025-01-14 NOTE — TELEPHONE ENCOUNTER
Called patient with PET Scheduled for 1/17/25 at 945 am at [facility], green parking lot to diagnostic east registration  Hold Metforim 48 hours prior to testing, NPO after MN, no carbs day before.  [facility]  She verbalizes understanding.

## 2025-01-15 ENCOUNTER — TELEPHONE (OUTPATIENT)
Dept: PULMONOLOGY | Facility: CLINIC | Age: 75
End: 2025-01-15

## 2025-01-15 RX ORDER — CETIRIZINE HYDROCHLORIDE 5 MG/1
5 TABLET ORAL DAILY
Status: ON HOLD | COMMUNITY
End: 2025-01-22

## 2025-01-15 NOTE — TELEPHONE ENCOUNTER
Received fax from [employer] requesting more information for PET STANDARD BODY SCAN approval.   -Current signs and symptoms indicating the exam  -Prior diagnostic studies with results   -Prior management   -Medications with dose and duration    Faxed requested documents to [employer]. Confirmation received and sent to scanning

## 2025-01-15 NOTE — TELEPHONE ENCOUNTER
Patient given CT and procedure date, time, & instructions. Instructed her to hold Farxiga for 4 days prior to procedure. Phone # for Managed Care given. Reassurance provided. Explained she may contact her insurance regarding benefits & coverage. She voiced understanding.

## 2025-01-17 ENCOUNTER — HOSPITAL ENCOUNTER (OUTPATIENT)
Dept: NUCLEAR MEDICINE | Facility: HOSPITAL | Age: 75
Discharge: HOME | End: 2025-01-17
Attending: INTERNAL MEDICINE
Payer: MEDICARE

## 2025-01-17 DIAGNOSIS — R91.8 LUNG MASS: ICD-10-CM

## 2025-01-17 LAB — GLUCOSE BLDC GLUCOMTR-MCNC: 114 MG/DL (ref 70–99)

## 2025-01-17 PROCEDURE — 78815 PET IMAGE W/CT SKULL-THIGH: CPT | Performed by: INTERNAL MEDICINE

## 2025-01-17 PROCEDURE — 82962 GLUCOSE BLOOD TEST: CPT

## 2025-01-17 NOTE — TELEPHONE ENCOUNTER
Rosa in Radiology ([id number]) scheduled CT chest ION protocol for 1/22/25 10:30 am ([facility]).

## 2025-01-20 NOTE — TELEPHONE ENCOUNTER
No prior auth required for CT or procedure per [facility] referral notes.

## 2025-01-22 ENCOUNTER — HOSPITAL ENCOUNTER (OUTPATIENT)
Dept: CT IMAGING | Facility: HOSPITAL | Age: 75
Discharge: HOME | End: 2025-01-22
Attending: INTERNAL MEDICINE
Payer: MEDICARE

## 2025-01-22 ENCOUNTER — APPOINTMENT (OUTPATIENT)
Dept: GENERAL RADIOLOGY | Facility: HOSPITAL | Age: 75
End: 2025-01-22
Attending: INTERNAL MEDICINE
Payer: MEDICARE

## 2025-01-22 ENCOUNTER — ANESTHESIA EVENT (OUTPATIENT)
Dept: ENDOSCOPY | Facility: HOSPITAL | Age: 75
End: 2025-01-22
Payer: MEDICARE

## 2025-01-22 ENCOUNTER — ANESTHESIA (OUTPATIENT)
Dept: ENDOSCOPY | Facility: HOSPITAL | Age: 75
End: 2025-01-22
Payer: MEDICARE

## 2025-01-22 ENCOUNTER — HOSPITAL ENCOUNTER (OUTPATIENT)
Facility: HOSPITAL | Age: 75
Setting detail: HOSPITAL OUTPATIENT SURGERY
Discharge: HOME | End: 2025-01-22
Attending: INTERNAL MEDICINE | Admitting: INTERNAL MEDICINE
Payer: MEDICARE

## 2025-01-22 VITALS
RESPIRATION RATE: 14 BRPM | DIASTOLIC BLOOD PRESSURE: 78 MMHG | TEMPERATURE: 97 F | SYSTOLIC BLOOD PRESSURE: 108 MMHG | HEIGHT: 65 IN | BODY MASS INDEX: 33.66 KG/M2 | WEIGHT: 202 LBS | HEART RATE: 112 BPM | OXYGEN SATURATION: 90 %

## 2025-01-22 DIAGNOSIS — R91.8 MASS OF RIGHT LUNG: ICD-10-CM

## 2025-01-22 LAB
ATRIAL RATE: 107 BPM
BASOPHILS NFR BRONCH: 1 %
EOSINOPHIL NFR BRONCH: 0 %
GLUCOSE BLDC GLUCOMTR-MCNC: 113 MG/DL (ref 70–99)
GLUCOSE BLDC GLUCOMTR-MCNC: 130 MG/DL (ref 70–99)
LYMPHOCYTES NFR BRONCH: 19 %
MONOS+MACROS NFR BRONCH: 21 %
NEUTROPHILS NFR BRONCH: 59 %
P AXIS: 50 DEGREES
P-R INTERVAL: 194 MS
Q-T INTERVAL: 338 MS
QRS DURATION: 68 MS
QTC CALCULATION (BEZET): 451 MS
R AXIS: -22 DEGREES
RBC # FLD: ABNORMAL /CUMM (ref ?–1)
T AXIS: 20 DEGREES
TOTAL CELLS COUNTED BRONCH: 654 /CUMM (ref ?–1)
TOTAL CELLS COUNTED FLD: 100
VENTRICULAR RATE: 107 BPM
WBC CALC (IRIS) BRW: 654 /CUMM

## 2025-01-22 PROCEDURE — 88274 CYTOGENETICS 25-99: CPT | Performed by: PATHOLOGY

## 2025-01-22 PROCEDURE — 88363 XM ARCHIVE TISSUE MOLEC ANAL: CPT | Performed by: PATHOLOGY

## 2025-01-22 PROCEDURE — 88271 CYTOGENETICS DNA PROBE: CPT | Performed by: PATHOLOGY

## 2025-01-22 PROCEDURE — 71045 X-RAY EXAM CHEST 1 VIEW: CPT | Performed by: INTERNAL MEDICINE

## 2025-01-22 PROCEDURE — 31627 NAVIGATIONAL BRONCHOSCOPY: CPT | Performed by: INTERNAL MEDICINE

## 2025-01-22 PROCEDURE — 0B9C8ZX DRAINAGE OF RIGHT UPPER LUNG LOBE, VIA NATURAL OR ARTIFICIAL OPENING ENDOSCOPIC, DIAGNOSTIC: ICD-10-PCS | Performed by: INTERNAL MEDICINE

## 2025-01-22 PROCEDURE — 31652 BRONCH EBUS SAMPLNG 1/2 NODE: CPT | Performed by: INTERNAL MEDICINE

## 2025-01-22 PROCEDURE — 31629 BRONCHOSCOPY/NEEDLE BX EACH: CPT | Performed by: INTERNAL MEDICINE

## 2025-01-22 PROCEDURE — 0BBC8ZZ EXCISION OF RIGHT UPPER LUNG LOBE, VIA NATURAL OR ARTIFICIAL OPENING ENDOSCOPIC: ICD-10-PCS | Performed by: INTERNAL MEDICINE

## 2025-01-22 PROCEDURE — 76497 UNLISTED CT PROCEDURE: CPT | Performed by: INTERNAL MEDICINE

## 2025-01-22 PROCEDURE — 31624 DX BRONCHOSCOPE/LAVAGE: CPT | Performed by: INTERNAL MEDICINE

## 2025-01-22 PROCEDURE — 31654 BRONCH EBUS IVNTJ PERPH LES: CPT | Performed by: INTERNAL MEDICINE

## 2025-01-22 PROCEDURE — 31628 BRONCHOSCOPY/LUNG BX EACH: CPT | Performed by: INTERNAL MEDICINE

## 2025-01-22 RX ORDER — LIDOCAINE HYDROCHLORIDE 10 MG/ML
INJECTION, SOLUTION EPIDURAL; INFILTRATION; INTRACAUDAL; PERINEURAL AS NEEDED
Status: DISCONTINUED | OUTPATIENT
Start: 2025-01-22 | End: 2025-01-22 | Stop reason: SURG

## 2025-01-22 RX ORDER — IPRATROPIUM BROMIDE AND ALBUTEROL SULFATE 2.5; .5 MG/3ML; MG/3ML
3 SOLUTION RESPIRATORY (INHALATION) ONCE
Status: COMPLETED | OUTPATIENT
Start: 2025-01-22 | End: 2025-01-22

## 2025-01-22 RX ORDER — ESMOLOL HYDROCHLORIDE 10 MG/ML
INJECTION INTRAVENOUS AS NEEDED
Status: DISCONTINUED | OUTPATIENT
Start: 2025-01-22 | End: 2025-01-22 | Stop reason: SURG

## 2025-01-22 RX ORDER — ONDANSETRON 2 MG/ML
INJECTION INTRAMUSCULAR; INTRAVENOUS AS NEEDED
Status: DISCONTINUED | OUTPATIENT
Start: 2025-01-22 | End: 2025-01-22 | Stop reason: SURG

## 2025-01-22 RX ORDER — ROCURONIUM BROMIDE 10 MG/ML
INJECTION, SOLUTION INTRAVENOUS AS NEEDED
Status: DISCONTINUED | OUTPATIENT
Start: 2025-01-22 | End: 2025-01-22 | Stop reason: SURG

## 2025-01-22 RX ORDER — NALOXONE HYDROCHLORIDE 0.4 MG/ML
0.08 INJECTION, SOLUTION INTRAMUSCULAR; INTRAVENOUS; SUBCUTANEOUS ONCE AS NEEDED
Status: DISCONTINUED | OUTPATIENT
Start: 2025-01-22 | End: 2025-01-22 | Stop reason: HOSPADM

## 2025-01-22 RX ORDER — PHENYLEPHRINE HCL 10 MG/ML
VIAL (ML) INJECTION AS NEEDED
Status: DISCONTINUED | OUTPATIENT
Start: 2025-01-22 | End: 2025-01-22 | Stop reason: SURG

## 2025-01-22 RX ORDER — SODIUM CHLORIDE, SODIUM LACTATE, POTASSIUM CHLORIDE, CALCIUM CHLORIDE 600; 310; 30; 20 MG/100ML; MG/100ML; MG/100ML; MG/100ML
INJECTION, SOLUTION INTRAVENOUS CONTINUOUS
Status: DISCONTINUED | OUTPATIENT
Start: 2025-01-22 | End: 2025-01-22

## 2025-01-22 RX ADMIN — ESMOLOL HYDROCHLORIDE 10 MG: 10 INJECTION INTRAVENOUS at 14:00:00

## 2025-01-22 RX ADMIN — ESMOLOL HYDROCHLORIDE 20 MG: 10 INJECTION INTRAVENOUS at 14:43:00

## 2025-01-22 RX ADMIN — ROCURONIUM BROMIDE 50 MG: 10 INJECTION, SOLUTION INTRAVENOUS at 13:52:00

## 2025-01-22 RX ADMIN — PHENYLEPHRINE HCL 100 MCG: 10 MG/ML VIAL (ML) INJECTION at 14:19:00

## 2025-01-22 RX ADMIN — ONDANSETRON 8 MG: 2 INJECTION INTRAMUSCULAR; INTRAVENOUS at 14:50:00

## 2025-01-22 RX ADMIN — LIDOCAINE HYDROCHLORIDE 100 MG: 10 INJECTION, SOLUTION EPIDURAL; INFILTRATION; INTRACAUDAL; PERINEURAL at 13:52:00

## 2025-01-22 NOTE — ANESTHESIA PREPROCEDURE EVALUATION
Anesthesia PreOp Note    HPI:     Yi Torres is a 74 year old female who presents for preoperative consultation requested by: Gabrielle Garrido DO    Date of Surgery: 1/22/2025    Procedure(s):  ION ROBOT-ASSISTED NAVIGATIONAL BRONCHOSCOPY  Indication: Mass of right lung    Relevant Problems   No relevant active problems       NPO:  Last Liquid Consumption Date: 01/21/25  Last Liquid Consumption Time: 2100  Last Solid Consumption Date: 01/21/25  Last Solid Consumption Time: 2000  Last Liquid Consumption Date: 01/21/25          History Review:  Patient Active Problem List    Diagnosis Date Noted    Retinal drusen 12/19/2024    CKD (chronic kidney disease) stage 2, GFR 60-89 ml/min 07/26/2024    Osteoarthritis     Cataract of both eyes 12/03/2020    Hyponatremia 10/13/2020    Osteoporosis 09/30/2020    BPPV (benign paroxysmal positional vertigo) 06/16/2020    Type 2 diabetes mellitus with stage 3 chronic kidney disease, without long-term current use of insulin (HCC) 04/17/2019    Aortic atherosclerosis (HCC) 04/17/2019    Personal history of skin cancer 03/14/2019    Hidradenitis suppurativa 03/14/2019    History of actinic keratoses 09/27/2018    Vitreous degeneration 09/08/2017    Cortical cataract 09/08/2017    Pure hypercholesterolemia 04/25/2016    Exposure to hepatitis C 04/25/2016    Morbid obesity due to excess calories (HCC) 04/25/2016    Essential hypertension, benign 11/03/2014    Inflamed seborrheic keratosis 04/24/2014    Disseminated superficial actinic porokeratosis (DSAP) 04/24/2014    Allergic rhinitis 09/15/2009       Past Medical History:    Anesthesia complication    body aches for 3 days    Anxiety state    Cancer (HCC)    skin cancer    Cataract    Diabetes (HCC)    Diabetes mellitus (HCC)    Essential hypertension    High blood pressure    High cholesterol    Incontinence    bladder    Obesity, unspecified    Osteoarthritis    In knees    Renal disorder    CKD 2    Squamous cell  carcinoma in situ (SCCIS)    right temple    Visual impairment       Past Surgical History:   Procedure Laterality Date    Adj tiss xfer head,fac,hand <10sqcm Right 2018    Wide excision lesion right temple, flap reconstruction    Cataract  left- 2017.     Cholecystectomy      Laparoscopic incisional / umbilical / ventral hernia repair  2024       Prescriptions Prior to Admission[1]  Current Medications and Prescriptions Ordered in Epic[2]    Allergies[3]    Family History   Problem Relation Age of Onset    Hypertension Father     Stroke Father     Heart Attack Mother      Social History     Socioeconomic History    Marital status:    Tobacco Use    Smoking status: Former     Current packs/day: 0.00     Average packs/day: 0.5 packs/day for 45.0 years (22.5 ttl pk-yrs)     Types: Cigarettes     Start date: 1973     Quit date: 2018     Years since quittin.9     Passive exposure: Past    Smokeless tobacco: Never    Tobacco comments:     1 pack every 3 days   Vaping Use    Vaping status: Never Used   Substance and Sexual Activity    Alcohol use: Not Currently     Comment: very rarely    Drug use: No   Other Topics Concern    Caffeine Concern Yes     Comment: Coffee, 2 cups per day     History of tanning No    Reaction to local anesthetic No    Left Handed No    Right Handed Yes    Currently spends a great deal of time in the sun No    Hx of Spending Great Deal of Time in Sun No    Bad sunburns in the past Yes    Tanning Salons in the Past No    Hx of Radiation Treatments No       Available pre-op labs reviewed.     Lab Results   Component Value Date     (L) 2024    K 4.4 2024     2024    CO2 28.0 2024    BUN 25 (H) 2024    CREATSERUM 0.44 (L) 2024     (H) 2024    PGLU 113 (H) 2025    CA 10.7 (H) 2024          Vital Signs:  Body mass index is 33.61 kg/m².   height is 1.651 m (5' 5\") and weight is 91.6 kg (202  lb). Her blood pressure is 118/96 (abnormal) and her pulse is 107. Her respiration is 16 and oxygen saturation is 95%.   Vitals:    01/15/25 1744 01/22/25 1124   BP:  (!) 118/96   Pulse:  107   Resp:  16   SpO2:  95%   Weight: 91.6 kg (202 lb)    Height: 1.651 m (5' 5\")         Anesthesia Evaluation     Patient summary reviewed and Nursing notes reviewed    History of anesthetic complications   Airway   Mallampati: III  TM distance: <3 FB  Neck ROM: limited  Dental - Dentition appears grossly intact     Pulmonary     breath sounds clear to auscultation  Cardiovascular   (+) hypertension    Rhythm: regular  Rate: normal    Neuro/Psych    (+)  anxiety/panic attacks,        GI/Hepatic/Renal      Endo/Other    (+) diabetes mellitus    Comments: OBESITY  Abdominal                  Anesthesia Plan:   ASA:  3  Plan:   General  Airway:  ETT  Informed Consent Plan and Risks Discussed With:  Patient      I have informed Yi Law Toni Vincent and/or legal guardian or family member of the nature of the anesthetic plan, benefits, risks including possible dental damage if relevant, major complications, and any alternative forms of anesthetic management.   All of the patient's questions were answered to the best of my ability. The patient desires the anesthetic management as planned.  Gallito Mayberry MD  1/22/2025 12:17 PM  Present on Admission:  **None**           [1]   Medications Prior to Admission   Medication Sig Dispense Refill Last Dose/Taking    Cetirizine HCl 10 MG Oral Cap Take 10 mg by mouth daily.   1/21/2025 Evening    metFORMIN  MG Oral Tablet 24 Hr Take 1 tablet (500 mg total) by mouth daily with food. (Patient taking differently: Take 1 tablet (500 mg total) by mouth every evening.) 90 tablet 3 1/21/2025 Evening    dapagliflozin (FARXIGA) 10 MG Oral Tab Take 1 tablet (10 mg total) by mouth daily. 90 tablet 3 1/14/2025    metoprolol tartrate 25 MG Oral Tab Take 1 tablet (25 mg total) by mouth 2 (two) times  daily. 180 tablet 3 1/21/2025 Evening    verapamil  MG Oral Tab CR Take 1 tablet (240 mg total) by mouth nightly. 90 tablet 3 1/21/2025 Evening    alendronate 70 MG Oral Tab Take 1 tablet (70 mg total) by mouth once a week. 12 tablet 3 1/15/2025    atorvastatin 20 MG Oral Tab TAKE 1 TABLET (20MG) BY MOUTH EVERY DAY (Patient taking differently: Take 1 tablet (20 mg total) by mouth nightly. TAKE 1 TABLET (20MG) BY MOUTH EVERY DAY) 90 tablet 3 1/21/2025 Evening    Spironolactone-HCTZ 25-25 MG Oral Tab Take 1 tablet by mouth daily. 90 tablet 3 1/21/2025 Morning    cholecalciferol 50 MCG (2000 UT) Oral Cap Take 1 capsule (2,000 Units total) by mouth daily.   1/21/2025 Morning    Omega-3-acid Ethyl Esters 1 g Oral Cap Take 1 capsule (1 g total) by mouth daily.   1/4/2025    Multiple Vitamins-Minerals (CENTRUM SILVER) Oral Tab Take 1 tablet by mouth daily.   1/21/2025 Morning    Psyllium (METAMUCIL FREE & NATURAL) 43 % Oral Powder Take 1 Scoop by mouth as needed.       Glucose Blood (ACCU-CHEK ADITYA PLUS) In Vitro Strip USE ONCE DAILY IN THE MORNING BEFORE BREAKFAST 100 strip 3     Blood Glucose Monitoring Suppl (ACCU-CHEK ADITYA PLUS) w/Device Does not apply Kit USE ONCE DAILY IN AM BEFORE BREAKFAST 1 kit 0     LANCETS ULTRA THIN Does not apply Misc Use one daily 100 each 6    [2]   Current Facility-Administered Medications Ordered in Epic   Medication Dose Route Frequency Provider Last Rate Last Admin    lactated ringers infusion   Intravenous Continuous Gabrielle Garrido DO 20 mL/hr at 01/22/25 1127 New Bag at 01/22/25 1127    ipratropium-albuterol (Duoneb) 0.5-2.5 (3) MG/3ML inhalation solution 3 mL  3 mL Nebulization Once Gabrielle Garrido DO         No current [facility]-ordered outpatient medications on file.   [3]   Allergies  Allergen Reactions    Erythromycin DIARRHEA     Stomach pain

## 2025-01-22 NOTE — ANESTHESIA POSTPROCEDURE EVALUATION
Patient: Yi Torres    Procedure Summary       Date: 01/22/25 Room / Location: [facility] ENDOSCOPY 05 / [facility] ENDOSCOPY    Anesthesia Start: 1346 Anesthesia Stop: 1529    Procedure: ION ROBOT-ASSISTED NAVIGATIONAL BRONCHOSCOPY (Right) Diagnosis:       Mass of right lung      (Mass of right lung)    Surgeons: Gabrielle Garrido DO Anesthesiologist: Gallito Mayberry MD    Anesthesia Type: general ASA Status: 3            Anesthesia Type: general    Vitals Value Taken Time   /101 01/22/25 1529   Temp 97 °F (36.1 °C) 01/22/25 1529   Pulse 101 01/22/25 1529   Resp 18 01/22/25 1529   SpO2 98 % 01/22/25 1529       [facility] AN Post Evaluation:   Patient Evaluated in PACU  Patient Participation: complete - patient participated  Level of Consciousness: awake and alert  Pain Score: 0  Pain Management: adequate  Airway Patency:patent  Dental exam unchanged from preop  Yes    Nausea/Vomiting: none  Cardiovascular Status: acceptable  Respiratory Status: acceptable      Penny Russo CRNA  1/22/2025 3:29 PM

## 2025-01-22 NOTE — DISCHARGE INSTRUCTIONS
Home Care Instructions Following Bronchoscopy / Endobronchial Ultrasound with Sedation    Diet:  Prior to your examination, a local anesthetic was used to numb the back of your throat. Do not eat or drink for two hours. Your nurse will instruct you with the time you may resume your diet.  Sip fluids initially and advance to your regular diet as tolerated.  Do not drink alcohol today.    Medication:  If you have questions about resuming your normal medications, please contact your Primary Care Physician.    Activities:  Do not drive today.  Do not operate any machinery today (including kitchen equipment).    What to Expect:  A sore throat  A cough  Hoarseness  A small amount of blood in your sputum    Contact Your Doctor If:  You have difficulty breathing  You have chest pain  You have a fever greater than 102°F  You cough up more than a few tablespoons of blood in your sputum    **If unable to reach your doctor, please go to the [facility] Emergency Room**    - Your referring physician will receive a full report of your examination.  - Please contact your physician’s office within one week for results if appointment not scheduled.    You may be able to see your laboratory results in [employer] between 4 and 7 business days.  In some cases, your physician may not have viewed the results before they are released to [employer].  If you have questions regarding your results contact the physician who ordered the test/exam by phone or via [employer] by choosing \"Ask a Medical Question.\"

## 2025-01-22 NOTE — ANESTHESIA PROCEDURE NOTES
Airway  Date/Time: 1/22/2025 1:53 PM  Urgency: Elective    Airway not difficult    General Information and Staff    Patient location during procedure: OR  Anesthesiologist: Gallito Mayberry MD  Performed: anesthesiologist   Performed by: Gallito Mayberry MD  Authorized by: Gallito Mayberry MD      Indications and Patient Condition  Indications for airway management: anesthesia  Sedation level: deep  Preoxygenated: yes  Patient position: sniffing  Mask difficulty assessment: 1 - vent by mask    Final Airway Details  Final airway type: endotracheal airway      Successful airway: ETT  Cuffed: yes   Successful intubation technique: direct laryngoscopy  Endotracheal tube insertion site: oral  ETT size (mm): 8.0    Cormack-Lehane Classification: grade I - full view of glottis  Placement verified by: capnometry   Cuff volume (mL): 8  Measured from: teeth  ETT to teeth (cm): 20  Number of attempts at approach: 1

## 2025-01-22 NOTE — PROCEDURES
Robotic navigational bronchoscopy with transbronchial needle aspiration, transbronchial biopsy and bronchoalveolar lavage of right upper lobe mass with radial probe endobronchial ultrasound and fluoroscopic guidance and linear endobronchial ultrasound with transbronchial needle aspiration of lymph node station 7    Preoperative diagnosis: Right upper lobe mass, mediastinal lymphadenopathy    Postoperative diagnosis: Right upper lobe mass, mediastinal lymphadenopathy    Anesthesia: General    Consent: Risks and benefits were reviewed with the patient in detail regarding the procedure as well as anesthesia prior to the procedure.  All questions were answered.    Procedure:  Prior to procedure Ion protocol CT chest was loaded on [employer] Ion Planpoint software.  Target was marked and measured.  Visual pathways were created to the lesion.  The information was stored to her USB drive and loaded on the Ion controller software.    After patient intubated video bronchoscope advanced through endotracheal tube and lower trachea visualized with appeared grossly normal in appearance. Main mando was sharp and mobile.  The bronchoscope was advanced into the right mainstem bronchus and the right upper, middle and lower lobe segmental and subsegmental anatomy was visualized with appeared normal in appearance without evidence of any lesions, inflammatory changes or significant secretions.  The bronchoscope was next advanced into the left mainstem bronchus and the left upper, lingular and lower lobe segmental and subsegmental anatomy was visualized without any gross abnormalities seen without evidence of any lesions, inflammatory changes or significant secretions.      The robotic IM catheter was introduced via the swivel adapter into the endotracheal tube.  Registration was performed and confirmed with acceptable divergence.  Using shaped sensing robotic catheter guidance, the right upper lobe mass was located.  Subsequently,  radial probe ultrasound was introduced through the robotic catheter confirming appropriate positioning with concentric .  Using fluoroscopic guidance, transbronchial needle aspiration, transbronchial biopsies and BAL were performed.  Passes were given to the cytopathologist for screening.  No evidence of significant bleeding appreciated.  Robotic catheter was withdrawn.    The robotic bronchoscope was removed and EBUS bronchoscope advanced through ET tube.  Under direct ultrasound guidance, lymph node station 7 identified and EBUS TBNA obtained from lymph node station.  Mediastinal and hilar lymph nodes were otherwise inspected with no other significant lymphadenopathy noted.  All the airways were inspected once before with no evidence of bleeding and bronchoscope was removed and procedure completed.  All samples sent for analysis.  Saturations remained stable throughout the procedure.    Immediate blood loss: <5 ml      Gabrielle Garrido DO  Pulmonary Critical Care Medicine  [facility]

## 2025-01-22 NOTE — H&P
[facility]  part of [facility]    History & Physical    Yi Torres Patient Status:  Hospital Outpatient Surgery    1950 MRN [id number]   Location [facility] ENDOSCOPY LAB SUITES Attending Gabrielle Garrido, DO   Hosp Day # 0 PCP Tammy Chan MD     Date of Admission:  2025  History of Present Illness:   Patient is a 74-year-old female who presents to pulmonary clinic for initial visit.  Admits to some recent history of ongoing dyspnea after hernia surgery.  Has chest x-ray and subsequent CT chest with evidence of right lung mass seen.  Denies prior history of known lung disease.  Admits to 30-pack-year history of tobacco abuse.  Denies any hemoptysis, denies recent history of pneumonia.     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     Cholecystectomy      Laparoscopic incisional / umbilical / ventral hernia repair  2024       Family History  Family History   Problem Relation Age of Onset    Hypertension Father     Stroke Father     Heart Attack Mother        Social History  Social History     Socioeconomic History    Marital status:    Tobacco Use    Smoking status: Former     Current packs/day: 0.00     Average packs/day: 0.5 packs/day for 45.0 years (22.5 ttl pk-yrs)     Types: Cigarettes     Start date: 1973      Quit date: 2018     Years since quittin.9     Passive exposure: Past    Smokeless tobacco: Never    Tobacco comments:     1 pack every 3 days   Vaping Use    Vaping status: Never Used   Substance and Sexual Activity    Alcohol use: Not Currently     Comment: very rarely    Drug use: No   Other Topics Concern    Caffeine Concern Yes     Comment: Coffee, 2 cups per day     History of tanning No    Reaction to local anesthetic No    Left Handed No    Right Handed Yes    Currently spends a great deal of time in the sun No    Hx of Spending Great Deal of Time in Sun No    Bad sunburns in the past Yes    Tanning Salons in the Past No    Hx of Radiation Treatments No           Current Medications:  Current Facility-Administered Medications   Medication Dose Route Frequency    lactated ringers infusion   Intravenous Continuous     Medications Prior to Admission   Medication Sig    Cetirizine HCl 10 MG Oral Cap Take 10 mg by mouth daily.    metFORMIN  MG Oral Tablet 24 Hr Take 1 tablet (500 mg total) by mouth daily with food. (Patient taking differently: Take 1 tablet (500 mg total) by mouth every evening.)    dapagliflozin (FARXIGA) 10 MG Oral Tab Take 1 tablet (10 mg total) by mouth daily.    metoprolol tartrate 25 MG Oral Tab Take 1 tablet (25 mg total) by mouth 2 (two) times daily.    verapamil  MG Oral Tab CR Take 1 tablet (240 mg total) by mouth nightly.    alendronate 70 MG Oral Tab Take 1 tablet (70 mg total) by mouth once a week.    atorvastatin 20 MG Oral Tab TAKE 1 TABLET (20MG) BY MOUTH EVERY DAY (Patient taking differently: Take 1 tablet (20 mg total) by mouth nightly. TAKE 1 TABLET (20MG) BY MOUTH EVERY DAY)    Spironolactone-HCTZ 25-25 MG Oral Tab Take 1 tablet by mouth daily.    cholecalciferol 50 MCG (2000 UT) Oral Cap Take 1 capsule (2,000 Units total) by mouth daily.    Omega-3-acid Ethyl Esters 1 g Oral Cap Take 1 capsule (1 g total) by mouth daily.    Multiple Vitamins-Minerals  (CENTRUM SILVER) Oral Tab Take 1 tablet by mouth daily.    Psyllium (METAMUCIL FREE & NATURAL) 43 % Oral Powder Take 1 Scoop by mouth as needed.    Glucose Blood (ACCU-CHEK ADITYA PLUS) In Vitro Strip USE ONCE DAILY IN THE MORNING BEFORE BREAKFAST    Blood Glucose Monitoring Suppl (ACCU-CHEK ADITYA PLUS) w/Device Does not apply Kit USE ONCE DAILY IN AM BEFORE BREAKFAST    LANCETS ULTRA THIN Does not apply Misc Use one daily       Allergies  Allergies[1]    Review of Systems:   Constitutional: denies fevers, chills, weakness, fatigue, recent illness  HEENT: denies headache, sore throat, vision loss  Cardio: denies chest pain, chest pressure, palpitations  Respiratory: denies dyspnea, cough, wheezing, hemoptysis   GI: denies nausea, vomiting, abdominal pain  : denies dysuria, hematuria  Musculoskeletal: denies arthralgia, myalgia  Integumentary: denies rash, itching  Neurological: denies syncope, weakness, dizziness,   Psychiatric: denies depression, anxiety  Hematologic: denies bruising    Physical Exam:   Blood pressure (!) 118/96, pulse 107, resp. rate 16, height 5' 5\" (1.651 m), weight 202 lb (91.6 kg), SpO2 95%.    Constitutional: no acute distress  HEENT: PERRL  Neck: neck supple, no JVD  Cardio: RRR, S1 S2  Respiratory: clear to auscultation bilaterally, no wheezing, rales, rhonchi, crackles  GI: abdomen soft, non tender, active bowel sounds, no organomegaly  Extremities: no clubbing, cyanosis, edema  Neurologic: no gross motor deficits  Skin: warm, dry    Results:   Laboratory Data  Lab Results   Component Value Date    WBC 11.7 (H) 04/19/2024    HGB 15.3 04/19/2024    HCT 46.9 04/19/2024    .0 04/19/2024    CREATSERUM 0.44 (L) 12/04/2024    BUN 25 (H) 12/04/2024     (L) 12/04/2024    K 4.4 12/04/2024     12/04/2024    CO2 28.0 12/04/2024     (H) 12/04/2024    CA 10.7 (H) 12/04/2024    ALB 4.6 12/04/2024    ALKPHO 63 12/04/2024    TP 7.5 12/04/2024    AST 15 12/04/2024    ALT 37  12/04/2024    T4F 1.1 06/02/2021    TSH 2.859 04/19/2024    PHOS 3.9 07/17/2024    TROP <0.045 10/09/2020    CK 78 11/08/2019         Imaging  PET STANDARD BODY SCAN (ONCOLOGY) (CPT=78815)    Result Date: 1/17/2025  PROCEDURE: PET/CT STANDARD BODY SCAN (CPT=78815)  COMPARISON: [facility], CT CHEST(CONTRAST ONLY) (CPT=71260), 1/09/2025, 9:25 AM.  INDICATIONS: Initial treatment strategy.R91.8 Lung mass  TECHNIQUE: PET/CT study of the head to thighs, done with 14.6 millicuries of Fluorine-18 FDG (Fluorodeoxyglucose) injected into a right antecubital vein.  Low dose non-contrast CT scanning was performed using a dedicated integrated PET/CT scanner for attenuation correction and anatomic localization only.  FASTING PATIENT BLOOD GLUCOSE: 114 mg/dL.  UPTAKE TIME: 65 minutes.   FINDINGS: Mediastinal blood pool is 2.5 max SUV, and hepatic blood pool is 3.8 max SUV.  HEAD/NECK: No pathologic FDG activity.  LUNGS: In the right pulmonary apex there is a 91 x 79 mm (22.3 max SUV) mass which is peripherally hypermetabolic.  8 mm (1.5 max SUV) round solid nodule in the right apex (series 4, image 77), not PET avid.  7 mm (1.6 max SUV)  right lower lobe nodule (series 4, image 125), not PET avid.  No effusion. MEDIASTINUM/MELISSA: IVC is likely effaced due to presence of aforementioned 9 x 8 cm right pulmonary mass.  Coronary atherosclerosis.  9 x 12 mm (4.5 max SUV) node in the anterior superior mediastinum.  11 x 14 mm (3.5 max SUV)  subcarinal node. CHEST WALL/AXILLA: 12 x 13 mm (5.9 max SUV)  right clavicular node.  9 x 13 mm (6.0 max SUV) right paratracheal node at the thoracic inlet. ABDOMEN/PELVIS: Aortic atherosclerosis.  Post cholecystectomy.  Post abdominal wall closure with mesh.  Nonspecific scattered increased FDG activity along the mesh margins.  Otherwise, no pathologic FDG activity.  MUSCULOSKELETAL: Mild degenerative endplate change within the spine.  No pathologic FDG activity.  No  suspicious lesions on CT imaging.          CONCLUSION:  9 x 8 cm hypermetabolic mass within the right upper lobe extending into the adjacent mediastinum and right hilum.  It is peripherally hypermetabolic and centrally cystic/necrotic.  The appearance is most compatible with pulmonary neoplasm.  There is effacement of the IVC which is otherwise better seen on preceding CT chest with IV contrast Within the right upper and right lower lobes there are subcentimeter pulmonary nodules which are not significantly FDG avid.  They could represent noncalcified granulomas or low-grade malignancy/metastasis.  Continued CT surveillance of these nodules is recommended.  There are hypermetabolic lymph nodes within the anterior superior mediastinum, subcarinal region of mediastinum, right paratracheal region, and right clavicular chains compatible with metastasis.   Dictated by (CST): Jose Mtz MD on 1/17/2025 at 11:48 AM     Finalized by (CST): Jose Mtz MD on 1/17/2025 at 12:02 PM          CT CHEST(CONTRAST ONLY) (CPT=71260)    Result Date: 1/9/2025  PROCEDURE: CT CHEST(CONTRAST ONLY) (CPT=71260)  COMPARISON: [facility], XR CHEST PA + LAT CHEST (RBI=[id number]), 1/02/2025, 11:07 AM.  INDICATIONS: Lung mass  TECHNIQUE: Multidetector CT images of the chest were obtained with non-ionic intravenous contrast material. Automated exposure control for dose reduction was used. Adjustment of the mA and/or kV was done based on the patient's size. Iterative reconstruction technique for dose reduction was employed.   FINDINGS:  CARDIAC: The heart is not enlarged.  Multi-vessel coronary artery calcification. VASCULATURE: The thoracic aorta has unremarkable configuration without aneurysmal dilatation.  LUNGS/PLEURA: Very large heterogeneously enhancing and likely centrally necrotic right upper lobe lung mass, which spans approximately 8.2 x 7.4 x 10.8 cm.  Mass demonstrates contiguous extension to the right paratracheal upper  mediastinum.  Extensive right upper lobe bronchovascular structure encasement is identified.  Probable triangular morphology atelectasis at the periphery of the mass.  Nonspecific 0.6 cm satellite nodule in the right lung apex (series 3, image 17).  Mild reticular opacities surrounding the periphery of the mass in the right upper lobe.  There is partial atelectasis of the medial segment right middle lobe, which may be reactive.  Additional noncalcified 0.7 cm nodule in the dependent right lower lobe (series 3, image 44).  No significant pleural effusion or pneumothorax. There is dependent subsegmental atelectasis bilaterally.  Small scattered calcified granulomas. AIRWAYS: The tracheobronchial tree is without central mass or obstructing lesion. MEDIASTINUM/MELISSA: Mildly enlarged bilobed 1.3 cm short axis right paratracheal mediastinal lymph node at the thoracic inlet. CHEST WALL: No axillary mass or lymphadenopathy.  LIMITED ABDOMEN: Cholecystectomy.  Borderline 6 mm prominence of the common bile duct.  Well-circumscribed 1.9 cm left adrenal nodule.  Other smaller well-circumscribed bilateral adrenal nodules are suspected and include a 1 cm left adrenal nodule as well as a 1.4 cm right adrenal nodule. BONES: Demineralization with dextroscoliosis of the thoracic spine and multilevel thoracic spine degenerative changes. OTHER:  Negative.         CONCLUSION:  1. Very large heterogeneously enhancing and likely partially necrotic 8.2 x 7.4 x 10.8 cm right upper lobe lung mass, which directly invades the right paratracheal upper mediastinum and demonstrates extensive associated right upper lobe bronchovascular structure encasement.  Mass also results in narrowing of the adjacent superior vena cava.  Findings are highly concerning for a primary pulmonary neoplasm.  Pulmonary evaluation and histologic sampling is suggested for definitive characterization.  Staging PET-CT may also be of benefit. 2. Mildly enlarged bilobed  right upper paratracheal mediastinal lymph node at the thoracic inlet-likely metastatic.  Other borderline enlarged anterior mediastinal and right hilar lymph nodes may also be metastatic. 3. Subcentimeter noncalcified lung nodules in the right upper and right lower lobes are indeterminate and could be metastatic in nature.  These could be further assessed by PET-CT. 4. Multi-vessel coronary artery calcification. 5. Well-circumscribed bilateral adrenal nodules.  Although these statistically represent benign adenomas, small adrenal metastases are difficult to exclude.  Adrenal nodules could be fully characterized by PET-CT. 6. Cholecystectomy. 7. Lesser incidental findings as above.   Report will be faxed to ordering physician office.  elm-remote  Dictated by (CST): Rasheed Whitmore MD on 2025 at 10:05 AM     Finalized by (CST): Rasheed Whitmore MD on 2025 at 10:18 AM          CARD ECHO 2D DOPPLER CONTRAST (CPT=93306)    Result Date: 2025  Transthoracic Echocardiogram Name:Yi Barrientos Date: 2025 :  1950 Ht:  (65in)  BP: 112 / 76 MRN:  [id number]    Age:  74years    Wt:  (202lb) HR: 85bpm Loc:  [facility]       Gndr: F          BSA: 1.99m^2 Sonographer: Wilman ROJO RVT Ordering:    Tammy Chan Referring:   Tammy Chan ---------------------------------------------------------------------------- History/Indications:  Shortness of breath. ---------------------------------------------------------------------------- Procedure information:  A transthoracic complete 2D study was performed. Additional evaluation included M-mode, complete spectral Doppler, and color Doppler.  Patient status:  Outpatient.  Location:  Echo laboratory.    This was a routine study. Transthoracic echocardiography for diagnosis, ventricular function evaluation, and assessment of valvular function. Image quality was suboptimal. The study was technically limited due to poor acoustic window availability.  Intravenous contrast (Definity) was administered to evaluate left ventricular function. ECG rhythm:   Normal sinus ---------------------------------------------------------------------------- Conclusions: 1. Left ventricle: The cavity size was normal. Wall thickness was normal.    Systolic function was normal. The estimated ejection fraction was 60-65%,    by biplane method of disks. No diagnostic evidence for regional wall    motion abnormalities. Left ventricular diastolic function cannot be    assessed due to E-A fusion. 2. Right ventricle: Limited views. The cavity size appears grossly normal. 3. Pericardium, extracardiac: There was no pericardial effusion. 4. Inferior vena cava: The IVC was normal-sized. Impressions:  No previous study was available for comparison. * ---------------------------------------------------------------------------- * Findings: Left ventricle:  The cavity size was normal. Wall thickness was normal. Systolic function was normal. The estimated ejection fraction was 60-65%, by biplane method of disks. No diagnostic evidence for regional wall motion abnormalities. Left ventricular diastolic function cannot be assessed due to E-A fusion. Left atrium:  The atrium was normal in size. Right ventricle:  Limited views. The cavity size appears grossly normal. Right atrium:  The atrium was normal in size. Mitral valve:  The annulus was mildly calcified. Leaflet separation was normal.  Doppler:  Transvalvular velocity was within the normal range. There was no evidence for stenosis. There was trivial regurgitation. Aortic valve:   The valve was probably trileaflet. The leaflets were mildly calcified. Cusp separation was normal.  Doppler:  Transvalvular velocity was within the normal range. There was no evidence for stenosis. There was no significant regurgitation. Tricuspid valve:  The valve is structurally normal. Leaflet separation was normal.  Doppler:  Transvalvular velocity was within the  normal range. There was no evidence for stenosis. There was no significant regurgitation. Pulmonic valve:   Not well visualized. Cusp separation was normal.  Doppler:  Transvalvular velocity was within the normal range. There was no evidence for stenosis. There was no significant regurgitation. Pericardium:   There was no pericardial effusion. Aorta: Aortic root: The aortic root was normal. The aortic root was normal. Systemic veins:  Central venous respirophasic diameter changes are in the normal range (>50%). Inferior vena cava: The IVC was normal-sized. ---------------------------------------------------------------------------- Measurements  Left ventricle                    Value        Ref  IVS thickness, ED, PLAX           0.8   cm     0.6 - 0.9  LV ID, ED, PLAX                   5.1   cm     3.8 - 5.2  LV ID, ES, PLAX               (H) 3.8   cm     2.2 - 3.5  LV PW thickness, ED, PLAX         0.9   cm     0.6 - 0.9  IVS/LV PW ratio, ED, PLAX         0.89         ---------  LV PW/LV ID ratio, ED, PLAX       0.18         ---------  Stroke volume/bsa, 2D             25    ml/m^2 ---------  LV end-diastolic volume, 1-p      90    ml     48 - 140  A4C  LV ejection fraction, 1-p A4C     63    %      46 - 78  Stroke volume, 1-p A4C            57    ml     ---------  LV end-diastolic volume/bsa,      45    ml/m^2 30 - 82  1-p A4C  Stroke volume/bsa, 1-p A4C        29    ml/m^2 ---------  LV end-diastolic volume, 2-p      94    ml     46 - 106  LV end-systolic volume, 2-p       35    ml     14 - 42  LV ejection fraction, 2-p         63    %      54 - 74  Stroke volume, 2-p                59    ml     ---------  LV end-diastolic volume/bsa,      47    ml/m^2 29 - 61  2-p  LV end-systolic volume/bsa,       18    ml/m^2 8 - 24  2-p  Stroke volume/bsa, 2-p            29.6  ml/m^2 ---------  LV e', lateral                (L) 9.1   cm/sec >=10.0  LV E/e', lateral                  6            <=13  LV e', medial                  (L) 6.9   cm/sec >=7.0  LV E/e', medial                   8            ---------  LV e', average                    8.0   cm/sec ---------  LV E/e', average                  7            <=14  LVOT                              Value        Ref  LVOT ID                           2     cm     ---------  LVOT peak velocity, S             0.85  m/sec  ---------  LVOT VTI, S                       15.9  cm     ---------  LVOT peak gradient, S             3     mm Hg  ---------  LVOT mean gradient, S             2     mm Hg  ---------  Stroke volume (SV), LVOT DP       50    ml     ---------  Stroke index (SV/bsa), LVOT       25    ml/m^2 ---------  DP  Aortic root                       Value        Ref  Aortic root ID                    3.1   cm     2.7 - 4.1  Left atrium                       Value        Ref  LA ID, A-P, ES                    3.6   cm     2.7 - 3.8  LA volume, S                      47    ml     22 - 52  LA volume/bsa, S                  24    ml/m^2 16 - 34  LA volume, ES, 1-p A4C            40    ml     22 - 52  LA volume, ES, 1-p A2C            49    ml     22 - 52  LA volume, ES, A/L                50    ml     ---------  LA volume/bsa, ES, A/L            25    ml/m^2 16 - 34  LA/aortic root ratio              1.16         ---------  Mitral valve                      Value        Ref  Mitral E-wave peak velocity       0.58  m/sec  ---------  Mitral A-wave peak velocity       0.97  m/sec  ---------  Mitral deceleration time          206   ms     ---------  Mitral E/A ratio, peak            0.6          ---------  Systemic veins                    Value        Ref  Estimated CVP                     3     mm Hg  ---------  Inferior vena cava                Value        Ref  ID                                1.1   cm     <=2.1  Right ventricle                   Value        Ref  TAPSE, MM                         1.70  cm     >=1.70  RV s', lateral                    10.1  cm/sec >=9.5 Legend: (L)   and  (H)  elvira values outside specified reference range. ---------------------------------------------------------------------------- Prepared and electronically signed by Chris Wright 01/02/2025 17:40     XR CHEST PA + LAT CHEST (CPT=71046)    Result Date: 1/2/2025  PROCEDURE: XR CHEST PA + LAT CHEST (CPT=71046)  COMPARISON: [facility], CT ABDOMEN+PELVIS (CPT=74176), 5/15/2024, 8:31 AM.  [facility], XR CHEST PA + LAT CHEST (CPT=71046), 2/04/2019, 2:12 PM.  INDICATIONS: Shortness of breath x1 month.  TECHNIQUE:   Two views.   FINDINGS: CARDIAC/VASC: There is atherosclerotic calcification of the aortic arch and thoracic aorta.  9.6 cm right suprahilar mass. LUNGS/PLEURA: Mild bilateral lower lobe atelectasis. BONES: No fracture or visible bony lesion. OTHER: Negative.          CONCLUSION:   9.6 cm right suprahilar mass is new since the prior exam suspicious for malignancy. Non-emergent chest CT recommended to further evaluate.     Dictated by (CST): Eugenio French MD on 1/02/2025 at 11:28 AM     Finalized by (CST): Eugenio French MD on 1/02/2025 at 11:33 AM            Assessment   1.  Right lung mass  2.  Dyspnea with exertion    Plan   -Patient presents for pulmonary evaluation after some ongoing dyspnea after recent hernia surgery.  Chest x-ray and subsequent CT chest with evidence of large right upper lobe lung mass invading the paratracheal upper mediastinum.  Discussed findings with the patient.  Robotic navigational bronchoscopy with biopsy and endobronchial ultrasound inspection tentatively scheduled for 1/22/2025.     Gabrielle Garrido DO  Pulmonary Critical Care Medicine  [facility]  1/22/2025  1:36 PM         [1]   Allergies  Allergen Reactions    Erythromycin DIARRHEA     Stomach pain

## 2025-01-22 NOTE — H&P (VIEW-ONLY)
[facility]  part of [facility]    History & Physical    Yi Torres Patient Status:  Hospital Outpatient Surgery    1950 MRN [id number]   Location [facility] ENDOSCOPY LAB SUITES Attending Gabrielle Garrido, DO   Hosp Day # 0 PCP Tammy Chan MD     Date of Admission:  2025  History of Present Illness:   Patient is a 74-year-old female who presents to pulmonary clinic for initial visit.  Admits to some recent history of ongoing dyspnea after hernia surgery.  Has chest x-ray and subsequent CT chest with evidence of right lung mass seen.  Denies prior history of known lung disease.  Admits to 30-pack-year history of tobacco abuse.  Denies any hemoptysis, denies recent history of pneumonia.     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     Cholecystectomy      Laparoscopic incisional / umbilical / ventral hernia repair  2024       Family History  Family History   Problem Relation Age of Onset    Hypertension Father     Stroke Father     Heart Attack Mother        Social History  Social History     Socioeconomic History    Marital status:    Tobacco Use    Smoking status: Former     Current packs/day: 0.00     Average packs/day: 0.5 packs/day for 45.0 years (22.5 ttl pk-yrs)     Types: Cigarettes     Start date: 1973      Quit date: 2018     Years since quittin.9     Passive exposure: Past    Smokeless tobacco: Never    Tobacco comments:     1 pack every 3 days   Vaping Use    Vaping status: Never Used   Substance and Sexual Activity    Alcohol use: Not Currently     Comment: very rarely    Drug use: No   Other Topics Concern    Caffeine Concern Yes     Comment: Coffee, 2 cups per day     History of tanning No    Reaction to local anesthetic No    Left Handed No    Right Handed Yes    Currently spends a great deal of time in the sun No    Hx of Spending Great Deal of Time in Sun No    Bad sunburns in the past Yes    Tanning Salons in the Past No    Hx of Radiation Treatments No           Current Medications:  Current Facility-Administered Medications   Medication Dose Route Frequency    lactated ringers infusion   Intravenous Continuous     Medications Prior to Admission   Medication Sig    Cetirizine HCl 10 MG Oral Cap Take 10 mg by mouth daily.    metFORMIN  MG Oral Tablet 24 Hr Take 1 tablet (500 mg total) by mouth daily with food. (Patient taking differently: Take 1 tablet (500 mg total) by mouth every evening.)    dapagliflozin (FARXIGA) 10 MG Oral Tab Take 1 tablet (10 mg total) by mouth daily.    metoprolol tartrate 25 MG Oral Tab Take 1 tablet (25 mg total) by mouth 2 (two) times daily.    verapamil  MG Oral Tab CR Take 1 tablet (240 mg total) by mouth nightly.    alendronate 70 MG Oral Tab Take 1 tablet (70 mg total) by mouth once a week.    atorvastatin 20 MG Oral Tab TAKE 1 TABLET (20MG) BY MOUTH EVERY DAY (Patient taking differently: Take 1 tablet (20 mg total) by mouth nightly. TAKE 1 TABLET (20MG) BY MOUTH EVERY DAY)    Spironolactone-HCTZ 25-25 MG Oral Tab Take 1 tablet by mouth daily.    cholecalciferol 50 MCG (2000 UT) Oral Cap Take 1 capsule (2,000 Units total) by mouth daily.    Omega-3-acid Ethyl Esters 1 g Oral Cap Take 1 capsule (1 g total) by mouth daily.    Multiple Vitamins-Minerals  (CENTRUM SILVER) Oral Tab Take 1 tablet by mouth daily.    Psyllium (METAMUCIL FREE & NATURAL) 43 % Oral Powder Take 1 Scoop by mouth as needed.    Glucose Blood (ACCU-CHEK ADITYA PLUS) In Vitro Strip USE ONCE DAILY IN THE MORNING BEFORE BREAKFAST    Blood Glucose Monitoring Suppl (ACCU-CHEK ADITYA PLUS) w/Device Does not apply Kit USE ONCE DAILY IN AM BEFORE BREAKFAST    LANCETS ULTRA THIN Does not apply Misc Use one daily       Allergies  Allergies[1]    Review of Systems:   Constitutional: denies fevers, chills, weakness, fatigue, recent illness  HEENT: denies headache, sore throat, vision loss  Cardio: denies chest pain, chest pressure, palpitations  Respiratory: denies dyspnea, cough, wheezing, hemoptysis   GI: denies nausea, vomiting, abdominal pain  : denies dysuria, hematuria  Musculoskeletal: denies arthralgia, myalgia  Integumentary: denies rash, itching  Neurological: denies syncope, weakness, dizziness,   Psychiatric: denies depression, anxiety  Hematologic: denies bruising    Physical Exam:   Blood pressure (!) 118/96, pulse 107, resp. rate 16, height 5' 5\" (1.651 m), weight 202 lb (91.6 kg), SpO2 95%.    Constitutional: no acute distress  HEENT: PERRL  Neck: neck supple, no JVD  Cardio: RRR, S1 S2  Respiratory: clear to auscultation bilaterally, no wheezing, rales, rhonchi, crackles  GI: abdomen soft, non tender, active bowel sounds, no organomegaly  Extremities: no clubbing, cyanosis, edema  Neurologic: no gross motor deficits  Skin: warm, dry    Results:   Laboratory Data  Lab Results   Component Value Date    WBC 11.7 (H) 04/19/2024    HGB 15.3 04/19/2024    HCT 46.9 04/19/2024    .0 04/19/2024    CREATSERUM 0.44 (L) 12/04/2024    BUN 25 (H) 12/04/2024     (L) 12/04/2024    K 4.4 12/04/2024     12/04/2024    CO2 28.0 12/04/2024     (H) 12/04/2024    CA 10.7 (H) 12/04/2024    ALB 4.6 12/04/2024    ALKPHO 63 12/04/2024    TP 7.5 12/04/2024    AST 15 12/04/2024    ALT 37  12/04/2024    T4F 1.1 06/02/2021    TSH 2.859 04/19/2024    PHOS 3.9 07/17/2024    TROP <0.045 10/09/2020    CK 78 11/08/2019         Imaging  PET STANDARD BODY SCAN (ONCOLOGY) (CPT=78815)    Result Date: 1/17/2025  PROCEDURE: PET/CT STANDARD BODY SCAN (CPT=78815)  COMPARISON: [facility], CT CHEST(CONTRAST ONLY) (CPT=71260), 1/09/2025, 9:25 AM.  INDICATIONS: Initial treatment strategy.R91.8 Lung mass  TECHNIQUE: PET/CT study of the head to thighs, done with 14.6 millicuries of Fluorine-18 FDG (Fluorodeoxyglucose) injected into a right antecubital vein.  Low dose non-contrast CT scanning was performed using a dedicated integrated PET/CT scanner for attenuation correction and anatomic localization only.  FASTING PATIENT BLOOD GLUCOSE: 114 mg/dL.  UPTAKE TIME: 65 minutes.   FINDINGS: Mediastinal blood pool is 2.5 max SUV, and hepatic blood pool is 3.8 max SUV.  HEAD/NECK: No pathologic FDG activity.  LUNGS: In the right pulmonary apex there is a 91 x 79 mm (22.3 max SUV) mass which is peripherally hypermetabolic.  8 mm (1.5 max SUV) round solid nodule in the right apex (series 4, image 77), not PET avid.  7 mm (1.6 max SUV)  right lower lobe nodule (series 4, image 125), not PET avid.  No effusion. MEDIASTINUM/MELISSA: IVC is likely effaced due to presence of aforementioned 9 x 8 cm right pulmonary mass.  Coronary atherosclerosis.  9 x 12 mm (4.5 max SUV) node in the anterior superior mediastinum.  11 x 14 mm (3.5 max SUV)  subcarinal node. CHEST WALL/AXILLA: 12 x 13 mm (5.9 max SUV)  right clavicular node.  9 x 13 mm (6.0 max SUV) right paratracheal node at the thoracic inlet. ABDOMEN/PELVIS: Aortic atherosclerosis.  Post cholecystectomy.  Post abdominal wall closure with mesh.  Nonspecific scattered increased FDG activity along the mesh margins.  Otherwise, no pathologic FDG activity.  MUSCULOSKELETAL: Mild degenerative endplate change within the spine.  No pathologic FDG activity.  No  suspicious lesions on CT imaging.          CONCLUSION:  9 x 8 cm hypermetabolic mass within the right upper lobe extending into the adjacent mediastinum and right hilum.  It is peripherally hypermetabolic and centrally cystic/necrotic.  The appearance is most compatible with pulmonary neoplasm.  There is effacement of the IVC which is otherwise better seen on preceding CT chest with IV contrast Within the right upper and right lower lobes there are subcentimeter pulmonary nodules which are not significantly FDG avid.  They could represent noncalcified granulomas or low-grade malignancy/metastasis.  Continued CT surveillance of these nodules is recommended.  There are hypermetabolic lymph nodes within the anterior superior mediastinum, subcarinal region of mediastinum, right paratracheal region, and right clavicular chains compatible with metastasis.   Dictated by (CST): Jose Mtz MD on 1/17/2025 at 11:48 AM     Finalized by (CST): Jose Mtz MD on 1/17/2025 at 12:02 PM          CT CHEST(CONTRAST ONLY) (CPT=71260)    Result Date: 1/9/2025  PROCEDURE: CT CHEST(CONTRAST ONLY) (CPT=71260)  COMPARISON: [facility], XR CHEST PA + LAT CHEST (VFJ=[id number]), 1/02/2025, 11:07 AM.  INDICATIONS: Lung mass  TECHNIQUE: Multidetector CT images of the chest were obtained with non-ionic intravenous contrast material. Automated exposure control for dose reduction was used. Adjustment of the mA and/or kV was done based on the patient's size. Iterative reconstruction technique for dose reduction was employed.   FINDINGS:  CARDIAC: The heart is not enlarged.  Multi-vessel coronary artery calcification. VASCULATURE: The thoracic aorta has unremarkable configuration without aneurysmal dilatation.  LUNGS/PLEURA: Very large heterogeneously enhancing and likely centrally necrotic right upper lobe lung mass, which spans approximately 8.2 x 7.4 x 10.8 cm.  Mass demonstrates contiguous extension to the right paratracheal upper  mediastinum.  Extensive right upper lobe bronchovascular structure encasement is identified.  Probable triangular morphology atelectasis at the periphery of the mass.  Nonspecific 0.6 cm satellite nodule in the right lung apex (series 3, image 17).  Mild reticular opacities surrounding the periphery of the mass in the right upper lobe.  There is partial atelectasis of the medial segment right middle lobe, which may be reactive.  Additional noncalcified 0.7 cm nodule in the dependent right lower lobe (series 3, image 44).  No significant pleural effusion or pneumothorax. There is dependent subsegmental atelectasis bilaterally.  Small scattered calcified granulomas. AIRWAYS: The tracheobronchial tree is without central mass or obstructing lesion. MEDIASTINUM/MELISSA: Mildly enlarged bilobed 1.3 cm short axis right paratracheal mediastinal lymph node at the thoracic inlet. CHEST WALL: No axillary mass or lymphadenopathy.  LIMITED ABDOMEN: Cholecystectomy.  Borderline 6 mm prominence of the common bile duct.  Well-circumscribed 1.9 cm left adrenal nodule.  Other smaller well-circumscribed bilateral adrenal nodules are suspected and include a 1 cm left adrenal nodule as well as a 1.4 cm right adrenal nodule. BONES: Demineralization with dextroscoliosis of the thoracic spine and multilevel thoracic spine degenerative changes. OTHER:  Negative.         CONCLUSION:  1. Very large heterogeneously enhancing and likely partially necrotic 8.2 x 7.4 x 10.8 cm right upper lobe lung mass, which directly invades the right paratracheal upper mediastinum and demonstrates extensive associated right upper lobe bronchovascular structure encasement.  Mass also results in narrowing of the adjacent superior vena cava.  Findings are highly concerning for a primary pulmonary neoplasm.  Pulmonary evaluation and histologic sampling is suggested for definitive characterization.  Staging PET-CT may also be of benefit. 2. Mildly enlarged bilobed  right upper paratracheal mediastinal lymph node at the thoracic inlet-likely metastatic.  Other borderline enlarged anterior mediastinal and right hilar lymph nodes may also be metastatic. 3. Subcentimeter noncalcified lung nodules in the right upper and right lower lobes are indeterminate and could be metastatic in nature.  These could be further assessed by PET-CT. 4. Multi-vessel coronary artery calcification. 5. Well-circumscribed bilateral adrenal nodules.  Although these statistically represent benign adenomas, small adrenal metastases are difficult to exclude.  Adrenal nodules could be fully characterized by PET-CT. 6. Cholecystectomy. 7. Lesser incidental findings as above.   Report will be faxed to ordering physician office.  elm-remote  Dictated by (CST): Rasheed Whitmore MD on 2025 at 10:05 AM     Finalized by (CST): Rasheed Whitmore MD on 2025 at 10:18 AM          CARD ECHO 2D DOPPLER CONTRAST (CPT=93306)    Result Date: 2025  Transthoracic Echocardiogram Name:Yi Barrientos Date: 2025 :  1950 Ht:  (65in)  BP: 112 / 76 MRN:  [id number]    Age:  74years    Wt:  (202lb) HR: 85bpm Loc:  [facility]       Gndr: F          BSA: 1.99m^2 Sonographer: Wilman ROJO RVT Ordering:    Tammy Chan Referring:   Tammy Chan ---------------------------------------------------------------------------- History/Indications:  Shortness of breath. ---------------------------------------------------------------------------- Procedure information:  A transthoracic complete 2D study was performed. Additional evaluation included M-mode, complete spectral Doppler, and color Doppler.  Patient status:  Outpatient.  Location:  Echo laboratory.    This was a routine study. Transthoracic echocardiography for diagnosis, ventricular function evaluation, and assessment of valvular function. Image quality was suboptimal. The study was technically limited due to poor acoustic window availability.  Intravenous contrast (Definity) was administered to evaluate left ventricular function. ECG rhythm:   Normal sinus ---------------------------------------------------------------------------- Conclusions: 1. Left ventricle: The cavity size was normal. Wall thickness was normal.    Systolic function was normal. The estimated ejection fraction was 60-65%,    by biplane method of disks. No diagnostic evidence for regional wall    motion abnormalities. Left ventricular diastolic function cannot be    assessed due to E-A fusion. 2. Right ventricle: Limited views. The cavity size appears grossly normal. 3. Pericardium, extracardiac: There was no pericardial effusion. 4. Inferior vena cava: The IVC was normal-sized. Impressions:  No previous study was available for comparison. * ---------------------------------------------------------------------------- * Findings: Left ventricle:  The cavity size was normal. Wall thickness was normal. Systolic function was normal. The estimated ejection fraction was 60-65%, by biplane method of disks. No diagnostic evidence for regional wall motion abnormalities. Left ventricular diastolic function cannot be assessed due to E-A fusion. Left atrium:  The atrium was normal in size. Right ventricle:  Limited views. The cavity size appears grossly normal. Right atrium:  The atrium was normal in size. Mitral valve:  The annulus was mildly calcified. Leaflet separation was normal.  Doppler:  Transvalvular velocity was within the normal range. There was no evidence for stenosis. There was trivial regurgitation. Aortic valve:   The valve was probably trileaflet. The leaflets were mildly calcified. Cusp separation was normal.  Doppler:  Transvalvular velocity was within the normal range. There was no evidence for stenosis. There was no significant regurgitation. Tricuspid valve:  The valve is structurally normal. Leaflet separation was normal.  Doppler:  Transvalvular velocity was within the  normal range. There was no evidence for stenosis. There was no significant regurgitation. Pulmonic valve:   Not well visualized. Cusp separation was normal.  Doppler:  Transvalvular velocity was within the normal range. There was no evidence for stenosis. There was no significant regurgitation. Pericardium:   There was no pericardial effusion. Aorta: Aortic root: The aortic root was normal. The aortic root was normal. Systemic veins:  Central venous respirophasic diameter changes are in the normal range (>50%). Inferior vena cava: The IVC was normal-sized. ---------------------------------------------------------------------------- Measurements  Left ventricle                    Value        Ref  IVS thickness, ED, PLAX           0.8   cm     0.6 - 0.9  LV ID, ED, PLAX                   5.1   cm     3.8 - 5.2  LV ID, ES, PLAX               (H) 3.8   cm     2.2 - 3.5  LV PW thickness, ED, PLAX         0.9   cm     0.6 - 0.9  IVS/LV PW ratio, ED, PLAX         0.89         ---------  LV PW/LV ID ratio, ED, PLAX       0.18         ---------  Stroke volume/bsa, 2D             25    ml/m^2 ---------  LV end-diastolic volume, 1-p      90    ml     48 - 140  A4C  LV ejection fraction, 1-p A4C     63    %      46 - 78  Stroke volume, 1-p A4C            57    ml     ---------  LV end-diastolic volume/bsa,      45    ml/m^2 30 - 82  1-p A4C  Stroke volume/bsa, 1-p A4C        29    ml/m^2 ---------  LV end-diastolic volume, 2-p      94    ml     46 - 106  LV end-systolic volume, 2-p       35    ml     14 - 42  LV ejection fraction, 2-p         63    %      54 - 74  Stroke volume, 2-p                59    ml     ---------  LV end-diastolic volume/bsa,      47    ml/m^2 29 - 61  2-p  LV end-systolic volume/bsa,       18    ml/m^2 8 - 24  2-p  Stroke volume/bsa, 2-p            29.6  ml/m^2 ---------  LV e', lateral                (L) 9.1   cm/sec >=10.0  LV E/e', lateral                  6            <=13  LV e', medial                  (L) 6.9   cm/sec >=7.0  LV E/e', medial                   8            ---------  LV e', average                    8.0   cm/sec ---------  LV E/e', average                  7            <=14  LVOT                              Value        Ref  LVOT ID                           2     cm     ---------  LVOT peak velocity, S             0.85  m/sec  ---------  LVOT VTI, S                       15.9  cm     ---------  LVOT peak gradient, S             3     mm Hg  ---------  LVOT mean gradient, S             2     mm Hg  ---------  Stroke volume (SV), LVOT DP       50    ml     ---------  Stroke index (SV/bsa), LVOT       25    ml/m^2 ---------  DP  Aortic root                       Value        Ref  Aortic root ID                    3.1   cm     2.7 - 4.1  Left atrium                       Value        Ref  LA ID, A-P, ES                    3.6   cm     2.7 - 3.8  LA volume, S                      47    ml     22 - 52  LA volume/bsa, S                  24    ml/m^2 16 - 34  LA volume, ES, 1-p A4C            40    ml     22 - 52  LA volume, ES, 1-p A2C            49    ml     22 - 52  LA volume, ES, A/L                50    ml     ---------  LA volume/bsa, ES, A/L            25    ml/m^2 16 - 34  LA/aortic root ratio              1.16         ---------  Mitral valve                      Value        Ref  Mitral E-wave peak velocity       0.58  m/sec  ---------  Mitral A-wave peak velocity       0.97  m/sec  ---------  Mitral deceleration time          206   ms     ---------  Mitral E/A ratio, peak            0.6          ---------  Systemic veins                    Value        Ref  Estimated CVP                     3     mm Hg  ---------  Inferior vena cava                Value        Ref  ID                                1.1   cm     <=2.1  Right ventricle                   Value        Ref  TAPSE, MM                         1.70  cm     >=1.70  RV s', lateral                    10.1  cm/sec >=9.5 Legend: (L)   and  (H)  elvira values outside specified reference range. ---------------------------------------------------------------------------- Prepared and electronically signed by Chris Wright 01/02/2025 17:40     XR CHEST PA + LAT CHEST (CPT=71046)    Result Date: 1/2/2025  PROCEDURE: XR CHEST PA + LAT CHEST (CPT=71046)  COMPARISON: [facility], CT ABDOMEN+PELVIS (CPT=74176), 5/15/2024, 8:31 AM.  [facility], XR CHEST PA + LAT CHEST (CPT=71046), 2/04/2019, 2:12 PM.  INDICATIONS: Shortness of breath x1 month.  TECHNIQUE:   Two views.   FINDINGS: CARDIAC/VASC: There is atherosclerotic calcification of the aortic arch and thoracic aorta.  9.6 cm right suprahilar mass. LUNGS/PLEURA: Mild bilateral lower lobe atelectasis. BONES: No fracture or visible bony lesion. OTHER: Negative.          CONCLUSION:   9.6 cm right suprahilar mass is new since the prior exam suspicious for malignancy. Non-emergent chest CT recommended to further evaluate.     Dictated by (CST): Eugenio French MD on 1/02/2025 at 11:28 AM     Finalized by (CST): Eugenio French MD on 1/02/2025 at 11:33 AM            Assessment   1.  Right lung mass  2.  Dyspnea with exertion    Plan   -Patient presents for pulmonary evaluation after some ongoing dyspnea after recent hernia surgery.  Chest x-ray and subsequent CT chest with evidence of large right upper lobe lung mass invading the paratracheal upper mediastinum.  Discussed findings with the patient.  Robotic navigational bronchoscopy with biopsy and endobronchial ultrasound inspection tentatively scheduled for 1/22/2025.     Gabrielle Garrido DO  Pulmonary Critical Care Medicine  [facility]  1/22/2025  1:36 PM         [1]   Allergies  Allergen Reactions    Erythromycin DIARRHEA     Stomach pain

## 2025-01-22 NOTE — ADDENDUM NOTE
Addendum  created 01/22/25 1531 by Penny Russo CRNA    Intraprocedure Event edited, Intraprocedure Staff edited

## 2025-01-24 ENCOUNTER — TELEPHONE (OUTPATIENT)
Age: 75
End: 2025-01-24

## 2025-01-24 ENCOUNTER — TELEPHONE (OUTPATIENT)
Dept: PULMONOLOGY | Facility: CLINIC | Age: 75
End: 2025-01-24

## 2025-01-24 NOTE — TELEPHONE ENCOUNTER
I discussed biopsy results with the patient with evidence of squamous cell carcinoma.  Advised patient to follow-up with oncology.  Will reach out to Dr. Goodson

## 2025-01-24 NOTE — TELEPHONE ENCOUNTER
Spoke with patient about the need for Medical oncology appointment for her new diagnosis of Lung Cancer. Introduced myself. Offered her an appointment on Wednesday at 9am. She accepted the appointment and is aware of where we are located. Discussed the need for an MRI of brain to complete staging workup, she voiced understanding. Told her that I would be scheduling this for her and would call her back with the date and time. MRI booked. Called Elva back with date and time she is aware.

## 2025-01-26 DIAGNOSIS — I70.0 AORTIC ATHEROSCLEROSIS: ICD-10-CM

## 2025-01-26 DIAGNOSIS — I10 ESSENTIAL HYPERTENSION, BENIGN: Chronic | ICD-10-CM

## 2025-01-26 DIAGNOSIS — E78.00 PURE HYPERCHOLESTEROLEMIA: Chronic | ICD-10-CM

## 2025-01-28 LAB
% TUMOR CELLS STAINING: 0 %
% TUMOR CELLS STAINING: 0 %

## 2025-01-29 ENCOUNTER — OFFICE VISIT (OUTPATIENT)
Age: 75
End: 2025-01-29
Attending: INTERNAL MEDICINE
Payer: MEDICARE

## 2025-01-29 ENCOUNTER — OFFICE VISIT (OUTPATIENT)
Dept: NEPHROLOGY | Facility: CLINIC | Age: 75
End: 2025-01-29
Payer: MEDICARE

## 2025-01-29 VITALS
HEART RATE: 89 BPM | DIASTOLIC BLOOD PRESSURE: 75 MMHG | RESPIRATION RATE: 18 BRPM | TEMPERATURE: 98 F | WEIGHT: 201 LBS | HEIGHT: 64.5 IN | BODY MASS INDEX: 33.9 KG/M2 | SYSTOLIC BLOOD PRESSURE: 115 MMHG | OXYGEN SATURATION: 94 %

## 2025-01-29 VITALS
SYSTOLIC BLOOD PRESSURE: 139 MMHG | HEART RATE: 95 BPM | WEIGHT: 202 LBS | BODY MASS INDEX: 34.07 KG/M2 | HEIGHT: 64.5 IN | DIASTOLIC BLOOD PRESSURE: 84 MMHG

## 2025-01-29 DIAGNOSIS — C34.11 MALIGNANT NEOPLASM OF UPPER LOBE OF RIGHT LUNG (HCC): ICD-10-CM

## 2025-01-29 DIAGNOSIS — N18.2 CKD (CHRONIC KIDNEY DISEASE) STAGE 2, GFR 60-89 ML/MIN: ICD-10-CM

## 2025-01-29 DIAGNOSIS — C77.0 MALIGNANT NEOPLASM METASTATIC TO LYMPH NODE OF NECK (HCC): ICD-10-CM

## 2025-01-29 DIAGNOSIS — E11.22 TYPE 2 DIABETES MELLITUS WITH STAGE 3A CHRONIC KIDNEY DISEASE, WITHOUT LONG-TERM CURRENT USE OF INSULIN (HCC): Chronic | ICD-10-CM

## 2025-01-29 DIAGNOSIS — N18.31 TYPE 2 DIABETES MELLITUS WITH STAGE 3A CHRONIC KIDNEY DISEASE, WITHOUT LONG-TERM CURRENT USE OF INSULIN (HCC): Chronic | ICD-10-CM

## 2025-01-29 DIAGNOSIS — I10 ESSENTIAL HYPERTENSION, BENIGN: Primary | Chronic | ICD-10-CM

## 2025-01-29 DIAGNOSIS — C34.11 MALIGNANT NEOPLASM OF UPPER LOBE OF RIGHT LUNG (HCC): Primary | ICD-10-CM

## 2025-01-29 LAB
ALBUMIN SERPL-MCNC: 4.6 G/DL (ref 3.2–4.8)
ALBUMIN/GLOB SERPL: 1.4 {RATIO} (ref 1–2)
ALP LIVER SERPL-CCNC: 68 U/L
ALT SERPL-CCNC: 16 U/L
ANION GAP SERPL CALC-SCNC: 7 MMOL/L (ref 0–18)
ASPERGILLUS AG BAL/SERUM: 3.86 INDEX
AST SERPL-CCNC: 17 U/L (ref ?–34)
BASOPHILS # BLD AUTO: 0.05 X10(3) UL (ref 0–0.2)
BASOPHILS NFR BLD AUTO: 0.4 %
BILIRUB SERPL-MCNC: 0.7 MG/DL (ref 0.2–1.1)
BUN BLD-MCNC: 22 MG/DL (ref 9–23)
BUN/CREAT SERPL: 25.3 (ref 10–20)
CALCIUM BLD-MCNC: 10.5 MG/DL (ref 8.7–10.4)
CHLORIDE SERPL-SCNC: 98 MMOL/L (ref 98–112)
CO2 SERPL-SCNC: 28 MMOL/L (ref 21–32)
CREAT BLD-MCNC: 0.87 MG/DL
DEPRECATED RDW RBC AUTO: 50.5 FL (ref 35.1–46.3)
EGFRCR SERPLBLD CKD-EPI 2021: 71 ML/MIN/1.73M2 (ref 60–?)
EOSINOPHIL # BLD AUTO: 0.07 X10(3) UL (ref 0–0.7)
EOSINOPHIL NFR BLD AUTO: 0.5 %
ERYTHROCYTE [DISTWIDTH] IN BLOOD BY AUTOMATED COUNT: 17.2 % (ref 11–15)
GLOBULIN PLAS-MCNC: 3.4 G/DL (ref 2–3.5)
GLUCOSE BLD-MCNC: 105 MG/DL (ref 70–99)
HCT VFR BLD AUTO: 43.7 %
HGB BLD-MCNC: 13.7 G/DL
IMM GRANULOCYTES # BLD AUTO: 0.05 X10(3) UL (ref 0–1)
IMM GRANULOCYTES NFR BLD: 0.4 %
LYMPHOCYTES # BLD AUTO: 2.58 X10(3) UL (ref 1–4)
LYMPHOCYTES NFR BLD AUTO: 20 %
MCH RBC QN AUTO: 25.6 PG (ref 26–34)
MCHC RBC AUTO-ENTMCNC: 31.4 G/DL (ref 31–37)
MCV RBC AUTO: 81.7 FL
MONOCYTES # BLD AUTO: 1.03 X10(3) UL (ref 0.1–1)
MONOCYTES NFR BLD AUTO: 8 %
NEUTROPHILS # BLD AUTO: 9.14 X10 (3) UL (ref 1.5–7.7)
NEUTROPHILS # BLD AUTO: 9.14 X10(3) UL (ref 1.5–7.7)
NEUTROPHILS NFR BLD AUTO: 70.7 %
OSMOLALITY SERPL CALC.SUM OF ELEC: 280 MOSM/KG (ref 275–295)
PLATELET # BLD AUTO: 364 10(3)UL (ref 150–450)
POTASSIUM SERPL-SCNC: 3.9 MMOL/L (ref 3.5–5.1)
PROT SERPL-MCNC: 8 G/DL (ref 5.7–8.2)
RBC # BLD AUTO: 5.35 X10(6)UL
SODIUM SERPL-SCNC: 133 MMOL/L (ref 136–145)
WBC # BLD AUTO: 12.9 X10(3) UL (ref 4–11)

## 2025-01-29 PROCEDURE — 1159F MED LIST DOCD IN RCRD: CPT | Performed by: INTERNAL MEDICINE

## 2025-01-29 PROCEDURE — 99214 OFFICE O/P EST MOD 30 MIN: CPT | Performed by: INTERNAL MEDICINE

## 2025-01-29 NOTE — PATIENT INSTRUCTIONS
Elva your labs look great      Please keep me posted with everything coming up I will be thinking of you and I wish you good luck    Please keep an eye on your blood work ordered by oncologist there is any changes in your creatinine please let me know    Please see me again in June or July    Please make sure you have had all your vaccinations      Reach out to me if you need me for anything

## 2025-01-29 NOTE — CONSULTS
[facility] Hematology/Oncology Consultation Note    Patient Name: Yi Torres   YOB: 1950   Medical Record Number: [id number]   CSN: [id number]   Consulting Physician: José Miguel Goodson MD  Referring Physician(s): Dr Gabrielle Garrido DO  Date of Consultation: 1/29/2025     Reason for Consultation:  Lung cancer     Cancer Staging   Malignant neoplasm of upper lobe of right lung (HCC)  Staging form: Lung, AJCC 8th Edition  - Clinical stage from 1/29/2025: Stage IIIC (cT4, cN3, cM0) - Signed by José Miguel Goodson MD on 1/29/2025    History of Present Illness:   Yi Torres is a 74 year old female that was seen today in the Cancer Center for newly diagnosed lung cancer.  Her oncologic history is detailed below    1/9/25 she had complained of increasing dyspnea for about 3 months.  Saw PCP and underwent a CT chest that showed a very large centrally necrotic right upper lobe lung mass measuring 8.2 x 7.4 x 10.8 cm with extension to the right paratracheal upper mediastinum.  Extensive right upper lobe bronchovascular structure encasement.  Mildly enlarged right paratracheal mediastinal lymph nodes bilateral adrenal nodules were noted.    1/17/2025 PET/CT showed a 9 x 8 cm hypermetabolic mass within the right upper lobe extending into the mediastinum and right hilum peripherally hypermetabolic and centrally cystic and necrotic.  Effacement of the IVC.  Subcentimeter pulmonary nodules bilaterally were not FDG avid  Hypermetabolic lymph nodes within the anterior superior mediastinum subcarinal right paratracheal area and right clavicle chains were all compatible with metastatic disease.    1/22/25 she underwent a robotic navigational bronchoscopy with EBUS bx of RUL mass and  TBNA of lymph node station 7.  BAL from the right upper lobe was consistent with squamous cell carcinoma as was the needle biopsy from the right upper lobe mass.  EBUS sampling from station node 7 showed no malignant cells.   Lymphocytes were present. PD-L1 TPS was 0    She denies any new pain no focal deficit.  She has lost over 100 pounds in the last year and a half intentionally watching her calories.  Continues to have some postural dyspnea and orthopnea.        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     Cholecystectomy      Laparoscopic incisional / umbilical / ventral hernia repair  2024       Family Medical History:  Family History   Problem Relation Age of Onset    Hypertension Father     Stroke Father     Heart Attack Mother        Gyne History:  OB History    Para Term  AB Living   1 1 0 0 0 0   SAB IAB Ectopic Multiple Live Births   0 0 0 0 0       Social History:  Social History     Socioeconomic History    Marital status:      Spouse name: Not on file    Number of children: Not on file    Years of education: Not on file    Highest education level: Not on file   Occupational History    Not on file   Tobacco Use    Smoking status: Former     Current packs/day: 0.00     Average packs/day: 0.5 packs/day for 45.0 years (22.5 ttl pk-yrs)     Types: Cigarettes     Start date: 1973     Quit date: 2018     Years since quittin.0     Passive exposure: Past    Smokeless tobacco: Never    Tobacco comments:     1 pack every 3 days   Vaping Use    Vaping status: Never  Used   Substance and Sexual Activity    Alcohol use: Not Currently     Comment: very rarely    Drug use: No    Sexual activity: Not on file   Other Topics Concern     Service Not Asked    Blood Transfusions Not Asked    Caffeine Concern Yes     Comment: Coffee, 2 cups per day     Occupational Exposure Not Asked    Hobby Hazards Not Asked    Sleep Concern Not Asked    Stress Concern Not Asked    Weight Concern Not Asked    Special Diet Not Asked    Back Care Not Asked    Exercise Not Asked    Bike Helmet Not Asked    Seat Belt Not Asked    Self-Exams Not Asked    Grew up on a farm Not Asked    History of tanning No    Outdoor occupation Not Asked    Breast feeding Not Asked    Reaction to local anesthetic No    Left Handed No    Right Handed Yes    Currently spends a great deal of time in the sun No    Past Sunlamp Treatments for Acne Not Asked    Hx of Spending Great Deal of Time in Sun No    Bad sunburns in the past Yes    Tanning Salons in the Past No    Hx of Radiation Treatments No    Regular use of sun block Not Asked   Social History Narrative    Not on file     Social Drivers of Health     Financial Resource Strain: Not on file   Food Insecurity: Not on file   Transportation Needs: Not on file   Physical Activity: Not on file   Stress: Not on file   Social Connections: Not on file   Housing Stability: Not on file       Allergies:   Allergies[1]    Current Medications:  No outpatient medications have been marked as taking for the 1/29/25 encounter (Appointment) with José Miguel Goodson MD.       Review of Systems:    Constitutional: Negative for anorexia, chills, fatigue, fevers, night sweats and weight loss.  Eyes: Negative for visual disturbance, irritation and redness.  Respiratory: Negative for cough, hemoptysis, chest pain, or dyspnea on exertion.  Gastrointestinal: Negative for dysphagia, odynophagia, reflux symptoms, nausea, vomiting, change in bowel habits, diarrhea, constipation and abdominal  pain.  Integument/breast: Negative for rash, skin lesions, and pruritus.  Hematologic/lymphatic: Negative for easy bruising, bleeding, and lymphadenopathy.  Musculoskeletal: Negative for myalgias, arthralgias, muscle weakness.  Neurological: Negative for headaches, dizziness, speech problems, gait problems and weakness.    A comprehensive 14 point review of systems was completed.  Pertinent positives and negatives noted in the the HPI.     Vital Signs:  There were no vitals taken for this visit.    Physical Examination:    General: Patient is alert and oriented x 3, not in acute distress.  HEENT: EOMs intact. PERRL. Oropharynx is clear.   Neck: No JVD. No palpable lymphadenopathy. Neck is supple.  Lymphatics: There is no palpable lymphadenopathy throughout in the cervical, supraclavicular or axillary regions.  Chest: Clear to auscultation. No wheezes or rales.  Heart: Regular rate and rhythm. S1S2 normal.  Abdomen: Soft, non tender, no hepatosplenomegaly.  No palpable mass.  Extremities: No edema or calf tenderness.  Neurological: Grossly intact.     Performance Status:    ECOG-1    Labs:    Lab Results   Component Value Date/Time    WBC 11.7 (H) 04/19/2024 07:40 AM    RBC 5.38 (H) 04/19/2024 07:40 AM    HGB 15.3 04/19/2024 07:40 AM    HCT 46.9 04/19/2024 07:40 AM    MCV 87.2 04/19/2024 07:40 AM    MCH 28.4 04/19/2024 07:40 AM    MCHC 32.6 04/19/2024 07:40 AM    RDW 15.9 (H) 04/19/2024 07:40 AM    NEPRELIM 6.64 04/19/2024 07:40 AM    .0 04/19/2024 07:40 AM       Lab Results   Component Value Date/Time     (H) 12/04/2024 07:42 AM    BUN 25 (H) 12/04/2024 07:42 AM    CREATSERUM 0.44 (L) 12/04/2024 07:42 AM    GFRNAA 45 (L) 06/13/2022 08:02 AM    CA 10.7 (H) 12/04/2024 07:42 AM    ALB 4.6 12/04/2024 07:42 AM     (L) 12/04/2024 07:42 AM    K 4.4 12/04/2024 07:42 AM     12/04/2024 07:42 AM    CO2 28.0 12/04/2024 07:42 AM    ALKPHO 63 12/04/2024 07:42 AM    AST 15 12/04/2024 07:42 AM    ALT 37  12/04/2024 07:42 AM       Imaging:  PET films reviewed personally with the patient/family      Impression:      ICD-10-CM    1. Malignant neoplasm of upper lobe of right lung (HCC)  C34.11       2. 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    I had a long discussion with the patient and explained that given the extent of disease concurrent chemoradiation would be recommended rather than upfront surgical resection    Plan:  Ordered staging MRI  Will review her situation at the thoracic MDT tomorrow and confirm agreement with plan for concurrent chemo-RT with [facility] and thoracic surgery  Refer to radiation oncology.  If MRI is benign, then given the squamous histology I have recommended concurrent chemo RT with weekly carboplatin paclitaxel with plan to initiate durvalumab maintenance after the completion of chemoRT. Anticipated side effects of this particular regimen, including nausea,asthenia, alopecia, myelosuppression, mucositis, diarrhea, and risk for infections were discussed. The patient understands and wishes to proceed.     Will arrange chemo-teach appt with ANP soon  Tobacco history is limited, so will check EGFR mutation as well    Emotional Well Being:    Emotional Well Being discussed, patient aware of support systems available through the Cancer Center. No acute issues.     The diagnosis, prognosis, treatment goals, plan for treatment, and expected response was explained to the patient.       Thank you Dr Dr Gabrielle Garrido DO  for the opportunity to participate in the care of this interesting patient. Please do contact me if I may be of any further assistance    José Miguel Goodson MD  [facility]     High complexity MDM  with detailed discussion of new cancer diagnosis, prognosis, treatment options side effects etc.    Copy to:Dr Tammy Chan MD       [1]   Allergies  Allergen Reactions    Erythromycin DIARRHEA     Stomach pain

## 2025-01-30 RX ORDER — ATORVASTATIN CALCIUM 20 MG/1
TABLET, FILM COATED ORAL
Qty: 90 TABLET | Refills: 3 | Status: SHIPPED | OUTPATIENT
Start: 2025-01-30

## 2025-01-30 RX ORDER — SPIRONOLACTONE AND HYDROCHLOROTHIAZIDE 25; 25 MG/1; MG/1
1 TABLET ORAL DAILY
Qty: 90 TABLET | Refills: 3 | Status: SHIPPED | OUTPATIENT
Start: 2025-01-30

## 2025-01-30 NOTE — TELEPHONE ENCOUNTER
Refill passed per [facility] protocol.  Requested Prescriptions   Pending Prescriptions Disp Refills    SPIRONOLACTONE-HCTZ 25-25 MG Oral Tab [Pharmacy Med Name: SPIRONOLACTONE-HCTZ 25-25 TAB] 90 tablet 3     Sig: TAKE 1 TABLET BY MOUTH EVERY DAY       Hypertension Medications Protocol Passed - 1/30/2025  8:43 AM        Passed - CMP or BMP in past 12 months        Passed - Last BP reading less than 140/90     BP Readings from Last 1 Encounters:   01/29/25 139/84               Passed - In person appointment or virtual visit in the past 12 mos or appointment in next 3 mos     Recent Outpatient Visits              Yesterday Malignant neoplasm of upper lobe of right lung (HCC)    [facility] Hematology Oncology [place] José Miguel Goodson MD    Office Visit    Yesterday Essential hypertension, benign    [facility] Shaji Genao MD    Office Visit    2 weeks ago Lung mass    [facility] Gabrielle Garrido DO    Office Visit    1 month ago Type 2 diabetes mellitus with stage 3a chronic kidney disease, without long-term current use of insulin (HCC)    [facility], Tammy Grene MD    Office Visit    3 months ago Encounter for postoperative care    [facility]    Nurse Only          Future Appointments         Provider Department Appt Notes    Tomorrow LMB MRI RM1 (1.5T WIDE) [facility] MRI - [place]     In 1 week Abdirizak Monroy MD; [facility] RAD ONC RN [facility] - Rad/Onc Consult ref dr goodson dx lung    In 1 week Naye Perry APRN [facility] Hematology Oncology [place] Carbo/Taxol with concurrent RT    In 4 months Shaji Genao MD [facility] 5 months                    Passed - EGFRCR or GFRNAA  > 50     GFR Evaluation  EGFRCR: 71 , resulted on 1/29/2025          Passed - Medication is active on med list          ATORVASTATIN 20 MG Oral Tab [Pharmacy Med Name: ATORVASTATIN 20 MG TABLET] 90 tablet 3     Sig: TAKE 1 TABLET (20MG) BY MOUTH EVERY DAY       Cholesterol Medication Protocol Passed - 1/30/2025  8:43 AM        Passed - ALT < 80     Lab Results   Component Value Date    ALT 16 01/29/2025             Passed - ALT resulted within past year        Passed - Lipid panel within past 12 months     Lab Results   Component Value Date    CHOLEST 107 12/04/2024    TRIG 126 12/04/2024    HDL 35 (L) 12/04/2024    LDL 49 12/04/2024    VLDL 18 12/04/2024    NONHDLC 72 12/04/2024             Passed - In person appointment or virtual visit in the past 12 mos or appointment in next 3 mos     Recent Outpatient Visits              Yesterday Malignant neoplasm of upper lobe of right lung (HCC)    [facility] Hematology Oncology [place] José Miguel Goodson MD    Office Visit    Yesterday Essential hypertension, benign    [facility] [place] Shaji Genao MD    Office Visit    2 weeks ago Lung mass    [facility] Gabrielle Garrido DO    Office Visit    1 month ago Type 2 diabetes mellitus with stage 3a chronic kidney disease, without long-term current use of insulin (HCC)    [facility] [place] Tammy Green MD    Office Visit    3 months ago Encounter for postoperative care    [facility]    Nurse Only          Future Appointments         Provider Department Appt Notes    Tomorrow LMB MRI RM1 (1.5T WIDE) [facility] MRI - [place]     In 1 week Abdirizak Monroy MD; [facility] RAD ONC RN [facility] - Rad[facility]Onc Consult ref dr goodson dx lung    In 1 week [facility] APRN [facility] Hematology Oncology [place] Carbo/Taxol with concurrent RT    In 4 months Shaji Genao MD [facility] [place] 5 months                    Passed - Medication is active on med list           Recent Outpatient Visits              Yesterday Malignant neoplasm of upper lobe of right lung (HCC)    [facility] Hematology Oncology [place] José Miguel Goodson MD    Office Visit    Yesterday Essential hypertension, benign    [facility] [place] Shaji Genao MD    Office Visit    2 weeks ago Lung mass    [facility] [place] Gabrielle Garrido DO    Office Visit    1 month ago Type 2 diabetes mellitus with stage 3a chronic kidney disease, without long-term current use of insulin (HCC)    [facility], Tammy Green MD    Office Visit    3 months ago Encounter for postoperative care    [facility] [place] [facility]    Nurse Only          Future Appointments         Provider Department Appt Notes    Tomorrow LMB MRI RM1 (1.5T WIDE) [facility] MRI - [place]     In 1 week Abdirizak Monroy MD; [facility] RAD ONC RN [facility] - Rad/Onc Consult ref dr goodson dx lung    In 1 week Naye Perry APRN [facility] Hematology Oncology [place] Carbo/Taxol with concurrent RT    In 4 months Shaji Genao MD [facility] [place] 5 months

## 2025-01-31 ENCOUNTER — PATIENT MESSAGE (OUTPATIENT)
Dept: NEPHROLOGY | Facility: CLINIC | Age: 75
End: 2025-01-31

## 2025-02-02 ENCOUNTER — HOSPITAL ENCOUNTER (OUTPATIENT)
Dept: MRI IMAGING | Age: 75
Discharge: HOME | End: 2025-02-02
Attending: INTERNAL MEDICINE
Payer: MEDICARE

## 2025-02-02 ENCOUNTER — HOSPITAL ENCOUNTER (OUTPATIENT)
Dept: MRI IMAGING | Age: 75
End: 2025-02-02
Attending: INTERNAL MEDICINE
Payer: MEDICARE

## 2025-02-02 DIAGNOSIS — C34.90 SQUAMOUS CELL LUNG CANCER (HCC): ICD-10-CM

## 2025-02-02 PROCEDURE — A9575 INJ GADOTERATE MEGLUMI 0.1ML: HCPCS | Performed by: INTERNAL MEDICINE

## 2025-02-02 PROCEDURE — 70553 MRI BRAIN STEM W/O & W/DYE: CPT | Performed by: INTERNAL MEDICINE

## 2025-02-02 RX ORDER — GADOTERATE MEGLUMINE 376.9 MG/ML
20 INJECTION INTRAVENOUS
Status: COMPLETED | OUTPATIENT
Start: 2025-02-02 | End: 2025-02-02

## 2025-02-02 RX ADMIN — GADOTERATE MEGLUMINE 19 ML: 376.9 INJECTION INTRAVENOUS at 13:58:00

## 2025-02-06 NOTE — PROGRESS NOTES
Nursing Consultation Note  Patient: Yi Torres  YOB: 1950  Age: 74 year old  Radiation Oncologist: Dr. Abdirizak Alvarado  Referring Physician: José Miguel Goodson  Diagnosis:@TAM@  Consult Date: 2/6/2025      Chemotherapy: n/a  Labs: Reviewed  Imaging: Reviewed  Is the patient of child-bearing age?         No  Has the patient received radiation therapy in the past? no  Does the patient have an implantable device?No   Patient has/has had:     1. Assistive Devices: Wheelchair    2. Flu Vaccination: yes    3. Pneumonia Vaccination:  yes    Vital Signs:   Vitals:    02/07/25 0811   BP: 137/63   Pulse: 77   Resp: 18   ,   Wt Readings from Last 6 Encounters:   02/07/25 92 kg (202 lb 12.8 oz)   01/29/25 91.6 kg (202 lb)   01/29/25 91.2 kg (201 lb)   01/15/25 91.6 kg (202 lb)   01/14/25 91.8 kg (202 lb 6.4 oz)   12/10/24 91.6 kg (202 lb)       Nursing Note: Hx of newly diagnosed R lung cancer. Presented with increased shortness of breath for about 3 months. CT chest done 1/9/25- mass in RUL. PET done 1/17/25. Transbronchial biopsy done 1/22/25- SCC of RUL. Pt saw Dr. Goodson on 1/29/25. Plan for chemoRT. Staging MRI brain done 2/2/25- no mets. Here for consult today. Pt reports has SOB with minimal exertion.     Review of Systems   Constitutional:  Positive for appetite change and fatigue.   HENT: Negative.     Eyes: Negative.    Respiratory:  Positive for cough and shortness of breath.    Cardiovascular: Negative.    Gastrointestinal:  Positive for constipation.   Endocrine: Negative.    Genitourinary: Negative.    Musculoskeletal: Negative.    Skin: Negative.         Hx of boils   Allergic/Immunologic: Negative.    Neurological:  Positive for dizziness.   Hematological: Negative.    Psychiatric/Behavioral: Negative.           Allergies:  Allergies[1]    Current Outpatient Medications   Medication Sig Dispense Refill    Spironolactone-HCTZ 25-25 MG Oral Tab Take 1 tablet by mouth daily. 90 tablet 3     atorvastatin 20 MG Oral Tab TAKE 1 TABLET (20MG) BY MOUTH EVERY DAY 90 tablet 3    Cetirizine HCl 10 MG Oral Cap Take 10 mg by mouth daily.      Psyllium (METAMUCIL FREE & NATURAL) 43 % Oral Powder Take 1 Scoop by mouth as needed.      metFORMIN  MG Oral Tablet 24 Hr Take 1 tablet (500 mg total) by mouth daily with food. 90 tablet 3    dapagliflozin (FARXIGA) 10 MG Oral Tab Take 1 tablet (10 mg total) by mouth daily. 90 tablet 3    metoprolol tartrate 25 MG Oral Tab Take 1 tablet (25 mg total) by mouth 2 (two) times daily. 180 tablet 3    verapamil  MG Oral Tab CR Take 1 tablet (240 mg total) by mouth nightly. 90 tablet 3    alendronate 70 MG Oral Tab Take 1 tablet (70 mg total) by mouth once a week. 12 tablet 3    cholecalciferol 50 MCG (2000 UT) Oral Cap Take 1 capsule (2,000 Units total) by mouth daily.      Glucose Blood (ACCU-CHEK ADITYA PLUS) In Vitro Strip USE ONCE DAILY IN THE MORNING BEFORE BREAKFAST 100 strip 3    Blood Glucose Monitoring Suppl (ACCU-CHEK ADITYA PLUS) w/Device Does not apply Kit USE ONCE DAILY IN AM BEFORE BREAKFAST 1 kit 0    LANCETS ULTRA THIN Does not apply Misc Use one daily 100 each 6    Multiple Vitamins-Minerals (CENTRUM SILVER) Oral Tab Take 1 tablet by mouth daily.      Omega-3-acid Ethyl Esters 1 g Oral Cap Take 1 capsule (1 g total) by mouth daily. (Patient not taking: Reported on 2/7/2025)         Preferred Pharmacy:    [facility]/pharmacy #[id number] - [place], [place] - [address] [place] [address] AT across from Behzad Jaquez, [phone], [phone]  [address] [place] [address]  Phone: [phone] Fax: [phone]      Past Medical History:    Anesthesia complication    body aches for 3 days    Anxiety state    Cancer (HCC)    skin cancer    Cataract    Diabetes (HCC)    Diabetes mellitus (HCC)    Essential hypertension    High blood pressure    High cholesterol    Incontinence    bladder    Obesity, unspecified    Osteoarthritis    In knees    Renal disorder    CKD 2    Squamous  cell carcinoma in situ (SCCIS)    right temple    Visual impairment       Past Surgical History:   Procedure Laterality Date    Adj tiss xfer head,fac,hand <10sqcm Right 2018    Wide excision lesion right temple, flap reconstruction    Cataract  left- 2017.     Cholecystectomy      Laparoscopic incisional / umbilical / ventral hernia repair  2024       Social History     Socioeconomic History    Marital status:      Spouse name: Not on file    Number of children: 1    Years of education: Not on file    Highest education level: Not on file   Occupational History    Not on file   Tobacco Use    Smoking status: Former     Current packs/day: 0.00     Average packs/day: 0.5 packs/day for 45.0 years (22.5 ttl pk-yrs)     Types: Cigarettes     Start date: 1973     Quit date: 2018     Years since quittin.0     Passive exposure: Past    Smokeless tobacco: Never    Tobacco comments:     1 pack every 3 days   Vaping Use    Vaping status: Never Used   Substance and Sexual Activity    Alcohol use: Not Currently     Comment: very rarely    Drug use: No    Sexual activity: Not on file   Other Topics Concern     Service Not Asked    Blood Transfusions Not Asked    Caffeine Concern Yes     Comment: Coffee, 2 cups per day     Occupational Exposure Not Asked    Hobby Hazards Not Asked    Sleep Concern Not Asked    Stress Concern Not Asked    Weight Concern Not Asked    Special Diet Not Asked    Back Care Not Asked    Exercise Not Asked    Bike Helmet Not Asked    Seat Belt Not Asked    Self-Exams Not Asked    Grew up on a farm Not Asked    History of tanning No    Outdoor occupation Not Asked    Breast feeding Not Asked    Reaction to local anesthetic No    Left Handed No    Right Handed Yes    Currently spends a great deal of time in the sun No    Past Sunlamp Treatments for Acne Not Asked    Hx of Spending Great Deal of Time in Sun No    Bad sunburns in the past Yes    Tanning Salons in  the Past No    Hx of Radiation Treatments No    Regular use of sun block Not Asked   Social History Narrative    - lives with     1 son      Social Drivers of Health     Food Insecurity: Not on file   Transportation Needs: Not on file   Housing Stability: Not on file       ECOG:  Grade 0 - Fully active, able to carry on all predisease activities without restrictions.      Education:  Knowledge Deficit Plan Of Care:    Problem:  Knowledge Deficit    Problems related to:    Radiation therapy    Interventions:  Assess patient knowledge level  Assess knowledge needs  Instruct on treatment planning  Instruct on radiation therapy appointment scheduling  Instruct on purpose of radiation therapy  Instruct on side effects of radiation therapy    Expected Outcomes:  Knowledge of radiation therapy    Progress Toward Outcome:  Making progress    Pamphlets/Handouts Given to Patient:  Understanding radiation therapy      Are ADL's met?  Yes  Does patient feel safe in their environment?  Yes  Care decisions:  Patient and/or surrogate IS involved in care decisions.  Advanced directives:  Patient DOES NOT have advanced directives.  Transportation:  Adequate transportation available for expected visits    Pain: pt denies         [1]   Allergies  Allergen Reactions    Erythromycin DIARRHEA     Stomach pain

## 2025-02-07 ENCOUNTER — OFFICE VISIT (OUTPATIENT)
Dept: RADIATION ONCOLOGY | Facility: HOSPITAL | Age: 75
End: 2025-02-07
Attending: RADIOLOGY
Payer: MEDICARE

## 2025-02-07 VITALS
RESPIRATION RATE: 18 BRPM | OXYGEN SATURATION: 94 % | SYSTOLIC BLOOD PRESSURE: 137 MMHG | WEIGHT: 202.81 LBS | HEART RATE: 77 BPM | TEMPERATURE: 98 F | DIASTOLIC BLOOD PRESSURE: 63 MMHG | BODY MASS INDEX: 34 KG/M2

## 2025-02-07 DIAGNOSIS — C34.11 MALIGNANT NEOPLASM OF UPPER LOBE OF RIGHT LUNG (HCC): Primary | ICD-10-CM

## 2025-02-07 PROCEDURE — 99212 OFFICE O/P EST SF 10 MIN: CPT

## 2025-02-07 NOTE — PATIENT INSTRUCTIONS
- WE WILL CALL TO SCHEDULE YOUR CT SIMULATION FOR RADIATION PLANNING.       - IF YOU HAVE ANY QUESTIONS OR CONCERNS REGARDING RADIATION THERAPY, PLEASE CALL THE NURSING LINE AT [phone].

## 2025-02-07 NOTE — CONSULTS
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    HISTORY OF PRESENT ILLNESS:  The patient is a 74-year-old female, recently diagnosed with a regionally advanced right-sided lung cancer.  She has a long smoking history and complained of dyspnea for about the past 3 months or so.  A CT scan of the chest was obtained on 01/09/2025, and demonstrated a very large heterogeneously enhancing and partially necrotic 8.2 x 7.4 x 10.8 cm right upper lobe mass directly invading the right paratracheal, upper mediastinum, and demonstrating extensive associated right upper lobe bronchovascular structure encasement.  There is narrowing of the superior vena cava, and the findings were deemed highly concerning for a primary pulmonary neoplasm.  The patient saw Dr. Garrido who obtained a PET scan on 01/17/2025.  This showed a 9 x 8 cm hypermetabolic mass within the right upper lobe extending into the adjacent mediastinum and right hilum.  This is centrally necrotic.  This is compatible with a primary pulmonary neoplasm.  There is effacement of the IVC.  There are hypermetabolic lymph nodes within the anterior superior mediastinum, subcarinal region, right paratracheal region, as well as the right supraclavicular region.  The patient underwent a bronchoscopy and biopsy by Dr. Garrido on 01/22/2025.  A biopsy of the right upper lobe mass came back positive for squamous cell carcinoma.  An EBUS biopsy of station 7 was benign.  The patient saw Dr. Goodson who obtained an MRI to complete the staging workup.  This took place on 02/02/2025 and showed no intracranial disease.  Dr. Goodson then  had a long talk with the patient regarding the potential treatment options, and the patient was not deemed a surgical candidate based upon the locally invasive and potential vascular involvement.  The decision, therefore, is to proceed with concomitant chemoradiotherapy to be followed by immunotherapy.  Dr. Goodson has talked to the patient about the chemotherapeutic component of treatment, and referred her on to Radiation for a discussion regarding this therapy.     The patient otherwise has had some increasing shortness of breath.  She denies hemoptysis.  She denies any significant weight loss or changes in appetite and denies bony or joint pain.    PAST MEDICAL HISTORY:  The patient has a past history of diabetes, arthritis, multiple skin cancers, hypertension, obesity, anxiety.      PAST SURGICAL HISTORY:  Cholecystectomy, cataract removal, and wide excision of a basal cell carcinoma from the right temple.      MEDICATIONS:  Alendronate, atorvastatin, cetirizine, Farxiga, metformin, metoprolol, spironolactone, verapamil.      ALLERGIES:  Erythromycin.    FAMILY HISTORY:  Negative for malignancy.    SOCIAL HISTORY:  The patient is a former smoker who has about a 25-pack-year history in total.  She quit in 2018.  She reports only rare alcohol use.  She is  and is seen in consultation with her , who is a former patient at our institution for head and neck and anal cancers.  She denies transportation-related difficulties.    REVIEW OF SYSTEMS:  A 14-point review of systems is performed.  Pertinent positives and negatives are as per HPI.      PHYSICAL EXAMINATION:    GENERAL:  A 74-year-old female, who is pleasant, cooperative, and alert, awake, oriented x3.  She is in no acute distress.  She has an ECOG performance score of 1 and a current pain score of 0.  VITAL SIGNS:  Blood pressure of 137/63, pulse of 77, respiratory rate of 18, and a temperature of 97.9.  Her weight is 202 pounds.  HEENT:  Pupils are  equal, round, and reactive to light with accommodation, and the extraocular movements are intact.  The oral cavity is without ulcerations or lesions.  NECK:  Supple with no lymphadenopathy.  LUNGS:  Chest is clear to auscultation bilaterally.  HEART:  Regular rate and rhythm, with normal S1 and S2, and no audible murmurs.  LYMPHATICS:  There is no supraclavicular, axillary, or inguinal lymphadenopathy.  ABDOMEN:  Soft, nontender, nondistended, with normoactive bowel sounds and no hepatosplenomegaly.  EXTREMITIES:  Without clubbing, cyanosis, or edema.  NEUROLOGIC:  Cranial nerves II through XII are grossly intact, and there are no focal deficits.    IMPRESSION:  This is a 74-year-old female with a recent diagnosis of a stage IIIC squamous cell carcinoma of the right lung.  She has a large necrotic mass in the right upper lobe of the lung with some direct mediastinal involvement, as well as supraclavicular/cervical chain lymph nodes which appear involved by PET scan.    RECOMMENDATIONS:  I do believe the patient is a good candidate for definitive treatment with concomitant chemoradiotherapy.  She is not an appropriate surgical candidate based upon the extensiveness of her disease.  I would recommend 6300 cGy to all PET-positive areas of disease and would utilize both intensity-modulated radiotherapy as well as image guidance for the most accurate and precise treatment possible.  This should be done alongside chemotherapy as given by Dr. Goodson.  After the radiation is complete, the patient then will continue on immunotherapy.    I then had a long talk with the patient and her  regarding the potential side effects of this treatment.  I told her that she should tolerate treatments well and I do not expect much dramatic morbidity from the radiation.  She may get some cough and shortness of breath, and there may be some hemoptysis, as there is some necrosis of the tumor itself.  Fatigue is very commonplace with  therapy.  The other meaningful side effect is sore throat.  The patient may have increasing dysphagia during treatment.  The side effects will all worsen during the course of therapy but should resolve fairly quickly after radiation is complete.  I do not expect any long-term or permanent side effects as a result of the radiation.  There is a possibility of some esophageal strictures or radiation pneumonitis.  This would be unlikely, but it is certainly possible.  If it were to occur in her case, it could be quite problematic given that her overall lung functioning is likely significantly impaired already.  Following this long and thorough discussion of all the risks and benefits of treatment, the patient indicated that she understood all these issues and does wish to proceed with treatment as I previously dictated.    We, therefore, will schedule the patient for simulation soon with the intent to begin treatment shortly thereafter.      Thank you very much for allowing me the opportunity to participate in care of this patient.  If there should be any questions regarding the radiotherapy, please feel free to contact me at any time.     Dictated By Abdirizak Alvarado MD  d: 02/07/2025 09:37:36  t: 02/07/2025 10:27:26  [facility] [id number]  NAD/    cc: MD Gabrielle Molina DO Anastasia Munoz, MD

## 2025-02-10 ENCOUNTER — OFFICE VISIT (OUTPATIENT)
Age: 75
End: 2025-02-10
Attending: INTERNAL MEDICINE
Payer: MEDICARE

## 2025-02-10 DIAGNOSIS — C34.11 MALIGNANT NEOPLASM OF UPPER LOBE OF RIGHT LUNG (HCC): Primary | ICD-10-CM

## 2025-02-10 RX ORDER — ONDANSETRON 8 MG/1
8 TABLET, FILM COATED ORAL EVERY 8 HOURS PRN
Qty: 30 TABLET | Refills: 3 | Status: SHIPPED | OUTPATIENT
Start: 2025-02-10

## 2025-02-10 RX ORDER — PROCHLORPERAZINE MALEATE 10 MG
10 TABLET ORAL EVERY 6 HOURS PRN
Qty: 30 TABLET | Refills: 3 | Status: SHIPPED | OUTPATIENT
Start: 2025-02-10

## 2025-02-10 NOTE — PATIENT INSTRUCTIONS
Medication Education Record: IV Therapy    Learner:  Patient and Spouse    Barriers / Limitations:  None    Psychosocial Assessment:  patient psychosocial response appropriate    Diagnosis:   Lung Cancer    IV Cancer Treatment Name(s): Carboplatin/Paclitaxel   IV Cancer Treatment Frequency Weekly    Number of cycles planned Concurrent RT  Plan for appointments and lab testing Prior to each cycle  Verified Consent to Chemotherapy/Biotherapy Cancer Treatment form signed by patient and provider:  Yes    Confirm patient informs his/her Cancer Care team of any treatment received in a setting other than at the [facility] (such as inpatient or outpatient at another hospital or clinic, locally or out of state) so that this medical information can accurately be reflected in his/her medical record.  This vital information will provide an accurate picture for the physician prescribing the current cancer treatment.Yes  Cancer Treatment Side Effects (refer to Chemo Care Handouts for further information):  Allergic reactions  Constipation  Diarrhea  Fatigue  Hair loss  Heart effects  Kidney / Bladder effects  Liver effects  Loss of appetite  Low red blood cell count / Anemia  Low White Blood Cell Count/Risk of infection  Low Platelet Count/Risk of Bleeding  Lung Effects  Mouth or Throat Sores  Muscle / Bone Effects  Nausea / Vomiting  Nerve Effects  Sexual Effects  Skin Effects  Taste Changes    IV administration risks:  Potential leaking of drug outside of vein during administration   Signs/symptoms include redness, swelling, pain, burning at the site of administration  Notify Infusion Nurse immediately if any of these symptoms occur during or after the infusion  Allergic reaction: there is a chance for allergic reaction with some medications.  If your prescribed therapy has a higher risk for this, steps will be taken to prevent and minimize this from occurring.    Recommended Anti-nausea medications (as  directed by your provider):  Prochloperazine (Compazine) 10 mg every 6 hours and Ondansetron (Zofran) 8 mg every 8 hours  Take as needed    Helpful hints during cancer treatment:    Diet:  Avoid greasy or spicy foods on days surrounding chemotherapy  Eat small frequent meals per day (6-7 meals) rather than 3 large meals  Choose high calorie/high protein foods (chicken, hard cooked eggs, peanut butter, cheese)  If nauseated, try dry foods, such as toast, crackers or pretzels; light or bland foods, such as applesauce or oatmeal.    Fluid intake:  Drink 8-10 cups of liquid a day and take a water bottle wherever you go.  Any fluid is acceptable, but caffeinated products do not count towards your intake and should be limited to 1-2 drinks/day.    Physical Activity    If your doctor approves, be as physically active as you can, but start out slowly, and increase your activity over time as you feel stronger.  Listen to your body and rest when you need to.  Do what you can when you feel up to it.      Oral Care  Keep your mouth clean.  Rinse your mouth before and after meals with plain water or with a mild mouth rinse (made with 1 quart water, 1 teaspoon salt, and 1 teaspoon baking soda, shake before using)   Avoid commercial mouthwashes that contain alcohol, alcoholic or acidic drinks or tobacco  An acceptable mouthwash is Biotene®   If soreness or sores develop, contact the office.    Diarrhea or Constipation    Imodium A-D use for diarrhea:  Take 2 tabs (4mg) at the first sign of diarrhea; then take 1 tab (2mg) every 2 hours until you have had no diarrhea for at least 12 hours; during the night take 2 tabs (4mg) every 4 hours as needed.  Maximum of 8 tablets in 24 hours.                 Constipation: Over-the-counter recommendations include: Senokot, Ducolax, Milk of Magnesia or Miralax (generics are ok)    If you have persistent diarrhea or constipation, please contact the triage nurse for further instructions.  Skin  Care  Avoid direct sunlight.  Wear a broad-spectrum sunscreen with an SPF of 30 or higher on any skin exposed to the sun.  Re-apply every 2 hours if in the sun and after bathing or sweating.  For dry skin, use an alcohol-free lotion twice per day, especially after baths.  If scalp hair loss has occurred, protect the scalp from the sun by wearing a hat and use sunscreen.  Apply alcohol-free moisturizer as needed.    When to call the doctor:  Fever of 100.4 or greater or shaking chills  Nausea/vomiting not controlled with anti-nausea medications: Unable to drink for 24 hours or have signs of dehydration: tiredness, thirst, dry mouth, dark and decreased amount of urine  Diarrhea - not controlled with Imodium AD or more than 6 episodes in 24 hours  Constipation -no bowel movement x 3 days, no response to stool softeners or laxatives  Mouth sores, sore throat or blisters on the lips affecting oral intake  Difficulty breathing, chest pain, or new cough  Excessive tiredness or weakness, confusion or loss of balance  New rash  Tingling or burning, redness, swelling of the palms of hands or soles of feet  Sudden new unexpected symptom -such as change in vision, swelling in arm or leg  Increase in numbness and tingling of hands or feet  Unusual bleeding (nose bleeds, blood in urine, stool or phlegm)  Pain with urination  Persistent mood changes, depression, nervousness, difficulty sleeping   Pain, redness, swelling or blistering at the IV site  If you go to Emergency Room for any reason or seek medical attention elsewhere  If you should need to cancel or reschedule any visit, it is important that you contact the office    What Phone Number to Call:  Cancer Center [phone] / Triage Nurse    Teaching Materials Provided:   Chemo Care chemotherapy information sheets     Please refer to the following link if you are interested in additional information about chemotherapy for yourself or family members:  [email or web address]        Safety and Handling of Chemotherapy  While you or your family member is receiving chemotherapy, whether in the clinic or at home, the following precautions must be taken to lessen any exposure to the medication.     Handling Body Waste:   Caregivers must wear gloves if exposed to the patient’s blood, urine, stool or vomit.  Dispose of the used gloves after each use and wash hands with soap and water.  Any sheets or clothes soiled with the bodily fluids should be machine washed twice in hot water with regular laundry detergent.  Do not wash soiled garments with hands.  If the soiled garments cannot be washed right away, place them in a sealed plastic bag until they can be washed.   Absorbable undergarments, or any other items contaminated with chemotherapy, should be placed in a sealed plastic bag for disposal, separate from other trash.  Toilets should be flushed twice with the lid closed while taking this medication and for 48 hours after the last dose.  Wash your hands after using the toilet.  Wash any skin area that has come in contact with urine or stool.      Safety for my family and friends:  Due to safety concerns and the nature of this treatment environment, children are not permitted in the infusion center.  Please make appropriate arrangements.   Hugging and kissing is safe for you and your partner or family members.  You can visit, sit with, hug and kiss the children in your life.    You can be around pregnant women, though (if possible) they should not clean up any of your body fluids after you have treatment.   Sexual activity is safe while throughout treatment.  It is possible that traces of the oral chemotherapy may be present in vaginal fluid or semen for 48 hours after taking.  A condom should be used during this time.  Effective birth control should be used throughout treatment to prevent pregnancy while on these medications and for several  months or years after therapy.  Chemotherapy can have harmful side effects to the fetus, especially in the first trimester.  In addition, menstrual cycles can become irregular during and after treatment, so you may not know if you are at a time in your cycle when you could become pregnant or if you are actually pregnant.

## 2025-02-10 NOTE — PROGRESS NOTES
Medication Education Record: IV Therapy    Learner:  Patient and Spouse    Barriers / Limitations:  None    Psychosocial Assessment:  patient psychosocial response appropriate    Diagnosis:   Lung Cancer    IV Cancer Treatment Name(s): Carboplatin/Paclitaxel   IV Cancer Treatment Frequency Weekly    Number of cycles planned Concurrent RT  Plan for appointments and lab testing Prior to each cycle  Verified Consent to Chemotherapy/Biotherapy Cancer Treatment form signed by patient and provider:  Yes    Confirm patient informs his/her Cancer Care team of any treatment received in a setting other than at the [facility] (such as inpatient or outpatient at another hospital or clinic, locally or out of state) so that this medical information can accurately be reflected in his/her medical record.  This vital information will provide an accurate picture for the physician prescribing the current cancer treatment.Yes  Cancer Treatment Side Effects (refer to Chemo Care Handouts for further information):  Allergic reactions  Constipation  Diarrhea  Fatigue  Hair loss  Heart effects  Kidney / Bladder effects  Liver effects  Loss of appetite  Low red blood cell count / Anemia  Low White Blood Cell Count/Risk of infection  Low Platelet Count/Risk of Bleeding  Lung Effects  Mouth or Throat Sores  Muscle / Bone Effects  Nausea / Vomiting  Nerve Effects  Sexual Effects  Skin Effects  Taste Changes    IV administration risks:  Potential leaking of drug outside of vein during administration   Signs/symptoms include redness, swelling, pain, burning at the site of administration  Notify Infusion Nurse immediately if any of these symptoms occur during or after the infusion  Allergic reaction: there is a chance for allergic reaction with some medications.  If your prescribed therapy has a higher risk for this, steps will be taken to prevent and minimize this from occurring.    Recommended Anti-nausea medications (as  directed by your provider):  Prochloperazine (Compazine) 10 mg every 6 hours and Ondansetron (Zofran) 8 mg every 8 hours  Take as needed    Helpful hints during cancer treatment:    Diet:  Avoid greasy or spicy foods on days surrounding chemotherapy  Eat small frequent meals per day (6-7 meals) rather than 3 large meals  Choose high calorie/high protein foods (chicken, hard cooked eggs, peanut butter, cheese)  If nauseated, try dry foods, such as toast, crackers or pretzels; light or bland foods, such as applesauce or oatmeal.    Fluid intake:  Drink 8-10 cups of liquid a day and take a water bottle wherever you go.  Any fluid is acceptable, but caffeinated products do not count towards your intake and should be limited to 1-2 drinks/day.    Physical Activity    If your doctor approves, be as physically active as you can, but start out slowly, and increase your activity over time as you feel stronger.  Listen to your body and rest when you need to.  Do what you can when you feel up to it.      Oral Care  Keep your mouth clean.  Rinse your mouth before and after meals with plain water or with a mild mouth rinse (made with 1 quart water, 1 teaspoon salt, and 1 teaspoon baking soda, shake before using)   Avoid commercial mouthwashes that contain alcohol, alcoholic or acidic drinks or tobacco  An acceptable mouthwash is Biotene®   If soreness or sores develop, contact the office.    Diarrhea or Constipation    Imodium A-D use for diarrhea:  Take 2 tabs (4mg) at the first sign of diarrhea; then take 1 tab (2mg) every 2 hours until you have had no diarrhea for at least 12 hours; during the night take 2 tabs (4mg) every 4 hours as needed.  Maximum of 8 tablets in 24 hours.                 Constipation: Over-the-counter recommendations include: Senokot, Ducolax, Milk of Magnesia or Miralax (generics are ok)    If you have persistent diarrhea or constipation, please contact the triage nurse for further instructions.  Skin  Care  Avoid direct sunlight.  Wear a broad-spectrum sunscreen with an SPF of 30 or higher on any skin exposed to the sun.  Re-apply every 2 hours if in the sun and after bathing or sweating.  For dry skin, use an alcohol-free lotion twice per day, especially after baths.  If scalp hair loss has occurred, protect the scalp from the sun by wearing a hat and use sunscreen.  Apply alcohol-free moisturizer as needed.    When to call the doctor:  Fever of 100.4 or greater or shaking chills  Nausea/vomiting not controlled with anti-nausea medications: Unable to drink for 24 hours or have signs of dehydration: tiredness, thirst, dry mouth, dark and decreased amount of urine  Diarrhea - not controlled with Imodium AD or more than 6 episodes in 24 hours  Constipation -no bowel movement x 3 days, no response to stool softeners or laxatives  Mouth sores, sore throat or blisters on the lips affecting oral intake  Difficulty breathing, chest pain, or new cough  Excessive tiredness or weakness, confusion or loss of balance  New rash  Tingling or burning, redness, swelling of the palms of hands or soles of feet  Sudden new unexpected symptom -such as change in vision, swelling in arm or leg  Increase in numbness and tingling of hands or feet  Unusual bleeding (nose bleeds, blood in urine, stool or phlegm)  Pain with urination  Persistent mood changes, depression, nervousness, difficulty sleeping   Pain, redness, swelling or blistering at the IV site  If you go to Emergency Room for any reason or seek medical attention elsewhere  If you should need to cancel or reschedule any visit, it is important that you contact the office    What Phone Number to Call:  Cancer Center [phone] / Triage Nurse    Teaching Materials Provided:   Chemo Care chemotherapy information sheets     Please refer to the following link if you are interested in additional information about chemotherapy for yourself or family members:  [email or web address]        Safety and Handling of Chemotherapy  While you or your family member is receiving chemotherapy, whether in the clinic or at home, the following precautions must be taken to lessen any exposure to the medication.     Handling Body Waste:   Caregivers must wear gloves if exposed to the patient’s blood, urine, stool or vomit.  Dispose of the used gloves after each use and wash hands with soap and water.  Any sheets or clothes soiled with the bodily fluids should be machine washed twice in hot water with regular laundry detergent.  Do not wash soiled garments with hands.  If the soiled garments cannot be washed right away, place them in a sealed plastic bag until they can be washed.   Absorbable undergarments, or any other items contaminated with chemotherapy, should be placed in a sealed plastic bag for disposal, separate from other trash.  Toilets should be flushed twice with the lid closed while taking this medication and for 48 hours after the last dose.  Wash your hands after using the toilet.  Wash any skin area that has come in contact with urine or stool.      Safety for my family and friends:  Due to safety concerns and the nature of this treatment environment, children are not permitted in the infusion center.  Please make appropriate arrangements.   Hugging and kissing is safe for you and your partner or family members.  You can visit, sit with, hug and kiss the children in your life.    You can be around pregnant women, though (if possible) they should not clean up any of your body fluids after you have treatment.   Sexual activity is safe while throughout treatment.  It is possible that traces of the oral chemotherapy may be present in vaginal fluid or semen for 48 hours after taking.  A condom should be used during this time.  Effective birth control should be used throughout treatment to prevent pregnancy while on these medications and for several  months or years after therapy.  Chemotherapy can have harmful side effects to the fetus, especially in the first trimester.  In addition, menstrual cycles can become irregular during and after treatment, so you may not know if you are at a time in your cycle when you could become pregnant or if you are actually pregnant.      Cancer treatment education, including treatment plan, supportive medications, and post-treatment care was provided to the patient.  The patient/support person  was attentive during education, verbalized understanding, all questions were answered and patient was instructed to call with further questions.     Reinforcement of education is needed at next visit? Yes  55 minute appointment spent on education and counseling on above plan along with supportive care

## 2025-02-11 ENCOUNTER — HOSPITAL ENCOUNTER (OUTPATIENT)
Dept: INTERVENTIONAL RADIOLOGY/VASCULAR | Facility: HOSPITAL | Age: 75
Discharge: HOME | End: 2025-02-11
Attending: INTERNAL MEDICINE | Admitting: RADIOLOGY
Payer: MEDICARE

## 2025-02-11 VITALS
SYSTOLIC BLOOD PRESSURE: 142 MMHG | OXYGEN SATURATION: 91 % | TEMPERATURE: 98 F | DIASTOLIC BLOOD PRESSURE: 60 MMHG | WEIGHT: 200.38 LBS | HEART RATE: 81 BPM | BODY MASS INDEX: 33.79 KG/M2 | HEIGHT: 64.5 IN | RESPIRATION RATE: 21 BRPM

## 2025-02-11 DIAGNOSIS — C77.0 MALIGNANT NEOPLASM METASTATIC TO LYMPH NODE OF NECK (HCC): ICD-10-CM

## 2025-02-11 DIAGNOSIS — C34.11 MALIGNANT NEOPLASM OF UPPER LOBE OF RIGHT LUNG (HCC): ICD-10-CM

## 2025-02-11 LAB — GLUCOSE BLDC GLUCOMTR-MCNC: 117 MG/DL (ref 70–99)

## 2025-02-11 PROCEDURE — B548ZZA ULTRASONOGRAPHY OF SUPERIOR VENA CAVA, GUIDANCE: ICD-10-PCS | Performed by: RADIOLOGY

## 2025-02-11 PROCEDURE — 02HV33Z INSERTION OF INFUSION DEVICE INTO SUPERIOR VENA CAVA, PERCUTANEOUS APPROACH: ICD-10-PCS | Performed by: RADIOLOGY

## 2025-02-11 PROCEDURE — 0JH60WZ INSERTION OF TOTALLY IMPLANTABLE VASCULAR ACCESS DEVICE INTO CHEST SUBCUTANEOUS TISSUE AND FASCIA, OPEN APPROACH: ICD-10-PCS | Performed by: RADIOLOGY

## 2025-02-11 PROCEDURE — 77001 FLUOROGUIDE FOR VEIN DEVICE: CPT | Performed by: RADIOLOGY

## 2025-02-11 PROCEDURE — 82962 GLUCOSE BLOOD TEST: CPT

## 2025-02-11 PROCEDURE — 99152 MOD SED SAME PHYS/QHP 5/>YRS: CPT | Performed by: RADIOLOGY

## 2025-02-11 PROCEDURE — 36561 INSERT TUNNELED CV CATH: CPT | Performed by: RADIOLOGY

## 2025-02-11 RX ORDER — MIDAZOLAM HYDROCHLORIDE 1 MG/ML
INJECTION INTRAMUSCULAR; INTRAVENOUS
Status: COMPLETED
Start: 2025-02-11 | End: 2025-02-11

## 2025-02-11 RX ORDER — SODIUM CHLORIDE 9 MG/ML
INJECTION, SOLUTION INTRAVENOUS CONTINUOUS
Status: DISCONTINUED | OUTPATIENT
Start: 2025-02-11 | End: 2025-02-11

## 2025-02-11 RX ORDER — LIDOCAINE HYDROCHLORIDE 20 MG/ML
INJECTION, SOLUTION INFILTRATION; PERINEURAL
Status: COMPLETED
Start: 2025-02-11 | End: 2025-02-11

## 2025-02-11 RX ADMIN — SODIUM CHLORIDE: 9 INJECTION, SOLUTION INTRAVENOUS at 09:15:00

## 2025-02-11 NOTE — IVS NOTE
DISCHARGE NOTE      Pt is able to sit up and ambulate without difficulty.   Pt voided and tolerated fluids and food.   Procedural site remains dry and intact with good circulation, motion, and sensation.   No signs and symptoms of bleeding/hematoma noted.   IV access removed  Instruction provided, patient/family verbalizes understanding.   Dr. Loaiza   spoke with patient/family post procedure.      Pt discharge via wheelchair to [facility]      Follow up Appointment: n/a     New Prescription:n/a    1130: MJ and IR team notified of swelling to pt's L neck. Site soft, no hematoma. Ok to discharge.

## 2025-02-11 NOTE — INTERVAL H&P NOTE
The above referenced H&P was reviewed by Cesar Loaiza MD on 2/11/2025, the patient was examined and no significant changes have occurred in the patient's condition since the H&P was performed.  Risks, benefits, alternative treatments and consequences of no treatment were discussed.  We will proceed with procedure as planned.      Cesar Loaiza MD  2/11/2025  9:30 AM

## 2025-02-11 NOTE — DISCHARGE INSTRUCTIONS
INTERVENTIONAL RADIOLOGY  [facility]  [phone]     Patient Name:  Yi Torres    Procedure:  Chest Port insertion    Site Care: Dermabond (skin glue) has been applied to your incision.  Do not scrub the area.  Allow the Dermabond to flake off on its own.  No showering for 48 hours.                                     Activity/Diet  No heavy lifting or strenuous activity for 48 hours. Nothing heavier than a gallon of milk.  Drink plenty of fluids, unless you have otherwise been told to restrict your fluid intake.  Do not drink alcohol for 24 hours.  Do not drive,  operate heavy machinery, make important decisions or sign legal documents today.    Medications:  Make no changes to your existing medications.    Contact Interventional Radiology at [phone] if you have severe/unrelieved pain, fever, chills, dizziness/lightheadedness, or drainage/bleeding from your incision site.

## 2025-02-12 ENCOUNTER — PATIENT MESSAGE (OUTPATIENT)
Dept: RADIATION ONCOLOGY | Facility: HOSPITAL | Age: 75
End: 2025-02-12

## 2025-02-13 ENCOUNTER — HOSPITAL ENCOUNTER (INPATIENT)
Facility: HOSPITAL | Age: 75
LOS: 4 days | Discharge: HOME | End: 2025-02-17
Attending: STUDENT IN AN ORGANIZED HEALTH CARE EDUCATION/TRAINING PROGRAM | Admitting: HOSPITALIST
Payer: MEDICARE

## 2025-02-13 ENCOUNTER — APPOINTMENT (OUTPATIENT)
Dept: INTERVENTIONAL RADIOLOGY/VASCULAR | Facility: HOSPITAL | Age: 75
End: 2025-02-13
Attending: CLINICAL NURSE SPECIALIST
Payer: MEDICARE

## 2025-02-13 ENCOUNTER — NURSE ONLY (OUTPATIENT)
Dept: RADIATION ONCOLOGY | Facility: HOSPITAL | Age: 75
End: 2025-02-13

## 2025-02-13 ENCOUNTER — APPOINTMENT (OUTPATIENT)
Dept: RADIATION ONCOLOGY | Facility: HOSPITAL | Age: 75
End: 2025-02-13
Attending: RADIOLOGY
Payer: MEDICARE

## 2025-02-13 ENCOUNTER — APPOINTMENT (OUTPATIENT)
Dept: CT IMAGING | Facility: HOSPITAL | Age: 75
End: 2025-02-13
Attending: STUDENT IN AN ORGANIZED HEALTH CARE EDUCATION/TRAINING PROGRAM
Payer: MEDICARE

## 2025-02-13 ENCOUNTER — OFFICE VISIT (OUTPATIENT)
Age: 75
End: 2025-02-13
Attending: INTERNAL MEDICINE
Payer: MEDICARE

## 2025-02-13 VITALS
BODY MASS INDEX: 35 KG/M2 | RESPIRATION RATE: 18 BRPM | WEIGHT: 205 LBS | TEMPERATURE: 98 F | OXYGEN SATURATION: 96 % | HEART RATE: 88 BPM | SYSTOLIC BLOOD PRESSURE: 152 MMHG | DIASTOLIC BLOOD PRESSURE: 65 MMHG

## 2025-02-13 DIAGNOSIS — C77.0 MALIGNANT NEOPLASM METASTATIC TO LYMPH NODE OF NECK (HCC): ICD-10-CM

## 2025-02-13 DIAGNOSIS — I87.1 SVC SYNDROME: Primary | ICD-10-CM

## 2025-02-13 DIAGNOSIS — I82.B12 ACUTE EMBOLISM AND THROMBOSIS OF LEFT SUBCLAVIAN VEIN (HCC): ICD-10-CM

## 2025-02-13 DIAGNOSIS — I82.290: ICD-10-CM

## 2025-02-13 DIAGNOSIS — C34.11 MALIGNANT NEOPLASM OF UPPER LOBE OF RIGHT LUNG (HCC): Primary | ICD-10-CM

## 2025-02-13 PROBLEM — I82.609 VENOUS THROMBOSIS OF UPPER EXTREMITY: Status: ACTIVE | Noted: 2025-02-13

## 2025-02-13 LAB
ANION GAP SERPL CALC-SCNC: 6 MMOL/L (ref 0–18)
APTT PPP: 26.1 SECONDS (ref 23–36)
BASOPHILS # BLD AUTO: 0.07 X10(3) UL (ref 0–0.2)
BASOPHILS NFR BLD AUTO: 0.5 %
BUN BLD-MCNC: 25 MG/DL (ref 9–23)
BUN/CREAT SERPL: 33.8 (ref 10–20)
CALCIUM BLD-MCNC: 9.7 MG/DL (ref 8.7–10.4)
CHLORIDE SERPL-SCNC: 97 MMOL/L (ref 98–112)
CO2 SERPL-SCNC: 29 MMOL/L (ref 21–32)
CREAT BLD-MCNC: 0.74 MG/DL
DEPRECATED RDW RBC AUTO: 49.7 FL (ref 35.1–46.3)
EGFRCR SERPLBLD CKD-EPI 2021: 86 ML/MIN/1.73M2 (ref 60–?)
EOSINOPHIL # BLD AUTO: 0.07 X10(3) UL (ref 0–0.7)
EOSINOPHIL NFR BLD AUTO: 0.5 %
ERYTHROCYTE [DISTWIDTH] IN BLOOD BY AUTOMATED COUNT: 17.2 % (ref 11–15)
GLUCOSE BLD-MCNC: 98 MG/DL (ref 70–99)
GLUCOSE BLDC GLUCOMTR-MCNC: 196 MG/DL (ref 70–99)
HCT VFR BLD AUTO: 38.8 %
HGB BLD-MCNC: 12 G/DL
IMM GRANULOCYTES # BLD AUTO: 0.04 X10(3) UL (ref 0–1)
IMM GRANULOCYTES NFR BLD: 0.3 %
INR BLD: 1.15 (ref 0.8–1.2)
ISTAT ACTIVATED CLOTTING TIME: 279 SECONDS (ref 125–137)
LYMPHOCYTES # BLD AUTO: 2.04 X10(3) UL (ref 1–4)
LYMPHOCYTES NFR BLD AUTO: 15.1 %
MCH RBC QN AUTO: 24.6 PG (ref 26–34)
MCHC RBC AUTO-ENTMCNC: 30.9 G/DL (ref 31–37)
MCV RBC AUTO: 79.7 FL
MONOCYTES # BLD AUTO: 1.18 X10(3) UL (ref 0.1–1)
MONOCYTES NFR BLD AUTO: 8.7 %
NEUTROPHILS # BLD AUTO: 10.13 X10 (3) UL (ref 1.5–7.7)
NEUTROPHILS # BLD AUTO: 10.13 X10(3) UL (ref 1.5–7.7)
NEUTROPHILS NFR BLD AUTO: 74.9 %
OSMOLALITY SERPL CALC.SUM OF ELEC: 278 MOSM/KG (ref 275–295)
PLATELET # BLD AUTO: 315 10(3)UL (ref 150–450)
POTASSIUM SERPL-SCNC: 3.7 MMOL/L (ref 3.5–5.1)
PROTHROMBIN TIME: 15.4 SECONDS (ref 11.6–14.8)
RBC # BLD AUTO: 4.87 X10(6)UL
SODIUM SERPL-SCNC: 132 MMOL/L (ref 136–145)
WBC # BLD AUTO: 13.5 X10(3) UL (ref 4–11)

## 2025-02-13 PROCEDURE — 99223 1ST HOSP IP/OBS HIGH 75: CPT | Performed by: INTERNAL MEDICINE

## 2025-02-13 PROCEDURE — 027V3DZ DILATION OF SUPERIOR VENA CAVA WITH INTRALUMINAL DEVICE, PERCUTANEOUS APPROACH: ICD-10-PCS | Performed by: RADIOLOGY

## 2025-02-13 PROCEDURE — 99223 1ST HOSP IP/OBS HIGH 75: CPT | Performed by: HOSPITALIST

## 2025-02-13 PROCEDURE — X2CR3T7 EXTIRPATION OF MATTER FROM LEFT UPPER EXTREMITY VEIN USING COMPUTER-AIDED MECHANICAL ASPIRATION, PERCUTANEOUS APPROACH, NEW TECHNOLOGY GROUP 7: ICD-10-PCS | Performed by: RADIOLOGY

## 2025-02-13 PROCEDURE — 71260 CT THORAX DX C+: CPT | Performed by: STUDENT IN AN ORGANIZED HEALTH CARE EDUCATION/TRAINING PROGRAM

## 2025-02-13 RX ORDER — DEXTROSE MONOHYDRATE 25 G/50ML
50 INJECTION, SOLUTION INTRAVENOUS
Status: DISCONTINUED | OUTPATIENT
Start: 2025-02-13 | End: 2025-02-17

## 2025-02-13 RX ORDER — CETIRIZINE HYDROCHLORIDE 10 MG/1
10 TABLET ORAL DAILY
Status: DISCONTINUED | OUTPATIENT
Start: 2025-02-14 | End: 2025-02-14

## 2025-02-13 RX ORDER — ATORVASTATIN CALCIUM 20 MG/1
20 TABLET, FILM COATED ORAL NIGHTLY
Status: DISCONTINUED | OUTPATIENT
Start: 2025-02-13 | End: 2025-02-17

## 2025-02-13 RX ORDER — NICOTINE POLACRILEX 4 MG
15 LOZENGE BUCCAL
Status: DISCONTINUED | OUTPATIENT
Start: 2025-02-13 | End: 2025-02-17

## 2025-02-13 RX ORDER — NICOTINE POLACRILEX 4 MG
30 LOZENGE BUCCAL
Status: DISCONTINUED | OUTPATIENT
Start: 2025-02-13 | End: 2025-02-17

## 2025-02-13 RX ORDER — VERAPAMIL HYDROCHLORIDE 240 MG/1
240 TABLET, FILM COATED, EXTENDED RELEASE ORAL NIGHTLY
Status: DISCONTINUED | OUTPATIENT
Start: 2025-02-13 | End: 2025-02-16

## 2025-02-13 RX ORDER — IOPAMIDOL 612 MG/ML
200 INJECTION, SOLUTION INTRAVASCULAR
Status: COMPLETED | OUTPATIENT
Start: 2025-02-13 | End: 2025-02-13

## 2025-02-13 RX ORDER — HEPARIN SODIUM 1000 [USP'U]/ML
INJECTION, SOLUTION INTRAVENOUS; SUBCUTANEOUS
Status: COMPLETED
Start: 2025-02-13 | End: 2025-02-13

## 2025-02-13 RX ORDER — HEPARIN SODIUM AND DEXTROSE 10000; 5 [USP'U]/100ML; G/100ML
18 INJECTION INTRAVENOUS ONCE
Status: DISCONTINUED | OUTPATIENT
Start: 2025-02-13 | End: 2025-02-13

## 2025-02-13 RX ORDER — IPRATROPIUM BROMIDE AND ALBUTEROL SULFATE 2.5; .5 MG/3ML; MG/3ML
3 SOLUTION RESPIRATORY (INHALATION) EVERY 6 HOURS PRN
Status: DISCONTINUED | OUTPATIENT
Start: 2025-02-13 | End: 2025-02-17

## 2025-02-13 RX ORDER — ACETAMINOPHEN 500 MG
500 TABLET ORAL EVERY 4 HOURS PRN
Status: DISCONTINUED | OUTPATIENT
Start: 2025-02-13 | End: 2025-02-14

## 2025-02-13 RX ORDER — HEPARIN SODIUM AND DEXTROSE 10000; 5 [USP'U]/100ML; G/100ML
INJECTION INTRAVENOUS CONTINUOUS
Status: DISCONTINUED | OUTPATIENT
Start: 2025-02-13 | End: 2025-02-13

## 2025-02-13 RX ORDER — HEPARIN SODIUM 1000 [USP'U]/ML
80 INJECTION, SOLUTION INTRAVENOUS; SUBCUTANEOUS ONCE
Status: COMPLETED | OUTPATIENT
Start: 2025-02-13 | End: 2025-02-13

## 2025-02-13 RX ORDER — METOCLOPRAMIDE HYDROCHLORIDE 5 MG/ML
10 INJECTION INTRAMUSCULAR; INTRAVENOUS EVERY 8 HOURS PRN
Status: DISCONTINUED | OUTPATIENT
Start: 2025-02-13 | End: 2025-02-17

## 2025-02-13 RX ORDER — ONDANSETRON 2 MG/ML
4 INJECTION INTRAMUSCULAR; INTRAVENOUS EVERY 6 HOURS PRN
Status: DISCONTINUED | OUTPATIENT
Start: 2025-02-13 | End: 2025-02-17

## 2025-02-13 RX ORDER — MIDAZOLAM HYDROCHLORIDE 1 MG/ML
INJECTION INTRAMUSCULAR; INTRAVENOUS
Status: COMPLETED
Start: 2025-02-13 | End: 2025-02-13

## 2025-02-13 RX ORDER — METOPROLOL TARTRATE 25 MG/1
25 TABLET, FILM COATED ORAL 2 TIMES DAILY
Status: DISCONTINUED | OUTPATIENT
Start: 2025-02-13 | End: 2025-02-16

## 2025-02-13 NOTE — CONSULTS
[facility] Hematology/Oncology    Inpatient Consult Note    Yi Torres Patient Status:  Emergency    1950 MRN [id number]   Location [facility] EMERGENCY DEPARTMENT Attending Sandro Garg*   Hosp Day # 0 PCP Tammy Chan MD     Reason for consultation: SVC syndrome  Left s/c thrombus  Lung cancer    History of Present Illness  Yi Torres is a 74 year old female with recently diagnosed locally advanced non-small cell lung cancer who is due to start concurrent chemo RT.  She had a port placed yesterday and then developed increasing redness and swelling as well as inability to lie flat.  She was seen in the oncology clinic earlier today and referred to the ED.    2025 CT chest showed thrombus within the left brachiocephalic and left subclavian vein which is new since prior study.  There was a 9.2 cm right upper lobe suprahilar mass with invasion of the mediastinum that has increased in size along with septal thickening that the right upper lobe that is new and thought to represent mild interstitial edema.  The right upper lobe mass is causing circumferential encasement and as well as invasion and narrowing of the SVC which is completely obliterated    She was hospitalized for concern regarding SVC syndrome and left subclavian thrombosis. Reports some fullness in the head and occ blurred vision    Past Oncologic History  25 she had complained of increasing dyspnea for about 3 months.  Saw PCP and underwent a CT chest that showed a very large centrally necrotic right upper lobe lung mass measuring 8.2 x 7.4 x 10.8 cm with extension to the right paratracheal upper mediastinum.  Extensive right upper lobe bronchovascular structure encasement.  Mildly enlarged right paratracheal mediastinal lymph nodes bilateral adrenal nodules were noted.     2025 PET/CT showed a 9 x 8 cm hypermetabolic mass within the right upper lobe extending into the mediastinum  and right hilum peripherally hypermetabolic and centrally cystic and necrotic.  Effacement of the IVC.  Subcentimeter pulmonary nodules bilaterally were not FDG avid  Hypermetabolic lymph nodes within the anterior superior mediastinum subcarinal right paratracheal area and right clavicle chains were all compatible with metastatic disease.     1/22/25 she underwent a robotic navigational bronchoscopy with EBUS bx of RUL mass and  TBNA of lymph node station 7.  BAL from the right upper lobe was consistent with squamous cell carcinoma as was the needle biopsy from the right upper lobe mass.  EBUS sampling from station node 7 showed no malignant cells.  Lymphocytes were present. 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     Cholecystectomy      Laparoscopic incisional / umbilical / ventral hernia repair  09/19/2024       Current Medications:   [COMPLETED] iopamidol 76% (ISOVUE-370) injection for power injector  80 mL Intravenous ONCE PRN       Allergies:   Allergies[1]    Family Medical History:  Family History   Problem Relation Age of Onset    Hypertension Father     Stroke Father     Heart Attack Mother        Social History:  Social History     Socioeconomic  History    Marital status:      Spouse name: Not on file    Number of children: 1    Years of education: Not on file    Highest education level: Not on file   Occupational History    Not on file   Tobacco Use    Smoking status: Former     Current packs/day: 0.00     Average packs/day: 0.5 packs/day for 45.0 years (22.5 ttl pk-yrs)     Types: Cigarettes     Start date: 1973     Quit date: 2018     Years since quittin.0     Passive exposure: Past    Smokeless tobacco: Never    Tobacco comments:     1 pack every 3 days   Vaping Use    Vaping status: Never Used   Substance and Sexual Activity    Alcohol use: Not Currently     Comment: very rarely    Drug use: No    Sexual activity: Not on file   Other Topics Concern     Service Not Asked    Blood Transfusions Not Asked    Caffeine Concern Yes     Comment: Coffee, 2 cups per day     Occupational Exposure Not Asked    Hobby Hazards Not Asked    Sleep Concern Not Asked    Stress Concern Not Asked    Weight Concern Not Asked    Special Diet Not Asked    Back Care Not Asked    Exercise Not Asked    Bike Helmet Not Asked    Seat Belt Not Asked    Self-Exams Not Asked    Grew up on a farm Not Asked    History of tanning No    Outdoor occupation Not Asked    Breast feeding Not Asked    Reaction to local anesthetic No    Left Handed No    Right Handed Yes    Currently spends a great deal of time in the sun No    Past Sunlamp Treatments for Acne Not Asked    Hx of Spending Great Deal of Time in Sun No    Bad sunburns in the past Yes    Tanning Salons in the Past No    Hx of Radiation Treatments No    Regular use of sun block Not Asked   Social History Narrative    - lives with     1 son      Social Drivers of Health     Food Insecurity: Not on file   Transportation Needs: Not on file   Housing Stability: Not on file       Gyn History:  OB History    Para Term  AB Living   1 1 0 0 0 0   SAB IAB Ectopic Multiple Live Births    0 0 0 0 0       Objective:    /49   Pulse 90   Temp 98.6 °F (37 °C) (Temporal)   Resp 26   Wt 93 kg (205 lb 0.4 oz)   SpO2 95%   BMI 34.65 kg/m²   Physical Exam:  General: A&Ox3, NAD  HEENT: PERRL, OP clear  Face mildly flushed  Neck: supple,   Erythema, swelling left IJ vein  Left s/c port site swollen with mild erythema  CV: RRR, no murmurs, + pulses  Pulm: CTA b/l, no w/r/r, normal effort  Lymph: no palpable lymphadenopathy throughout the cervical, supraclavicular, or axillary regions  Extremities: no edema or calf tenderness  Neurological: Grossly intact    Labs:  Lab Results   Component Value Date/Time    WBC 13.5 (H) 02/13/2025 11:46 AM    RBC 4.87 02/13/2025 11:46 AM    HGB 12.0 02/13/2025 11:46 AM    HCT 38.8 02/13/2025 11:46 AM    MCV 79.7 (L) 02/13/2025 11:46 AM    MCH 24.6 (L) 02/13/2025 11:46 AM    MCHC 30.9 (L) 02/13/2025 11:46 AM    RDW 17.2 (H) 02/13/2025 11:46 AM    NEPRELIM 10.13 (H) 02/13/2025 11:46 AM    .0 02/13/2025 11:46 AM       Lab Results   Component Value Date/Time    GLU 98 02/13/2025 11:46 AM    BUN 25 (H) 02/13/2025 11:46 AM    CREATSERUM 0.74 02/13/2025 11:46 AM    GFRNAA 45 (L) 06/13/2022 08:02 AM    CA 9.7 02/13/2025 11:46 AM    ALB 4.6 01/29/2025 09:27 AM     (L) 02/13/2025 11:46 AM    K 3.7 02/13/2025 11:46 AM    CL 97 (L) 02/13/2025 11:46 AM    CO2 29.0 02/13/2025 11:46 AM    ALKPHO 68 01/29/2025 09:27 AM    AST 17 01/29/2025 09:27 AM    ALT 16 01/29/2025 09:27 AM       Imaging:  CT CHEST(CONTRAST ONLY) (CPT=71260)    Result Date: 2/13/2025  CONCLUSION:   Thrombus within the left brachiocephalic vein and medial aspect of the left subclavian vein is new since 1/9/2025.  9.2 cm right upper lobe suprahilar mass with invasion of the mediastinum has increased in size.  Interlobular septal thickening within the right upper lobe is new and may be secondary to mild interstitial edema or lymphangitic carcinomatosis.  Multiple ground-glass and semi solid nodules  within the right upper lobe are new and may be secondary to satellite metastases versus underlying infectious/inflammatory etiology.  Metastatic right supraclavicular lymph node is unchanged.  Multiple other incidental findings as described in the body of the report which are unchanged.     Dictated by (CST): Eugenio French MD on 2/13/2025 at 1:29 PM     Finalized by (CST): Eugenio French MD on 2/13/2025 at 1:35 PM          IR PORT A CATH PROCEDURE    Result Date: 2/11/2025  CONCLUSION:  Ultrasound and fluoroscopic guided surgical placement of chest port    Dictated by (CST): Cesar Loaiza MD on 2/11/2025 at 2:24 PM     Finalized by (CST): Cesar Loaiza MD on 2/11/2025 at 2:25 PM              Assessment & Plan:    Yi Torres is a 74 year old female with locally advanced Rt lung cancer, with mediastinal invasion, now with SVC syndrome and left subclavian thrombus    # D/W IR, who will evaluate the pt for SVC stenting and remove left sided port    # Now on heparin gtt, once procedures all complete, can transition to oral DOAC    # Will plan to reschedule her CT sim for radiation oncology and plan to start her shortly therafter on chemo-RT to definitively treat her locally advanced lung cancer    Thank you  for the opportunity to participate in the care of this interesting patient. Please do contact me if I may be of any further assistance    José Miguel Goodson MD  [facility]   [facility]    This note was created using a voice-recognition transcribing system. Incorrect words or phrases may have been missed during proofreading. Please interpret accordingly.         [1]   Allergies  Allergen Reactions    Erythromycin DIARRHEA     Stomach pain

## 2025-02-13 NOTE — CONSULTS
[facility]  part of [facility]    Report of Consultation    Yi Torres Patient Status:  Inpatient    1950 MRN [id number]   Location [facility] 2W/SW Attending Bryson Dumas MD   Hosp Day # 0 PCP Tammy Chan MD     Reason for Consultation:  Facial swelling and redness.    History of Present Illness:  Yi Torres is a a(n) 74 year old female with recent diagnosis of lung CA who was getting her planning CT today.  When she laid flat, she became SOB with sudden facial flushing and swelling.  CT shows SVC constriction along with thrombus surrounding her left sided chest port.      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     Cholecystectomy      Laparoscopic incisional / umbilical / ventral hernia repair  2024     Family History   Problem Relation Age of Onset    Hypertension Father     Stroke Father     Heart Attack Mother       reports that she quit smoking about 7 years ago. Her smoking use included cigarettes. She started smoking about 52 years ago. She has a 22.5 pack-year smoking history. She has been exposed to tobacco smoke. She has never used smokeless tobacco. She reports that she does not currently use alcohol. She reports that she does not use drugs.    Allergies:  Allergies[1]    Medications:    Current Facility-Administered Medications:      heparin (Porcine) 32102 units/250 mL infusion ED (PE/DVT/THROMBUS) INITIAL DOSE, 18 Units/kg/hr, Intravenous, Once    heparin (Porcine) 09077 units/250mL infusion PE/DVT/THROMBUS CONTINUOUS, 200-3,000 Units/hr, Intravenous, Continuous    acetaminophen (Tylenol Extra Strength) tab 500 mg, 500 mg, Oral, Q4H PRN    ondansetron (Zofran) 4 MG/2ML injection 4 mg, 4 mg, Intravenous, Q6H PRN    metoclopramide (Reglan) 5 mg/mL injection 10 mg, 10 mg, Intravenous, Q8H PRN    glucose (Dex4) 15 GM/59ML oral liquid 15 g, 15 g, Oral, Q15 Min PRN **OR** glucose (Glutose) 40% oral gel 15 g, 15 g, Oral, Q15 Min PRN **OR** glucose-vitamin C (Dex-4) chewable tab 4 tablet, 4 tablet, Oral, Q15 Min PRN **OR** dextrose 50% injection 50 mL, 50 mL, Intravenous, Q15 Min PRN **OR** glucose (Dex4) 15 GM/59ML oral liquid 30 g, 30 g, Oral, Q15 Min PRN **OR** glucose (Glutose) 40% oral gel 30 g, 30 g, Oral, Q15 Min PRN **OR** glucose-vitamin C (Dex-4) chewable tab 8 tablet, 8 tablet, Oral, Q15 Min PRN    insulin aspart (NovoLOG) 100 Units/mL FlexPen 1-5 Units, 1-5 Units, Subcutaneous, TID CC    Review of Systems:  Pertinent items are noted in HPI.    Physical Exam:   General: Alert, orientated x3.  Cooperative.  No apparent distress.  Vital Signs:  Blood pressure 160/59, pulse 98, temperature 98.6 °F (37 °C), temperature source Temporal, resp. rate 20, weight 213 lb 6.5 oz (96.8 kg), SpO2 90%.  HEENT: Facial swelling and redness.  Neck: Supple, prominent veins  Lungs: Normal respiratory effort sitting up at rest.   Cardiac: Regular rate and rhythm.  Extremities:  Bilateral lower extremity edema noted.    Laboratory Data:  Lab Results   Component Value Date    WBC 13.5 02/13/2025    HGB 12.0 02/13/2025    HCT 38.8 02/13/2025    .0 02/13/2025    CREATSERUM 0.74 02/13/2025    BUN 25 02/13/2025     02/13/2025    K 3.7 02/13/2025    CL 97 02/13/2025    CO2 29.0 02/13/2025    GLU 98 02/13/2025    CA 9.7 02/13/2025    PTT 26.1 02/13/2025     INR 1.15 02/13/2025       Imaging:  CT CHEST(CONTRAST ONLY) (CPT=71260)    Result Date: 2/13/2025  CONCLUSION:   Thrombus within the left brachiocephalic vein and medial aspect of the left subclavian vein is new since 1/9/2025.  9.2 cm right upper lobe suprahilar mass with invasion of the mediastinum has increased in size.  Interlobular septal thickening within the right upper lobe is new and may be secondary to mild interstitial edema or lymphangitic carcinomatosis.  Multiple ground-glass and semi solid nodules within the right upper lobe are new and may be secondary to satellite metastases versus underlying infectious/inflammatory etiology.  Metastatic right supraclavicular lymph node is unchanged.  Multiple other incidental findings as described in the body of the report which are unchanged.     Dictated by (CST): Eugenio French MD on 2/13/2025 at 1:29 PM     Finalized by (CST): Eugenio French MD on 2/13/2025 at 1:35 PM           Impression:  Patient Active Problem List   Diagnosis    Essential hypertension, benign    Pure hypercholesterolemia    Exposure to hepatitis C    Morbid obesity due to excess calories (HCC)    Inflamed seborrheic keratosis    Allergic rhinitis    History of actinic keratoses    Personal history of skin cancer    Hidradenitis suppurativa    Type 2 diabetes mellitus with stage 3 chronic kidney disease, without long-term current use of insulin (HCC)    Aortic atherosclerosis    Disseminated superficial actinic porokeratosis (DSAP)    BPPV (benign paroxysmal positional vertigo)    Osteoporosis    Hyponatremia    Cataract of both eyes    Osteoarthritis    CKD (chronic kidney disease) stage 2, GFR 60-89 ml/min    Vitreous degeneration    Retinal drusen    Cortical cataract    Mass of right lung    Malignant neoplasm of upper lobe of right lung (HCC)    SVC syndrome    Venous thrombosis of upper extremity    Acute embolism and thrombosis of left subclavian vein (HCC)    Thrombosis of  brachiocephalic vein (HCC)       Assessment/Plan:  75yo with lung CA that was recently diagnosed.  She has not started treatment yet, was getting a planning CT before starting radiation when she experienced sudden facial swelling.  CT is compatible with SVC syndrome.  Plan is for upper extremity venogram with left sided thrombectomy along with SVC stent placement.  Procedure discussed with patient and her  who agree to proceed.     Thank you for allowing me to participate in the care of your patient.    BERTRAM CHURCH, APRN  2/13/2025  4:24 PM       [1]   Allergies  Allergen Reactions    Erythromycin DIARRHEA     Stomach pain

## 2025-02-13 NOTE — PLAN OF CARE
Pt admitted from home, t to CCU on heparin gtt, VSS, on 2 L NC for comfort, to IR for SVC stent placement, a & O x 4, bed in low and locked poisition, call light w/in reach.     Problem: Diabetes/Glucose Control  Goal: Glucose maintained within prescribed range  Description: INTERVENTIONS:  - Monitor Blood Glucose as ordered  - Assess for signs and symptoms of hyperglycemia and hypoglycemia  - Administer ordered medications to maintain glucose within target range  - Assess barriers to adequate nutritional intake and initiate nutrition consult as needed  - Instruct patient on self management of diabetes  Outcome: Progressing     Problem: Patient/Family Goals  Goal: Patient/Family Long Term Goal  Description: Patient's Long Term Goal:    Interventions:  - See additional Care Plan goals for specific interventions  Outcome: Progressing  Goal: Patient/Family Short Term Goal  Description: Patient's Short Term Goal:    Interventions:  - See additional Care Plan goals for specific interventions  Outcome: Progressing     Problem: RESPIRATORY - ADULT  Goal: Achieves optimal ventilation and oxygenation  Description: INTERVENTIONS:  - Assess for changes in respiratory status  - Assess for changes in mentation and behavior  - Position to facilitate oxygenation and minimize respiratory effort  - Oxygen supplementation based on oxygen saturation or ABGs  - Provide Smoking Cessation handout, if applicable  - Encourage broncho-pulmonary hygiene including cough, deep breathe, Incentive Spirometry  - Assess the need for suctioning and perform as needed  - Assess and instruct to report SOB or any respiratory difficulty  - Respiratory Therapy support as indicated  - Manage/alleviate anxiety  - Monitor for signs/symptoms of CO2 retention  Outcome: Progressing     Problem: METABOLIC/FLUID AND ELECTROLYTES - ADULT  Goal: Glucose maintained within prescribed range  Description: INTERVENTIONS:  - Monitor Blood Glucose as ordered  - Assess  for signs and symptoms of hyperglycemia and hypoglycemia  - Administer ordered medications to maintain glucose within target range  - Assess barriers to adequate nutritional intake and initiate nutrition consult as needed  - Instruct patient on self management of diabetes  Outcome: Progressing     Problem: SKIN/TISSUE INTEGRITY - ADULT  Goal: Skin integrity remains intact  Description: INTERVENTIONS  - Assess and document risk factors for pressure ulcer development  - Assess and document skin integrity  - Monitor for areas of redness and/or skin breakdown  - Initiate interventions, skin care algorithm/standards of care as needed  Outcome: Progressing     Problem: HEMATOLOGIC - ADULT  Goal: Maintains hematologic stability  Description: INTERVENTIONS  - Assess for signs and symptoms of bleeding or hemorrhage  - Monitor labs and vital signs for trends  - Administer supportive blood products/factors, fluids and medications as ordered and appropriate  - Administer supportive blood products/factors as ordered and appropriate  Outcome: Progressing     Problem: MUSCULOSKELETAL - ADULT  Goal: Return mobility to safest level of function  Description: INTERVENTIONS:  - Assess patient stability and activity tolerance for standing, transferring and ambulating w/ or w/o assistive devices  - Assist with transfers and ambulation using safe patient handling equipment as needed  - Ensure adequate protection for wounds/incisions during mobilization  - Obtain PT/OT consults as needed  - Advance activity as appropriate  - Communicate ordered activity level and limitations with patient/family  Outcome: Progressing     Problem: SAFETY ADULT - FALL  Goal: Free from fall injury  Description: INTERVENTIONS:  - Assess pt frequently for physical needs  - Identify cognitive and physical deficits and behaviors that affect risk of falls.  - [place] fall precautions as indicated by assessment.  - Educate pt/family on patient safety including  physical limitations  - Instruct pt to call for assistance with activity based on assessment  - Modify environment to reduce risk of injury  - Provide assistive devices as appropriate  - Consider OT/PT consult to assist with strengthening/mobility  - Encourage toileting schedule  Outcome: Progressing     Problem: DISCHARGE PLANNING  Goal: Discharge to home or other facility with appropriate resources  Description: INTERVENTIONS:  - Identify barriers to discharge w/pt and caregiver  - Include patient/family/discharge partner in discharge planning  - Arrange for needed discharge resources and transportation as appropriate  - Identify discharge learning needs (meds, wound care, etc)  - Arrange for interpreters to assist at discharge as needed  - Consider post-discharge preferences of patient/family/discharge partner  - Complete POLST form as appropriate  - Assess patient's ability to be responsible for managing their own health  - Refer to Case Management Department for coordinating discharge planning if the patient needs post-hospital services based on physician/LIP order or complex needs related to functional status, cognitive ability or social support system  Outcome: Progressing

## 2025-02-13 NOTE — CONSULTS
[facility]  part of [facility]    Consult Note    Date:  2/13/2025  Date of Admission:  2/13/2025    Chief Complaint:   Yi Torres is a(n) 74 year old female with facial flushing and edema when moving arms above head.    HPI:   The patient is a recent diagnosis of non-small cell carcinoma of the lung status post Ion biopsy.  She was to initiate both radiation and chemotherapy in the short-term.  She was getting her planning CT for radiotherapy today and, when she was laying on the table with her arms up over her head, her face became very flushed and swollen.  She also notes that if she bends over her head becomes very congested.  She has had worsening shortness of breath without associated hemoptysis, chest pain, pleurisy.  CT imaging demonstrated thrombus by the left sided port as well as findings consistent with superior vena caval constriction.    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     Cholecystectomy      Laparoscopic incisional / umbilical / ventral hernia repair  09/19/2024     Family History   Problem Relation Age of Onset    Hypertension Father     Stroke Father     Heart Attack Mother      Social History: , 1 child, quit tobacco 7 years ago after 1/2 pack/day for greater than 30 years, no alcohol, retired from human resource  Social History     Socioeconomic History     Marital status:     Number of children: 1   Tobacco Use    Smoking status: Former     Current packs/day: 0.00     Average packs/day: 0.5 packs/day for 45.0 years (22.5 ttl pk-yrs)     Types: Cigarettes     Start date: 1973     Quit date: 2018     Years since quittin.0     Passive exposure: Past    Smokeless tobacco: Never    Tobacco comments:     1 pack every 3 days   Vaping Use    Vaping status: Never Used   Substance and Sexual Activity    Alcohol use: Not Currently     Comment: very rarely    Drug use: No   Other Topics Concern    Caffeine Concern Yes     Comment: Coffee, 2 cups per day     History of tanning No    Reaction to local anesthetic No    Left Handed No    Right Handed Yes    Currently spends a great deal of time in the sun No    Hx of Spending Great Deal of Time in Sun No    Bad sunburns in the past Yes    Tanning Salons in the Past No    Hx of Radiation Treatments No   Social History Narrative    - lives with     1 son      Allergies/Medications:   Allergies: Allergies[1]  Medications Prior to Admission   Medication Sig    prochlorperazine (COMPAZINE) 10 mg tablet Take 1 tablet (10 mg total) by mouth every 6 (six) hours as needed for Nausea.    ondansetron (ZOFRAN) 8 MG tablet Take 1 tablet (8 mg total) by mouth every 8 (eight) hours as needed for Nausea.    Spironolactone-HCTZ 25-25 MG Oral Tab Take 1 tablet by mouth daily.    atorvastatin 20 MG Oral Tab TAKE 1 TABLET (20MG) BY MOUTH EVERY DAY    Cetirizine HCl 10 MG Oral Cap Take 10 mg by mouth daily.    Psyllium (METAMUCIL FREE & NATURAL) 43 % Oral Powder Take 1 Scoop by mouth as needed.    metFORMIN  MG Oral Tablet 24 Hr Take 1 tablet (500 mg total) by mouth daily with food.    dapagliflozin (FARXIGA) 10 MG Oral Tab Take 1 tablet (10 mg total) by mouth daily.    metoprolol tartrate 25 MG Oral Tab Take 1 tablet (25 mg total) by mouth 2 (two) times daily.    verapamil  MG Oral Tab CR Take 1 tablet  (240 mg total) by mouth nightly.    alendronate 70 MG Oral Tab Take 1 tablet (70 mg total) by mouth once a week.    cholecalciferol 50 MCG (2000 UT) Oral Cap Take 1 capsule (2,000 Units total) by mouth daily.    Glucose Blood (ACCU-CHEK ADITYA PLUS) In Vitro Strip USE ONCE DAILY IN THE MORNING BEFORE BREAKFAST    Omega-3-acid Ethyl Esters 1 g Oral Cap Take 1 capsule (1 g total) by mouth daily. (Patient not taking: Reported on 2/13/2025)    Blood Glucose Monitoring Suppl (ACCU-CHEK ADITYA PLUS) w/Device Does not apply Kit USE ONCE DAILY IN AM BEFORE BREAKFAST    LANCETS ULTRA THIN Does not apply Misc Use one daily    Multiple Vitamins-Minerals (CENTRUM SILVER) Oral Tab Take 1 tablet by mouth daily.       Review of Systems:   Review of Systems:  Vision normal. Ear nose and throat normal. Bowel normal. Bladder function normal. No depression. No thyroid disease. No lymphatic system concerns.  No rash. Muscles and joints notable for arthritis. No weight loss no weight gain.    Physical Exam:   Vital Signs:  Blood pressure 145/71, pulse 99, temperature 98.6 °F (37 °C), temperature source Temporal, resp. rate 26, weight 206 lb 2.1 oz (93.5 kg), SpO2 93%.     Alert white female in no distress  HEENT examination is unremarkable with pupils equal round and reactive to light and accommodation.  Very very minimal facial swelling  Neck without adenopathy, thyromegaly, JVD nor bruit.   Lungs slightly diminished to auscultation and percussion.  Cardiac regular rate and rhythm no murmur.   Abdomen nontender, without hepatosplenomegaly and no mass appreciable.   Extremities without clubbing cyanosis nor edema.   Neurologic grossly intact with symmetric tone and strength and reflex.  Skin without gross abnormality    Results:     Lab Results   Component Value Date    WBC 13.5 02/13/2025    HGB 12.0 02/13/2025    HCT 38.8 02/13/2025    .0 02/13/2025    CREATSERUM 0.74 02/13/2025    BUN 25 02/13/2025     02/13/2025    K 3.7  02/13/2025    CL 97 02/13/2025    CO2 29.0 02/13/2025    GLU 98 02/13/2025    CA 9.7 02/13/2025    PTT 26.1 02/13/2025    INR 1.15 02/13/2025     CT scan of the chest-Thrombus within the left brachiocephalic vein and medial aspect of the left subclavian vein is new since 1/9/2025.      9.2 cm right upper lobe suprahilar mass with invasion of the mediastinum has increased in size.      Interlobular septal thickening within the right upper lobe is new and may be secondary to mild interstitial edema or lymphangitic carcinomatosis.      Multiple ground-glass and semi solid nodules within the right upper lobe are new and may be secondary to satellite metastases versus underlying infectious/inflammatory etiology.      Metastatic right supraclavicular lymph node is unchanged.     There is circumferential encasement   and invasion and narrowing of the SVC which is completely obliterated.     Assessment/Plan:   1.  SVC syndrome with brachiocephalic vein thrombus at the line from the port.-CT scan of the chest demonstrates circumferential encasement and invasion and narrowing of the SVC which is completely obliterated.  Additionally, the patient has thrombus extending at the port at the brachiocephalic vein extending into the subclavian vein.    Recommendations:  1.  SVC stent  2.  Heparin thereafter  3.  Long-term anticoagulation  4.  The port will likely be removed in the short-term.    2.  Carcinoma lung-as per oncology, patient will be a candidate for radiotherapy and chemotherapeutic.  As per Dr. Goodson.    3.  DVT prophylaxis-as above    4.  COPD    5.  Diabetes mellitus-insulin    I am delighted to assist with Elva's care.    Bryson Dumas MD  Medical Director, Critical Care, [facility]  Medical Director, [facility]  Pager: [phone]               [1]   Allergies  Allergen Reactions    Erythromycin DIARRHEA     Stomach pain

## 2025-02-13 NOTE — ED INITIAL ASSESSMENT (HPI)
Patient ambulatory to ED with complaint of redness swelling and pain to left chest port-a-cath site recently implanted.       Patient is AXOX4.

## 2025-02-13 NOTE — ED PROVIDER NOTES
Patient Seen in: [facility] Emergency Department      History     Chief Complaint   Patient presents with    Post-op Infection     Stated Complaint: redness to port site--newly implanted    Subjective:   HPI      74-year-old female presents with swelling and redness at port site.  She had left-sided Chemo-Port implanted on 2-11 for right upper lobe  lung CA for which she is due to start chemoradiation.  Since implantation of Chemo-Port, she has developed swelling at the site as well as a sensation of head pressure eye bulging and facial flushing when laying flat or bending over.  No shortness of breath.  Denies bleeding from any orifice.  Denies fevers chills or night sweats.    Objective:     No pertinent past medical history.            No pertinent past surgical history.              No pertinent social history.                Physical Exam     ED Triage Vitals [02/13/25 1006]   /81   Pulse 92   Resp 22   Temp 98.6 °F (37 °C)   Temp src Temporal   SpO2 100 %   O2 Device None (Room air)       Current Vitals:   Vital Signs  BP: 147/86  Pulse: 105  Resp: 21  Temp: 98.6 °F (37 °C)  Temp src: Temporal  MAP (mmHg): 97    Oxygen Therapy  SpO2: 95 %  O2 Device: None (Room air)        Physical Exam  Constitutional: awake, alert, no sig distress  HENT: mmm, no lesions,  Neck: normal range of motion, no tenderness, supple.  -Neck veins mildly distended  Eyes: PERRL, EOMI, conjunctiva normal, no discharge. Sclera anicteric.  Cardiovascular: rr no murmur  -Left chest Port-A-Cath in place, wound clean dry and intact, swelling diffusely in the area with increased varicosities of the left chest and left breast  Respiratory: Normal breath sounds, no respiratory distress, no wheezing, no chest tenderness.  GI: Bowel sounds normal, Soft, no tenderness, no masses, no pulsatile masses.  : No CVA tenderness.  Skin: Warm, dry, no erythema, no rash.  Musculoskeletal: Intact distal pulses, no edema, no tenderness, no  cyanosis, no clubbing. Good range of motion in all major joints. No tenderness to palpation or major deformities noted. Back- No tenderness.  Neurologic: Alert & oriented x 3, normal motor function, normal sensory function, no focal deficits noted.  Psych: Calm, cooperative, nl affect      ED Course     Labs Reviewed   BASIC METABOLIC PANEL (8) - Abnormal; Notable for the following components:       Result Value    Sodium 132 (*)     Chloride 97 (*)     BUN 25 (*)     BUN/CREA Ratio 33.8 (*)     All other components within normal limits   CBC WITH DIFFERENTIAL WITH PLATELET - Abnormal; Notable for the following components:    WBC 13.5 (*)     MCV 79.7 (*)     MCH 24.6 (*)     MCHC 30.9 (*)     RDW-SD 49.7 (*)     RDW 17.2 (*)     Neutrophil Absolute Prelim 10.13 (*)     Neutrophil Absolute 10.13 (*)     Monocyte Absolute 1.18 (*)     All other components within normal limits   PROTHROMBIN TIME (PT) - Abnormal; Notable for the following components:    PT 15.4 (*)     All other components within normal limits   PTT, ACTIVATED - Normal       ED Course as of 02/13/25 1516  ------------------------------------------------------------  Time: 02/13 1414  Comment: Hematology, rad oncology, interventional radiology and pulmonology consulted regarding patient's care.  Plan to start heparin, IR will evaluate patient for candidacy for SVC stenting.  Patient to go to PCCU for close monitoring              MDM      74F history as documented above - sent in with following at site of Port-A-Cath insertion.  On arrival vitals are stable and reassuring  Exam is not concerning for surgical site infection  History is most concerning for SVC syndrome.  Consider tumor invasion, versus thrombosis secondary to recent procedure  -Plan for labs and CT chest with contrast      Reviewed patient CT chest with contrast independently remarkable for large right upper lobe mass, which appears to obliterate the SVC completely.  This appears enlarged  compared to interval study.  Radiology read also notable for left brachiocephalic and left subclavian thrombosis.  Discussed with hospitalist, hematology/oncology, interventional radiology, pulmonary/CCM. [facility] notified.     Will start on heparin, patient to go to IR today for stenting of SVC.    I spent a total of 55 minutes of critical care time in obtaining history, performing a physical exam, bedside monitoring of interventions, collecting and interpreting tests and discussion with consultants but not including time spent performing procedures.      Admission disposition: 2/13/2025  2:16 PM           Medical Decision Making      Disposition and Plan     Clinical Impression:  1. SVC syndrome    2. Acute embolism and thrombosis of left subclavian vein (HCC)    3. Thrombosis of brachiocephalic vein (HCC)         Disposition:  Admit  2/13/2025  2:16 pm    Follow-up:  No follow-up provider specified.  We recommend that you schedule follow up care with a primary care provider within the next three months to obtain basic health screening including reassessment of your blood pressure.      Medications Prescribed:  Current Discharge Medication List              Supplementary Documentation:         Hospital Problems       Present on Admission  Date Reviewed: 2/13/2025            ICD-10-CM Noted POA    SVC syndrome I87.1 2/13/2025 Unknown    Venous thrombosis of upper extremity I82.609 2/13/2025 Unknown

## 2025-02-13 NOTE — TELEPHONE ENCOUNTER
Call received from Adelina in radiation oncology. She informed writer that patient is currently in their clinic for CT-SIM. Patient had a port placed yesterday and when she lays flat, she feels a bulging sensation in her eye and turns \"red\" when she lays flat. This information was shared with Dr. Rogers. He informed writer to book an acute care visit with Naye BYNUM for exam. Writer then went to the radiation oncology treatment room and informed Adelina WEINER and the patient. Report given to Alcira WEINER on upcoming acute care visit.

## 2025-02-13 NOTE — ED QUICK NOTES
Orders for admission, patient is aware of plan and ready to go upstairs. Any questions, please call ED RN Gayle at extension [phone].     Patient Covid vaccination status: Fully vaccinated     COVID Test Ordered in ED: None    COVID Suspicion at Admission: N/A    Running Infusions:  None    Mental Status/LOC at time of transport: x4    Other pertinent information:   CIWA score: N/A   NIH score:  N/A

## 2025-02-13 NOTE — PROGRESS NOTES
Called into CT sim room by radiation therapist, who states when patient lays flat, she had color change to her face (more red) and feels like her \"eyes are going to pop out\". Patient has been feeling like this since her port placement on 2/112/25. Dressing is intact, skin is read. Has been sleeping sitting up in a recliner, which she does normally do. Denies shortness of breath. When patient sat back up, she felt better and skin went back to normal color. Patient asked to be placed on 2L of O2 which she felt helped during her last scan. Is normally on room air. Patient would need to be flat for about 10  minutes for scan. RN uncomfortable with proceeding with scan in this state. RN notified med onc nurses who have set her up for an acute care visit. Patient wheeled to lobby on room air to check in for her acute care visit.

## 2025-02-14 LAB
ALBUMIN SERPL-MCNC: 4 G/DL (ref 3.2–4.8)
ALBUMIN/GLOB SERPL: 1.3 {RATIO} (ref 1–2)
ALP LIVER SERPL-CCNC: 62 U/L
ALT SERPL-CCNC: 18 U/L
ANION GAP SERPL CALC-SCNC: 7 MMOL/L (ref 0–18)
AST SERPL-CCNC: 19 U/L (ref ?–34)
BASOPHILS # BLD AUTO: 0.07 X10(3) UL (ref 0–0.2)
BASOPHILS NFR BLD AUTO: 0.4 %
BILIRUB SERPL-MCNC: 0.5 MG/DL (ref 0.2–1.1)
BUN BLD-MCNC: 28 MG/DL (ref 9–23)
BUN/CREAT SERPL: 32.2 (ref 10–20)
CALCIUM BLD-MCNC: 9.5 MG/DL (ref 8.7–10.4)
CHLORIDE SERPL-SCNC: 98 MMOL/L (ref 98–112)
CO2 SERPL-SCNC: 26 MMOL/L (ref 21–32)
CREAT BLD-MCNC: 0.87 MG/DL
DEPRECATED RDW RBC AUTO: 49.8 FL (ref 35.1–46.3)
EGFRCR SERPLBLD CKD-EPI 2021: 71 ML/MIN/1.73M2 (ref 60–?)
EOSINOPHIL # BLD AUTO: 0.1 X10(3) UL (ref 0–0.7)
EOSINOPHIL NFR BLD AUTO: 0.6 %
ERYTHROCYTE [DISTWIDTH] IN BLOOD BY AUTOMATED COUNT: 17 % (ref 11–15)
GLOBULIN PLAS-MCNC: 3.2 G/DL (ref 2–3.5)
GLUCOSE BLD-MCNC: 125 MG/DL (ref 70–99)
GLUCOSE BLDC GLUCOMTR-MCNC: 140 MG/DL (ref 70–99)
GLUCOSE BLDC GLUCOMTR-MCNC: 141 MG/DL (ref 70–99)
GLUCOSE BLDC GLUCOMTR-MCNC: 160 MG/DL (ref 70–99)
GLUCOSE BLDC GLUCOMTR-MCNC: 170 MG/DL (ref 70–99)
HCT VFR BLD AUTO: 38.8 %
HGB BLD-MCNC: 11.7 G/DL
IMM GRANULOCYTES # BLD AUTO: 0.07 X10(3) UL (ref 0–1)
IMM GRANULOCYTES NFR BLD: 0.4 %
LYMPHOCYTES # BLD AUTO: 2.41 X10(3) UL (ref 1–4)
LYMPHOCYTES NFR BLD AUTO: 15.4 %
MCH RBC QN AUTO: 24.4 PG (ref 26–34)
MCHC RBC AUTO-ENTMCNC: 30.2 G/DL (ref 31–37)
MCV RBC AUTO: 80.8 FL
MONOCYTES # BLD AUTO: 1.33 X10(3) UL (ref 0.1–1)
MONOCYTES NFR BLD AUTO: 8.5 %
NEUTROPHILS # BLD AUTO: 11.64 X10 (3) UL (ref 1.5–7.7)
NEUTROPHILS # BLD AUTO: 11.64 X10(3) UL (ref 1.5–7.7)
NEUTROPHILS NFR BLD AUTO: 74.7 %
OSMOLALITY SERPL CALC.SUM OF ELEC: 279 MOSM/KG (ref 275–295)
PLATELET # BLD AUTO: 324 10(3)UL (ref 150–450)
POTASSIUM SERPL-SCNC: 4.1 MMOL/L (ref 3.5–5.1)
PROT SERPL-MCNC: 7.2 G/DL (ref 5.7–8.2)
RBC # BLD AUTO: 4.8 X10(6)UL
SODIUM SERPL-SCNC: 131 MMOL/L (ref 136–145)
WBC # BLD AUTO: 15.6 X10(3) UL (ref 4–11)

## 2025-02-14 PROCEDURE — 99233 SBSQ HOSP IP/OBS HIGH 50: CPT | Performed by: INTERNAL MEDICINE

## 2025-02-14 PROCEDURE — 99233 SBSQ HOSP IP/OBS HIGH 50: CPT | Performed by: HOSPITALIST

## 2025-02-14 PROCEDURE — 99232 SBSQ HOSP IP/OBS MODERATE 35: CPT | Performed by: INTERNAL MEDICINE

## 2025-02-14 RX ORDER — CETIRIZINE HYDROCHLORIDE 5 MG/1
10 TABLET ORAL NIGHTLY
Status: DISCONTINUED | OUTPATIENT
Start: 2025-02-15 | End: 2025-02-17

## 2025-02-14 RX ORDER — ENOXAPARIN SODIUM 100 MG/ML
1 INJECTION SUBCUTANEOUS EVERY 12 HOURS SCHEDULED
Status: DISCONTINUED | OUTPATIENT
Start: 2025-02-14 | End: 2025-02-17

## 2025-02-14 RX ORDER — ACETAMINOPHEN 500 MG
500 TABLET ORAL EVERY 4 HOURS PRN
Status: DISCONTINUED | OUTPATIENT
Start: 2025-02-14 | End: 2025-02-17

## 2025-02-14 NOTE — PROGRESS NOTES
[facility]  part of [facility]     Progress Note    Yi Torres Patient Status:  Inpatient    1950 MRN [id number]   Location [facility] 2W/SW Attending Vanessa Colon,*   Hosp Day # 1 PCP Tammy Chan MD       Subjective:   Patient seen and examined.  Underwent left innominate/subclavian vein thrombectomy and SVC stent placement yesterday.  States she feels better today.  Flushing improved.  Denies significant dyspnea at rest    Objective:   Blood pressure (!) 134/113, pulse 70, temperature 97 °F (36.1 °C), temperature source Temporal, resp. rate 24, weight 207 lb 7.3 oz (94.1 kg), SpO2 99%.  Intake/Output:   Last 3 shifts: No intake/output data recorded.   This shift: No intake/output data recorded.     Vent Settings:      Hemodynamic parameters (last 24 hours):      Scheduled Meds:   Current Facility-Administered Medications   Medication Dose Route Frequency    enoxaparin (Lovenox) 100 MG/ML SUBQ injection 90 mg  1 mg/kg Subcutaneous 2 times per day    acetaminophen (Tylenol Extra Strength) tab 500 mg  500 mg Oral Q4H PRN    ondansetron (Zofran) 4 MG/2ML injection 4 mg  4 mg Intravenous Q6H PRN    metoclopramide (Reglan) 5 mg/mL injection 10 mg  10 mg Intravenous Q8H PRN    glucose (Dex4) 15 GM/59ML oral liquid 15 g  15 g Oral Q15 Min PRN    Or    glucose (Glutose) 40% oral gel 15 g  15 g Oral Q15 Min PRN    Or    glucose-vitamin C (Dex-4) chewable tab 4 tablet  4 tablet Oral Q15 Min PRN    Or    dextrose 50% injection 50 mL  50 mL Intravenous Q15 Min PRN    Or    glucose (Dex4) 15 GM/59ML oral liquid 30 g  30 g Oral Q15 Min PRN    Or    glucose (Glutose) 40% oral gel 30 g  30 g Oral Q15 Min PRN    Or    glucose-vitamin C (Dex-4) chewable tab 8 tablet  8 tablet Oral Q15 Min PRN    insulin aspart (NovoLOG) 100 Units/mL FlexPen 1-5 Units  1-5 Units Subcutaneous TID CC    atorvastatin (Lipitor) tab 20 mg  20 mg Oral Nightly    cetirizine (ZyrTEC) tab 10 mg   10 mg Oral Daily    metoprolol tartrate (Lopressor) tab 25 mg  25 mg Oral BID    spironolactone (Aldactone) 25 mg, hydroCHLOROthiazide 25 mg for Aldactazide 25/25 ([facility] only)   Oral Daily    verapamil ER (Calan-SR) tab 240 mg  240 mg Oral Nightly    ipratropium-albuterol (Duoneb) 0.5-2.5 (3) MG/3ML inhalation solution 3 mL  3 mL Nebulization Q6H PRN    guaiFENesin (Robitussin) 100 MG/5 ML oral liquid 200 mg  200 mg Oral Q4H PRN       Continuous Infusions:     Physical Exam  Constitutional: no acute distress  Eyes: PERRL  ENT: nares pateint  Neck: supple, no JVD  Cardio: RRR, S1 S2  Respiratory: clear to auscultation bilaterally, no wheezing, rales, rhonchi, crackles  GI: abdomen soft, non tender, active bowel sounds, no organomegaly  Extremities: no clubbing, cyanosis, edema  Neurologic: no gross motor deficits  Skin: warm, dry      Results:     Lab Results   Component Value Date    WBC 15.6 02/14/2025    HGB 11.7 02/14/2025    HCT 38.8 02/14/2025    .0 02/14/2025    CREATSERUM 0.87 02/14/2025    BUN 28 02/14/2025     02/14/2025    K 4.1 02/14/2025    CL 98 02/14/2025    CO2 26.0 02/14/2025     02/14/2025    CA 9.5 02/14/2025    ALB 4.0 02/14/2025    ALKPHO 62 02/14/2025    BILT 0.5 02/14/2025    TP 7.2 02/14/2025    AST 19 02/14/2025    ALT 18 02/14/2025    PTT 26.1 02/13/2025    INR 1.15 02/13/2025       CT CHEST(CONTRAST ONLY) (CPT=71260)    Result Date: 2/13/2025  CONCLUSION:   Thrombus within the left brachiocephalic vein and medial aspect of the left subclavian vein is new since 1/9/2025.  9.2 cm right upper lobe suprahilar mass with invasion of the mediastinum has increased in size.  Interlobular septal thickening within the right upper lobe is new and may be secondary to mild interstitial edema or lymphangitic carcinomatosis.  Multiple ground-glass and semi solid nodules within the right upper lobe are new and may be secondary to satellite metastases versus underlying  infectious/inflammatory etiology.  Metastatic right supraclavicular lymph node is unchanged.  Multiple other incidental findings as described in the body of the report which are unchanged.     Dictated by (CST): Eugenio French MD on 2/13/2025 at 1:29 PM     Finalized by (CST): Eugenio French MD on 2/13/2025 at 1:35 PM                 Assessment   1.  SVC syndrome  2.  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  Squamous cell lung cancer  4.  COPD  5.  Diabetes mellitus  6.  Obesity     Plan   -Patient presents with evidence of worsening facial flushing and swelling with concern for SVC syndrome.  Eval by IR status post left innominate/subclavian thrombectomy and SVC stent placement on 2/13/2025 with clinical improvement.  -Recent diagnosis of squamous cell lung cancer with right upper lobe mass.  Awaiting starting treatment with oncology and radiation oncology.  Recent port placement.  -Appears to be stable from COPD perspective.  Continue nebulizer treatments  -DVT prophylaxis: Lovenox  -Okay to transfer to floor    Gabrielle Garrido DO  Pulmonary Critical Care Medicine  [facility]

## 2025-02-14 NOTE — PLAN OF CARE
Pt is alert & or x 4, following cammands, up to chair and bathroom standby assist.  Sinus r. B/p stable. Up to bathroom now on 4 liters high flow n/c.  She had lg formed bm this am.  She likes being in recliner.  Gabriel portillo with IR came in this am changed her bilat arm dressings from thrombectomy and  right brachial stent placed. Sboth sites have bruising and redness, small sqaure mepliex applied, old dressing removed. She is ok to go to tele floor when bed is avail. She was transferred to room 239 report given to jeff mcdonough. Accuchecks ac/hs. Eating well.  at bedside. No c/o of pain.  Pt demonstrated to me that she can give herself lovenox injection into her abdomen. She cleaned, pinched her abdomen. Showed me she can give the lovenox. Requested to pt that she demonstrate each time for practice with rn.  Problem: Patient Centered Care  Goal: Patient preferences are identified and integrated in the patient's plan of care  Description: Interventions:  - What would you like us to know as we care for you?   - Provide timely, complete, and accurate information to patient/family  - Incorporate patient and family knowledge, values, beliefs, and cultural backgrounds into the planning and delivery of care  - Encourage patient/family to participate in care and decision-making at the level they choose  - Honor patient and family perspectives and choices  Outcome: Progressing     Problem: Diabetes/Glucose Control  Goal: Glucose maintained within prescribed range  Description: INTERVENTIONS:  - Monitor Blood Glucose as ordered  - Assess for signs and symptoms of hyperglycemia and hypoglycemia  - Administer ordered medications to maintain glucose within target range  - Assess barriers to adequate nutritional intake and initiate nutrition consult as needed  - Instruct patient on self management of diabetes  Outcome: Progressing     Problem: Patient/Family Goals  Goal: Patient/Family Long Term Goal  Description:  Patient's Long Term Goal:    Interventions:    - See additional Care Plan goals for specific interventions  Outcome: Progressing  Goal: Patient/Family Short Term Goal  Description: Patient's Short Term Goal:     Interventions:     - See additional Care Plan goals for specific interventions  Outcome: Progressing     Problem: RESPIRATORY - ADULT  Goal: Achieves optimal ventilation and oxygenation  Description: INTERVENTIONS:  - Assess for changes in respiratory status  - Assess for changes in mentation and behavior  - Position to facilitate oxygenation and minimize respiratory effort  - Oxygen supplementation based on oxygen saturation or ABGs  - Provide Smoking Cessation handout, if applicable  - Encourage broncho-pulmonary hygiene including cough, deep breathe, Incentive Spirometry  - Assess the need for suctioning and perform as needed  - Assess and instruct to report SOB or any respiratory difficulty  - Respiratory Therapy support as indicated  - Manage/alleviate anxiety  - Monitor for signs/symptoms of CO2 retention  Outcome: Progressing     Problem: METABOLIC/FLUID AND ELECTROLYTES - ADULT  Goal: Glucose maintained within prescribed range  Description: INTERVENTIONS:  - Monitor Blood Glucose as ordered  - Assess for signs and symptoms of hyperglycemia and hypoglycemia  - Administer ordered medications to maintain glucose within target range  - Assess barriers to adequate nutritional intake and initiate nutrition consult as needed  - Instruct patient on self management of diabetes  Outcome: Progressing     Problem: SKIN/TISSUE INTEGRITY - ADULT  Goal: Skin integrity remains intact  Description: INTERVENTIONS  - Assess and document risk factors for pressure ulcer development  - Assess and document skin integrity  - Monitor for areas of redness and/or skin breakdown  - Initiate interventions, skin care algorithm/standards of care as needed  Outcome: Progressing     Problem: HEMATOLOGIC - ADULT  Goal: Maintains  hematologic stability  Description: INTERVENTIONS  - Assess for signs and symptoms of bleeding or hemorrhage  - Monitor labs and vital signs for trends  - Administer supportive blood products/factors, fluids and medications as ordered and appropriate  - Administer supportive blood products/factors as ordered and appropriate  Outcome: Progressing     Problem: MUSCULOSKELETAL - ADULT  Goal: Return mobility to safest level of function  Description: INTERVENTIONS:  - Assess patient stability and activity tolerance for standing, transferring and ambulating w/ or w/o assistive devices  - Assist with transfers and ambulation using safe patient handling equipment as needed  - Ensure adequate protection for wounds/incisions during mobilization  - Obtain PT/OT consults as needed  - Advance activity as appropriate  - Communicate ordered activity level and limitations with patient/family  Outcome: Progressing     Problem: SAFETY ADULT - FALL  Goal: Free from fall injury  Description: INTERVENTIONS:  - Assess pt frequently for physical needs  - Identify cognitive and physical deficits and behaviors that affect risk of falls.  - [place] fall precautions as indicated by assessment.  - Educate pt/family on patient safety including physical limitations  - Instruct pt to call for assistance with activity based on assessment  - Modify environment to reduce risk of injury  - Provide assistive devices as appropriate  - Consider OT/PT consult to assist with strengthening/mobility  - Encourage toileting schedule  Outcome: Progressing     Problem: DISCHARGE PLANNING  Goal: Discharge to home or other facility with appropriate resources  Description: INTERVENTIONS:  - Identify barriers to discharge w/pt and caregiver  - Include patient/family/discharge partner in discharge planning  - Arrange for needed discharge resources and transportation as appropriate  - Identify discharge learning needs (meds, wound care, etc)  - Arrange for  interpreters to assist at discharge as needed  - Consider post-discharge preferences of patient/family/discharge partner  - Complete POLST form as appropriate  - Assess patient's ability to be responsible for managing their own health  - Refer to Case Management Department for coordinating discharge planning if the patient needs post-hospital services based on physician/LIP order or complex needs related to functional status, cognitive ability or social support system  Outcome: Progressing

## 2025-02-14 NOTE — PAYOR COMM NOTE
--------------  ADMISSION REVIEW     Payor: [employer]  Subscriber #:  [id number]  Authorization Number: [id number]    Admit date: 2/13/25  Admit time:  3:17 PM       REVIEW DOCUMENTATION:     ED Provider Notes        Stated Complaint: redness to port site--newly implanted    Subjective:   HPI      74-year-old female presents with swelling and redness at port site.  She had left-sided Chemo-Port implanted on 2-11 for right upper lobe  lung CA for which she is due to start chemoradiation.  Since implantation of Chemo-Port, she has developed swelling at the site as well as a sensation of head pressure eye bulging and facial flushing when laying flat or bending over.  No shortness of breath.  Denies bleeding from any orifice.  Denies fevers chills or night sweats.      Physical Exam     ED Triage Vitals [02/13/25 1006]   /81   Pulse 92   Resp 22   Temp 98.6 °F (37 °C)   Temp src Temporal   SpO2 100 %   O2 Device None (Room air)     Physical Exam  Constitutional: awake, alert, no sig distress  HENT: mmm, no lesions,  Neck: normal range of motion, no tenderness, supple.  -Neck veins mildly distended  Eyes: PERRL, EOMI, conjunctiva normal, no discharge. Sclera anicteric.  Cardiovascular: rr no murmur  -Left chest Port-A-Cath in place, wound clean dry and intact, swelling diffusely in the area with increased varicosities of the left chest and left breast  Respiratory: Normal breath sounds, no respiratory distress, no wheezing, no chest tenderness.  GI: Bowel sounds normal, Soft, no tenderness, no masses, no pulsatile masses.  : No CVA tenderness.  Skin: Warm, dry, no erythema, no rash.  Musculoskeletal: Intact distal pulses, no edema, no tenderness, no cyanosis, no clubbing. Good range of motion in all major joints. No tenderness to palpation or major deformities noted. Back- No tenderness.  Neurologic: Alert & oriented x 3, normal motor function, normal sensory function, no focal deficits noted.  Psych:  Calm, cooperative, nl affect      ED Course     Labs Reviewed   BASIC METABOLIC PANEL (8) - Abnormal; Notable for the following components:       Result Value    Sodium 132 (*)     Chloride 97 (*)     BUN 25 (*)     BUN/CREA Ratio 33.8 (*)     All other components within normal limits   CBC WITH DIFFERENTIAL WITH PLATELET - Abnormal; Notable for the following components:    WBC 13.5 (*)     MCV 79.7 (*)     MCH 24.6 (*)     MCHC 30.9 (*)     RDW-SD 49.7 (*)     RDW 17.2 (*)     Neutrophil Absolute Prelim 10.13 (*)     Neutrophil Absolute 10.13 (*)     Monocyte Absolute 1.18 (*)     All other components within normal limits   PROTHROMBIN TIME (PT) - Abnormal; Notable for the following components:    PT 15.4 (*)     All other components within normal limits   PTT, ACTIVATED - Normal       MDM      74F history as documented above - sent in with following at site of Port-A-Cath insertion.  On arrival vitals are stable and reassuring  Exam is not concerning for surgical site infection  History is most concerning for SVC syndrome.  Consider tumor invasion, versus thrombosis secondary to recent procedure  -Plan for labs and CT chest with contrast      Reviewed patient CT chest with contrast independently remarkable for large right upper lobe mass, which appears to obliterate the SVC completely.  This appears enlarged compared to interval study.  Radiology read also notable for left brachiocephalic and left subclavian thrombosis.  Discussed with hospitalist, hematology/oncology, interventional radiology, pulmonary/CCM. RadOnc notified.     Will start on heparin, patient to go to IR today for stenting of SVC.    Medical Decision Making      Disposition and Plan     Clinical Impression:  1. SVC syndrome    2. Acute embolism and thrombosis of left subclavian vein (HCC)    3. Thrombosis of brachiocephalic vein (HCC)         Disposition:  Admit  2/13/2025  2:16 pm    HISTORY AND PHYSICAL EXAMINATION     CHIEF COMPLAINT:  SVC  syndrome.      HISTORY OF PRESENT ILLNESS:  Patient is a 74-year-old  female who was recently diagnosed with large right upper lobe lung mass, positive biopsy for non-small cell lung cancer, squamous cell pathology, being evaluated by Oncology and Hematology/Oncology for chemoradiation therapy.  Today, presented to the emergency department for evaluation of fatigue and pressure sensation in her head when she bends forward or lies flat.  Patient reports shortness of breath.  Emergency room workup showed white blood cell count of 13.5 with left shift.  Chemistry was unremarkable.  CT scan of the chest, abdomen, and pelvis showed thrombus within the left brachiocephalic vein and medial aspect of the left subclavian vein, new since January 2025; 9.2 cm right upper lobe suprahilar mass with invasion of the mediastinum has increased in size; interlobular septal thickening within the right upper lobe, new, which is secondary to interstitial edema or lymphangitic carcinomatosis; multiple ground-glass and semisolid nodules within the right upper lobe are new and may be secondary to satellite metastases versus underlying infectious inflammatory etiology; metastatic right supraclavicular lymph node is unchanged; multiple other incidental findings.  Patient was started on IV heparin, and she will be admitted to the hospital for further management.         ASSESSMENT:    1.       Superior vena cava syndrome, secondary to large right upper lobe mass with mediastinal invasion and superior vena cava external pressure.  2.       Left brachiocephalic to medial left subclavian vein deep vein thrombosis, most likely related to recent Port-A-Cath placement.      PLAN:  Patient will be admitted to progressive care unit.  Monitor hemodynamic status closely.  Start her on IV heparin.  Obtain interventional radiology, oncology, and radiation oncology consults.  Also pulmonary and critical care evaluation.  Further recommendations  to follow.      Dictated By Rene Elizabeth MD      2/13 PULMONARY:    Date:  2/13/2025  Date of Admission:  2/13/2025     Chief Complaint:   Yi Torres is a(n) 74 year old female with facial flushing and edema when moving arms above head.     HPI:   The patient is a recent diagnosis of non-small cell carcinoma of the lung status post Ion biopsy.  She was to initiate both radiation and chemotherapy in the short-term.  She was getting her planning CT for radiotherapy today and, when she was laying on the table with her arms up over her head, her face became very flushed and swollen.  She also notes that if she bends over her head becomes very congested.  She has had worsening shortness of breath without associated hemoptysis, chest pain, pleurisy.  CT imaging demonstrated thrombus by the left sided port as well as findings consistent with superior vena caval constriction.      Assessment/Plan:   1.  SVC syndrome with brachiocephalic vein thrombus at the line from the port.-CT scan of the chest demonstrates circumferential encasement and invasion and narrowing of the SVC which is completely obliterated.  Additionally, the patient has thrombus extending at the port at the brachiocephalic vein extending into the subclavian vein.     Recommendations:  1.  SVC stent  2.  Heparin thereafter  3.  Long-term anticoagulation  4.  The port will likely be removed in the short-term.     2.  Carcinoma lung-as per oncology, patient will be a candidate for radiotherapy and chemotherapeutic.  As per Dr. Goodson.     3.  DVT prophylaxis-as above     4.  COPD     5.  Diabetes mellitus-insulin     I am delighted to assist with Elva's care.     Bryson Dumas MD      2/13 IR:    Procedure: SVC, L innominate/subclavian vein thrombectomy/SVC stent      Pre-Procedure Diagnosis:  SVC syndrome.  H/o lung cancer     Post-Procedure Diagnosis: as above     MD:  Fadia Maravilla      Anesthesia:  Sedation     Findings:  thrombosed SVC,  extending proximally into L innominate/subclavian vein, cleared c suction thrombectomy, and tight stenosis SVC due to extrinsic tumor compression stented, c restoration of patency and flow      Specimens: 0     Blood Loss:  75cc                              Complications:  None     Plan:   LMWH start in AM     Cesar Loaiza MD  2/13/2025 2/14 PULMONARY:    Subjective:   Patient seen and examined.  Underwent left innominate/subclavian vein thrombectomy and SVC stent placement yesterday.  States she feels better today.  Flushing improved.  Denies significant dyspnea at rest     Objective:   Blood pressure (!) 134/113, pulse 70, temperature 97 °F (36.1 °C), temperature source Temporal, resp. rate 24, weight 207 lb 7.3 oz (94.1 kg), SpO2 99%.  Intake/Output:         Assessment   1.  SVC syndrome  2.  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  Squamous cell lung cancer  4.  COPD  5.  Diabetes mellitus  6.  Obesity      Plan   -Patient presents with evidence of worsening facial flushing and swelling with concern for SVC syndrome.  Eval by IR status post left innominate/subclavian thrombectomy and SVC stent placement on 2/13/2025 with clinical improvement.  -Recent diagnosis of squamous cell lung cancer with right upper lobe mass.  Awaiting starting treatment with oncology and radiation oncology.  Recent port placement.  -Appears to be stable from COPD perspective.  Continue nebulizer treatments  -DVT prophylaxis: Lovenox  -Okay to transfer to floor     Calvinoliver Garrido DO    MEDICATIONS ADMINISTERED IN LAST 1 DAY:  acetaminophen (Tylenol Extra Strength) tab 500 mg       Date Action Dose Route User    2/14/2025 0535 Given 500 mg Oral Sandro Hernandez RN    2/13/2025 2324 Given 500 mg Oral AnnmarieiriSandro wallace RN          atorvastatin (Lipitor) tab 20 mg       Date Action Dose Route User    2/13/2025 2324 Given 20 mg Oral Sandro Hernandez RN          heparin (Porcine) 1000 UNIT/ML injection -  BOLUS IV 7,400 Units       Date Action Dose Route User    2/13/2025 1427 Given 7,400 Units Intravenous Gayle Schaefer RN          heparin (Porcine) 74117 units/250 mL infusion ED (PE/DVT/THROMBUS) INITIAL DOSE       Date Action Dose Route User    2/13/2025 1427 New Bag 18 Units/kg/hr × 93 kg Intravenous Gayle Schaefer RN          iopamidol (ISOVUE-300) 61 % injection 200 mL       Date Action Dose Route User    2/13/2025 1833 Given 160 mL Injection Reese Houston RN          iopamidol 76% (ISOVUE-370) injection for power injector       Date Action Dose Route User    2/13/2025 1245 Given 80 mL Intravenous Robin Rojas          ipratropium-albuterol (Duoneb) 0.5-2.5 (3) MG/3ML inhalation solution 3 mL       Date Action Dose Route User    2/13/2025 2348 Given 3 mL Nebulization Miryam Pratt RCP          metoprolol tartrate (Lopressor) tab 25 mg       Date Action Dose Route User    2/13/2025 2324 Given 25 mg Oral TemSandro hughes RN          verapamil ER (Calan-SR) tab 240 mg       Date Action Dose Route User    2/13/2025 2325 Given 240 mg Oral TemiriSandro wallace RN            Vitals (last day)       Date/Time Temp Pulse Resp BP SpO2 Weight O2 Device O2 Flow Rate (L/min) [facility]    02/14/25 0700 -- -- -- -- -- 207 lb 7.3 oz (94.1 kg) -- -- TT    02/14/25 0600 -- 70 24 134/113 99 % -- -- -- TT    02/14/25 0500 -- 62 17 107/80 100 % -- -- -- TT    02/14/25 0400 97 °F (36.1 °C) 58 20 128/60 97 % -- High flow nasal cannula 6 L/min TT    02/14/25 0300 -- 64 18 126/75 97 % -- High flow nasal cannula 6 L/min TT    02/14/25 0200 -- 67 18 122/65 97 % -- High flow nasal cannula 6 L/min TT    02/14/25 0100 -- 73 18 108/67 96 % -- High flow nasal cannula 6 L/min TT    02/14/25 0000 97.8 °F (36.6 °C) 101 26 121/78 97 % -- High flow nasal cannula 6 L/min TT    02/13/25 2348 -- -- -- -- -- -- High flow nasal cannula 6 L/min     02/13/25 2348 -- 111 26 121/78 96 % -- -- -- TT    02/13/25 2300 -- 117 27 119/95 93 % -- High flow nasal  cannula 6 L/min TT    02/13/25 2200 -- 119 22 125/96 91 % -- High flow nasal cannula 6 L/min TT    02/13/25 2100 -- 117 32 109/84 95 % -- High flow nasal cannula 6 L/min TT    02/13/25 2000 -- 116 17 115/65 96 % -- High flow nasal cannula 6 L/min TT    02/13/25 1600 -- 98 20 160/59 90 % -- None (Room air) --     02/13/25 1551 -- -- -- -- -- 213 lb 6.5 oz (96.8 kg) -- --     02/13/25 1539 -- 99 26 145/71 93 % -- None (Room air) --     02/13/25 1445 -- 105 21 147/86 95 % -- None (Room air) -- [place]    02/13/25 1421 -- -- -- -- -- 206 lb 2.1 oz (93.5 kg) -- -- [place]    02/13/25 1345 -- 108 21 141/85 97 % -- None (Room air) -- [place]    02/13/25 1315 -- 90 26 148/49 95 % -- None (Room air) -- [place]    02/13/25 1215 -- 88 19 137/50 95 % -- None (Room air) -- [place]    02/13/25 1145 -- 90 18 124/71 93 % -- None (Room air) -- [place]    02/13/25 1006 98.6 °F (37 °C) 92 22 120/81 100 % 205 lb 0.4 oz (93 kg) None (Room air) -- [place]

## 2025-02-14 NOTE — PROGRESS NOTES
[facility] Hematology/Oncology    Inpatient Progress Note    Yi Torres Patient Status:  Emergency    1950 MRN [id number]   Location [facility] EMERGENCY DEPARTMENT Attending Sandro Garg*   Hosp Day # 1 PCP Tammy Chan MD     Reason for consultation: SVC syndrome  Left s/c thrombus  Lung cancer    INTERVAL HISTORY   she underwent SVC stenting as well as thrombectomy from left subclavian vein on 2025.  Her port was still functional.  She is now on enoxaparin.  She is feeling much better.  Her dyspnea has improved.  No longer has any postural flushing congestion.    History of Present Illness  Yi Torres is a 74 year old female with recently diagnosed locally advanced non-small cell lung cancer who is due to start concurrent chemo RT.  She had a port placed yesterday and then developed increasing redness and swelling as well as inability to lie flat.  She was seen in the oncology clinic earlier today and referred to the ED.    2025 CT chest showed thrombus within the left brachiocephalic and left subclavian vein which is new since prior study.  There was a 9.2 cm right upper lobe suprahilar mass with invasion of the mediastinum that has increased in size along with septal thickening that the right upper lobe that is new and thought to represent mild interstitial edema.  The right upper lobe mass is causing circumferential encasement and as well as invasion and narrowing of the SVC which is completely obliterated    She was hospitalized for concern regarding SVC syndrome and left subclavian thrombosis. Reports some fullness in the head and occ blurred vision    Past Oncologic History  25 she had complained of increasing dyspnea for about 3 months.  Saw PCP and underwent a CT chest that showed a very large centrally necrotic right upper lobe lung mass measuring 8.2 x 7.4 x 10.8 cm with extension to the right paratracheal upper mediastinum.   Extensive right upper lobe bronchovascular structure encasement.  Mildly enlarged right paratracheal mediastinal lymph nodes bilateral adrenal nodules were noted.     1/17/2025 PET/CT showed a 9 x 8 cm hypermetabolic mass within the right upper lobe extending into the mediastinum and right hilum peripherally hypermetabolic and centrally cystic and necrotic.  Effacement of the IVC.  Subcentimeter pulmonary nodules bilaterally were not FDG avid  Hypermetabolic lymph nodes within the anterior superior mediastinum subcarinal right paratracheal area and right clavicle chains were all compatible with metastatic disease.     1/22/25 she underwent a robotic navigational bronchoscopy with EBUS bx of RUL mass and  TBNA of lymph node station 7.  BAL from the right upper lobe was consistent with squamous cell carcinoma as was the needle biopsy from the right upper lobe mass.  EBUS sampling from station node 7 showed no malignant cells.  Lymphocytes were present. 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     Cholecystectomy      Laparoscopic incisional / umbilical / ventral hernia repair  09/19/2024       Current Medications:   enoxaparin (Lovenox) 100 MG/ML SUBQ injection  90 mg  1 mg/kg Subcutaneous 2 times per day    [COMPLETED] iopamidol 76% (ISOVUE-370) injection for power injector  80 mL Intravenous ONCE PRN    [COMPLETED] heparin (Porcine) 1000 UNIT/ML injection - BOLUS IV 7,400 Units  80 Units/kg Intravenous Once    acetaminophen (Tylenol Extra Strength) tab 500 mg  500 mg Oral Q4H PRN    ondansetron (Zofran) 4 MG/2ML injection 4 mg  4 mg Intravenous Q6H PRN    metoclopramide (Reglan) 5 mg/mL injection 10 mg  10 mg Intravenous Q8H PRN    glucose (Dex4) 15 GM/59ML oral liquid 15 g  15 g Oral Q15 Min PRN    Or    glucose (Glutose) 40% oral gel 15 g  15 g Oral Q15 Min PRN    Or    glucose-vitamin C (Dex-4) chewable tab 4 tablet  4 tablet Oral Q15 Min PRN    Or    dextrose 50% injection 50 mL  50 mL Intravenous Q15 Min PRN    Or    glucose (Dex4) 15 GM/59ML oral liquid 30 g  30 g Oral Q15 Min PRN    Or    glucose (Glutose) 40% oral gel 30 g  30 g Oral Q15 Min PRN    Or    glucose-vitamin C (Dex-4) chewable tab 8 tablet  8 tablet Oral Q15 Min PRN    insulin aspart (NovoLOG) 100 Units/mL FlexPen 1-5 Units  1-5 Units Subcutaneous TID CC    [COMPLETED] heparin in sodium chloride 0.9% (Porcine) 2 Units/mL flush bag premix        [COMPLETED] midazolam (Versed) 2 MG/2ML injection        [COMPLETED] fentaNYL (Sublimaze) 50 mcg/mL injection        [COMPLETED] heparin (Porcine) 1000 UNIT/ML injection        [COMPLETED] iopamidol (ISOVUE-300) 61 % injection 200 mL  200 mL Injection ONCE PRN    atorvastatin (Lipitor) tab 20 mg  20 mg Oral Nightly    cetirizine (ZyrTEC) tab 10 mg  10 mg Oral Daily    metoprolol tartrate (Lopressor) tab 25 mg  25 mg Oral BID    spironolactone (Aldactone) 25 mg, hydroCHLOROthiazide 25 mg for Aldactazide 25/25 ([facility] only)   Oral Daily    verapamil ER (Calan-SR) tab 240 mg  240 mg Oral Nightly    ipratropium-albuterol (Duoneb) 0.5-2.5 (3) MG/3ML inhalation solution 3 mL  3 mL Nebulization Q6H PRN    guaiFENesin (Robitussin) 100 MG/5 ML oral liquid 200 mg  200 mg  Oral Q4H PRN       Allergies:   Allergies[1]    Family Medical History:  Family History   Problem Relation Age of Onset    Hypertension Father     Stroke Father     Heart Attack Mother        Social History:  Social History     Socioeconomic History    Marital status:      Spouse name: Not on file    Number of children: 1    Years of education: Not on file    Highest education level: Not on file   Occupational History    Not on file   Tobacco Use    Smoking status: Former     Current packs/day: 0.00     Average packs/day: 0.5 packs/day for 45.0 years (22.5 ttl pk-yrs)     Types: Cigarettes     Start date: 1973     Quit date: 2018     Years since quittin.0     Passive exposure: Past    Smokeless tobacco: Never    Tobacco comments:     1 pack every 3 days   Vaping Use    Vaping status: Never Used   Substance and Sexual Activity    Alcohol use: Not Currently     Comment: very rarely    Drug use: No    Sexual activity: Not on file   Other Topics Concern     Service Not Asked    Blood Transfusions Not Asked    Caffeine Concern Yes     Comment: Coffee, 2 cups per day     Occupational Exposure Not Asked    Hobby Hazards Not Asked    Sleep Concern Not Asked    Stress Concern Not Asked    Weight Concern Not Asked    Special Diet Not Asked    Back Care Not Asked    Exercise Not Asked    Bike Helmet Not Asked    Seat Belt Not Asked    Self-Exams Not Asked    Grew up on a farm Not Asked    History of tanning No    Outdoor occupation Not Asked    Breast feeding Not Asked    Reaction to local anesthetic No    Left Handed No    Right Handed Yes    Currently spends a great deal of time in the sun No    Past Sunlamp Treatments for Acne Not Asked    Hx of Spending Great Deal of Time in Sun No    Bad sunburns in the past Yes    Tanning Salons in the Past No    Hx of Radiation Treatments No    Regular use of sun block Not Asked   Social History Narrative    - lives with     1 son      Social  Drivers of Health     Food Insecurity: No Food Insecurity (2025)    NCSS - Food Insecurity     Worried About Running Out of Food in the Last Year: No     Ran Out of Food in the Last Year: No   Transportation Needs: No Transportation Needs (2025)    NCSS - Transportation     Lack of Transportation: No   Housing Stability: Not At Risk (2025)    NCSS - Housing/Utilities     Has Housing: Yes     Worried About Losing Housing: No     Unable to Get Utilities: No       Gyn History:  OB History    Para Term  AB Living   1 1 0 0 0 0   SAB IAB Ectopic Multiple Live Births   0 0 0 0 0       Objective:    BP (!) 134/113 (BP Location: Right arm)   Pulse 70   Temp 97 °F (36.1 °C) (Temporal)   Resp 24   Wt 94.1 kg (207 lb 7.3 oz)   SpO2 99%   BMI 35.06 kg/m²   Physical Exam:  General: A&Ox3, NAD  HEENT: PERRL, OP clear  Face mildly flushed  Neck: supple,   Erythema, swelling left IJ vein : Has improved.  Left s/c port site swelling is better  CV: RRR, no murmurs, + pulses  Pulm: CTA b/l, no w/r/r, normal effort  Lymph: no palpable lymphadenopathy throughout the cervical, supraclavicular, or axillary regions  Extremities: no edema or calf tenderness  Neurological: Grossly intact    Labs:  Lab Results   Component Value Date/Time    WBC 15.6 (H) 2025 03:52 AM    RBC 4.80 2025 03:52 AM    HGB 11.7 (L) 2025 03:52 AM    HCT 38.8 2025 03:52 AM    MCV 80.8 2025 03:52 AM    MCH 24.4 (L) 2025 03:52 AM    MCHC 30.2 (L) 2025 03:52 AM    RDW 17.0 (H) 2025 03:52 AM    NEPRELIM 11.64 (H) 2025 03:52 AM    .0 2025 03:52 AM       Lab Results   Component Value Date/Time     (H) 2025 03:52 AM    BUN 28 (H) 2025 03:52 AM    CREATSERUM 0.87 2025 03:52 AM    GFRNAA 45 (L) 2022 08:02 AM    CA 9.5 2025 03:52 AM    ALB 4.0 2025 03:52 AM     (L) 2025 03:52 AM    K 4.1 2025 03:52 AM    CL 98  02/14/2025 03:52 AM    CO2 26.0 02/14/2025 03:52 AM    ALKPHO 62 02/14/2025 03:52 AM    AST 19 02/14/2025 03:52 AM    ALT 18 02/14/2025 03:52 AM       Imaging:  CT CHEST(CONTRAST ONLY) (CPT=71260)    Result Date: 2/13/2025  CONCLUSION:   Thrombus within the left brachiocephalic vein and medial aspect of the left subclavian vein is new since 1/9/2025.  9.2 cm right upper lobe suprahilar mass with invasion of the mediastinum has increased in size.  Interlobular septal thickening within the right upper lobe is new and may be secondary to mild interstitial edema or lymphangitic carcinomatosis.  Multiple ground-glass and semi solid nodules within the right upper lobe are new and may be secondary to satellite metastases versus underlying infectious/inflammatory etiology.  Metastatic right supraclavicular lymph node is unchanged.  Multiple other incidental findings as described in the body of the report which are unchanged.     Dictated by (CST): Eugenio French MD on 2/13/2025 at 1:29 PM     Finalized by (CST): Eugenio French MD on 2/13/2025 at 1:35 PM          IR PORT A CATH PROCEDURE    Result Date: 2/11/2025  CONCLUSION:  Ultrasound and fluoroscopic guided surgical placement of chest port    Dictated by (CST): Cesar Loaiza MD on 2/11/2025 at 2:24 PM     Finalized by (CST): Cesar Loaiza MD on 2/11/2025 at 2:25 PM              Assessment & Plan:    Yi Torres is a 74 year old female with locally advanced Rt lung cancer, with mediastinal invasion, now with SVC syndrome and left subclavian thrombus    # s/p SVC stenting and left thrombectomy    # continue enoxaparin per IR, can eventually transition to an oral DOAC which given her malignancy will likely be long-term    # Will plan to reschedule her CT sim for radiation oncology and plan to start her shortly therafter on chemo-RT to definitively treat her locally advanced lung cancer    Thank you  for the opportunity to participate in the care of this  interesting patient. Please do contact me if I may be of any further assistance    José Miguel Goodson MD  [facility]   [facility]    This note was created using a voice-recognition transcribing system. Incorrect words or phrases may have been missed during proofreading. Please interpret accordingly.         [1]   Allergies  Allergen Reactions    Erythromycin DIARRHEA     Stomach pain

## 2025-02-14 NOTE — PROGRESS NOTES
[facility]  part of [facility]  Progress Note      Yi Law Toni Carlton Patient Status:  Inpatient    1950 MRN [id number]   Location [facility] 2W/SW Attending Vanessa Colon,*   Hosp Day # 1 PCP Tammy Chan MD       Subjective:   Sitting up in chair eating breakfast.  is at the bedside. Feels better today. No sob at rest , on 02. NO chest pain.    Objective:   Physical Exam:   General: Alert, orientated x3.  Cooperative.  No apparent distress.  Vital Signs:  Blood pressure (!) 134/113, pulse 70, temperature 97 °F (36.1 °C), temperature source Temporal, resp. rate 24, weight 207 lb 7.3 oz (94.1 kg), SpO2 99%.  HEENT: Exam is unremarkable.  Lungs: normal respiratory effort, on 02 NC  Cardiac: Regular rate   Abdomen:  No guarding  Extremities: + Lower extremity edema. No upper extremity or facial/neck edema. Bilateral upper extremity dressings removed sites intact  Skin: Normal texture and turgor.    Vital Signs:  Blood pressure (!) 134/113, pulse 70, temperature 97 °F (36.1 °C), temperature source Temporal, resp. rate 24, weight 207 lb 7.3 oz (94.1 kg), SpO2 99%.    Input/Output:    Intake/Output Summary (Last 24 hours) at 2025 0915  Last data filed at 2025 0000  Gross per 24 hour   Intake --   Output 0 ml   Net 0 ml     Results:   Labs:  Lab Results   Component Value Date    WBC 15.6 2025    RBC 4.80 2025    HGB 11.7 2025    HCT 38.8 2025    MCV 80.8 2025    MCH 24.4 2025    MCHC 30.2 2025    RDW 17.0 2025    .0 2025     Recent Labs   Lab 25  1146 25  0352   GLU 98 125*   BUN 25* 28*   CREATSERUM 0.74 0.87   EGFRCR 86 71   CA 9.7 9.5   * 131*   K 3.7 4.1   CL 97* 98   CO2 29.0 26.0     CT CHEST(CONTRAST ONLY) (CPT=71260)    Result Date: 2025  CONCLUSION:   Thrombus within the left brachiocephalic vein and medial aspect of the left subclavian vein is new since  1/9/2025.  9.2 cm right upper lobe suprahilar mass with invasion of the mediastinum has increased in size.  Interlobular septal thickening within the right upper lobe is new and may be secondary to mild interstitial edema or lymphangitic carcinomatosis.  Multiple ground-glass and semi solid nodules within the right upper lobe are new and may be secondary to satellite metastases versus underlying infectious/inflammatory etiology.  Metastatic right supraclavicular lymph node is unchanged.  Multiple other incidental findings as described in the body of the report which are unchanged.     Dictated by (CST): Eugenio French MD on 2/13/2025 at 1:29 PM     Finalized by (CST): Eugenio French MD on 2/13/2025 at 1:35 PM             Assessment and Plan:   Day #1 s/p SVC, L innominate/subclavian vein thrombectomy/SVC stent   No sob or facial/upper extremity edema today, bilateral upper extremity dressings removed- covered with mepilex until healed.   Start therapeutic lovenox x 4 weeks  Follow up with Dr Loaiza in clinic in 4 weeks    MISA Arias  2/14/2025  9:15 AM

## 2025-02-14 NOTE — PROGRESS NOTES
[facility]  part of [facility]    Progress Note    Yi Torres Patient Status:  Inpatient    1950 MRN [id number]   Location [facility] 2W/SW Attending Vanessa Colon,*   Hosp Day # 1 PCP Tammy Chan MD     Chief Complaint: feel better    Subjective:     Constitutional:  Positive for appetite change and fatigue.   HENT:  Negative for congestion.    Respiratory:  Negative for cough and chest tightness.    Cardiovascular:  Negative for chest pain and leg swelling.   Gastrointestinal:  Negative for abdominal distention.   Genitourinary:  Negative for dysuria.   Musculoskeletal:  Negative for joint swelling and joint pain.   Neurological:  Negative for weakness.   Psychiatric/Behavioral:  Negative for confusion and decreased concentration. The patient is not nervous/anxious.        Objective:   Blood pressure (!) 134/113, pulse 70, temperature 97 °F (36.1 °C), temperature source Temporal, resp. rate 24, weight 207 lb 7.3 oz (94.1 kg), SpO2 99%.  Physical Exam  Vitals and nursing note reviewed.   Constitutional:       General: She is not in acute distress.     Appearance: She is obese. She is ill-appearing. She is not toxic-appearing or diaphoretic.   HENT:      Head: Normocephalic and atraumatic.   Cardiovascular:      Rate and Rhythm: Normal rate and regular rhythm.   Pulmonary:      Effort: Pulmonary effort is normal.      Breath sounds: Rhonchi present.   Abdominal:      General: Bowel sounds are normal.      Palpations: Abdomen is soft.   Skin:     Capillary Refill: Capillary refill takes less than 2 seconds.   Neurological:      General: No focal deficit present.      Mental Status: She is alert and oriented to person, place, and time.   Psychiatric:         Behavior: Behavior normal.         Judgment: Judgment normal.         Results:   Lab Results   Component Value Date    WBC 15.6 (H) 2025    HGB 11.7 (L) 2025    HCT 38.8 2025     .0 02/14/2025    CREATSERUM 0.87 02/14/2025    BUN 28 (H) 02/14/2025     (L) 02/14/2025    K 4.1 02/14/2025    CL 98 02/14/2025    CO2 26.0 02/14/2025     (H) 02/14/2025    CA 9.5 02/14/2025    ALB 4.0 02/14/2025    ALKPHO 62 02/14/2025    BILT 0.5 02/14/2025    TP 7.2 02/14/2025    AST 19 02/14/2025    ALT 18 02/14/2025    PTT 26.1 02/13/2025    INR 1.15 02/13/2025    T4F 1.1 06/02/2021    TSH 2.859 04/19/2024    PHOS 3.9 07/17/2024    TROP <0.045 10/09/2020    CK 78 11/08/2019       CT CHEST(CONTRAST ONLY) (CPT=71260)    Result Date: 2/13/2025  CONCLUSION:   Thrombus within the left brachiocephalic vein and medial aspect of the left subclavian vein is new since 1/9/2025.  9.2 cm right upper lobe suprahilar mass with invasion of the mediastinum has increased in size.  Interlobular septal thickening within the right upper lobe is new and may be secondary to mild interstitial edema or lymphangitic carcinomatosis.  Multiple ground-glass and semi solid nodules within the right upper lobe are new and may be secondary to satellite metastases versus underlying infectious/inflammatory etiology.  Metastatic right supraclavicular lymph node is unchanged.  Multiple other incidental findings as described in the body of the report which are unchanged.     Dictated by (CST): Eugenio French MD on 2/13/2025 at 1:29 PM     Finalized by (CST): Eugenio French MD on 2/13/2025 at 1:35 PM               Assessment & Plan:   1.  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  Squamous cell lung cancer as per Dr. Goodson  4.  COPD  5.  Diabetes mellitus  6.  Obesity bmi=35.06 may need brian sands    Patient will require inpatient services that will reasonably be expected to span two midnight's based on the clinical documentation in H+P.   Based on patients current state of illness, I anticipate that, after discharge, patient will require TBD.    Complex mdm; coordinating  care with nurse/arranging transfer  and counseling pt and with her permission her  in room about anticoag/tele    JORDAN DOMINGUEZ MD

## 2025-02-14 NOTE — OCCUPATIONAL THERAPY NOTE
OCCUPATIONAL THERAPY EVALUATION - INPATIENT     Room Number: 239/239-A  Evaluation Date: 2/14/2025  Type of Evaluation: Initial  Presenting Problem: superior vena cava syndrome    Physician Order: IP Consult to Occupational Therapy  Reason for Therapy: ADL/IADL Dysfunction and Discharge Planning    OCCUPATIONAL THERAPY ASSESSMENT   Patient is a 74 year old female admitted 2/13/2025 for SVC syndrome. Pt s/p thrombectomy w/ stent.  Prior to admission, patient was living w/ her  in a condo w/ 16 stairs to enter. Pt reports being mod I for adls and ambulation w/o an ad.  Patient is currently functioning below baseline with adls and functional mobility.  Patient is requiring cga/minimal assist as a result of the following impairments: decreased endurance and pain. Occupational Therapy will continue to follow for duration of hospitalization.    Patient will benefit from continued skilled OT Services for duration of hospitalization but do not anticipate skilled therapy needs at discharge    PLAN DURING HOSPITALIZATION  OT Device Recommendations: Shower chair;Raised toilet seat (rw)  OT Treatment Plan: Compensatory technique education;Patient/Family training;Patient/Family education;Endurance training;Functional transfer training;ADL training;Energy conservation/work simplification techniques     OCCUPATIONAL THERAPY MEDICAL/SOCIAL HISTORY   Problem List  Principal Problem:    SVC syndrome  Active Problems:    Venous thrombosis of upper extremity    Acute embolism and thrombosis of left subclavian vein (HCC)    Thrombosis of brachiocephalic vein (HCC)    HOME SITUATION  Type of Home: Condo  Home Layout: One level  Lives With: Spouse  Toilet and Equipment: Standard height toilet  Shower/Tub and Equipment: Tub-shower combo  Other Equipment: -- (cane)  Patient Regularly Uses: Glasses    Stairs in Home: 16 to enter  Use of Assistive Device(s): none (pt does have a cane that she does not use)    Prior Level of  Swain: Prior to admission, patient was living w/ her  in a condo w/ 16 stairs to enter. Pt reports being mod I for adls and ambulation w/o an ad. Pt sleeps in a recliner    SUBJECTIVE  \"Sitting hurts my butt\"    OCCUPATIONAL THERAPY EXAMINATION      OBJECTIVE  Fall Risk: Standard fall risk    PAIN ASSESSMENT       ACTIVITY TOLERANCE  Good  /96  HR 97    O2 SATURATIONS  94-97% on room air w/ activity    Behavioral/Emotional/Social: cooperative, pt not overly receptive to suggestions for dme/ad    RANGE OF MOTION   Upper extremity ROM is within functional limits     STRENGTH ASSESSMENT  Upper extremity strength is within functional limits     ACTIVITIES OF DAILY LIVING ASSESSMENT  AM-PAC ‘6-Clicks’ Inpatient Daily Activity Short Form  How much help from another person does the patient currently need…  -   Putting on and taking off regular lower body clothing?: A Little  -   Bathing (including washing, rinsing, drying)?: A Little  -   Toileting, which includes using toilet, bedpan or urinal? : A Little  -   Putting on and taking off regular upper body clothing?: A Little  -   Taking care of personal grooming such as brushing teeth?: None  -   Eating meals?: None    AM-PAC Score:  Score: 20  Approx Degree of Impairment: 38.32%  Standardized Score (AM-PAC Scale): 42.03  CMS Modifier (G-Code): CJ    FUNCTIONAL ADL ASSESSMENT  Eating: independent  Grooming: setup assist  UB Dressing: min assist  LB Dressing: min assist  Toileting: na    Skilled Therapy Provided: OT orders received and chart reviewed. RN contacted prior to start of care. Treatment coordinated w/ PT. Pt agreeable to participation in therapy. Gait belt used during dynamic activity. Pt received up in chair, alert and oriented x 4;spouse at bedside. On initial contact pt required cga to transfer sit<>stand from bedside chair. Pt ambulating in the room w/ rw and cga. Activity tolerance limited pt's concern abut becoming sob and needing to  limit activity. Energy conservation and pacing strategies discussed. Breathing strategies reinforced as well as spine precautions and posture principles. Suggestion made for rts, rw and shower chair for home use     At end of session pt returned to chair and left w/ all needs in reach;spouse at bedside. RN aware of pt's status and performance in therapy      EDUCATION PROVIDED  Patient Education : Role of Occupational Therapy; Plan of Care; Functional Transfer Techniques; Fall Prevention; Posture/Positioning; Energy Conservation (breathing strategies)  Patient's Response to Education: Verbalized Understanding    The patient's Approx Degree of Impairment: 38.32% has been calculated based on documentation in the [facility] '6 clicks' Inpatient Daily Activity Short Form.  Research supports that patients with this level of impairment may benefit from return to home w/ HH.  Final disposition will be made by interdisciplinary medical team.     Patient End of Session: Up in chair;Needs met;Call light within reach;RN aware of session/findings;All patient questions and concerns addressed;Family present    OT Goals  Patients self stated goal is: to return home     Patient will complete functional transfer with supervision  Comment:     Patient will complete toileting with mod i  Comment:     Patient will tolerate standing for 5 minutes in prep for adls with supervision   Comment:    Patient will complete dressing task with supervision  Comment:          Goals  on: 25  Frequency: 3-5x/week    Patient Evaluation Complexity Level:   Occupational Profile/Medical History MODERATE - Expanded review of history including review of medical or therapy record   Specific performance deficits impacting engagement in ADL/IADL MODERATE  3 - 5 performance deficits   Client Assessment/Performance Deficits MODERATE - Comorbidities and min to mod modifications of tasks    Clinical Decision Making MODERATE - Analysis of occupational  profile, detailed assessments, several treatment options    Overall Complexity MODERATE     Therapeutic Activity: 15 minutes

## 2025-02-14 NOTE — H&P
[facility]    PATIENT'S NAME: NICOLE FRANCOIS   ATTENDING PHYSICIAN: Bryson Dumas MD   PATIENT ACCOUNT#:   [id number]    LOCATION:  [address] [place] [address]  MEDICAL RECORD #:   [id number]       YOB: 1950  ADMISSION DATE:       02/13/2025    HISTORY AND PHYSICAL EXAMINATION    CHIEF COMPLAINT:  SVC syndrome.     HISTORY OF PRESENT ILLNESS:  Patient is a 74-year-old  female who was recently diagnosed with large right upper lobe lung mass, positive biopsy for non-small cell lung cancer, squamous cell pathology, being evaluated by Oncology and Hematology/Oncology for chemoradiation therapy.  Today, presented to the emergency department for evaluation of fatigue and pressure sensation in her head when she bends forward or lies flat.  Patient reports shortness of breath.  Emergency room workup showed white blood cell count of 13.5 with left shift.  Chemistry was unremarkable.  CT scan of the chest, abdomen, and pelvis showed thrombus within the left brachiocephalic vein and medial aspect of the left subclavian vein, new since January 2025; 9.2 cm right upper lobe suprahilar mass with invasion of the mediastinum has increased in size; interlobular septal thickening within the right upper lobe, new, which is secondary to interstitial edema or lymphangitic carcinomatosis; multiple ground-glass and semisolid nodules within the right upper lobe are new and may be secondary to satellite metastases versus underlying infectious inflammatory etiology; metastatic right supraclavicular lymph node is unchanged; multiple other incidental findings.  Patient was started on IV heparin, and she will be admitted to the hospital for further management.     PAST MEDICAL HISTORY:  Recent diagnosis of right upper lobe large mass, non-small cell lung cancer, squamous cell pathology, with invasion into the mediastinum, metastases to mediastinum and supraclavicular area, and today, imaging studies suggestive  of carcinomatosis of the right upper lobe, being prepared for chemoradiation therapy.  Also history of diabetes mellitus type 2, hypertension, hyperlipidemia, obesity, anxiety, osteoarthritis, osteoporosis.    PAST SURGICAL HISTORY:  Right cheek squamous cell carcinoma resection, recent left Port-A-Cath placement, cataract procedure, cholecystectomy, laparoscopic ventral hernia repair.    MEDICATIONS:  Please see medication reconciliation list.     ALLERGIES:  No known drug allergies.     FAMILY HISTORY:  Mother had coronary artery disease.  Father had hypertension and cerebrovascular accident.    SOCIAL HISTORY:  No tobacco, alcohol, or drug use.  Independent for basic activities of daily living.     REVIEW OF SYSTEMS:  Patient said she had sporadic cough with pressure in her head, especially changes with body position if she leans forward or bends over.  She has been having progressive shortness of breath recently.  She denies any fever or chills, but she did not check her temperature at home.  Other 12-point review of systems is negative.       PHYSICAL EXAMINATION:    GENERAL:  Alert and oriented to time, place and person.  No acute distress.   VITAL SIGNS:  Temperature 98.6, pulse 90, respiratory rate 26, blood pressure 148/49, pulse ox 95% on room air.  HEENT:  Atraumatic.  Oropharynx clear.  Dry mucous membranes.  Ears and nose normal.  Eyes:  Anicteric sclerae.   NECK:  Supple.  No lymphadenopathy.  Trachea midline.  Full range of motion.   LUNGS:  Clear to auscultation bilaterally.  Normal respiratory effort.    HEART:  Regular rate and rhythm.  S1 and S2 auscultated.  No murmur.    ABDOMEN:  Soft, nondistended.  No tenderness.  Positive bowel sounds.   EXTREMITIES:  Nonpitting edema, both legs.  No clubbing or cyanosis.   NEUROLOGIC:  Motor and sensory intact.      ASSESSMENT:    1.   Superior vena cava syndrome, secondary to large right upper lobe mass with mediastinal invasion and superior vena cava  external pressure.  2.   Left brachiocephalic to medial left subclavian vein deep vein thrombosis, most likely related to recent Port-A-Cath placement.     PLAN:  Patient will be admitted to progressive care unit.  Monitor hemodynamic status closely.  Start her on IV heparin.  Obtain interventional radiology, oncology, and radiation oncology consults.  Also pulmonary and critical care evaluation.  Further recommendations to follow.     Dictated By Rene Elizabeth MD  d: 02/13/2025 14:27:59  t: 02/13/2025 14:55:36  [facility] [id number]  /

## 2025-02-14 NOTE — PLAN OF CARE
Problem: Diabetes/Glucose Control  Goal: Glucose maintained within prescribed range  Description: INTERVENTIONS:  - Monitor Blood Glucose as ordered  - Assess for signs and symptoms of hyperglycemia and hypoglycemia  - Administer ordered medications to maintain glucose within target range  - Assess barriers to adequate nutritional intake and initiate nutrition consult as needed  - Instruct patient on self management of diabetes  Outcome: Progressing     Problem: RESPIRATORY - ADULT  Goal: Achieves optimal ventilation and oxygenation  Description: INTERVENTIONS:  - Assess for changes in respiratory status  - Assess for changes in mentation and behavior  - Position to facilitate oxygenation and minimize respiratory effort  - Oxygen supplementation based on oxygen saturation or ABGs  - Provide Smoking Cessation handout, if applicable  - Encourage broncho-pulmonary hygiene including cough, deep breathe, Incentive Spirometry  - Assess the need for suctioning and perform as needed  - Assess and instruct to report SOB or any respiratory difficulty  - Respiratory Therapy support as indicated  - Manage/alleviate anxiety  - Monitor for signs/symptoms of CO2 retention  Outcome: Progressing     Problem: METABOLIC/FLUID AND ELECTROLYTES - ADULT  Goal: Glucose maintained within prescribed range  Description: INTERVENTIONS:  - Monitor Blood Glucose as ordered  - Assess for signs and symptoms of hyperglycemia and hypoglycemia  - Administer ordered medications to maintain glucose within target range  - Assess barriers to adequate nutritional intake and initiate nutrition consult as needed  - Instruct patient on self management of diabetes  Outcome: Progressing     Problem: MUSCULOSKELETAL - ADULT  Goal: Return mobility to safest level of function  Description: INTERVENTIONS:  - Assess patient stability and activity tolerance for standing, transferring and ambulating w/ or w/o assistive devices  - Assist with transfers and  ambulation using safe patient handling equipment as needed  - Ensure adequate protection for wounds/incisions during mobilization  - Obtain PT/OT consults as needed  - Advance activity as appropriate  - Communicate ordered activity level and limitations with patient/family  Outcome: Progressing     Problem: SAFETY ADULT - FALL  Goal: Free from fall injury  Description: INTERVENTIONS:  - Assess pt frequently for physical needs  - Identify cognitive and physical deficits and behaviors that affect risk of falls.  - [place]y fall precautions as indicated by assessment.  - Educate pt/family on patient safety including physical limitations  - Instruct pt to call for assistance with activity based on assessment  - Modify environment to reduce risk of injury  - Provide assistive devices as appropriate  - Consider OT/PT consult to assist with strengthening/mobility  - Encourage toileting schedule  Outcome: Progressing

## 2025-02-14 NOTE — PROGRESS NOTES
Provider Clarification    Additional information on the cause and effect relationship between left chest port-a-cath and left subclavian and brachiocephalic vein thrombosis    Port a cath and caval clot were probably associating    This note is part of the patient's medical record.

## 2025-02-14 NOTE — BRIEF PROCEDURE NOTE
[facility]  part of [facility]  Procedure Note    Yi Thanh Toni Carlton Patient Status:  Inpatient    1950 MRN [id number]   Location [facility] INTERVENTIONAL SUITES Attending Bryson Dumas MD   Hosp Day # 0 PCP Tammy Chan MD     Procedure: SVC, L innominate/subclavian vein thrombectomy/SVC stent     Pre-Procedure Diagnosis:  SVC syndrome.  H/o lung cancer    Post-Procedure Diagnosis: as above    MD:  Fadia Maravilla     Anesthesia:  Sedation    Findings:  thrombosed SVC, extending proximally into L innominate/subclavian vein, cleared c suction thrombectomy, and tight stenosis SVC due to extrinsic tumor compression stented, c restoration of patency and flow     Specimens: 0    Blood Loss:  75cc      Complications:  None    Plan:   [facility] start in AM    Cesar Loaiza MD  2025

## 2025-02-14 NOTE — PHYSICAL THERAPY NOTE
PHYSICAL THERAPY EVALUATION - INPATIENT     Room Number: 212/212-A  Evaluation Date: 2/14/2025  Type of Evaluation: Initial   Physician Order: PT Eval and Treat    Presenting Problem: SVC syndrome, subclavian thrombus  Co-Morbidities : lung CA  Reason for Therapy: Mobility Dysfunction and Discharge Planning    PHYSICAL THERAPY ASSESSMENT   Patient is a 74 year old female admitted 2/13/2025 for SVC syndrome, subclavian thrombus. Pt underwent thrombectomy on 2/13.  Prior to admission, patient's baseline is amb with indep, some assist with ADLs (laundry). Pt with inc time for bathing. Pt limited by SOB at baseline d/t tumor.  Patient is currently functioning near baseline with bed mobility, transfers, and gait.  Patient is requiring contact guard assist as a result of the following impairments: decreased endurance/aerobic capacity, pain, decreased muscular endurance, and increased O2 needs from baseline.  Physical Therapy will continue to follow for duration of hospitalization.    Patient will benefit from continued skilled PT Services for duration of hospitalization, however, given the patient is functioning near baseline level do not anticipate skilled therapy needs at discharge . Pt declined HHPT after d/c.     PLAN DURING HOSPITALIZATION  Nursing Mobility Recommendation : 1 Assist  PT Device Recommendation: Rolling walker  PT Treatment Plan: Bed mobility;Body mechanics;Endurance;Energy conservation;Patient education;Gait training;Strengthening;Transfer training;Balance training;Stair training  Rehab Potential : Fair  Frequency (Obs): 5x/week     PHYSICAL THERAPY MEDICAL/SOCIAL HISTORY       Problem List  Principal Problem:    SVC syndrome  Active Problems:    Venous thrombosis of upper extremity    Acute embolism and thrombosis of left subclavian vein (HCC)    Thrombosis of brachiocephalic vein (HCC)      HOME SITUATION  Type of Home: Condo  Home Layout: One level  Stairs to Enter : 16   Railing: Yes    Stairs to  Bedroom: 0         Lives With: Spouse        Patient Regularly Uses:  (owns cane, does not use)       SUBJECTIVE  \"I lost 120 pounds, so now sometimes it hurts to sit or my legs get tingly.\"    PHYSICAL THERAPY EXAMINATION   OBJECTIVE     Fall Risk: Standard fall risk    WEIGHT BEARING RESTRICTION       PAIN ASSESSMENT  Rating:  (c/o back pain, did not rate)  Location: back       COGNITION  Overall Cognitive Status:  WFL - within functional limits    RANGE OF MOTION AND STRENGTH ASSESSMENT  Upper extremity ROM and strength are within functional limits   Lower extremity ROM is within functional limits   Lower extremity strength is within functional limits     BALANCE  Static Sitting: Good  Dynamic Sitting: Fair +  Static Standing: Fair +  Dynamic Standing: Fair    ADDITIONAL TESTS                                    NEUROLOGICAL FINDINGS                      ACTIVITY TOLERANCE  Pulse: 96  Heart Rate Source: Monitor     BP: (!) 120/96  BP Location: Right arm  BP Method: Automatic  Patient Position: Sitting    O2 WALK  Oxygen Therapy  SPO2% on Oxygen at Rest: 98  At rest oxygen flow (liters per minute): 7  SPO2% Ambulation on Oxygen: 94  Ambulation oxygen flow (liters per minute): 7    AM-PAC '6-Clicks' INPATIENT SHORT FORM - BASIC MOBILITY  How much difficulty does the patient currently have...  Patient Difficulty: Turning over in bed (including adjusting bedclothes, sheets and blankets)?: A Little   Patient Difficulty: Sitting down on and standing up from a chair with arms (e.g., wheelchair, bedside commode, etc.): A Little   Patient Difficulty: Moving from lying on back to sitting on the side of the bed?: A Little   How much help from another person does the patient currently need...   Help from Another: Moving to and from a bed to a chair (including a wheelchair)?: A Little   Help from Another: Need to walk in hospital room?: A Little   Help from Another: Climbing 3-5 steps with a railing?: A Little     AM-PAC  Score:  Raw Score: 18   Approx Degree of Impairment: 46.58%   Standardized Score (AM-PAC Scale): 43.63   CMS Modifier (G-Code): CK    FUNCTIONAL ABILITY STATUS  Functional Mobility/Gait Assessment  Gait Assistance: Contact guard assist  Distance (ft): 40  Assistive Device: Rolling walker  Pattern:  (dec david)    Sit to Stand: contact guard assist    Exercise/Education Provided:  Energy conservation  Gait training  Transfer training  PT Eval    Skilled Therapy Provided: Pt ok to be seen per RN Hope. Pt educ in role of PT and PT POC. Pt agreeable and participatory. Pt limited by SOB at baseline, continues to be limited by O2 needs. Pt demo'd safe ambulation in room, but declined second bout of amb as she felt like it would be too much. Pt has good awareness of body's needs and activity pacing--reviewed with pt, ivana as pt has higher O2 needs than baseline. Pt has assist for ADLs from spouse. Educ pt on benefits of RW as she has limited endurance and back pain, but pt states she can amb without it. Pt declining HHPT upon d/c as spouse can assist.    The patient's Approx Degree of Impairment: 46.58% has been calculated based on documentation in the [facility] '6 clicks' Inpatient Basic Mobility Short Form.  Research supports that patients with this level of impairment may benefit from home with HHPT.  Final disposition will be made by interdisciplinary medical team.    Patient End of Session: Up in chair;Needs met;Call light within reach;RN aware of session/findings;All patient questions and concerns addressed;Hospital anti-slip socks;Family present    CURRENT GOALS  Goals to be met by: 2/28/25  Patient Goal Patient's self-stated goal is: return home   Goal #1 Patient is able to demonstrate supine - sit EOB @ level: modified independent     Goal #1   Current Status    Goal #2 Patient is able to demonstrate transfers Sit to/from Stand at assistance level: modified independent with walker - rolling     Goal #2  Current  Status    Goal #3 Patient is able to ambulate 150 feet with assist device: walker - rolling at assistance level: supervision   Goal #3   Current Status    Goal #4 Patient will negotiate 16 stairs with rail and supervision   Goal #4   Current Status    Goal #5 Patient to demonstrate independence with home activity/exercise instructions provided to patient in preparation for discharge.   Goal #5   Current Status    Goal #6    Goal #6  Current Status      Patient Evaluation Complexity Level:  History Moderate - 1 or 2 personal factors and/or co-morbidities   Examination of body systems Moderate - addressing a total of 3 or more elements   Clinical Presentation  Moderate - Evolving   Clinical Decision Making  Moderate Complexity     Gait Training: 15 minutes

## 2025-02-14 NOTE — CM/SW NOTE
02/14/25 1400   CM/SW Referral Data   Referral Source    Reason for Referral Discharge planning   Informant Patient;Spouse/Significant Other   Medical Hx   Does patient have an established PCP? Yes  (Tammy Chan)   Patient Info   Patient's Current Mental Status at Time of Assessment Alert;Oriented   Patient's Home Environment Condo/Apt no elevator   Number of Levels in Home 2   Number of Stair in Home 16 steps to condo entrance   Patient lives with Spouse/Significant other   Patient Status Prior to Admission   Independent with ADLs and Mobility Yes   Services in place prior to admission DME/Supplies at home   Type of DME/Supplies Straight Cane   Discharge Needs   Anticipated D/C needs To be determined     Pt discussed during nursing rounds. Dx SVC syndrome 2/2 lung CA, on 4L (RA at baseline). Home w/spouse, independent w/o device prior to admission. Pt states she uses railing to navigate 16 stairs to her condo entrance. Anticipated therapy need: Home with Home Healthcare. Pt declining HH at dc. SW/CM to monitor O2 needs possible home O2 at dc. Pt does not want home O2 at dc.    Pt will transfer to medical floor once bed is available.    Plan: Home w/spouse pending O2 weaning and medical clearance.    / to remain available for support and/or discharge planning.     VALENTE Tanner    [phone]

## 2025-02-15 LAB
ANION GAP SERPL CALC-SCNC: 10 MMOL/L (ref 0–18)
ATRIAL RATE: 101 BPM
BASOPHILS # BLD AUTO: 0.07 X10(3) UL (ref 0–0.2)
BASOPHILS NFR BLD AUTO: 0.5 %
BUN BLD-MCNC: 25 MG/DL (ref 9–23)
BUN/CREAT SERPL: 36.2 (ref 10–20)
CALCIUM BLD-MCNC: 9.3 MG/DL (ref 8.7–10.4)
CHLORIDE SERPL-SCNC: 95 MMOL/L (ref 98–112)
CHLORIDE UR-SCNC: 31 MMOL/L
CO2 SERPL-SCNC: 24 MMOL/L (ref 21–32)
CREAT BLD-MCNC: 0.69 MG/DL
CREAT UR-SCNC: 101.2 MG/DL
DEPRECATED RDW RBC AUTO: 48.7 FL (ref 35.1–46.3)
EGFRCR SERPLBLD CKD-EPI 2021: 92 ML/MIN/1.73M2 (ref 60–?)
EOSINOPHIL # BLD AUTO: 0.1 X10(3) UL (ref 0–0.7)
EOSINOPHIL NFR BLD AUTO: 0.7 %
ERYTHROCYTE [DISTWIDTH] IN BLOOD BY AUTOMATED COUNT: 17.1 % (ref 11–15)
GLUCOSE BLD-MCNC: 123 MG/DL (ref 70–99)
GLUCOSE BLDC GLUCOMTR-MCNC: 114 MG/DL (ref 70–99)
GLUCOSE BLDC GLUCOMTR-MCNC: 117 MG/DL (ref 70–99)
GLUCOSE BLDC GLUCOMTR-MCNC: 129 MG/DL (ref 70–99)
GLUCOSE BLDC GLUCOMTR-MCNC: 170 MG/DL (ref 70–99)
HCT VFR BLD AUTO: 33.2 %
HGB BLD-MCNC: 10.3 G/DL
IMM GRANULOCYTES # BLD AUTO: 0.08 X10(3) UL (ref 0–1)
IMM GRANULOCYTES NFR BLD: 0.6 %
LYMPHOCYTES # BLD AUTO: 1.27 X10(3) UL (ref 1–4)
LYMPHOCYTES NFR BLD AUTO: 9 %
MAGNESIUM SERPL-MCNC: 2 MG/DL (ref 1.6–2.6)
MCH RBC QN AUTO: 24.5 PG (ref 26–34)
MCHC RBC AUTO-ENTMCNC: 31 G/DL (ref 31–37)
MCV RBC AUTO: 79 FL
MONOCYTES # BLD AUTO: 1.15 X10(3) UL (ref 0.1–1)
MONOCYTES NFR BLD AUTO: 8.1 %
NEUTROPHILS # BLD AUTO: 11.51 X10 (3) UL (ref 1.5–7.7)
NEUTROPHILS # BLD AUTO: 11.51 X10(3) UL (ref 1.5–7.7)
NEUTROPHILS NFR BLD AUTO: 81.1 %
OSMOLALITY SERPL CALC.SUM OF ELEC: 274 MOSM/KG (ref 275–295)
OSMOLALITY UR: 815 MOSM/KG (ref 300–1300)
P AXIS: 24 DEGREES
P-R INTERVAL: 250 MS
PHOSPHATE SERPL-MCNC: 3.4 MG/DL (ref 2.4–5.1)
PLATELET # BLD AUTO: 301 10(3)UL (ref 150–450)
POTASSIUM SERPL-SCNC: 4 MMOL/L (ref 3.5–5.1)
POTASSIUM UR-SCNC: 60 MMOL/L
Q-T INTERVAL: 384 MS
QRS DURATION: 84 MS
QTC CALCULATION (BEZET): 497 MS
R AXIS: -18 DEGREES
RBC # BLD AUTO: 4.2 X10(6)UL
SODIUM SERPL-SCNC: 129 MMOL/L (ref 136–145)
SODIUM SERPL-SCNC: <10 MMOL/L
T AXIS: 149 DEGREES
VENTRICULAR RATE: 101 BPM
WBC # BLD AUTO: 14.2 X10(3) UL (ref 4–11)

## 2025-02-15 PROCEDURE — 99232 SBSQ HOSP IP/OBS MODERATE 35: CPT | Performed by: INTERNAL MEDICINE

## 2025-02-15 PROCEDURE — 99233 SBSQ HOSP IP/OBS HIGH 50: CPT | Performed by: HOSPITALIST

## 2025-02-15 RX ORDER — SODIUM CHLORIDE 1 G/1
1 TABLET ORAL
Status: COMPLETED | OUTPATIENT
Start: 2025-02-15 | End: 2025-02-17

## 2025-02-15 NOTE — PROGRESS NOTES
[facility] Hematology/Oncology    Inpatient Progress Note    Yi Torres Patient Status:  Emergency    1950 MRN [id number]   Location [facility] EMERGENCY DEPARTMENT Attending Sandro Garg*   Hosp Day # 2 PCP Tammy Chan MD     Reason for consultation: SVC syndrome  Left s/c thrombus  Lung cancer    INTERVAL HISTORY  2/15 Continues to feel well, out of the ICU. On Enoxaparin, no bleeding. Dyspnea has improved, weaning down on oxygen     she underwent SVC stenting as well as thrombectomy from left subclavian vein on 2025.  Her port was still functional.  She is now on enoxaparin.  She is feeling much better.  Her dyspnea has improved.  No longer has any postural flushing congestion.    History of Present Illness  Yi Torres is a 74 year old female with recently diagnosed locally advanced non-small cell lung cancer who is due to start concurrent chemo RT.  She had a port placed yesterday and then developed increasing redness and swelling as well as inability to lie flat.  She was seen in the oncology clinic earlier today and referred to the ED.    2025 CT chest showed thrombus within the left brachiocephalic and left subclavian vein which is new since prior study.  There was a 9.2 cm right upper lobe suprahilar mass with invasion of the mediastinum that has increased in size along with septal thickening that the right upper lobe that is new and thought to represent mild interstitial edema.  The right upper lobe mass is causing circumferential encasement and as well as invasion and narrowing of the SVC which is completely obliterated    She was hospitalized for concern regarding SVC syndrome and left subclavian thrombosis. Reports some fullness in the head and occ blurred vision    Past Oncologic History  25 she had complained of increasing dyspnea for about 3 months.  Saw PCP and underwent a CT chest that showed a very large centrally  necrotic right upper lobe lung mass measuring 8.2 x 7.4 x 10.8 cm with extension to the right paratracheal upper mediastinum.  Extensive right upper lobe bronchovascular structure encasement.  Mildly enlarged right paratracheal mediastinal lymph nodes bilateral adrenal nodules were noted.     1/17/2025 PET/CT showed a 9 x 8 cm hypermetabolic mass within the right upper lobe extending into the mediastinum and right hilum peripherally hypermetabolic and centrally cystic and necrotic.  Effacement of the IVC.  Subcentimeter pulmonary nodules bilaterally were not FDG avid  Hypermetabolic lymph nodes within the anterior superior mediastinum subcarinal right paratracheal area and right clavicle chains were all compatible with metastatic disease.     1/22/25 she underwent a robotic navigational bronchoscopy with EBUS bx of RUL mass and  TBNA of lymph node station 7.  BAL from the right upper lobe was consistent with squamous cell carcinoma as was the needle biopsy from the right upper lobe mass.  EBUS sampling from station node 7 showed no malignant cells.  Lymphocytes were present. 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     Cholecystectomy      Laparoscopic  incisional / umbilical / ventral hernia repair  09/19/2024       Current Medications:   enoxaparin (Lovenox) 100 MG/ML SUBQ injection 90 mg  1 mg/kg Subcutaneous 2 times per day    lidocaine-menthol 4-1 % patch 2 patch  2 patch Transdermal Daily    cetirizine (ZyrTEC) tab 10 mg  10 mg Oral Nightly    acetaminophen (Tylenol Extra Strength) tab 500 mg  500 mg Oral Q4H PRN    [COMPLETED] iopamidol 76% (ISOVUE-370) injection for power injector  80 mL Intravenous ONCE PRN    [COMPLETED] heparin (Porcine) 1000 UNIT/ML injection - BOLUS IV 7,400 Units  80 Units/kg Intravenous Once    ondansetron (Zofran) 4 MG/2ML injection 4 mg  4 mg Intravenous Q6H PRN    metoclopramide (Reglan) 5 mg/mL injection 10 mg  10 mg Intravenous Q8H PRN    glucose (Dex4) 15 GM/59ML oral liquid 15 g  15 g Oral Q15 Min PRN    Or    glucose (Glutose) 40% oral gel 15 g  15 g Oral Q15 Min PRN    Or    glucose-vitamin C (Dex-4) chewable tab 4 tablet  4 tablet Oral Q15 Min PRN    Or    dextrose 50% injection 50 mL  50 mL Intravenous Q15 Min PRN    Or    glucose (Dex4) 15 GM/59ML oral liquid 30 g  30 g Oral Q15 Min PRN    Or    glucose (Glutose) 40% oral gel 30 g  30 g Oral Q15 Min PRN    Or    glucose-vitamin C (Dex-4) chewable tab 8 tablet  8 tablet Oral Q15 Min PRN    insulin aspart (NovoLOG) 100 Units/mL FlexPen 1-5 Units  1-5 Units Subcutaneous TID CC    [COMPLETED] heparin in sodium chloride 0.9% (Porcine) 2 Units/mL flush bag premix        [COMPLETED] midazolam (Versed) 2 MG/2ML injection        [COMPLETED] fentaNYL (Sublimaze) 50 mcg/mL injection        [COMPLETED] heparin (Porcine) 1000 UNIT/ML injection        [COMPLETED] iopamidol (ISOVUE-300) 61 % injection 200 mL  200 mL Injection ONCE PRN    atorvastatin (Lipitor) tab 20 mg  20 mg Oral Nightly    metoprolol tartrate (Lopressor) tab 25 mg  25 mg Oral BID    spironolactone (Aldactone) 25 mg, hydroCHLOROthiazide 25 mg for Aldactazide 25/25 ([facility] only)   Oral Daily    verapamil ER (Calan-SR) tab  240 mg  240 mg Oral Nightly    ipratropium-albuterol (Duoneb) 0.5-2.5 (3) MG/3ML inhalation solution 3 mL  3 mL Nebulization Q6H PRN    guaiFENesin (Robitussin) 100 MG/5 ML oral liquid 200 mg  200 mg Oral Q4H PRN       Allergies:   Allergies[1]    Family Medical History:  Family History   Problem Relation Age of Onset    Hypertension Father     Stroke Father     Heart Attack Mother        Social History:  Social History     Socioeconomic History    Marital status:      Spouse name: Not on file    Number of children: 1    Years of education: Not on file    Highest education level: Not on file   Occupational History    Not on file   Tobacco Use    Smoking status: Former     Current packs/day: 0.00     Average packs/day: 0.5 packs/day for 45.0 years (22.5 ttl pk-yrs)     Types: Cigarettes     Start date: 1973     Quit date: 2018     Years since quittin.0     Passive exposure: Past    Smokeless tobacco: Never    Tobacco comments:     1 pack every 3 days   Vaping Use    Vaping status: Never Used   Substance and Sexual Activity    Alcohol use: Not Currently     Comment: very rarely    Drug use: No    Sexual activity: Not on file   Other Topics Concern     Service Not Asked    Blood Transfusions Not Asked    Caffeine Concern Yes     Comment: Coffee, 2 cups per day     Occupational Exposure Not Asked    Hobby Hazards Not Asked    Sleep Concern Not Asked    Stress Concern Not Asked    Weight Concern Not Asked    Special Diet Not Asked    Back Care Not Asked    Exercise Not Asked    Bike Helmet Not Asked    Seat Belt Not Asked    Self-Exams Not Asked    Grew up on a farm Not Asked    History of tanning No    Outdoor occupation Not Asked    Breast feeding Not Asked    Reaction to local anesthetic No    Left Handed No    Right Handed Yes    Currently spends a great deal of time in the sun No    Past Sunlamp Treatments for Acne Not Asked    Hx of Spending Great Deal of Time in Sun No    Bad sunburns  in the past Yes    Tanning Salons in the Past No    Hx of Radiation Treatments No    Regular use of sun block Not Asked   Social History Narrative    - lives with     1 son      Social Drivers of Health     Food Insecurity: No Food Insecurity (2025)    NCSS - Food Insecurity     Worried About Running Out of Food in the Last Year: No     Ran Out of Food in the Last Year: No   Transportation Needs: No Transportation Needs (2025)    NCSS - Transportation     Lack of Transportation: No   Housing Stability: Not At Risk (2025)    NCSS - Housing/Utilities     Has Housing: Yes     Worried About Losing Housing: No     Unable to Get Utilities: No       Gyn History:  OB History    Para Term  AB Living   1 1 0 0 0 0   SAB IAB Ectopic Multiple Live Births   0 0 0 0 0       Objective:    /67 (BP Location: Right arm)   Pulse 69   Temp 97.9 °F (36.6 °C) (Oral)   Resp 17   Wt 94.1 kg (207 lb 7.3 oz)   SpO2 99%   BMI 35.06 kg/m²   Physical Exam:  General: A&Ox3, NAD  HEENT: PERRL, OP clear  Face flushing has resolved  Neck: supple,   Erythema, swelling left IJ vein : Has improved.  Left s/c port site swelling is better  CV: RRR, no murmurs, + pulses  Pulm: CTA b/l, no w/r/r, normal effort  Neurological: Grossly intact    Labs:  Lab Results   Component Value Date/Time    WBC 14.2 (H) 02/15/2025 04:40 AM    RBC 4.20 02/15/2025 04:40 AM    HGB 10.3 (L) 02/15/2025 04:40 AM    HCT 33.2 (L) 02/15/2025 04:40 AM    MCV 79.0 (L) 02/15/2025 04:40 AM    MCH 24.5 (L) 02/15/2025 04:40 AM    MCHC 31.0 02/15/2025 04:40 AM    RDW 17.1 (H) 02/15/2025 04:40 AM    NEPRELIM 11.51 (H) 02/15/2025 04:40 AM    .0 02/15/2025 04:40 AM       Lab Results   Component Value Date/Time     (H) 02/15/2025 04:40 AM    BUN 25 (H) 02/15/2025 04:40 AM    CREATSERUM 0.69 02/15/2025 04:40 AM    GFRNAA 45 (L) 2022 08:02 AM    CA 9.3 02/15/2025 04:40 AM    ALB 4.0 2025 03:52 AM     (L)  02/15/2025 04:40 AM    K 4.0 02/15/2025 04:40 AM    CL 95 (L) 02/15/2025 04:40 AM    CO2 24.0 02/15/2025 04:40 AM    ALKPHO 62 02/14/2025 03:52 AM    AST 19 02/14/2025 03:52 AM    ALT 18 02/14/2025 03:52 AM       Imaging:  CT CHEST(CONTRAST ONLY) (CPT=71260)    Result Date: 2/13/2025  CONCLUSION:   Thrombus within the left brachiocephalic vein and medial aspect of the left subclavian vein is new since 1/9/2025.  9.2 cm right upper lobe suprahilar mass with invasion of the mediastinum has increased in size.  Interlobular septal thickening within the right upper lobe is new and may be secondary to mild interstitial edema or lymphangitic carcinomatosis.  Multiple ground-glass and semi solid nodules within the right upper lobe are new and may be secondary to satellite metastases versus underlying infectious/inflammatory etiology.  Metastatic right supraclavicular lymph node is unchanged.  Multiple other incidental findings as described in the body of the report which are unchanged.     Dictated by (CST): Eugenio French MD on 2/13/2025 at 1:29 PM     Finalized by (CST): Eugenio French MD on 2/13/2025 at 1:35 PM              Assessment & Plan:    Yi Torres is a 74 year old female with locally advanced Rt lung cancer, with mediastinal invasion, now with SVC syndrome and left subclavian thrombus    # s/p SVC stenting and left thrombectomy, with symptomatic improvement    # continue enoxaparin per IR, can eventually transition to an oral DOAC which given her malignancy will likely be long-term    # Will plan to reschedule her CT sim for radiation oncology and plan to start her shortly therafter on chemo-RT to definitively treat her locally advanced lung cancer, as an outpt    #Hyponatremia: 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 Please do contact me if I may be of any further assistance    José Miguel Goodson,  MD  [facility]   [facility]    This note was created using a voice-recognition transcribing system. Incorrect words or phrases may have been missed during proofreading. Please interpret accordingly.         [1]   Allergies  Allergen Reactions    Erythromycin DIARRHEA     Stomach pain

## 2025-02-15 NOTE — PLAN OF CARE
Problem: Patient Centered Care  Goal: Patient preferences are identified and integrated in the patient's plan of care  Description: Interventions:  - What would you like us to know as we care for you? Lives home with   - Provide timely, complete, and accurate information to patient/family  - Incorporate patient and family knowledge, values, beliefs, and cultural backgrounds into the planning and delivery of care  - Encourage patient/family to participate in care and decision-making at the level they choose  - Honor patient and family perspectives and choices  Outcome: Progressing     Problem: Diabetes/Glucose Control  Goal: Glucose maintained within prescribed range  Description: INTERVENTIONS:  - Monitor Blood Glucose as ordered  - Assess for signs and symptoms of hyperglycemia and hypoglycemia  - Administer ordered medications to maintain glucose within target range  - Assess barriers to adequate nutritional intake and initiate nutrition consult as needed  - Instruct patient on self management of diabetes  Outcome: Progressing     Problem: Patient/Family Goals  Goal: Patient/Family Long Term Goal  Description: Patient's Long Term Goal: get better and go home    Interventions:  - meds as ordered  -assess q shift prn  - See additional Care Plan goals for specific interventions  Outcome: Progressing  Goal: Patient/Family Short Term Goal  Description: Patient's Short Term Goal: will remain without injury    Interventions:   - call light and personal belongings are within reach  -pain controlled  -bathroom assistance  -hourly rounds done  - See additional Care Plan goals for specific interventions  Outcome: Progressing     Problem: RESPIRATORY - ADULT  Goal: Achieves optimal ventilation and oxygenation  Description: INTERVENTIONS:  - Assess for changes in respiratory status  - Assess for changes in mentation and behavior  - Position to facilitate oxygenation and minimize respiratory effort  - Oxygen  supplementation based on oxygen saturation or ABGs  - Provide Smoking Cessation handout, if applicable  - Encourage broncho-pulmonary hygiene including cough, deep breathe, Incentive Spirometry  - Assess the need for suctioning and perform as needed  - Assess and instruct to report SOB or any respiratory difficulty  - Respiratory Therapy support as indicated  - Manage/alleviate anxiety  - Monitor for signs/symptoms of CO2 retention  Outcome: Progressing     Problem: METABOLIC/FLUID AND ELECTROLYTES - ADULT  Goal: Glucose maintained within prescribed range  Description: INTERVENTIONS:  - Monitor Blood Glucose as ordered  - Assess for signs and symptoms of hyperglycemia and hypoglycemia  - Administer ordered medications to maintain glucose within target range  - Assess barriers to adequate nutritional intake and initiate nutrition consult as needed  - Instruct patient on self management of diabetes  Outcome: Progressing     Problem: HEMATOLOGIC - ADULT  Goal: Maintains hematologic stability  Description: INTERVENTIONS  - Assess for signs and symptoms of bleeding or hemorrhage  - Monitor labs and vital signs for trends  - Administer supportive blood products/factors, fluids and medications as ordered and appropriate  - Administer supportive blood products/factors as ordered and appropriate  Outcome: Progressing     Problem: MUSCULOSKELETAL - ADULT  Goal: Return mobility to safest level of function  Description: INTERVENTIONS:  - Assess patient stability and activity tolerance for standing, transferring and ambulating w/ or w/o assistive devices  - Assist with transfers and ambulation using safe patient handling equipment as needed  - Ensure adequate protection for wounds/incisions during mobilization  - Obtain PT/OT consults as needed  - Advance activity as appropriate  - Communicate ordered activity level and limitations with patient/family  Outcome: Progressing     Problem: SAFETY ADULT - FALL  Goal: Free from fall  injury  Description: INTERVENTIONS:  - Assess pt frequently for physical needs  - Identify cognitive and physical deficits and behaviors that affect risk of falls.  - [place] fall precautions as indicated by assessment.  - Educate pt/family on patient safety including physical limitations  - Instruct pt to call for assistance with activity based on assessment  - Modify environment to reduce risk of injury  - Provide assistive devices as appropriate  - Consider OT/PT consult to assist with strengthening/mobility  - Encourage toileting schedule  Outcome: Progressing     Problem: DISCHARGE PLANNING  Goal: Discharge to home or other facility with appropriate resources  Description: INTERVENTIONS:  - Identify barriers to discharge w/pt and caregiver  - Include patient/family/discharge partner in discharge planning  - Arrange for needed discharge resources and transportation as appropriate  - Identify discharge learning needs (meds, wound care, etc)  - Arrange for interpreters to assist at discharge as needed  - Consider post-discharge preferences of patient/family/discharge partner  - Complete POLST form as appropriate  - Assess patient's ability to be responsible for managing their own health  - Refer to Case Management Department for coordinating discharge planning if the patient needs post-hospital services based on physician/LIP order or complex needs related to functional status, cognitive ability or social support system  Outcome: Progressing

## 2025-02-15 NOTE — PROGRESS NOTES
[facility]  part of [facility]     Progress Note    Yi Law Toni Carlton Patient Status:  Inpatient    1950 MRN [id number]   Location [facility] 2W/SW Attending Vanessa Colon,*   Hosp Day # 2 PCP Tammy Chan MD       Subjective:   Patient seen and examined.  Denies significant facial flushing or swelling.  No significant dyspnea  Objective:   Blood pressure 121/67, pulse 69, temperature 97.4 °F (36.3 °C), temperature source Temporal, resp. rate 17, weight 207 lb 7.3 oz (94.1 kg), SpO2 99%.  Intake/Output:   Last 3 shifts: I/O last 3 completed shifts:  In: 510 [P.O.:510]  Out: 0    This shift: No intake/output data recorded.     Vent Settings:      Hemodynamic parameters (last 24 hours):      Scheduled Meds:   Current Facility-Administered Medications   Medication Dose Route Frequency    enoxaparin (Lovenox) 100 MG/ML SUBQ injection 90 mg  1 mg/kg Subcutaneous 2 times per day    lidocaine-menthol 4-1 % patch 2 patch  2 patch Transdermal Daily    cetirizine (ZyrTEC) tab 10 mg  10 mg Oral Nightly    acetaminophen (Tylenol Extra Strength) tab 500 mg  500 mg Oral Q4H PRN    ondansetron (Zofran) 4 MG/2ML injection 4 mg  4 mg Intravenous Q6H PRN    metoclopramide (Reglan) 5 mg/mL injection 10 mg  10 mg Intravenous Q8H PRN    glucose (Dex4) 15 GM/59ML oral liquid 15 g  15 g Oral Q15 Min PRN    Or    glucose (Glutose) 40% oral gel 15 g  15 g Oral Q15 Min PRN    Or    glucose-vitamin C (Dex-4) chewable tab 4 tablet  4 tablet Oral Q15 Min PRN    Or    dextrose 50% injection 50 mL  50 mL Intravenous Q15 Min PRN    Or    glucose (Dex4) 15 GM/59ML oral liquid 30 g  30 g Oral Q15 Min PRN    Or    glucose (Glutose) 40% oral gel 30 g  30 g Oral Q15 Min PRN    Or    glucose-vitamin C (Dex-4) chewable tab 8 tablet  8 tablet Oral Q15 Min PRN    insulin aspart (NovoLOG) 100 Units/mL FlexPen 1-5 Units  1-5 Units Subcutaneous TID CC    atorvastatin (Lipitor) tab 20 mg  20 mg Oral  Nightly    metoprolol tartrate (Lopressor) tab 25 mg  25 mg Oral BID    spironolactone (Aldactone) 25 mg, hydroCHLOROthiazide 25 mg for Aldactazide 25/25 ([facility] only)   Oral Daily    verapamil ER (Calan-SR) tab 240 mg  240 mg Oral Nightly    ipratropium-albuterol (Duoneb) 0.5-2.5 (3) MG/3ML inhalation solution 3 mL  3 mL Nebulization Q6H PRN    guaiFENesin (Robitussin) 100 MG/5 ML oral liquid 200 mg  200 mg Oral Q4H PRN       Continuous Infusions:     Physical Exam  Constitutional: no acute distress  Eyes: PERRL  ENT: nares pateint  Neck: supple, no JVD  Cardio: RRR, S1 S2  Respiratory: clear to auscultation bilaterally, no wheezing, rales, rhonchi, crackles  GI: abdomen soft, non tender, active bowel sounds, no organomegaly  Extremities: no clubbing, cyanosis, edema  Neurologic: no gross motor deficits  Skin: warm, dry      Results:     Lab Results   Component Value Date    WBC 14.2 02/15/2025    HGB 10.3 02/15/2025    HCT 33.2 02/15/2025    .0 02/15/2025    CREATSERUM 0.69 02/15/2025    BUN 25 02/15/2025     02/15/2025    K 4.0 02/15/2025    CL 95 02/15/2025    CO2 24.0 02/15/2025     02/15/2025    CA 9.3 02/15/2025    MG 2.0 02/15/2025    PHOS 3.4 02/15/2025       CT CHEST(CONTRAST ONLY) (CPT=71260)    Result Date: 2/13/2025  CONCLUSION:   Thrombus within the left brachiocephalic vein and medial aspect of the left subclavian vein is new since 1/9/2025.  9.2 cm right upper lobe suprahilar mass with invasion of the mediastinum has increased in size.  Interlobular septal thickening within the right upper lobe is new and may be secondary to mild interstitial edema or lymphangitic carcinomatosis.  Multiple ground-glass and semi solid nodules within the right upper lobe are new and may be secondary to satellite metastases versus underlying infectious/inflammatory etiology.  Metastatic right supraclavicular lymph node is unchanged.  Multiple other incidental findings as described in the body of the  report which are unchanged.     Dictated by (CST): Eugenio French MD on 2/13/2025 at 1:29 PM     Finalized by (CST): Eugenio French MD on 2/13/2025 at 1:35 PM                 Assessment   1.  SVC syndrome  2.  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  Squamous cell lung cancer  4.  COPD  5.  Diabetes mellitus  6.  Obesity     Plan   -Patient presents with evidence of worsening facial flushing and swelling with concern for SVC syndrome.  Eval by IR status post left innominate/subclavian thrombectomy and SVC stent placement on 2/13/2025 with clinical improvement.  -Recent diagnosis of squamous cell lung cancer with right upper lobe mass.  Awaiting starting treatment with oncology and radiation oncology.  Recent port placement.  -Appears to be stable from COPD perspective.  Continue nebulizer treatments  -Attempt to wean off oxygen  -DVT prophylaxis: Lovenox  -Okay to transfer to floor    Gabrielle Garrido DO  Pulmonary Critical Care Medicine  [facility]

## 2025-02-15 NOTE — PROGRESS NOTES
[facility]  part of [facility]    Progress Note    Yi Torres Patient Status:  Inpatient    1950 MRN [id number]   Location [facility] 2W/SW Attending Vanessa Colon,*   Hosp Day # 2 PCP Tammy Chan MD     Chief Complaint: feel better    Subjective:     Constitutional:  Positive for appetite change and fatigue.   HENT:  Negative for congestion.    Respiratory:  Negative for cough and chest tightness.    Cardiovascular:  Negative for chest pain and leg swelling.   Gastrointestinal:  Negative for abdominal distention.   Genitourinary:  Negative for dysuria.   Musculoskeletal:  Negative for joint swelling and joint pain.   Neurological:  Negative for weakness.   Psychiatric/Behavioral:  Negative for confusion and decreased concentration. The patient is not nervous/anxious.        Objective:   Blood pressure 123/76, pulse 106, temperature 97.9 °F (36.6 °C), temperature source Oral, resp. rate 18, weight 207 lb 7.3 oz (94.1 kg), SpO2 99%.  Physical Exam  Vitals and nursing note reviewed.   Constitutional:       General: She is not in acute distress.     Appearance: She is obese. She is ill-appearing. She is not toxic-appearing or diaphoretic.   HENT:      Head: Normocephalic and atraumatic.   Cardiovascular:      Rate and Rhythm: Normal rate and regular rhythm.   Pulmonary:      Effort: Pulmonary effort is normal.      Breath sounds: Rhonchi present.   Abdominal:      General: Bowel sounds are normal.      Palpations: Abdomen is soft.   Skin:     Capillary Refill: Capillary refill takes less than 2 seconds.   Neurological:      General: No focal deficit present.      Mental Status: She is alert and oriented to person, place, and time.   Psychiatric:         Behavior: Behavior normal.         Judgment: Judgment normal.         Results:   Lab Results   Component Value Date    WBC 14.2 (H) 02/15/2025    HGB 10.3 (L) 02/15/2025    HCT 33.2 (L) 02/15/2025    PLT  301.0 02/15/2025    CREATSERUM 0.69 02/15/2025    BUN 25 (H) 02/15/2025     (L) 02/15/2025    K 4.0 02/15/2025    CL 95 (L) 02/15/2025    CO2 24.0 02/15/2025     (H) 02/15/2025    CA 9.3 02/15/2025    ALB 4.0 02/14/2025    ALKPHO 62 02/14/2025    BILT 0.5 02/14/2025    TP 7.2 02/14/2025    AST 19 02/14/2025    ALT 18 02/14/2025    PTT 26.1 02/13/2025    INR 1.15 02/13/2025    T4F 1.1 06/02/2021    TSH 2.859 04/19/2024    MG 2.0 02/15/2025    PHOS 3.4 02/15/2025    TROP <0.045 10/09/2020    CK 78 11/08/2019       CT CHEST(CONTRAST ONLY) (CPT=71260)    Result Date: 2/13/2025  CONCLUSION:   Thrombus within the left brachiocephalic vein and medial aspect of the left subclavian vein is new since 1/9/2025.  9.2 cm right upper lobe suprahilar mass with invasion of the mediastinum has increased in size.  Interlobular septal thickening within the right upper lobe is new and may be secondary to mild interstitial edema or lymphangitic carcinomatosis.  Multiple ground-glass and semi solid nodules within the right upper lobe are new and may be secondary to satellite metastases versus underlying infectious/inflammatory etiology.  Metastatic right supraclavicular lymph node is unchanged.  Multiple other incidental findings as described in the body of the report which are unchanged.     Dictated by (CST): Eugenio French MD on 2/13/2025 at 1:29 PM     Finalized by (CST): Eugenio French MD on 2/13/2025 at 1:35 PM         EKG 12 Lead    Result Date: 2/15/2025  Sinus tachycardia with 1st degree A-V block Low voltage QRS, consider pulmonary disease, pericardial effusion, or normal variant Cannot rule out Anterior infarct (cited on or before 22-JAN-2025) T wave abnormality, consider inferolateral ischemia Abnormal ECG When compared with ECG of 22-JAN-2025 15:44, Significant changes have occurred     Assessment & Plan:   1.  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   Squamous cell lung cancer as per Dr. Goodson  4.  COPD  5.  Diabetes mellitus  6.  Obesity bmi=35.06 may need brian sands  7.  Hyponatremia possibly from siadh; fluid restrict and salt tabs    Patient will require inpatient services that will reasonably be expected to span two midnight's based on the clinical documentation in H+P.   Based on patients current state of illness, I anticipate that, after discharge, patient will require TBD.    Complex mdm; coordinating care with nurse/arranging transfer  and counseling pt and with her permission her  in room about anticoag/tele    JORDAN DOMINGUEZ MD

## 2025-02-15 NOTE — PLAN OF CARE
Patient is A&Ox4, on 1L NC. PRN tylenol given. Spouse at bedside. Call light is within reach.  Problem: Patient Centered Care  Goal: Patient preferences are identified and integrated in the patient's plan of care  Description: Interventions:  - What would you like us to know as we care for you?   - Provide timely, complete, and accurate information to patient/family  - Incorporate patient and family knowledge, values, beliefs, and cultural backgrounds into the planning and delivery of care  - Encourage patient/family to participate in care and decision-making at the level they choose  - Honor patient and family perspectives and choices  Outcome: Progressing     Problem: Diabetes/Glucose Control  Goal: Glucose maintained within prescribed range  Description: INTERVENTIONS:  - Monitor Blood Glucose as ordered  - Assess for signs and symptoms of hyperglycemia and hypoglycemia  - Administer ordered medications to maintain glucose within target range  - Assess barriers to adequate nutritional intake and initiate nutrition consult as needed  - Instruct patient on self management of diabetes  Outcome: Progressing     Problem: Patient/Family Goals  Goal: Patient/Family Long Term Goal  Description: Patient's Long Term Goal: to go home    Interventions:  - follow MD order  - See additional Care Plan goals for specific interventions  Outcome: Progressing  Goal: Patient/Family Short Term Goal  Description: Patient's Short Term Goal: to be pain free    Interventions:   - follow medication regimen.  - See additional Care Plan goals for specific interventions  Outcome: Progressing     Problem: RESPIRATORY - ADULT  Goal: Achieves optimal ventilation and oxygenation  Description: INTERVENTIONS:  - Assess for changes in respiratory status  - Assess for changes in mentation and behavior  - Position to facilitate oxygenation and minimize respiratory effort  - Oxygen supplementation based on oxygen saturation or ABGs  - Provide Smoking  Cessation handout, if applicable  - Encourage broncho-pulmonary hygiene including cough, deep breathe, Incentive Spirometry  - Assess the need for suctioning and perform as needed  - Assess and instruct to report SOB or any respiratory difficulty  - Respiratory Therapy support as indicated  - Manage/alleviate anxiety  - Monitor for signs/symptoms of CO2 retention  Outcome: Progressing     Problem: METABOLIC/FLUID AND ELECTROLYTES - ADULT  Goal: Glucose maintained within prescribed range  Description: INTERVENTIONS:  - Monitor Blood Glucose as ordered  - Assess for signs and symptoms of hyperglycemia and hypoglycemia  - Administer ordered medications to maintain glucose within target range  - Assess barriers to adequate nutritional intake and initiate nutrition consult as needed  - Instruct patient on self management of diabetes  Outcome: Progressing     Problem: SKIN/TISSUE INTEGRITY - ADULT  Goal: Skin integrity remains intact  Description: INTERVENTIONS  - Assess and document risk factors for pressure ulcer development  - Assess and document skin integrity  - Monitor for areas of redness and/or skin breakdown  - Initiate interventions, skin care algorithm/standards of care as needed  Outcome: Progressing     Problem: HEMATOLOGIC - ADULT  Goal: Maintains hematologic stability  Description: INTERVENTIONS  - Assess for signs and symptoms of bleeding or hemorrhage  - Monitor labs and vital signs for trends  - Administer supportive blood products/factors, fluids and medications as ordered and appropriate  - Administer supportive blood products/factors as ordered and appropriate  Outcome: Progressing     Problem: MUSCULOSKELETAL - ADULT  Goal: Return mobility to safest level of function  Description: INTERVENTIONS:  - Assess patient stability and activity tolerance for standing, transferring and ambulating w/ or w/o assistive devices  - Assist with transfers and ambulation using safe patient handling equipment as  needed  - Ensure adequate protection for wounds/incisions during mobilization  - Obtain PT/OT consults as needed  - Advance activity as appropriate  - Communicate ordered activity level and limitations with patient/family  Outcome: Progressing     Problem: SAFETY ADULT - FALL  Goal: Free from fall injury  Description: INTERVENTIONS:  - Assess pt frequently for physical needs  - Identify cognitive and physical deficits and behaviors that affect risk of falls.  - [place] fall precautions as indicated by assessment.  - Educate pt/family on patient safety including physical limitations  - Instruct pt to call for assistance with activity based on assessment  - Modify environment to reduce risk of injury  - Provide assistive devices as appropriate  - Consider OT/PT consult to assist with strengthening/mobility  - Encourage toileting schedule  Outcome: Progressing     Problem: DISCHARGE PLANNING  Goal: Discharge to home or other facility with appropriate resources  Description: INTERVENTIONS:  - Identify barriers to discharge w/pt and caregiver  - Include patient/family/discharge partner in discharge planning  - Arrange for needed discharge resources and transportation as appropriate  - Identify discharge learning needs (meds, wound care, etc)  - Arrange for interpreters to assist at discharge as needed  - Consider post-discharge preferences of patient/family/discharge partner  - Complete POLST form as appropriate  - Assess patient's ability to be responsible for managing their own health  - Refer to Case Management Department for coordinating discharge planning if the patient needs post-hospital services based on physician/LIP order or complex needs related to functional status, cognitive ability or social support system  Outcome: Progressing

## 2025-02-16 ENCOUNTER — APPOINTMENT (OUTPATIENT)
Dept: CV DIAGNOSTICS | Facility: HOSPITAL | Age: 75
End: 2025-02-16
Attending: STUDENT IN AN ORGANIZED HEALTH CARE EDUCATION/TRAINING PROGRAM
Payer: MEDICARE

## 2025-02-16 LAB
ANION GAP SERPL CALC-SCNC: 8 MMOL/L (ref 0–18)
ATRIAL RATE: 86 BPM
BASOPHILS # BLD AUTO: 0.05 X10(3) UL (ref 0–0.2)
BASOPHILS NFR BLD AUTO: 0.4 %
BUN BLD-MCNC: 23 MG/DL (ref 9–23)
BUN/CREAT SERPL: 37.7 (ref 10–20)
CALCIUM BLD-MCNC: 9.3 MG/DL (ref 8.7–10.4)
CHLORIDE SERPL-SCNC: 91 MMOL/L (ref 98–112)
CO2 SERPL-SCNC: 29 MMOL/L (ref 21–32)
CREAT BLD-MCNC: 0.61 MG/DL
DEPRECATED RDW RBC AUTO: 47.8 FL (ref 35.1–46.3)
EGFRCR SERPLBLD CKD-EPI 2021: 95 ML/MIN/1.73M2 (ref 60–?)
EOSINOPHIL # BLD AUTO: 0.04 X10(3) UL (ref 0–0.7)
EOSINOPHIL NFR BLD AUTO: 0.3 %
ERYTHROCYTE [DISTWIDTH] IN BLOOD BY AUTOMATED COUNT: 17 % (ref 11–15)
GLUCOSE BLD-MCNC: 110 MG/DL (ref 70–99)
GLUCOSE BLDC GLUCOMTR-MCNC: 127 MG/DL (ref 70–99)
GLUCOSE BLDC GLUCOMTR-MCNC: 131 MG/DL (ref 70–99)
GLUCOSE BLDC GLUCOMTR-MCNC: 133 MG/DL (ref 70–99)
GLUCOSE BLDC GLUCOMTR-MCNC: 149 MG/DL (ref 70–99)
HCT VFR BLD AUTO: 32.3 %
HGB BLD-MCNC: 10.1 G/DL
IMM GRANULOCYTES # BLD AUTO: 0.06 X10(3) UL (ref 0–1)
IMM GRANULOCYTES NFR BLD: 0.5 %
LYMPHOCYTES # BLD AUTO: 1.49 X10(3) UL (ref 1–4)
LYMPHOCYTES NFR BLD AUTO: 12 %
MAGNESIUM SERPL-MCNC: 1.9 MG/DL (ref 1.6–2.6)
MCH RBC QN AUTO: 24.4 PG (ref 26–34)
MCHC RBC AUTO-ENTMCNC: 31.3 G/DL (ref 31–37)
MCV RBC AUTO: 78 FL
MONOCYTES # BLD AUTO: 0.98 X10(3) UL (ref 0.1–1)
MONOCYTES NFR BLD AUTO: 7.9 %
NEUTROPHILS # BLD AUTO: 9.83 X10 (3) UL (ref 1.5–7.7)
NEUTROPHILS # BLD AUTO: 9.83 X10(3) UL (ref 1.5–7.7)
NEUTROPHILS NFR BLD AUTO: 78.9 %
OSMOLALITY SERPL CALC.SUM OF ELEC: 270 MOSM/KG (ref 275–295)
P AXIS: 31 DEGREES
P-R INTERVAL: 206 MS
PHOSPHATE SERPL-MCNC: 2.5 MG/DL (ref 2.4–5.1)
PLATELET # BLD AUTO: 295 10(3)UL (ref 150–450)
POTASSIUM SERPL-SCNC: 3.5 MMOL/L (ref 3.5–5.1)
Q-T INTERVAL: 366 MS
QRS DURATION: 84 MS
QTC CALCULATION (BEZET): 437 MS
R AXIS: -7 DEGREES
RBC # BLD AUTO: 4.14 X10(6)UL
SODIUM SERPL-SCNC: 128 MMOL/L (ref 136–145)
T AXIS: 160 DEGREES
VENTRICULAR RATE: 86 BPM
WBC # BLD AUTO: 12.5 X10(3) UL (ref 4–11)

## 2025-02-16 PROCEDURE — 99232 SBSQ HOSP IP/OBS MODERATE 35: CPT | Performed by: INTERNAL MEDICINE

## 2025-02-16 PROCEDURE — 93306 TTE W/DOPPLER COMPLETE: CPT | Performed by: STUDENT IN AN ORGANIZED HEALTH CARE EDUCATION/TRAINING PROGRAM

## 2025-02-16 PROCEDURE — 99233 SBSQ HOSP IP/OBS HIGH 50: CPT | Performed by: HOSPITALIST

## 2025-02-16 RX ORDER — VERAPAMIL HYDROCHLORIDE 180 MG/1
180 TABLET, FILM COATED, EXTENDED RELEASE ORAL NIGHTLY
Status: DISCONTINUED | OUTPATIENT
Start: 2025-02-16 | End: 2025-02-17

## 2025-02-16 RX ORDER — HYDRALAZINE HYDROCHLORIDE 20 MG/ML
10 INJECTION INTRAMUSCULAR; INTRAVENOUS EVERY 4 HOURS PRN
Status: DISCONTINUED | OUTPATIENT
Start: 2025-02-16 | End: 2025-02-17

## 2025-02-16 RX ORDER — SODIUM CHLORIDE 9 MG/ML
INJECTION, SOLUTION INTRAVENOUS CONTINUOUS
Status: DISCONTINUED | OUTPATIENT
Start: 2025-02-16 | End: 2025-02-17

## 2025-02-16 RX ORDER — FLUTICASONE PROPIONATE 50 MCG
2 SPRAY, SUSPENSION (ML) NASAL 2 TIMES DAILY
Status: DISCONTINUED | OUTPATIENT
Start: 2025-02-16 | End: 2025-02-16

## 2025-02-16 RX ORDER — FLUTICASONE PROPIONATE 50 MCG
2 SPRAY, SUSPENSION (ML) NASAL DAILY
Status: DISCONTINUED | OUTPATIENT
Start: 2025-02-16 | End: 2025-02-17

## 2025-02-16 NOTE — CONSULTS
[facility]  part of [facility]    Cardiology Consultation    Yi Torres Patient Status:  Inpatient    1950 MRN [id number]   Location [facility] 2W/SW Attending Vanessa Colon,*   Hosp Day # 3 PCP Tammy Chan MD     Date of Admission:  2025  Date of Consult:  2025  Reason for Consultation:   Prolonged Qtc    History of Present Illness:   Patient is a 74-year-old female with recently diagnosed right upper lobe non-small cell lung cancer, COPD, HTN, HLD, and DM who presented with fatigue and facial fullness.  Underwent imaging which noted thrombus in the left brachiocephalic vein and left subclavian vein with invasion to the mediastinum consistent with SVC syndrome.  On 2024 IR performed thrombectomy and SVC stent and now doing better.  Denies any chest pain, change in shortness of breath, palpitations, dizziness, or seen.  Has mild unchanged lower extremity edema that is at her baseline.  Most recent twelve-lead ECG demonstrates sinus rhythm, lateral T wave inversion, and QTc 437 ms.  ECG from yesterday with similar appearance although QTc calculated 497 ms.  Oldest ECG is from 2024 which did not demonstrate T wave abnormality QTc was 420 ms.    Cardiac history:  COPD  HTN  HLD  DMII    Past Medical History  Past Medical History:    Anesthesia complication    body aches for 3 days    Anxiety state    Cancer (HCC)    skin cancer    Cataract    Diabetes (HCC)    Diabetes mellitus (HCC)    Essential hypertension    High blood pressure    High cholesterol    Incontinence    bladder    Obesity, unspecified    Osteoarthritis    In knees    Renal disorder    CKD 2    Squamous cell carcinoma in situ (SCCIS)    right temple    Visual impairment     Past Surgical History  Past Surgical History:   Procedure Laterality Date    Adj tiss xfer head,fac,hand <10sqcm Right 2018    Wide excision lesion right temple, flap reconstruction     Cataract  left- Nov 2017.     Cholecystectomy      Laparoscopic incisional / umbilical / ventral hernia repair  09/19/2024     Family History  Family History   Problem Relation Age of Onset    Hypertension Father     Stroke Father     Heart Attack Mother      Social History  Pediatric History   Patient Parents    Not on file     Other Topics Concern     Service Not Asked    Blood Transfusions Not Asked    Caffeine Concern Yes     Comment: Coffee, 2 cups per day     Occupational Exposure Not Asked    Hobby Hazards Not Asked    Sleep Concern Not Asked    Stress Concern Not Asked    Weight Concern Not Asked    Special Diet Not Asked    Back Care Not Asked    Exercise Not Asked    Bike Helmet Not Asked    Seat Belt Not Asked    Self-Exams Not Asked    Grew up on a farm Not Asked    History of tanning No    Outdoor occupation Not Asked    Breast feeding Not Asked    Reaction to local anesthetic No    Left Handed No    Right Handed Yes    Currently spends a great deal of time in the sun No    Past Sunlamp Treatments for Acne Not Asked    Hx of Spending Great Deal of Time in Sun No    Bad sunburns in the past Yes    Tanning Salons in the Past No    Hx of Radiation Treatments No    Regular use of sun block Not Asked   Social History Narrative    - lives with     1 son          Current Medications:  Current Facility-Administered Medications   Medication Dose Route Frequency    sodium phosphate 15 mmol in 0.9% NaCl 100mL IVPB premix  15 mmol Intravenous Once    sodium chloride 0.9% infusion   Intravenous Continuous    verapamil ER (Calan-SR) tab 180 mg  180 mg Oral Nightly    metoprolol tartrate (Lopressor) partial tab 12.5 mg  12.5 mg Oral BID    hydrALAzine (Apresoline) 20 mg/mL injection 10 mg  10 mg Intravenous Q4H PRN    fluticasone propionate (Flonase) 50 MCG/ACT nasal suspension 2 spray  2 spray Each Nare Daily    sodium chloride tab 1 g  1 g Oral TID CC    enoxaparin (Lovenox) 100 MG/ML SUBQ  injection 90 mg  1 mg/kg Subcutaneous 2 times per day    lidocaine-menthol 4-1 % patch 2 patch  2 patch Transdermal Daily    cetirizine (ZyrTEC) tab 10 mg  10 mg Oral Nightly    acetaminophen (Tylenol Extra Strength) tab 500 mg  500 mg Oral Q4H PRN    ondansetron (Zofran) 4 MG/2ML injection 4 mg  4 mg Intravenous Q6H PRN    metoclopramide (Reglan) 5 mg/mL injection 10 mg  10 mg Intravenous Q8H PRN    glucose (Dex4) 15 GM/59ML oral liquid 15 g  15 g Oral Q15 Min PRN    Or    glucose (Glutose) 40% oral gel 15 g  15 g Oral Q15 Min PRN    Or    glucose-vitamin C (Dex-4) chewable tab 4 tablet  4 tablet Oral Q15 Min PRN    Or    dextrose 50% injection 50 mL  50 mL Intravenous Q15 Min PRN    Or    glucose (Dex4) 15 GM/59ML oral liquid 30 g  30 g Oral Q15 Min PRN    Or    glucose (Glutose) 40% oral gel 30 g  30 g Oral Q15 Min PRN    Or    glucose-vitamin C (Dex-4) chewable tab 8 tablet  8 tablet Oral Q15 Min PRN    insulin aspart (NovoLOG) 100 Units/mL FlexPen 1-5 Units  1-5 Units Subcutaneous TID CC    atorvastatin (Lipitor) tab 20 mg  20 mg Oral Nightly    spironolactone (Aldactone) 25 mg, hydroCHLOROthiazide 25 mg for Aldactazide 25/25 ([facility] only)   Oral Daily    ipratropium-albuterol (Duoneb) 0.5-2.5 (3) MG/3ML inhalation solution 3 mL  3 mL Nebulization Q6H PRN    guaiFENesin (Robitussin) 100 MG/5 ML oral liquid 200 mg  200 mg Oral Q4H PRN     Medications Prior to Admission   Medication Sig    prochlorperazine (COMPAZINE) 10 mg tablet Take 1 tablet (10 mg total) by mouth every 6 (six) hours as needed for Nausea.    ondansetron (ZOFRAN) 8 MG tablet Take 1 tablet (8 mg total) by mouth every 8 (eight) hours as needed for Nausea.    Spironolactone-HCTZ 25-25 MG Oral Tab Take 1 tablet by mouth daily.    atorvastatin 20 MG Oral Tab TAKE 1 TABLET (20MG) BY MOUTH EVERY DAY    Cetirizine HCl 10 MG Oral Cap Take 10 mg by mouth daily.    Psyllium (METAMUCIL FREE & NATURAL) 43 % Oral Powder Take 1 Scoop by mouth as needed.     metFORMIN  MG Oral Tablet 24 Hr Take 1 tablet (500 mg total) by mouth daily with food.    dapagliflozin (FARXIGA) 10 MG Oral Tab Take 1 tablet (10 mg total) by mouth daily.    metoprolol tartrate 25 MG Oral Tab Take 1 tablet (25 mg total) by mouth 2 (two) times daily.    verapamil  MG Oral Tab CR Take 1 tablet (240 mg total) by mouth nightly.    alendronate 70 MG Oral Tab Take 1 tablet (70 mg total) by mouth once a week.    cholecalciferol 50 MCG (2000 UT) Oral Cap Take 1 capsule (2,000 Units total) by mouth daily.    Glucose Blood (ACCU-CHEK ADITYA PLUS) In Vitro Strip USE ONCE DAILY IN THE MORNING BEFORE BREAKFAST    Omega-3-acid Ethyl Esters 1 g Oral Cap Take 1 capsule (1 g total) by mouth daily. (Patient not taking: Reported on 2/13/2025)    Blood Glucose Monitoring Suppl (ACCU-CHEK ADITYA PLUS) w/Device Does not apply Kit USE ONCE DAILY IN AM BEFORE BREAKFAST    LANCETS ULTRA THIN Does not apply Misc Use one daily    Multiple Vitamins-Minerals (CENTRUM SILVER) Oral Tab Take 1 tablet by mouth daily.     Allergies  Allergies[1]    Review of Systems:     14 point review of systems completed and negative except as noted.    Physical Exam:   Patient Vitals for the past 24 hrs:   BP Temp Temp src Pulse Resp SpO2   02/16/25 1200 127/61 -- Oral 86 23 92 %   02/16/25 0924 134/60 -- -- -- -- 91 %   02/16/25 0800 121/66 97.7 °F (36.5 °C) Oral 89 20 96 %   02/16/25 0400 112/72 97.5 °F (36.4 °C) Oral 57 16 96 %   02/16/25 0300 -- -- -- 58 17 --   02/16/25 0017 152/63 -- -- 71 23 96 %   02/15/25 2000 130/63 98.5 °F (36.9 °C) Temporal 104 24 95 %   02/15/25 1600 124/59 98 °F (36.7 °C) Oral 96 25 92 %     Intake/Output:   Last 3 shifts:   Intake/Output                   02/14/25 0700 - 02/15/25 0659 02/15/25 0700 - 02/16/25 0659 02/16/25 0700 - 02/17/25 0659       Intake    P.O.  510  240  --    P.O. 510 240 --    I.V.  0  10  --    I.V. 0 10 --    Total Intake 510 250 --       Output    Urine  0  300  --    Urine 0  300 --    Urine Occurrence 402 x 6 x 1 x    Incontinent Urine Occurrence -- 0 x --    Stool  --  --  --    Stool Count Calculated for I/O 1 x 2 x --    Total Output 0 300 --       Net I/O     510 -50 --            Scheduled Meds:    sodium phosphate  15 mmol Intravenous Once    verapamil ER  180 mg Oral Nightly    metoprolol tartrate  12.5 mg Oral BID    fluticasone propionate  2 spray Each Nare Daily    sodium chloride  1 g Oral TID CC    enoxaparin  1 mg/kg Subcutaneous 2 times per day    lidocaine-menthol  2 patch Transdermal Daily    cetirizine  10 mg Oral Nightly    insulin aspart  1-5 Units Subcutaneous TID CC    atorvastatin  20 mg Oral Nightly    spironolactone (Aldactone) 25 mg, hydroCHLOROthiazide 25 mg for Aldactazide 25/25 ([facility] only)   Oral Daily     Continuous Infusions:    sodium chloride 50 mL/hr at 02/16/25 1045     General: Alert and oriented x 3. No apparent distress.   HEENT: Normocephalic, anicteric sclera, neck supple, no thyromegaly or adenopathy.  Neck: No JVD, carotids 2+, no bruits.  Cardiac: Regular rate and rhythm. S1, S2 normal. No murmur, pericardial rub, S3, or extra cardiac sounds.  Lungs: Clear without wheezes, rales, rhonchi or dullness.  Normal excursions and effort.  Abdomen: Soft, non-tender. No organosplenomegally, mass or rebound, BS-present.  Extremities: Without clubbing or cyanosis. No edema.    Neurologic: Alert and oriented, normal affect. No focal defects  Skin: Warm and dry.     Results:   Laboratory Data:  Lab Results   Component Value Date    WBC 12.5 (H) 02/16/2025    HGB 10.1 (L) 02/16/2025    HCT 32.3 (L) 02/16/2025    .0 02/16/2025    CREATSERUM 0.61 02/16/2025    BUN 23 02/16/2025     (L) 02/16/2025    K 3.5 02/16/2025    CL 91 (L) 02/16/2025    CO2 29.0 02/16/2025     (H) 02/16/2025    CA 9.3 02/16/2025    ALB 4.0 02/14/2025    ALKPHO 62 02/14/2025    TP 7.2 02/14/2025    AST 19 02/14/2025    ALT 18 02/14/2025    PTT 26.1 02/13/2025    INR 1.15  02/13/2025    PTP 15.4 (H) 02/13/2025    T4F 1.1 06/02/2021    TSH 2.859 04/19/2024    MG 1.9 02/16/2025    PHOS 2.5 02/16/2025    TROP <0.045 10/09/2020    CK 78 11/08/2019         Recent Labs   Lab 02/14/25  0352 02/15/25  0440 02/16/25  0714   * 123* 110*   BUN 28* 25* 23   CREATSERUM 0.87 0.69 0.61   CA 9.5 9.3 9.3   * 129* 128*   K 4.1 4.0 3.5   CL 98 95* 91*   CO2 26.0 24.0 29.0     Recent Labs   Lab 02/14/25  0352 02/15/25  0440 02/16/25  0714   RBC 4.80 4.20 4.14   HGB 11.7* 10.3* 10.1*   HCT 38.8 33.2* 32.3*   MCV 80.8 79.0* 78.0*   MCH 24.4* 24.5* 24.4*   MCHC 30.2* 31.0 31.3   RDW 17.0* 17.1* 17.0*   NEPRELIM 11.64* 11.51* 9.83*   WBC 15.6* 14.2* 12.5*   .0 301.0 295.0       No results for input(s): \"BNPML\" in the last 168 hours.    No results for input(s): \"TROP\", \"CK\" in the last 168 hours.    Imaging:  No results found.    Impression:   Assessment:  Non-small cell lung cancer  SVC syndrome s/p thrombectomy and stenting, secondary to above  Left brachiocephalic/subclavian vein thrombus  COPD  HTN  HLD  DMII    Plan:  - Reviewed twelve-lead ECG today and yesterday which notes new lateral T wave abnormality and QTc measuring up to 4 to 97 ms, baseline QTc normal  - Patient not on any QTc prolonging medications, magnesium and potassium low normal replete as needed  - Given ST-T wave normality and elevated QTc would repeat transthoracic echocardiogram to rule out any associated structural abnormality, particularly in the setting of SVC syndrome and recent thrombectomy/stenting  - Repeat twelve-lead ECG tomorrow morning to reassess  - Further testing/workup pending above results    Thank you for allowing me to participate in the care of your patient.    Shaji Mendez MD  [facility]  2/16/2025         [1]   Allergies  Allergen Reactions    Erythromycin DIARRHEA     Stomach pain

## 2025-02-16 NOTE — PROGRESS NOTES
[facility]  part of [facility]    Progress Note    Yi Torres Patient Status:  Inpatient    1950 MRN [id number]   Location [facility] 2W/SW Attending Vanessa Colon,*   Hosp Day # 3 PCP Tammy Chan MD     Chief Complaint: feel better    Subjective:     Constitutional:  Positive for appetite change and fatigue.   HENT:  Negative for congestion.    Respiratory:  Negative for cough and chest tightness.    Cardiovascular:  Negative for chest pain and leg swelling.   Gastrointestinal:  Negative for abdominal distention.   Genitourinary:  Negative for dysuria.   Musculoskeletal:  Negative for joint swelling and joint pain.   Neurological:  Negative for weakness.   Psychiatric/Behavioral:  Negative for confusion and decreased concentration. The patient is not nervous/anxious.        Objective:   Blood pressure 134/60, pulse 89, temperature 97.7 °F (36.5 °C), temperature source Oral, resp. rate 20, weight 207 lb 7.3 oz (94.1 kg), SpO2 91%.  Physical Exam  Vitals and nursing note reviewed.   Constitutional:       General: She is not in acute distress.     Appearance: She is obese. She is ill-appearing. She is not toxic-appearing or diaphoretic.   HENT:      Head: Normocephalic and atraumatic.   Cardiovascular:      Rate and Rhythm: Normal rate and regular rhythm.   Pulmonary:      Effort: Pulmonary effort is normal.      Breath sounds: Rhonchi present.   Abdominal:      General: Bowel sounds are normal.      Palpations: Abdomen is soft.   Skin:     Capillary Refill: Capillary refill takes less than 2 seconds.   Neurological:      General: No focal deficit present.      Mental Status: She is alert and oriented to person, place, and time.   Psychiatric:         Behavior: Behavior normal.         Judgment: Judgment normal.         Results:   Lab Results   Component Value Date    WBC 12.5 (H) 2025    HGB 10.1 (L) 2025    HCT 32.3 (L) 2025    PLT  295.0 02/16/2025    CREATSERUM 0.61 02/16/2025    BUN 23 02/16/2025     (L) 02/16/2025    K 3.5 02/16/2025    CL 91 (L) 02/16/2025    CO2 29.0 02/16/2025     (H) 02/16/2025    CA 9.3 02/16/2025    ALB 4.0 02/14/2025    ALKPHO 62 02/14/2025    BILT 0.5 02/14/2025    TP 7.2 02/14/2025    AST 19 02/14/2025    ALT 18 02/14/2025    PTT 26.1 02/13/2025    INR 1.15 02/13/2025    T4F 1.1 06/02/2021    TSH 2.859 04/19/2024    MG 1.9 02/16/2025    PHOS 2.5 02/16/2025    TROP <0.045 10/09/2020    CK 78 11/08/2019       No results found.  EKG 12 Lead    Result Date: 2/15/2025  Sinus tachycardia with 1st degree A-V block Low voltage QRS, consider pulmonary disease, pericardial effusion, or normal variant Cannot rule out Anterior infarct (cited on or before 22-JAN-2025) T wave abnormality, consider inferolateral ischemia Abnormal ECG When compared with ECG of 22-JAN-2025 15:44, Significant changes have occurred Confirmed by SAMANTHA DOWNING ([id number]) on 2/15/2025 12:44:18 PM     Assessment & Plan:   1.  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  Squamous cell lung cancer as per Dr. Goodson  4.  COPD  5.  Diabetes mellitus  6.  Obesity bmi=35.06 needs brian sands  7.  Hyponatremia; fe na <1 and urine na too low for suiadh but urine osm high; will continue fluid restriction and salt tabs  8. Prolonged qtc  on tele with 1st degree av block lytes ok ; recheck EKG and ask cardiology to see  9. Eye pressure ? Sinuses on zyrtec    Patient will require inpatient services that will reasonably be expected to span two midnight's based on the clinical documentation in H+P.   Based on patients current state of illness, I anticipate that, after discharge, patient will require TBD.    Complex mdm; coordinating care with nurse/  and counseling pt and with her permission her  in room about anticoag/tele/    JORDAN DOMINGUEZ MD

## 2025-02-16 NOTE — PROGRESS NOTES
[facility]  part of [facility]     Progress Note    Yi Thanh Toni Carlton Patient Status:  Inpatient    1950 MRN [id number]   Location [facility] 2W/SW Attending Vanessa Colon,*   Hosp Day # 3 PCP Tammy Chan MD       Subjective:   Patient seen and examined.  Denies significant facial flushing or swelling.  No significant dyspnea.  Objective:   Blood pressure 112/72, pulse 57, temperature 97.5 °F (36.4 °C), temperature source Oral, resp. rate 16, weight 207 lb 7.3 oz (94.1 kg), SpO2 96%.  Intake/Output:   Last 3 shifts: I/O last 3 completed shifts:  In: 400 [P.O.:390; I.V.:10]  Out: 300 [Urine:300]   This shift: No intake/output data recorded.     Vent Settings:      Hemodynamic parameters (last 24 hours):      Scheduled Meds:   Current Facility-Administered Medications   Medication Dose Route Frequency    sodium chloride tab 1 g  1 g Oral TID CC    enoxaparin (Lovenox) 100 MG/ML SUBQ injection 90 mg  1 mg/kg Subcutaneous 2 times per day    lidocaine-menthol 4-1 % patch 2 patch  2 patch Transdermal Daily    cetirizine (ZyrTEC) tab 10 mg  10 mg Oral Nightly    acetaminophen (Tylenol Extra Strength) tab 500 mg  500 mg Oral Q4H PRN    ondansetron (Zofran) 4 MG/2ML injection 4 mg  4 mg Intravenous Q6H PRN    metoclopramide (Reglan) 5 mg/mL injection 10 mg  10 mg Intravenous Q8H PRN    glucose (Dex4) 15 GM/59ML oral liquid 15 g  15 g Oral Q15 Min PRN    Or    glucose (Glutose) 40% oral gel 15 g  15 g Oral Q15 Min PRN    Or    glucose-vitamin C (Dex-4) chewable tab 4 tablet  4 tablet Oral Q15 Min PRN    Or    dextrose 50% injection 50 mL  50 mL Intravenous Q15 Min PRN    Or    glucose (Dex4) 15 GM/59ML oral liquid 30 g  30 g Oral Q15 Min PRN    Or    glucose (Glutose) 40% oral gel 30 g  30 g Oral Q15 Min PRN    Or    glucose-vitamin C (Dex-4) chewable tab 8 tablet  8 tablet Oral Q15 Min PRN    insulin aspart (NovoLOG) 100 Units/mL FlexPen 1-5 Units  1-5 Units  Subcutaneous TID CC    atorvastatin (Lipitor) tab 20 mg  20 mg Oral Nightly    metoprolol tartrate (Lopressor) tab 25 mg  25 mg Oral BID    spironolactone (Aldactone) 25 mg, hydroCHLOROthiazide 25 mg for Aldactazide 25/25 ([facility] only)   Oral Daily    verapamil ER (Calan-SR) tab 240 mg  240 mg Oral Nightly    ipratropium-albuterol (Duoneb) 0.5-2.5 (3) MG/3ML inhalation solution 3 mL  3 mL Nebulization Q6H PRN    guaiFENesin (Robitussin) 100 MG/5 ML oral liquid 200 mg  200 mg Oral Q4H PRN       Continuous Infusions:     Physical Exam  Constitutional: no acute distress  Eyes: PERRL  ENT: nares pateint  Neck: supple, no JVD  Cardio: RRR, S1 S2  Respiratory: clear to auscultation bilaterally, no wheezing, rales, rhonchi, crackles  GI: abdomen soft, non tender, active bowel sounds, no organomegaly  Extremities: no clubbing, cyanosis, edema  Neurologic: no gross motor deficits  Skin: warm, dry      Results:     Lab Results   Component Value Date    WBC 12.5 02/16/2025    HGB 10.1 02/16/2025    HCT 32.3 02/16/2025    .0 02/16/2025    CREATSERUM 0.61 02/16/2025    BUN 23 02/16/2025     02/16/2025    K 3.5 02/16/2025    CL 91 02/16/2025    CO2 29.0 02/16/2025     02/16/2025    CA 9.3 02/16/2025    MG 1.9 02/16/2025    PHOS 2.5 02/16/2025       No results found.    EKG 12 Lead    Result Date: 2/15/2025  Sinus tachycardia with 1st degree A-V block Low voltage QRS, consider pulmonary disease, pericardial effusion, or normal variant Cannot rule out Anterior infarct (cited on or before 22-JAN-2025) T wave abnormality, consider inferolateral ischemia Abnormal ECG When compared with ECG of 22-JAN-2025 15:44, Significant changes have occurred Confirmed by SAMANTHA DOWNING ([id number]) on 2/15/2025 12:44:18 PM     Assessment   1.  SVC syndrome  2.  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  Squamous cell lung cancer  4.  COPD  5.  Diabetes mellitus  6.  Obesity     Plan   -Patient presents with  evidence of worsening facial flushing and swelling with concern for SVC syndrome.  Eval by IR status post left innominate/subclavian thrombectomy and SVC stent placement on 2/13/2025 with clinical improvement.  -Recent diagnosis of squamous cell lung cancer with right upper lobe mass.  Awaiting starting treatment with oncology and radiation oncology.  Recent port placement.  -Appears to be stable from COPD perspective.  Continue nebulizer treatments  -Evaluate for need of home oxygen once again today.  Patient reluctant to go home with oxygen however  -DVT prophylaxis: Anthony Garrido,   Pulmonary Critical Care Medicine  [facility]

## 2025-02-16 NOTE — PLAN OF CARE
Patient A&Ox4 on 2L/NC for dyspnea. Lovenox continued. Safety precautions maintained, call light and personal belongings within reach. Plan is for home w/ pending O2 requirements and when medically cleared    Problem: Patient Centered Care  Goal: Patient preferences are identified and integrated in the patient's plan of care  Description: Interventions:  - What would you like us to know as we care for you? From home w/ spouse  - Provide timely, complete, and accurate information to patient/family  - Incorporate patient and family knowledge, values, beliefs, and cultural backgrounds into the planning and delivery of care  - Encourage patient/family to participate in care and decision-making at the level they choose  - Honor patient and family perspectives and choices  Outcome: Progressing     Problem: Diabetes/Glucose Control  Goal: Glucose maintained within prescribed range  Description: INTERVENTIONS:  - Monitor Blood Glucose as ordered  - Assess for signs and symptoms of hyperglycemia and hypoglycemia  - Administer ordered medications to maintain glucose within target range  - Assess barriers to adequate nutritional intake and initiate nutrition consult as needed  - Instruct patient on self management of diabetes  Outcome: Progressing     Problem: RESPIRATORY - ADULT  Goal: Achieves optimal ventilation and oxygenation  Description: INTERVENTIONS:  - Assess for changes in respiratory status  - Assess for changes in mentation and behavior  - Position to facilitate oxygenation and minimize respiratory effort  - Oxygen supplementation based on oxygen saturation or ABGs  - Provide Smoking Cessation handout, if applicable  - Encourage broncho-pulmonary hygiene including cough, deep breathe, Incentive Spirometry  - Assess the need for suctioning and perform as needed  - Assess and instruct to report SOB or any respiratory difficulty  - Respiratory Therapy support as indicated  - Manage/alleviate anxiety  - Monitor for  signs/symptoms of CO2 retention  Outcome: Progressing     Problem: METABOLIC/FLUID AND ELECTROLYTES - ADULT  Goal: Glucose maintained within prescribed range  Description: INTERVENTIONS:  - Monitor Blood Glucose as ordered  - Assess for signs and symptoms of hyperglycemia and hypoglycemia  - Administer ordered medications to maintain glucose within target range  - Assess barriers to adequate nutritional intake and initiate nutrition consult as needed  - Instruct patient on self management of diabetes  Outcome: Progressing     Problem: SKIN/TISSUE INTEGRITY - ADULT  Goal: Skin integrity remains intact  Description: INTERVENTIONS  - Assess and document risk factors for pressure ulcer development  - Assess and document skin integrity  - Monitor for areas of redness and/or skin breakdown  - Initiate interventions, skin care algorithm/standards of care as needed  Outcome: Progressing     Problem: HEMATOLOGIC - ADULT  Goal: Maintains hematologic stability  Description: INTERVENTIONS  - Assess for signs and symptoms of bleeding or hemorrhage  - Monitor labs and vital signs for trends  - Administer supportive blood products/factors, fluids and medications as ordered and appropriate  - Administer supportive blood products/factors as ordered and appropriate  Outcome: Progressing     Problem: MUSCULOSKELETAL - ADULT  Goal: Return mobility to safest level of function  Description: INTERVENTIONS:  - Assess patient stability and activity tolerance for standing, transferring and ambulating w/ or w/o assistive devices  - Assist with transfers and ambulation using safe patient handling equipment as needed  - Ensure adequate protection for wounds/incisions during mobilization  - Obtain PT/OT consults as needed  - Advance activity as appropriate  - Communicate ordered activity level and limitations with patient/family  Outcome: Progressing     Problem: SAFETY ADULT - FALL  Goal: Free from fall injury  Description: INTERVENTIONS:  -  Assess pt frequently for physical needs  - Identify cognitive and physical deficits and behaviors that affect risk of falls.  - [place] fall precautions as indicated by assessment.  - Educate pt/family on patient safety including physical limitations  - Instruct pt to call for assistance with activity based on assessment  - Modify environment to reduce risk of injury  - Provide assistive devices as appropriate  - Consider OT/PT consult to assist with strengthening/mobility  - Encourage toileting schedule  Outcome: Progressing     Problem: DISCHARGE PLANNING  Goal: Discharge to home or other facility with appropriate resources  Description: INTERVENTIONS:  - Identify barriers to discharge w/pt and caregiver  - Include patient/family/discharge partner in discharge planning  - Arrange for needed discharge resources and transportation as appropriate  - Identify discharge learning needs (meds, wound care, etc)  - Arrange for interpreters to assist at discharge as needed  - Consider post-discharge preferences of patient/family/discharge partner  - Complete POLST form as appropriate  - Assess patient's ability to be responsible for managing their own health  - Refer to Case Management Department for coordinating discharge planning if the patient needs post-hospital services based on physician/LIP order or complex needs related to functional status, cognitive ability or social support system  Outcome: Progressing

## 2025-02-16 NOTE — PLAN OF CARE
Pt alert and oriented x4. Standby assist. 2L NC, at times. IVF at 50. EKG completed. Echo completed. Pt and family updated on plan of care. Plan to monitor O2 and Na. Safety precautions in place. Call light within reach.    Problem: Patient Centered Care  Goal: Patient preferences are identified and integrated in the patient's plan of care  Description: Interventions:  - What would you like us to know as we care for you?  - Provide timely, complete, and accurate information to patient/family  - Incorporate patient and family knowledge, values, beliefs, and cultural backgrounds into the planning and delivery of care  - Encourage patient/family to participate in care and decision-making at the level they choose  - Honor patient and family perspectives and choices  Outcome: Progressing     Problem: Diabetes/Glucose Control  Goal: Glucose maintained within prescribed range  Description: INTERVENTIONS:  - Monitor Blood Glucose as ordered  - Assess for signs and symptoms of hyperglycemia and hypoglycemia  - Administer ordered medications to maintain glucose within target range  - Assess barriers to adequate nutritional intake and initiate nutrition consult as needed  - Instruct patient on self management of diabetes  Outcome: Progressing     Problem: Patient/Family Goals  Goal: Patient/Family Long Term Goal  Description: Patient's Long Term Goal: discharge    Interventions:  - Wean oxygen as tolerated  - Monitor eletrolytes  - See additional Care Plan goals for specific interventions  Outcome: Progressing  Goal: Patient/Family Short Term Goal  Description: Patient's Short Term Goal: feel better    Interventions:   - Ambulate as tolerated  - Improve breathing  - Continue ADLs  - See additional Care Plan goals for specific interventions  Outcome: Progressing     Problem: RESPIRATORY - ADULT  Goal: Achieves optimal ventilation and oxygenation  Description: INTERVENTIONS:  - Assess for changes in respiratory status  - Assess  for changes in mentation and behavior  - Position to facilitate oxygenation and minimize respiratory effort  - Oxygen supplementation based on oxygen saturation or ABGs  - Provide Smoking Cessation handout, if applicable  - Encourage broncho-pulmonary hygiene including cough, deep breathe, Incentive Spirometry  - Assess the need for suctioning and perform as needed  - Assess and instruct to report SOB or any respiratory difficulty  - Respiratory Therapy support as indicated  - Manage/alleviate anxiety  - Monitor for signs/symptoms of CO2 retention  Outcome: Progressing     Problem: METABOLIC/FLUID AND ELECTROLYTES - ADULT  Goal: Glucose maintained within prescribed range  Description: INTERVENTIONS:  - Monitor Blood Glucose as ordered  - Assess for signs and symptoms of hyperglycemia and hypoglycemia  - Administer ordered medications to maintain glucose within target range  - Assess barriers to adequate nutritional intake and initiate nutrition consult as needed  - Instruct patient on self management of diabetes  Outcome: Progressing     Problem: SKIN/TISSUE INTEGRITY - ADULT  Goal: Skin integrity remains intact  Description: INTERVENTIONS  - Assess and document risk factors for pressure ulcer development  - Assess and document skin integrity  - Monitor for areas of redness and/or skin breakdown  - Initiate interventions, skin care algorithm/standards of care as needed  Outcome: Progressing     Problem: HEMATOLOGIC - ADULT  Goal: Maintains hematologic stability  Description: INTERVENTIONS  - Assess for signs and symptoms of bleeding or hemorrhage  - Monitor labs and vital signs for trends  - Administer supportive blood products/factors, fluids and medications as ordered and appropriate  - Administer supportive blood products/factors as ordered and appropriate  Outcome: Progressing     Problem: MUSCULOSKELETAL - ADULT  Goal: Return mobility to safest level of function  Description: INTERVENTIONS:  - Assess patient  stability and activity tolerance for standing, transferring and ambulating w/ or w/o assistive devices  - Assist with transfers and ambulation using safe patient handling equipment as needed  - Ensure adequate protection for wounds/incisions during mobilization  - Obtain PT/OT consults as needed  - Advance activity as appropriate  - Communicate ordered activity level and limitations with patient/family  Outcome: Progressing     Problem: SAFETY ADULT - FALL  Goal: Free from fall injury  Description: INTERVENTIONS:  - Assess pt frequently for physical needs  - Identify cognitive and physical deficits and behaviors that affect risk of falls.  - [place] fall precautions as indicated by assessment.  - Educate pt/family on patient safety including physical limitations  - Instruct pt to call for assistance with activity based on assessment  - Modify environment to reduce risk of injury  - Provide assistive devices as appropriate  - Consider OT/PT consult to assist with strengthening/mobility  - Encourage toileting schedule  Outcome: Progressing     Problem: DISCHARGE PLANNING  Goal: Discharge to home or other facility with appropriate resources  Description: INTERVENTIONS:  - Identify barriers to discharge w/pt and caregiver  - Include patient/family/discharge partner in discharge planning  - Arrange for needed discharge resources and transportation as appropriate  - Identify discharge learning needs (meds, wound care, etc)  - Arrange for interpreters to assist at discharge as needed  - Consider post-discharge preferences of patient/family/discharge partner  - Complete POLST form as appropriate  - Assess patient's ability to be responsible for managing their own health  - Refer to Case Management Department for coordinating discharge planning if the patient needs post-hospital services based on physician/LIP order or complex needs related to functional status, cognitive ability or social support system  Outcome:  Progressing

## 2025-02-17 VITALS
BODY MASS INDEX: 35 KG/M2 | DIASTOLIC BLOOD PRESSURE: 57 MMHG | RESPIRATION RATE: 18 BRPM | OXYGEN SATURATION: 94 % | SYSTOLIC BLOOD PRESSURE: 120 MMHG | TEMPERATURE: 99 F | HEART RATE: 102 BPM | WEIGHT: 206 LBS

## 2025-02-17 LAB
ANION GAP SERPL CALC-SCNC: 9 MMOL/L (ref 0–18)
ATRIAL RATE: 83 BPM
BASOPHILS # BLD AUTO: 0.05 X10(3) UL (ref 0–0.2)
BASOPHILS NFR BLD AUTO: 0.5 %
BUN BLD-MCNC: 18 MG/DL (ref 9–23)
BUN/CREAT SERPL: 30.5 (ref 10–20)
CALCIUM BLD-MCNC: 9.1 MG/DL (ref 8.7–10.4)
CHLORIDE SERPL-SCNC: 95 MMOL/L (ref 98–112)
CO2 SERPL-SCNC: 26 MMOL/L (ref 21–32)
CREAT BLD-MCNC: 0.59 MG/DL
DEPRECATED RDW RBC AUTO: 48.2 FL (ref 35.1–46.3)
EGFRCR SERPLBLD CKD-EPI 2021: 96 ML/MIN/1.73M2 (ref 60–?)
EOSINOPHIL # BLD AUTO: 0.02 X10(3) UL (ref 0–0.7)
EOSINOPHIL NFR BLD AUTO: 0.2 %
ERYTHROCYTE [DISTWIDTH] IN BLOOD BY AUTOMATED COUNT: 16.8 % (ref 11–15)
GLUCOSE BLD-MCNC: 125 MG/DL (ref 70–99)
GLUCOSE BLDC GLUCOMTR-MCNC: 110 MG/DL (ref 70–99)
GLUCOSE BLDC GLUCOMTR-MCNC: 131 MG/DL (ref 70–99)
HCT VFR BLD AUTO: 31.9 %
HGB BLD-MCNC: 10.4 G/DL
IMM GRANULOCYTES # BLD AUTO: 0.04 X10(3) UL (ref 0–1)
IMM GRANULOCYTES NFR BLD: 0.4 %
LYMPHOCYTES # BLD AUTO: 0.84 X10(3) UL (ref 1–4)
LYMPHOCYTES NFR BLD AUTO: 8.1 %
MAGNESIUM SERPL-MCNC: 1.9 MG/DL (ref 1.6–2.6)
MCH RBC QN AUTO: 25.9 PG (ref 26–34)
MCHC RBC AUTO-ENTMCNC: 32.6 G/DL (ref 31–37)
MCV RBC AUTO: 79.6 FL
MONOCYTES # BLD AUTO: 0.86 X10(3) UL (ref 0.1–1)
MONOCYTES NFR BLD AUTO: 8.3 %
NEUTROPHILS # BLD AUTO: 8.54 X10 (3) UL (ref 1.5–7.7)
NEUTROPHILS # BLD AUTO: 8.54 X10(3) UL (ref 1.5–7.7)
NEUTROPHILS NFR BLD AUTO: 82.5 %
OSMOLALITY SERPL CALC.SUM OF ELEC: 273 MOSM/KG (ref 275–295)
P AXIS: 46 DEGREES
P-R INTERVAL: 204 MS
PHOSPHATE SERPL-MCNC: 2.2 MG/DL (ref 2.4–5.1)
PLATELET # BLD AUTO: 275 10(3)UL (ref 150–450)
POTASSIUM SERPL-SCNC: 3.3 MMOL/L (ref 3.5–5.1)
Q-T INTERVAL: 430 MS
QRS DURATION: 88 MS
QTC CALCULATION (BEZET): 505 MS
R AXIS: 3 DEGREES
RBC # BLD AUTO: 4.01 X10(6)UL
SODIUM SERPL-SCNC: 130 MMOL/L (ref 136–145)
T AXIS: 161 DEGREES
VENTRICULAR RATE: 83 BPM
WBC # BLD AUTO: 10.4 X10(3) UL (ref 4–11)

## 2025-02-17 PROCEDURE — 99233 SBSQ HOSP IP/OBS HIGH 50: CPT | Performed by: INTERNAL MEDICINE

## 2025-02-17 PROCEDURE — 99239 HOSP IP/OBS DSCHRG MGMT >30: CPT | Performed by: HOSPITALIST

## 2025-02-17 RX ORDER — POTASSIUM CHLORIDE 750 MG/1
20 TABLET, EXTENDED RELEASE ORAL DAILY
Qty: 20 TABLET | Refills: 0 | Status: SHIPPED | OUTPATIENT
Start: 2025-02-17 | End: 2025-02-27

## 2025-02-17 RX ORDER — ACETAMINOPHEN 500 MG
500 TABLET ORAL EVERY 4 HOURS PRN
Status: SHIPPED | COMMUNITY
Start: 2025-02-17

## 2025-02-17 RX ORDER — ENOXAPARIN SODIUM 100 MG/ML
1 INJECTION SUBCUTANEOUS EVERY 12 HOURS SCHEDULED
Qty: 39.48 ML | Refills: 0 | Status: SHIPPED | OUTPATIENT
Start: 2025-02-17 | End: 2025-03-10

## 2025-02-17 RX ORDER — SODIUM CHLORIDE 1 G/1
1 TABLET ORAL 2 TIMES DAILY WITH MEALS
Qty: 20 TABLET | Refills: 0 | Status: SHIPPED | OUTPATIENT
Start: 2025-02-17

## 2025-02-17 RX ORDER — POTASSIUM CHLORIDE 14.9 MG/ML
20 INJECTION INTRAVENOUS ONCE
Status: COMPLETED | OUTPATIENT
Start: 2025-02-17 | End: 2025-02-17

## 2025-02-17 RX ORDER — FLUTICASONE PROPIONATE 50 MCG
2 SPRAY, SUSPENSION (ML) NASAL DAILY
Qty: 1 EACH | Refills: 0 | Status: SHIPPED | OUTPATIENT
Start: 2025-02-18

## 2025-02-17 NOTE — DISCHARGE SUMMARY
Dc summary#[id number]  > 30 min spent on dc    Hospital Discharge Diagnoses: svc syndrome    Lace+ Score: 66  59-90 High Risk  29-58 Medium Risk  0-28   Low Risk.    TCM Follow-Up Recommendation:  LACE 29-58: Moderate Risk of readmission after discharge from the hospital.  Tcm fu recommended

## 2025-02-17 NOTE — CM/SW NOTE
02/17/25 1100   Discharge disposition   Expected discharge disposition Home or Self   Discharge transportation Private car     Pt discussed during nursing rounds. Pt is stable for dc today. MD dc order entered. Pt continues to decline HH despite recommendation. Pt now on RA. Pt's spouse will provide transport at dc.    Plan: Home w/spouse today.    / to remain available for support and/or discharge planning.     VALENTE Tanner    [phone]

## 2025-02-17 NOTE — PLAN OF CARE
A/Ox4. RA. Able to ambulate with PT with stairs, no destat. K/phos replaced. Educated and teach back on lovenox injection. Cards and IR cleared for dc. Home with spouse. Walker at dc. Safety precautions in place.     Problem: Patient Centered Care  Goal: Patient preferences are identified and integrated in the patient's plan of care  Description: Interventions:  - What would you like us to know as we care for you?   - Provide timely, complete, and accurate information to patient/family  - Incorporate patient and family knowledge, values, beliefs, and cultural backgrounds into the planning and delivery of care  - Encourage patient/family to participate in care and decision-making at the level they choose  - Honor patient and family perspectives and choices  Outcome: Adequate for Discharge     Problem: Diabetes/Glucose Control  Goal: Glucose maintained within prescribed range  Description: INTERVENTIONS:  - Monitor Blood Glucose as ordered  - Assess for signs and symptoms of hyperglycemia and hypoglycemia  - Administer ordered medications to maintain glucose within target range  - Assess barriers to adequate nutritional intake and initiate nutrition consult as needed  - Instruct patient on self management of diabetes  Outcome: Adequate for Discharge     Problem: Patient/Family Goals  Goal: Patient/Family Long Term Goal  Description: Patient's Long Term Goal: discharge    Interventions:  - Wean oxygen as tolerated  - Monitor eletrolytes  - See additional Care Plan goals for specific interventions  Outcome: Adequate for Discharge  Goal: Patient/Family Short Term Goal  Description: Patient's Short Term Goal: feel better    Interventions:   - Ambulate as tolerated  - Improve breathing  - Continue ADLs  - See additional Care Plan goals for specific interventions  Outcome: Adequate for Discharge     Problem: RESPIRATORY - ADULT  Goal: Achieves optimal ventilation and oxygenation  Description: INTERVENTIONS:  - Assess for  changes in respiratory status  - Assess for changes in mentation and behavior  - Position to facilitate oxygenation and minimize respiratory effort  - Oxygen supplementation based on oxygen saturation or ABGs  - Provide Smoking Cessation handout, if applicable  - Encourage broncho-pulmonary hygiene including cough, deep breathe, Incentive Spirometry  - Assess the need for suctioning and perform as needed  - Assess and instruct to report SOB or any respiratory difficulty  - Respiratory Therapy support as indicated  - Manage/alleviate anxiety  - Monitor for signs/symptoms of CO2 retention  Outcome: Adequate for Discharge     Problem: METABOLIC/FLUID AND ELECTROLYTES - ADULT  Goal: Glucose maintained within prescribed range  Description: INTERVENTIONS:  - Monitor Blood Glucose as ordered  - Assess for signs and symptoms of hyperglycemia and hypoglycemia  - Administer ordered medications to maintain glucose within target range  - Assess barriers to adequate nutritional intake and initiate nutrition consult as needed  - Instruct patient on self management of diabetes  Outcome: Adequate for Discharge     Problem: SKIN/TISSUE INTEGRITY - ADULT  Goal: Skin integrity remains intact  Description: INTERVENTIONS  - Assess and document risk factors for pressure ulcer development  - Assess and document skin integrity  - Monitor for areas of redness and/or skin breakdown  - Initiate interventions, skin care algorithm/standards of care as needed  Outcome: Adequate for Discharge     Problem: HEMATOLOGIC - ADULT  Goal: Maintains hematologic stability  Description: INTERVENTIONS  - Assess for signs and symptoms of bleeding or hemorrhage  - Monitor labs and vital signs for trends  - Administer supportive blood products/factors, fluids and medications as ordered and appropriate  - Administer supportive blood products/factors as ordered and appropriate  Outcome: Adequate for Discharge     Problem: MUSCULOSKELETAL - ADULT  Goal: Return  mobility to safest level of function  Description: INTERVENTIONS:  - Assess patient stability and activity tolerance for standing, transferring and ambulating w/ or w/o assistive devices  - Assist with transfers and ambulation using safe patient handling equipment as needed  - Ensure adequate protection for wounds/incisions during mobilization  - Obtain PT/OT consults as needed  - Advance activity as appropriate  - Communicate ordered activity level and limitations with patient/family  Outcome: Adequate for Discharge     Problem: SAFETY ADULT - FALL  Goal: Free from fall injury  Description: INTERVENTIONS:  - Assess pt frequently for physical needs  - Identify cognitive and physical deficits and behaviors that affect risk of falls.  - [place] fall precautions as indicated by assessment.  - Educate pt/family on patient safety including physical limitations  - Instruct pt to call for assistance with activity based on assessment  - Modify environment to reduce risk of injury  - Provide assistive devices as appropriate  - Consider OT/PT consult to assist with strengthening/mobility  - Encourage toileting schedule  Outcome: Adequate for Discharge     Problem: DISCHARGE PLANNING  Goal: Discharge to home or other facility with appropriate resources  Description: INTERVENTIONS:  - Identify barriers to discharge w/pt and caregiver  - Include patient/family/discharge partner in discharge planning  - Arrange for needed discharge resources and transportation as appropriate  - Identify discharge learning needs (meds, wound care, etc)  - Arrange for interpreters to assist at discharge as needed  - Consider post-discharge preferences of patient/family/discharge partner  - Complete POLST form as appropriate  - Assess patient's ability to be responsible for managing their own health  - Refer to Case Management Department for coordinating discharge planning if the patient needs post-hospital services based on physician/LIP order or  complex needs related to functional status, cognitive ability or social support system  Outcome: Adequate for Discharge

## 2025-02-17 NOTE — PHYSICAL THERAPY NOTE
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 Patient is cleared from a physical therapy standpoint for discharge as they have adequately achieved therapy goals to return home safely, however patient would benefit from continued inpatient therapy services if patient remains hospitalized to further progress towards prior level of function.     Patient is requiring supervision as a result of the following impairments: decreased endurance/aerobic capacity and medical status.     Patient continues to function below baseline with gait and stair negotiation.  Next session anticipate patient to progress gait and stair negotiation.  Physical Therapy will continue to follow patient for duration of hospitalization.    Patient continues to benefit from continued skilled PT services: at discharge to promote prior level of function.  Anticipate patient will return home with home health PT, per chart review patient declining HH services.     PLAN DURING HOSPITALIZATION  Nursing Mobility Recommendation : 1 Assist  PT Device Recommendation: Rolling walker  PT Treatment Plan: Bed mobility;Body mechanics;Endurance;Energy conservation;Patient education;Gait training;Strengthening;Transfer training;Balance training;Stair training  Frequency (Obs): 5x/week     SUBJECTIVE  \"I don't think I'll need the walker at home, I have the walls.\"    OBJECTIVE       WEIGHT BEARING RESTRICTION  none    PAIN ASSESSMENT   Ratin  Location: denies       BALANCE  Static Sitting: Normal  Dynamic  Sitting: Good  Static Standing: Good  Dynamic Standing: Fair +    ACTIVITY TOLERANCE  Pulse: 93  Heart Rate Source: Monitor     BP: (!) 134/91  BP Location: Right arm  BP Method: Automatic  Patient Position: Sitting     O2 WALK  Oxygen Therapy  SPO2% on Room Air at Rest: 95  SPO2% Ambulation on Room Air: 94    AM-PAC '6-Clicks' INPATIENT SHORT FORM - BASIC MOBILITY  How much difficulty does the patient currently have...  Patient Difficulty: Turning over in bed (including adjusting bedclothes, sheets and blankets)?: A Little   Patient Difficulty: Sitting down on and standing up from a chair with arms (e.g., wheelchair, bedside commode, etc.): None   Patient Difficulty: Moving from lying on back to sitting on the side of the bed?: A Little   How much help from another person does the patient currently need...   Help from Another: Moving to and from a bed to a chair (including a wheelchair)?: A Little   Help from Another: Need to walk in hospital room?: A Little   Help from Another: Climbing 3-5 steps with a railing?: A Little     AM-PAC Score:  Raw Score: 19   Approx Degree of Impairment: 41.77%   Standardized Score (AM-PAC Scale): 45.44   CMS Modifier (G-Code): CK    FUNCTIONAL ABILITY STATUS  Functional Mobility/Gait Assessment  Gait Assistance: Supervision  Distance (ft): 70  Assistive Device: Rolling walker;None  Pattern: Within Functional Limits (slow pace, no loss of balance, first 20' no device, improved gait speed and decreased L antalgia (hx knee pain) with walker)  Stairs: Stairs  How Many Stairs: 5  Device: 1 Rail  Assist: Supervision  Pattern: Ascend and Descend  Ascend and Descend : Step to  Sit to Stand: independent    Skilled Therapy Provided: Patient tolerating household distances using walker and able to perform 5 stairs. Patient encouraged to use walker for body mechanics and energy conservation, agreeable with education. Patient's spouse present and supportive.     Patient received seated in bedside  chair, agreeable to physical therapy. Vital signs monitored as noted above, no adverse symptoms and patient stable during session and patient O2 stable on room air . Anticipated therapy needs remain appropriate based on the patient's performance, personal factors, and remaining functional impairments.    PATIENT EDUCATION  Education Provided To: Patient;Family/Caregiver  Patient Education: Role of Physical Therapy;Plan of Care;DME Recommendations;Functional Transfer Techniques;Energy Conservation;Gait Training  Patient's Response to Education: Verbalized Understanding;Requires Further Education  Family/Caregiver Education: Role of Physical Therapy;Plan of Care;Energy Conservation  Family/Caregiver's Response to Education: Verbalized Understanding    The patient's Approx Degree of Impairment: 41.77% has been calculated based on documentation in the [facility] '6 clicks' Inpatient Daily Activity Short Form.  Research supports that patients with this level of impairment may benefit from home-health PT.  Final disposition will be made by interdisciplinary medical team.      Patient End of Session: Up in chair;Needs met;Call light within reach;RN aware of session/findings;All patient questions and concerns addressed;Hospital anti-slip socks;Family present    CURRENT GOALS   Goals to be met by: 2/28/25  Patient Goal Patient's self-stated goal is: return home   Goal #1 Patient is able to demonstrate supine - sit EOB @ level: modified independent     Goal #1   Current Status NOT MET/IN PROGRESS    Goal #2 Patient is able to demonstrate transfers Sit to/from Stand at assistance level: modified independent with walker - rolling     Goal #2  Current Status MET 2/17/25   Goal #3 Patient is able to ambulate 150 feet with assist device: walker - rolling at assistance level: supervision   Goal #3   Current Status NOT MET/IN PROGRESS    Goal #4 Patient will negotiate 16 stairs with rail and supervision   Goal #4   Current Status NOT  MET/IN PROGRESS    Goal #5 Patient to demonstrate independence with home activity/exercise instructions provided to patient in preparation for discharge.   Goal #5   Current Status IN PROGRESS/ONGOING    Goal #6    Goal #6  Current Status      Gait Trainin minutes      Preethi Kaiser, PT, DPT  [facility]  Rehab Services - Physical Therapy  [id number]

## 2025-02-17 NOTE — PROGRESS NOTES
[facility]  part of [facility]    Progress Note      Assessment and Plan:   1.  SVC syndrome with brachiocephalic vein thrombus at the line from the port-CT scan of the chest demonstrates circumferential encasement and invasion and narrowing of the SVC which is completely obliterated.  Additionally, the patient has thrombus extending at the port at the brachiocephalic vein extending into the subclavian vein.  The patient is much better clinically status post stent.  Currently on Lovenox.     Recommendations:  1.  Lovenox  2.  Long-term anticoagulation  4.  The port will likely be removed in the short-term.     2.  Carcinoma lung-as per oncology, patient will be a candidate for radiotherapy and chemotherapeutic.  As per Dr. Goodson.     3.  DVT prophylaxis-as above     4.  COPD     5.  Diabetes mellitus-insulin    6.  Abnormal EKG-as per cardiology, checking echo.    Subjective:   Yi Torres is a(n) 74 year old female who is breathing well and is anxious to go home    Objective:   Blood pressure (!) 134/91, pulse 93, temperature 98.6 °F (37 °C), temperature source Temporal, resp. rate 20, weight 206 lb (93.4 kg), SpO2 93%.    Physical Exam alert white female  HEENT examination is unremarkable with pupils equal round and reactive to light and accommodation.   Neck without adenopathy, thyromegaly, JVD nor bruit.   Lungs diminished to auscultation and percussion.  Cardiac regular rate and rhythm no murmur.   Abdomen nontender, without hepatosplenomegaly and no mass appreciable.   Extremities without clubbing cyanosis nor edema.   Neurologic grossly intact with symmetric tone and strength and reflex.  Skin without gross abnormality     Results:     Lab Results   Component Value Date    WBC 10.4 02/17/2025    HGB 10.4 02/17/2025    HCT 31.9 02/17/2025    .0 02/17/2025    CREATSERUM 0.59 02/17/2025    BUN 18 02/17/2025     02/17/2025    K 3.3 02/17/2025    CL 95 02/17/2025    CO2  26.0 02/17/2025     02/17/2025    CA 9.1 02/17/2025    MG 1.9 02/17/2025    PHOS 2.2 02/17/2025       Bryson Dumas MD  Medical Director, Critical Care, [facility]  Medical Director, [facility]  Pager: [phone]

## 2025-02-17 NOTE — PLAN OF CARE
Problem: HEMATOLOGIC - ADULT  Goal: Maintains hematologic stability  Description: INTERVENTIONS  - Assess for signs and symptoms of bleeding or hemorrhage  - Monitor labs and vital signs for trends  - Administer supportive blood products/factors, fluids and medications as ordered and appropriate  - Administer supportive blood products/factors as ordered and appropriate  Outcome: Progressing     Problem: SKIN/TISSUE INTEGRITY - ADULT  Goal: Skin integrity remains intact  Description: INTERVENTIONS  - Assess and document risk factors for pressure ulcer development  - Assess and document skin integrity  - Monitor for areas of redness and/or skin breakdown  - Initiate interventions, skin care algorithm/standards of care as needed  Outcome: Progressing     Problem: METABOLIC/FLUID AND ELECTROLYTES - ADULT  Goal: Glucose maintained within prescribed range  Description: INTERVENTIONS:  - Monitor Blood Glucose as ordered  - Assess for signs and symptoms of hyperglycemia and hypoglycemia  - Administer ordered medications to maintain glucose within target range  - Assess barriers to adequate nutritional intake and initiate nutrition consult as needed  - Instruct patient on self management of diabetes  Outcome: Progressing     Problem: RESPIRATORY - ADULT  Goal: Achieves optimal ventilation and oxygenation  Description: INTERVENTIONS:  - Assess for changes in respiratory status  - Assess for changes in mentation and behavior  - Position to facilitate oxygenation and minimize respiratory effort  - Oxygen supplementation based on oxygen saturation or ABGs  - Provide Smoking Cessation handout, if applicable  - Encourage broncho-pulmonary hygiene including cough, deep breathe, Incentive Spirometry  - Assess the need for suctioning and perform as needed  - Assess and instruct to report SOB or any respiratory difficulty  - Respiratory Therapy support as indicated  - Manage/alleviate anxiety  - Monitor for signs/symptoms of CO2  retention  Outcome: Progressing     Problem: Diabetes/Glucose Control  Goal: Glucose maintained within prescribed range  Description: INTERVENTIONS:  - Monitor Blood Glucose as ordered  - Assess for signs and symptoms of hyperglycemia and hypoglycemia  - Administer ordered medications to maintain glucose within target range  - Assess barriers to adequate nutritional intake and initiate nutrition consult as needed  - Instruct patient on self management of diabetes  Outcome: Progressing     Problem: SAFETY ADULT - FALL  Goal: Free from fall injury  Description: INTERVENTIONS:  - Assess pt frequently for physical needs  - Identify cognitive and physical deficits and behaviors that affect risk of falls.  - [place] fall precautions as indicated by assessment.  - Educate pt/family on patient safety including physical limitations  - Instruct pt to call for assistance with activity based on assessment  - Modify environment to reduce risk of injury  - Provide assistive devices as appropriate  - Consider OT/PT consult to assist with strengthening/mobility  - Encourage toileting schedule  Outcome: Progressing

## 2025-02-17 NOTE — PAYOR COMM NOTE
--------------  CONTINUED STAY REVIEW    Payor: [employer]  Subscriber #:  [id number]  Authorization Number: [id number]    Admit date: 2/13/25  Admit time:  3:17 PM    REVIEW DOCUMENTATION:  2/15/2025  Pulmonology   Blood pressure 121/67, pulse 69, temperature 97.4 °F (36.3 °C), temperature source Temporal, resp. rate 17, weight 207 lb 7.3 oz (94.1 kg), SpO2 99%.  Intake/Output:                 Last 3 shifts: I/O last 3 completed shifts:  In: 510 [P.O.:510]    Lab Results   Component Value Date/Time     WBC 14.2 (H) 02/15/2025 04:40 AM     RBC 4.20 02/15/2025 04:40 AM     HGB 10.3 (L) 02/15/2025 04:40 AM     HCT 33.2 (L) 02/15/2025 04:40 AM     MCV 79.0 (L) 02/15/2025 04:40 AM     MCH 24.5 (L) 02/15/2025 04:40 AM     MCHC 31.0 02/15/2025 04:40 AM     RDW 17.1 (H) 02/15/2025 04:40 AM     NEPRELIM 11.51 (H) 02/15/2025 04:40 AM     .0 02/15/2025 04:40 AM         (H) 02/15/2025 04:40 AM     BUN 25 (H) 02/15/2025 04:40 AM     CREATSERUM 0.69 02/15/2025 04:40 AM     CA 9.3 02/15/2025 04:40 AM      (L) 02/15/2025 04:40 AM     K 4.0 02/15/2025 04:40 AM     CL 95 (L) 02/15/2025 04:40 AM     CO2 24.0 02/15/2025 04:40 AM     Assessment   1.  SVC syndrome  2.  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  Squamous cell lung cancer  4.  COPD  5.  Diabetes mellitus  6.  Obesity      Plan   -Patient presents with evidence of worsening facial flushing and swelling with concern for SVC syndrome.  Eval by IR status post left innominate/subclavian thrombectomy and SVC stent placement on 2/13/2025 with clinical improvement.  -Recent diagnosis of squamous cell lung cancer with right upper lobe mass.  Awaiting starting treatment with oncology and radiation oncology.  Recent port placement.  -Appears to be stable from COPD perspective.  Continue nebulizer treatments  -Attempt to wean off oxygen  -DVT prophylaxis: Lovenox  -Okay to transfer to floor  Gabrielle Garrido DO  Pulmonary  Critical Care Medicine  2/15/2025  8:00 AM     EKG 12 Lead  Result Date: 2/15/2025  Sinus tachycardia with 1st degree A-V block Low voltage QRS, consider pulmonary disease, pericardial effusion, or normal variant Cannot rule out Anterior infarct (cited on or before 22-JAN-2025) T wave abnormality, consider inferolateral ischemia Abnormal ECG When compared with ECG of 22-JAN-2025 15:44, Significant changes have occurred     Assessment & Plan:  1.  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  Squamous cell lung cancer as per Dr. Goodson  4.  COPD  5.  Diabetes mellitus  6.  Obesity bmi=35.06 may need brian sands  7.  Hyponatremia possibly from siadh; fluid restrict and salt tabs  Patient will require inpatient services that will reasonably be expected to span two midnight's based on the clinical documentation in H+P.   Based on patients current state of illness, I anticipate that, after discharge, patient will require TBD.  Complex mdm; coordinating care with nurse/arranging transfer  and counseling pt and with her permission her  in room about anticoag/tele     JORDAN DOMINGUEZ MD  2/15/2025 10:34 AM     2/16/2025  Pulmonology   Blood pressure 112/72, pulse 57, temperature 97.5 °F (36.4 °C), temperature source Oral, resp. rate 16, weight 207 lb 7.3 oz (94.1 kg), SpO2 96%.  Intake/Output:                 Last 3 shifts: I/O last 3 completed shifts:  In: 400 [P.O.:390; I.V.:10]  Out: 300 [Urine:300]    Plan   -Patient presents with evidence of worsening facial flushing and swelling with concern for SVC syndrome.  Eval by IR status post left innominate/subclavian thrombectomy and SVC stent placement on 2/13/2025 with clinical improvement.  -Recent diagnosis of squamous cell lung cancer with right upper lobe mass.  Awaiting starting treatment with oncology and radiation oncology.  Recent port placement.  -Appears to be stable from COPD perspective.   Continue nebulizer treatments  -Evaluate for need of home oxygen once again today.    -DVT prophylaxis: Anthony Garrido DO  Pulmonary Critical Care Medicine  2/16/2025  8:09 AM     Cardiology Consultation   Reason for Consultation:   Prolonged Qtc    Most recent twelve-lead ECG demonstrates sinus rhythm, lateral T wave inversion, and QTc 437 ms. ECG from yesterday with similar appearance although QTc calculated 497 ms. Oldest ECG is from September 2024 which did not demonstrate T wave abnormality QTc was 420 ms.     Lab 02/14/25  0352 02/15/25  0440 02/16/25  0714   * 123* 110*   BUN 28* 25* 23   CREATSERUM 0.87 0.69 0.61   CA 9.5 9.3 9.3   * 129* 128*   K 4.1 4.0 3.5   CL 98 95* 91*   CO2 26.0 24.0 29.0     RBC 4.80 4.20 4.14   HGB 11.7* 10.3* 10.1*   HCT 38.8 33.2* 32.3*   MCV 80.8 79.0* 78.0*   MCH 24.4* 24.5* 24.4*   MCHC 30.2* 31.0 31.3   RDW 17.0* 17.1* 17.0*   NEPRELIM 11.64* 11.51* 9.83*   WBC 15.6* 14.2* 12.5*   .0 301.0 295.0     Plan:  - Reviewed twelve-lead ECG today and yesterday which notes new lateral T wave abnormality and QTc measuring up to 4 to 97 ms, baseline QTc normal  - Patient not on any QTc prolonging medications, magnesium and potassium low normal replete as needed  - Given ST-T wave normality and elevated QTc would repeat transthoracic echocardiogram to rule out any associated structural abnormality, particularly in the setting of SVC syndrome and recent thrombectomy/stenting  - Repeat twelve-lead ECG tomorrow morning to reassess  - Further testing/workup pending above results  Shaji Mendez MD  2/16/2025    MEDICATIONS ADMINISTERED IN LAST 1 DAY:  acetaminophen (Tylenol Extra Strength) tab 500 mg       Date Action Dose Route User    2/16/2025 2107 Given 500 mg Oral Yenni Miller, RN          atorvastatin (Lipitor) tab 20 mg       Date Action Dose Route User    2/16/2025 2107 Given 20 mg Oral Yenni Miller, RN          cetirizine (ZyrTEC) tab 10 mg        Date Action Dose Route User    2/16/2025 2107 Given 10 mg Oral Yenni Miller RN          spironolactone (Aldactone) 25 mg, hydroCHLOROthiazide 25 mg for Aldactazide 25/25 ([facility] only)       Date Action Dose Route User    2/16/2025 0927 Given (none) Oral Ria Abdul RN          enoxaparin (Lovenox) 100 MG/ML SUBQ injection 90 mg       Date Action Dose Route User    2/16/2025 2108 Given 90 mg Subcutaneous (Left Lower Abdomen) Yenni Miller RN    2/16/2025 0926 Given 90 mg Subcutaneous (Right Lower Abdomen) Ria Abdul RN          fluticasone propionate (Flonase) 50 MCG/ACT nasal suspension 2 spray       Date Action Dose Route User    2/16/2025 1310 Given 2 spray Each Nare Ria Abdul RN          lidocaine-menthol 4-1 % patch 2 patch       Date Action Dose Route User    2/16/2025 0926 Patch Applied 2 patch Transdermal (Mid Back) Ria Abdul RN          metoprolol tartrate (Lopressor) partial tab 12.5 mg       Date Action Dose Route User    2/16/2025 2107 Given 12.5 mg Oral Yenni Miller RN          metoprolol tartrate (Lopressor) tab 25 mg       Date Action Dose Route User    2/16/2025 0927 Given 25 mg Oral Ria Abdul RN          Perflutren Lipid Microsphere (DEFINITY) 6.52 MG/ML injection 1.5 mL       Date Action Dose Route User    2/16/2025 1707 Given 1.5 mL Intravenous Lou Joseph          sodium chloride 0.9% infusion       Date Action Dose Route User    2/16/2025 1045 New Bag (none) Intravenous Ria Abdul RN          sodium chloride tab 1 g       Date Action Dose Route User    2/16/2025 1653 Given 1 g Oral Ria Abdul RN    2/16/2025 1310 Given 1 g Oral Ria Abdul RN    2/16/2025 0926 Given 1 g Oral Ria Abdul RN          sodium phosphate 15 mmol in 0.9% NaCl 100mL IVPB premix       Date Action Dose Route User    2/16/2025 0953 Given 15 mmol Intravenous Ria Abdul RN          verapamil ER (Calan-SR) tab 180 mg       Date Action Dose Route User    2/16/2025 2109  Given 180 mg Oral Yenni Miller RN            Vitals (last day)       Date/Time Temp Pulse Resp BP SpO2 Weight O2 Device O2 Flow Rate (L/min) [facility]    02/17/25 0600 -- -- -- -- -- 206 lb (93.4 kg) -- --     02/17/25 0400 98.9 °F (37.2 °C) 72 16 117/48 100 % -- High flow nasal cannula 2 L/min     02/16/25 2300 -- 66 16 -- 99 % -- High flow nasal cannula 2 L/min     02/16/25 2200 -- 73 19 -- 98 % -- High flow nasal cannula 2 L/min     02/16/25 2000 98.7 °F (37.1 °C) 95 24 134/67 95 % -- High flow nasal cannula 2 L/min     02/16/25 1600 98.7 °F (37.1 °C) 102 20 131/66 93 % -- High flow nasal cannula 2 L/min SD    02/16/25 1441 -- -- -- -- 94 % -- High flow nasal cannula 2 L/min SD    02/16/25 1440 -- -- -- -- 86 % -- None (Room air) -- SD    02/16/25 1200 98.7 °F (37.1 °C) 86 23 127/61 92 % -- None (Room air) -- SD    02/16/25 0924 -- -- -- 134/60 91 % -- None (Room air) -- SD    02/16/25 0800 97.7 °F (36.5 °C) 89 20 121/66 96 % -- Nasal cannula 2 L/min SD    02/16/25 0400 97.5 °F (36.4 °C) 57 16 112/72 96 % -- Nasal cannula 2 L/min TQ    02/16/25 0300 -- 58 17 -- -- -- -- -- TQ    02/16/25 0017 -- 71 23 152/63 96 % -- Nasal cannula 2 L/min TQ       FOR CONTINUED STAY REVIEW/APPROVAL OF INPT ADMISSION

## 2025-02-17 NOTE — PROGRESS NOTES
[facility] - CARDIOLOGY PROGRESS NOTE  Yi Torres Patient Status:  Inpatient    1950 MRN [id number]   Location [facility] 2W/SW Attending Vanessa Colon,*   Hosp Day # 4 PCP Tammy Chan MD     Assessment:    Non-small cell lung cancer  SVC syndrome s/p thrombectomy and stenting, secondary to above  Left brachiocephalic/subclavian vein thrombus    Patient has persistent shallow T wave inversions diffusely.    Patient has mild prolongation of QT interval.  She is on no medications that would be expected to cause this.      Plan:    No new inpatient cardiac testing necessary.    Keep potassium and magnesium normalized.    Follow-up with Dr. Macdonald or Dr. Lees in 1 week to repeat an EKG.      Subjective:  No chest pain or shortness of breath.    Objective:  BP (!) 134/91 (BP Location: Right arm)   Pulse 93   Temp 98.6 °F (37 °C) (Temporal)   Resp 20   Wt 206 lb (93.4 kg)   SpO2 93%   BMI 34.81 kg/m²     Temp (24hrs), Av.7 °F (37.1 °C), Min:98.6 °F (37 °C), Max:98.9 °F (37.2 °C)      Intake/Output:    Intake/Output Summary (Last 24 hours) at 2025 1235  Last data filed at 2025 0838  Gross per 24 hour   Intake 2442.5 ml   Output 1050 ml   Net 1392.5 ml       Wt Readings from Last 2 Encounters:   25 206 lb (93.4 kg)   25 205 lb (93 kg)       Physical Exam:    General: Alert and oriented x 3,  No apparent distress.  HEENT: No focal deficits.  Neck: No JVD, carotids 2+ no bruits.  Cardiac: Regular rate and rhythm, S1, S2 normal, no murmur  Lungs: Clear without wheezes, rales, rhonchi.  Normal excursions and effort.  Abdomen: Soft, non-tender.   Extremities: Without clubbing, cyanosis or edema.  Peripheral pulses are 2+.  Skin: Warm and dry.     Labs:  Lab Results   Component Value Date    WBC 10.4 2025    HGB 10.4 2025    HCT 31.9 2025     .0 02/17/2025     Lab Results   Component Value Date    INR 1.15 02/13/2025     Lab Results   Component Value Date     02/17/2025    K 3.3 02/17/2025    CL 95 02/17/2025    CO2 26.0 02/17/2025    BUN 18 02/17/2025    CREATSERUM 0.59 02/17/2025     02/17/2025    MG 1.9 02/17/2025    CA 9.1 02/17/2025        Lab Results   Component Value Date    TROP <0.045 10/09/2020        Medications:     potassium phosphate dibasic 15 mmol in sodium chloride 0.9% 250 mL IVPB  15 mmol Intravenous Once    Followed by    potassium chloride  20 mEq Intravenous Once    verapamil ER  180 mg Oral Nightly    metoprolol tartrate  12.5 mg Oral BID    fluticasone propionate  2 spray Each Nare Daily    enoxaparin  1 mg/kg Subcutaneous 2 times per day    lidocaine-menthol  2 patch Transdermal Daily    cetirizine  10 mg Oral Nightly    insulin aspart  1-5 Units Subcutaneous TID CC    atorvastatin  20 mg Oral Nightly    spironolactone (Aldactone) 25 mg, hydroCHLOROthiazide 25 mg for Aldactazide 25/25 ([facility] only)   Oral Daily      sodium chloride 50 mL/hr at 02/16/25 1045       Salas Call MD  2/17/2025  12:35 PM

## 2025-02-17 NOTE — DISCHARGE INSTRUCTIONS
Ok to go home when ok with others   cancer center/radiation as scheduled  Fu onc to transition to oral blood thinner  Labs when sees primary: cbc, bmp, mag, phos  Pt declines hhc     Medication List        START taking these medications      acetaminophen 500 MG Tabs  Commonly known as: Tylenol Extra Strength     enoxaparin 100 MG/ML Sosy  Commonly known as: Lovenox  Inject 0.94 mL (94 mg total) into the skin every 12 (twelve) hours for 42 doses.     fluticasone propionate 50 MCG/ACT Susp  Commonly known as: Flonase  2 sprays by Each Nare route daily.  Start taking on: February 18, 2025     guaiFENesin 100 MG/5 ML  Commonly known as: Robitussin  Take 10 mL (200 mg total) by mouth every 4 (four) hours as needed.     lidocaine-menthol 4-1 % Ptch  Place 2 patches onto the skin daily.  Start taking on: February 18, 2025     potassium and sodium phosphates 280-160-250 MG Pack  Commonly known as: Neutra-Phos  Take 1 packet by mouth daily for 7 doses.     potassium chloride 10 MEQ Tbcr  Commonly known as: Klor-Con M10  Take 2 tablets (20 mEq total) by mouth daily for 10 doses.     sodium chloride 1 g Tabs  Take 1 tablet (1 g total) by mouth 2 (two) times daily with meals.            CONTINUE taking these medications      Accu-Chek Gabriela Plus Strp  USE ONCE DAILY IN THE MORNING BEFORE BREAKFAST     Accu-Chek Gabriela Plus w/Device Kit  USE ONCE DAILY IN AM BEFORE BREAKFAST     alendronate 70 MG Tabs  Commonly known as: Fosamax  Take 1 tablet (70 mg total) by mouth once a week.     atorvastatin 20 MG Tabs  Commonly known as: Lipitor  TAKE 1 TABLET (20MG) BY MOUTH EVERY DAY     Centrum Silver Tabs     Cetirizine HCl 10 MG Caps     cholecalciferol 50 MCG (2000 UT) Caps  Commonly known as: Vitamin D3     Lancets Ultra Thin Misc  Use one daily     Metamucil Free & Natural 43 % Powd  Generic drug: Psyllium     metFORMIN  MG Tb24  Commonly known as: Glucophage XR  Take 1 tablet (500 mg total) by mouth daily with food.      metoprolol tartrate 25 MG Tabs  Commonly known as: Lopressor  Take 1 tablet (25 mg total) by mouth 2 (two) times daily.     ondansetron 8 MG tablet  Commonly known as: Zofran  Take 1 tablet (8 mg total) by mouth every 8 (eight) hours as needed for Nausea.     prochlorperazine 10 mg tablet  Commonly known as: Compazine  Take 1 tablet (10 mg total) by mouth every 6 (six) hours as needed for Nausea.     Spironolactone-HCTZ 25-25 MG Tabs  Commonly known as: ALDACTAZIDE  Take 1 tablet by mouth daily.     verapamil  MG Tbcr  Commonly known as: Calan-SR  Take 1 tablet (240 mg total) by mouth nightly.            STOP taking these medications      dapagliflozin 10 MG Tabs  Commonly known as: Farxiga     omega-3-acid ethyl esters 1 g Caps  Commonly known as: Lovaza               Where to Get Your Medications        These medications were sent to [facility]/pharmacy #[id number] - [place], [place] - [address] [place] [address] AT across from Behzad Jaquez, [phone], [phone]  [address] [place] [address]      Phone: [phone]   enoxaparin 100 MG/ML Sosy  fluticasone propionate 50 MCG/ACT Susp  guaiFENesin 100 MG/5 ML  lidocaine-menthol 4-1 % Ptch  potassium and sodium phosphates 280-160-250 MG Pack  potassium chloride 10 MEQ Tbcr  sodium chloride 1 g Tabs

## 2025-02-18 ENCOUNTER — PATIENT OUTREACH (OUTPATIENT)
Dept: CASE MANAGEMENT | Age: 75
End: 2025-02-18

## 2025-02-18 ENCOUNTER — TELEPHONE (OUTPATIENT)
Dept: PULMONOLOGY | Facility: CLINIC | Age: 75
End: 2025-02-18

## 2025-02-18 NOTE — PAYOR COMM NOTE
--------------  DISCHARGE REVIEW    Payor: [employer]  Subscriber #:  [id number]  Authorization Number: [id number]    Admit date: 2/13/25  Admit time:   3:17 PM  Discharge Date: 2/17/2025  3:57 PM     Admitting Physician: Vanessa Colon MD  Attending Physician:  No att. providers found  Primary Care Physician: Tammy Chan MD          Discharge Summary Notes        Discharge Summary signed by Vnaessa Colon MD at 2/17/2025  4:30 PM       Author: Vanessa Colon MD Specialty: HOSPITALIST Author Type: Physician    Filed: 2/17/2025  4:30 PM Date of Service: 2/17/2025 11:33 AM Status: Addendum    : Vanessa Colon MD (Physician)    Related Notes: Original Note by Vanessa Colon MD (Physician) filed at 2/17/2025 11:35 AM         Dc summary#[id number]  > 30 min spent on dc    Hospital Discharge Diagnoses: svc syndrome    Lace+ Score: 66  59-90 High Risk  29-58 Medium Risk  0-28   Low Risk.    TCM Follow-Up Recommendation:  LACE 29-58: Moderate Risk of readmission after discharge from the hospital.  Tcm fu recommended       Electronically signed by Vanessa Colon MD on 2/17/2025  4:30 PM         REVIEWER COMMENTS

## 2025-02-18 NOTE — PROGRESS NOTES
Please contact patient for assistance with scheduling a follow-up appointment with primary care provider.  Thank you!

## 2025-02-18 NOTE — TELEPHONE ENCOUNTER
Bedside scheduling is calling to schedule 2 week Hospital follow up.  She is requesting patient be contacted.  Please call

## 2025-02-18 NOTE — PROGRESS NOTES
TCM request (discharged 02/17)    [facility]  [address]  [address]  [place] [address]  [phone]  Apt made:  Fri 02/21 @8:00am w/Santiago Garrido  Pulmonary Diseases  [address]  [address]  [place] [address]  [phone]  Follow up 2 weeks  Dr Garrido's office will call patient, due to no availability in needed time frame    Dr José Miguel Goodson  Hematology and Oncology  [address] [place] [address]  [phone]  Follow up 2 weeks  Apt made:  Tue 03/04 @8:45am    Dr Sebastien Lees  Electrophysiologist  [facility] [place]  [address]   [address]  [place] [address]  [phone]  Follow up 2 weeks  Apt made:  Mon 03/3 @9:30am    Dr Cesar Loaiza  Interventional, Radiology  [address]  [address]  [place] [address]  [phone]  Follow up 4 weeks  Apt made:  Fri 03/28 @12:00pm  Confirmed w/pt  Closing encounter

## 2025-02-20 ENCOUNTER — APPOINTMENT (OUTPATIENT)
Dept: RADIATION ONCOLOGY | Facility: HOSPITAL | Age: 75
End: 2025-02-20
Attending: RADIOLOGY
Payer: MEDICARE

## 2025-02-20 NOTE — PROGRESS NOTES
Subjective:   Yi Torres is a 74 year old female who presents for hospital follow up.   She was discharged from Inpatient hospital, [facility]  to Home   Admit Date: 2/13/25  Discharge Date: 2/17/25  Hospital Discharge Diagnosis: SVC syndrome, venous thrombosis of upper extremity, acute embolism and thrombosis of left subclavian femoral vein, thrombosis of brachiocephalic vein.    Interactive contact within 2 business days post discharge first initiated on Date: 2/18/25    I accessed Knip and/or Fatsoma Everywhere and personally reviewed the following for the recent hospitalization: provider notes, consults, summaries, labs and other test results and the pertinent findings are documented below.     HPI: Patient is a pleasant 74-year-old white female with past medical history significant for DM2 with CKD 3, obesity, aortic atherosclerosis, malignant neoplasm of upper lobe of right lung, hypertension, hyperlipidemia, OA, AR, BPPV, cataracts, and SVC syndrome. She presented to [facility] on 2/13/25 recently diagnosed with large right upper lobe lung mass, positive biopsy for non-small cell lung cancer, squamous cell pathology, being evaluated by Oncology and Hematology/Oncology for chemoradiation therapy. For evaluation of fatigue and pressure sensation in her head when she bends forward or lies flat. CT chest was performed \"Thrombus within the left brachiocephalic vein and medial aspect of the left subclavian vein is new since 1/9/2025.   9.2 cm right upper lobe suprahilar mass with invasion of the mediastinum has increased in size. \"    She was seen by Oncology, and Interventional Radiology for concern of SVC syncrome and she is status post left innominate/subclavian thrombectomy and SVC stent placement on 2/13/2025 with clinical improvement.  After that, she was followed in the hospital.  She did not have any serious bleeding nor any complaints.  She was somewhat oxygen dependent.  It took awhile for that  to be weaned off, but she really did not want oxygen at home and passed her walk test.  In addition, she had a curiously prolonged QT, and workup for that appeared negative, perhaps it was electrolyte or lead placement related.  Dr. Call advised that she follow up with Dr. Macdonald or Dr. Lees in 1 week.  She was going to pursue the rest of her lung cancer treatment as an outpatient.  She will get a CBC, BMP, magnesium, and phosphorus today.    Patient states she doesn't feel unwell. Just has swelling. She has issues sleeping because she is laying on left side to breath better. We talked about pillow placement and elevation.    Follow up Cards? Dr Lees on 3/3/25  Follow up pulm? Dr Garrido on 02/25/25  Follow up Onc? Dr faria on 03/04/25  Follow up Dr Fadia GARCIA on 03/28/25    Ct planning done yesterday. Back on track. Expect to start 1.5 weeks. 7 weeks radiation M-F. Chemo once per week x 7 weeks. Then immunotherapy.             History/Other:   Current Medications:  Medication Reconciliation:  I am aware of an inpatient discharge within the last 30 days.  The discharge medication list has been reconciled with the patient's current medication list and reviewed by me. See medication list for additions of new medication, and changes to current doses of medications and discontinued medications.  Outpatient Medications Marked as Taking for the 2/21/25 encounter (Office Visit) with Santiago Andrews APRN   Medication Sig    acetaminophen 500 MG Oral Tab Take 1 tablet (500 mg total) by mouth every 4 (four) hours as needed.    fluticasone propionate 50 MCG/ACT Nasal Suspension 2 sprays by Each Nare route daily.    sodium chloride 1 g Oral Tab Take 1 tablet (1 g total) by mouth 2 (two) times daily with meals.    potassium and sodium phosphates 280-160-250 MG Oral Powd Pack Take 1 packet by mouth daily for 7 doses.    potassium chloride 10 MEQ Oral Tab CR Take 2 tablets (20 mEq total) by mouth daily for 10 doses.    enoxaparin 100  MG/ML Injection Solution Prefilled Syringe Inject 0.94 mL (94 mg total) into the skin every 12 (twelve) hours for 42 doses.    prochlorperazine (COMPAZINE) 10 mg tablet Take 1 tablet (10 mg total) by mouth every 6 (six) hours as needed for Nausea.    ondansetron (ZOFRAN) 8 MG tablet Take 1 tablet (8 mg total) by mouth every 8 (eight) hours as needed for Nausea.    Spironolactone-HCTZ 25-25 MG Oral Tab Take 1 tablet by mouth daily.    atorvastatin 20 MG Oral Tab TAKE 1 TABLET (20MG) BY MOUTH EVERY DAY    Cetirizine HCl 10 MG Oral Cap Take 10 mg by mouth daily.    Psyllium (METAMUCIL FREE & NATURAL) 43 % Oral Powder Take 1 Scoop by mouth as needed.    metFORMIN  MG Oral Tablet 24 Hr Take 1 tablet (500 mg total) by mouth daily with food.    metoprolol tartrate 25 MG Oral Tab Take 1 tablet (25 mg total) by mouth 2 (two) times daily.    verapamil  MG Oral Tab CR Take 1 tablet (240 mg total) by mouth nightly.    alendronate 70 MG Oral Tab Take 1 tablet (70 mg total) by mouth once a week.    cholecalciferol 50 MCG (2000 UT) Oral Cap Take 1 capsule (2,000 Units total) by mouth daily.    Multiple Vitamins-Minerals (CENTRUM SILVER) Oral Tab Take 1 tablet by mouth daily.       Review of Systems:  GENERAL: weight stable, energy stable, no sweating  SKIN: denies any unusual skin lesions  EYES: denies blurred vision or double vision  HEENT: denies nasal congestion, sinus pain or ST  LUNGS: denies shortness of breath with exertion  CARDIOVASCULAR: denies chest pain on exertion or palpitations. Swelling and bruising left upper extremity  GI: denies abdominal pain, denies heartburn, denies diarrhea  MUSCULOSKELETAL: denies pain, normal range of motion of extremities  NEURO: denies headaches, denies dizziness, denies weakness  PSYCHE: denies depression or anxiety. Tearful at times  HEMATOLOGIC: denies hx of anemia, or bruising, denies bleeding  ENDOCRINE: denies thyroid history  ALL/ASTHMA: denies hx of allergy or  asthma    Objective:   No LMP recorded. Patient is postmenopausal.  Estimated body mass index is 36.33 kg/m² as calculated from the following:    Height as of this encounter: 5' 4.5\" (1.638 m).    Weight as of this encounter: 215 lb (97.5 kg).   /74 (BP Location: Right arm, Patient Position: Sitting, Cuff Size: large)   Pulse 85   Ht 5' 4.5\" (1.638 m)   Wt 215 lb (97.5 kg)   SpO2 95%   BMI 36.33 kg/m²    GENERAL: well developed, well nourished, in no apparent distress  SKIN: no rashes, no suspicious lesions  HEENT: atraumatic, normocephalic, ears and throat are clear  EYES: PERRLA, EOMI, conjunctiva are clear  NECK: supple, no adenopathy, no bruits  CHEST: no chest tenderness  LUNGS: clear to auscultation  CARDIO: RRR without murmur  GI: good BS's, no masses, HSM or tenderness  MUSCULOSKELETAL: back is not tender, FROM of the extremities  EXTREMITIES: no cyanosis, clubbing. Has bruising and swelling LUE. Good pulses, good rom  NEURO: Oriented times three, cranial nerves are intact, motor and sensory are grossly intact    Assessment & Plan:   1. SVC syndrome (Primary)  2. Venous thrombosis of upper extremity  3. Acute embolism and thrombosis of left subclavian vein (HCC)  4. Thrombosis of brachiocephalic vein (HCC)  5. Hyponatremia  -     CBC With Differential With Platelet; Future; Expected date: 02/21/2025  -     Basic Metabolic Panel (8); Future; Expected date: 02/21/2025  -     Magnesium; Future; Expected date: 02/21/2025  -     Phosphorus; Future; Expected date: 02/21/2025  6. Prolonged QT interval  -     CBC With Differential With Platelet; Future; Expected date: 02/21/2025  -     Basic Metabolic Panel (8); Future; Expected date: 02/21/2025  -     Magnesium; Future; Expected date: 02/21/2025  -     Phosphorus; Future; Expected date: 02/21/2025  7. Squamous cell carcinoma of right lung (HCC)  -     CBC With Differential With Platelet; Future; Expected date: 02/21/2025  -     Basic Metabolic Panel (8);  Future; Expected date: 02/21/2025  -     Magnesium; Future; Expected date: 02/21/2025  -     Phosphorus; Future; Expected date: 02/21/2025    1. SVC syndrome  Patient doing well status post stenting.    Continue anticoagulation with Lovenox, eventual transition to new oral anticoagulation agent.    The port will likely be removed in the short-term.  Follow up Dr Goodson    2. Venous thrombosis of upper extremity  On lovenox bid  No issues   Transition to oral AC with heme outpatient    3. Acute embolism and thrombosis of left subclavian vein (HCC)  Vacuumed out, stent placed.     4. Thrombosis of brachiocephalic vein (HCC)  Vacuumed out, stent placed.     5. Hyponatremia  It appears not be SIADH but appears to be from overdrinking water  urine sodium is very low but the osmolarity of the urine is high.   Seems to be better with salt tablets.   Check BMP, Phos, and Mag     6. Prolonged QT interval  Work up appeared negative  Likely related to lead placement or electrolytes  Keep potassium and magnesium normalized.   Follow up with Dr. Lees or Dr. Macdonald and check EKG.    7. Squamous cell carcinoma of right lung (HCC)  Outpt management with Dr Goodson  Weaned off O2 in hospital  Passed O2 test        Return in about 3 months (around 5/21/2025).

## 2025-02-21 ENCOUNTER — OFFICE VISIT (OUTPATIENT)
Dept: FAMILY MEDICINE CLINIC | Facility: CLINIC | Age: 75
End: 2025-02-21
Payer: MEDICARE

## 2025-02-21 ENCOUNTER — LAB ENCOUNTER (OUTPATIENT)
Dept: LAB | Age: 75
End: 2025-02-21
Payer: MEDICARE

## 2025-02-21 VITALS
HEART RATE: 85 BPM | WEIGHT: 215 LBS | HEIGHT: 64.5 IN | DIASTOLIC BLOOD PRESSURE: 74 MMHG | OXYGEN SATURATION: 95 % | BODY MASS INDEX: 36.26 KG/M2 | SYSTOLIC BLOOD PRESSURE: 137 MMHG

## 2025-02-21 DIAGNOSIS — C34.91 SQUAMOUS CELL CARCINOMA OF RIGHT LUNG (HCC): ICD-10-CM

## 2025-02-21 DIAGNOSIS — I82.609 VENOUS THROMBOSIS OF UPPER EXTREMITY: ICD-10-CM

## 2025-02-21 DIAGNOSIS — I87.1 SVC SYNDROME: Primary | ICD-10-CM

## 2025-02-21 DIAGNOSIS — E87.1 HYPONATREMIA: ICD-10-CM

## 2025-02-21 DIAGNOSIS — R94.31 PROLONGED QT INTERVAL: ICD-10-CM

## 2025-02-21 DIAGNOSIS — I82.290: ICD-10-CM

## 2025-02-21 DIAGNOSIS — I82.B12 ACUTE EMBOLISM AND THROMBOSIS OF LEFT SUBCLAVIAN VEIN (HCC): ICD-10-CM

## 2025-02-21 LAB
ANION GAP SERPL CALC-SCNC: 7 MMOL/L (ref 0–18)
BASOPHILS # BLD AUTO: 0.06 X10(3) UL (ref 0–0.2)
BASOPHILS NFR BLD AUTO: 0.4 %
BUN BLD-MCNC: 23 MG/DL (ref 9–23)
BUN/CREAT SERPL: 31.5 (ref 10–20)
CALCIUM BLD-MCNC: 9.5 MG/DL (ref 8.7–10.4)
CHLORIDE SERPL-SCNC: 95 MMOL/L (ref 98–112)
CO2 SERPL-SCNC: 28 MMOL/L (ref 21–32)
CREAT BLD-MCNC: 0.73 MG/DL
DEPRECATED RDW RBC AUTO: 51.8 FL (ref 35.1–46.3)
EGFRCR SERPLBLD CKD-EPI 2021: 87 ML/MIN/1.73M2 (ref 60–?)
EOSINOPHIL # BLD AUTO: 0.14 X10(3) UL (ref 0–0.7)
EOSINOPHIL NFR BLD AUTO: 1 %
ERYTHROCYTE [DISTWIDTH] IN BLOOD BY AUTOMATED COUNT: 18.4 % (ref 11–15)
FASTING STATUS PATIENT QL REPORTED: NO
GLUCOSE BLD-MCNC: 105 MG/DL (ref 70–99)
HCT VFR BLD AUTO: 33.9 %
HGB BLD-MCNC: 10.3 G/DL
IMM GRANULOCYTES # BLD AUTO: 0.06 X10(3) UL (ref 0–1)
IMM GRANULOCYTES NFR BLD: 0.4 %
LYMPHOCYTES # BLD AUTO: 1.65 X10(3) UL (ref 1–4)
LYMPHOCYTES NFR BLD AUTO: 11.7 %
MAGNESIUM SERPL-MCNC: 1.8 MG/DL (ref 1.6–2.6)
MCH RBC QN AUTO: 24.5 PG (ref 26–34)
MCHC RBC AUTO-ENTMCNC: 30.4 G/DL (ref 31–37)
MCV RBC AUTO: 80.7 FL
MONOCYTES # BLD AUTO: 1.07 X10(3) UL (ref 0.1–1)
MONOCYTES NFR BLD AUTO: 7.6 %
NEUTROPHILS # BLD AUTO: 11.16 X10 (3) UL (ref 1.5–7.7)
NEUTROPHILS # BLD AUTO: 11.16 X10(3) UL (ref 1.5–7.7)
NEUTROPHILS NFR BLD AUTO: 78.9 %
OSMOLALITY SERPL CALC.SUM OF ELEC: 274 MOSM/KG (ref 275–295)
PHOSPHATE SERPL-MCNC: 3.1 MG/DL (ref 2.4–5.1)
PLATELET # BLD AUTO: 390 10(3)UL (ref 150–450)
POTASSIUM SERPL-SCNC: 4.3 MMOL/L (ref 3.5–5.1)
RBC # BLD AUTO: 4.2 X10(6)UL
SODIUM SERPL-SCNC: 130 MMOL/L (ref 136–145)
WBC # BLD AUTO: 14.1 X10(3) UL (ref 4–11)

## 2025-02-21 PROCEDURE — 83735 ASSAY OF MAGNESIUM: CPT

## 2025-02-21 PROCEDURE — 36415 COLL VENOUS BLD VENIPUNCTURE: CPT

## 2025-02-21 PROCEDURE — 85025 COMPLETE CBC W/AUTO DIFF WBC: CPT

## 2025-02-21 PROCEDURE — 80048 BASIC METABOLIC PNL TOTAL CA: CPT

## 2025-02-21 PROCEDURE — 84100 ASSAY OF PHOSPHORUS: CPT

## 2025-02-25 ENCOUNTER — OFFICE VISIT (OUTPATIENT)
Dept: PULMONOLOGY | Facility: CLINIC | Age: 75
End: 2025-02-25
Payer: MEDICARE

## 2025-02-25 VITALS
HEIGHT: 64 IN | SYSTOLIC BLOOD PRESSURE: 141 MMHG | DIASTOLIC BLOOD PRESSURE: 62 MMHG | RESPIRATION RATE: 18 BRPM | HEART RATE: 87 BPM | BODY MASS INDEX: 36.7 KG/M2 | WEIGHT: 215 LBS | OXYGEN SATURATION: 96 %

## 2025-02-25 DIAGNOSIS — I87.1 SVC SYNDROME: Primary | ICD-10-CM

## 2025-02-25 PROCEDURE — 1111F DSCHRG MED/CURRENT MED MERGE: CPT | Performed by: INTERNAL MEDICINE

## 2025-02-25 PROCEDURE — 1159F MED LIST DOCD IN RCRD: CPT | Performed by: INTERNAL MEDICINE

## 2025-02-25 PROCEDURE — 1126F AMNT PAIN NOTED NONE PRSNT: CPT | Performed by: INTERNAL MEDICINE

## 2025-02-25 PROCEDURE — 99213 OFFICE O/P EST LOW 20 MIN: CPT | Performed by: INTERNAL MEDICINE

## 2025-02-25 NOTE — PROGRESS NOTES
Referring Physician  Tammy Chan MD    History of Present Illness  Patient seen today for follow-up visit to pulmonary clinic after recent hospitalization with SVC syndrome.  And facial swelling improved but does admit to lower extremity and upper extremity edema.  Denies significant cough or wheezing.    Medications  Medications Ordered Prior to Encounter[1]    Allergies  Allergies[2]    Physical Exam  Constitutional: no acute distress  HEENT: PERRL  Neck: supple, no JVD  Cardio: RRR, S1 S2  Respiratory: clear to auscultation bilaterally, no wheezing, rales, rhonchi, crackles  GI: abdomen soft, non tender, active bowel sounds, no organomegaly  Extremities: no clubbing, cyanosis, + lower extremity edema edema  Neurologic: no gross motor deficits  Skin: warm, dry  Lymphatic: no supraclavicular lymphadenopathy     Assessment  1.  Squamous cell lung cancer  2.  SVC syndrome status post stenting    Plan  -Patient presents today for follow-up visit after recent hospitalizations February 2025 with hospitalization for SVC syndrome and left innominate/subclavian thrombectomy with SVC stent placement on 2/13/2025 with clinical improvement  -Recent diagnosis of squamous cell lung cancer with right upper lobe mass.  Further treatment per oncology and radiation oncology      Gabrielle Garrido DO  Pulmonary Critical Care Medicine  [facility]  2/25/2025  10:32 AM        [1]   Current Outpatient Medications on File Prior to Visit   Medication Sig Dispense Refill    acetaminophen 500 MG Oral Tab Take 1 tablet (500 mg total) by mouth every 4 (four) hours as needed.      fluticasone propionate 50 MCG/ACT Nasal Suspension 2 sprays by Each Nare route daily. 1 each 0    guaiFENesin 100 MG/5 ML Oral Take 10 mL (200 mg total) by mouth every 4 (four) hours as needed. (Patient not taking: Reported on 2/21/2025) 300 mL 0    lidocaine-menthol 4-1 % External Patch Place 2 patches onto the skin daily. (Patient not taking: Reported on  2/21/2025) 30 patch 0    sodium chloride 1 g Oral Tab Take 1 tablet (1 g total) by mouth 2 (two) times daily with meals. 20 tablet 0    potassium chloride 10 MEQ Oral Tab CR Take 2 tablets (20 mEq total) by mouth daily for 10 doses. 20 tablet 0    enoxaparin 100 MG/ML Injection Solution Prefilled Syringe Inject 0.94 mL (94 mg total) into the skin every 12 (twelve) hours for 42 doses. 39.48 mL 0    prochlorperazine (COMPAZINE) 10 mg tablet Take 1 tablet (10 mg total) by mouth every 6 (six) hours as needed for Nausea. 30 tablet 3    ondansetron (ZOFRAN) 8 MG tablet Take 1 tablet (8 mg total) by mouth every 8 (eight) hours as needed for Nausea. 30 tablet 3    Spironolactone-HCTZ 25-25 MG Oral Tab Take 1 tablet by mouth daily. 90 tablet 3    atorvastatin 20 MG Oral Tab TAKE 1 TABLET (20MG) BY MOUTH EVERY DAY 90 tablet 3    Cetirizine HCl 10 MG Oral Cap Take 10 mg by mouth daily.      Psyllium (METAMUCIL FREE & NATURAL) 43 % Oral Powder Take 1 Scoop by mouth as needed.      metFORMIN  MG Oral Tablet 24 Hr Take 1 tablet (500 mg total) by mouth daily with food. 90 tablet 3    metoprolol tartrate 25 MG Oral Tab Take 1 tablet (25 mg total) by mouth 2 (two) times daily. 180 tablet 3    verapamil  MG Oral Tab CR Take 1 tablet (240 mg total) by mouth nightly. 90 tablet 3    alendronate 70 MG Oral Tab Take 1 tablet (70 mg total) by mouth once a week. 12 tablet 3    cholecalciferol 50 MCG (2000 UT) Oral Cap Take 1 capsule (2,000 Units total) by mouth daily.      Glucose Blood (ACCU-CHEK ADITYA PLUS) In Vitro Strip USE ONCE DAILY IN THE MORNING BEFORE BREAKFAST (Patient not taking: Reported on 2/21/2025) 100 strip 3    Blood Glucose Monitoring Suppl (ACCU-CHEK ADITYA PLUS) w/Device Does not apply Kit USE ONCE DAILY IN AM BEFORE BREAKFAST (Patient not taking: Reported on 2/21/2025) 1 kit 0    LANCETS ULTRA THIN Does not apply Misc Use one daily (Patient not taking: Reported on 2/21/2025) 100 each 6    Multiple  Vitamins-Minerals (CENTRUM SILVER) Oral Tab Take 1 tablet by mouth daily.       No current facility-administered medications on file prior to visit.   [2]   Allergies  Allergen Reactions    Erythromycin DIARRHEA     Stomach pain

## 2025-03-01 ENCOUNTER — APPOINTMENT (OUTPATIENT)
Dept: RADIATION ONCOLOGY | Facility: HOSPITAL | Age: 75
End: 2025-03-01
Attending: RADIOLOGY
Payer: MEDICARE

## 2025-03-02 DIAGNOSIS — I10 ESSENTIAL HYPERTENSION, BENIGN: Chronic | ICD-10-CM

## 2025-03-03 ENCOUNTER — NURSE ONLY (OUTPATIENT)
Age: 75
End: 2025-03-03
Attending: INTERNAL MEDICINE
Payer: MEDICARE

## 2025-03-03 DIAGNOSIS — C34.11 MALIGNANT NEOPLASM OF UPPER LOBE OF RIGHT LUNG (HCC): ICD-10-CM

## 2025-03-03 LAB
ALBUMIN SERPL-MCNC: 4.1 G/DL (ref 3.2–4.8)
ALBUMIN/GLOB SERPL: 1.2 {RATIO} (ref 1–2)
ALP LIVER SERPL-CCNC: 63 U/L
ALT SERPL-CCNC: 22 U/L
ANION GAP SERPL CALC-SCNC: 6 MMOL/L (ref 0–18)
AST SERPL-CCNC: 18 U/L (ref ?–34)
BASOPHILS # BLD AUTO: 0.06 X10(3) UL (ref 0–0.2)
BASOPHILS NFR BLD AUTO: 0.7 %
BILIRUB SERPL-MCNC: 0.4 MG/DL (ref 0.2–1.1)
BUN BLD-MCNC: 17 MG/DL (ref 9–23)
BUN/CREAT SERPL: 27.9 (ref 10–20)
CALCIUM BLD-MCNC: 9.6 MG/DL (ref 8.7–10.4)
CHLORIDE SERPL-SCNC: 95 MMOL/L (ref 98–112)
CO2 SERPL-SCNC: 26 MMOL/L (ref 21–32)
CREAT BLD-MCNC: 0.61 MG/DL
DEPRECATED RDW RBC AUTO: 54.5 FL (ref 35.1–46.3)
EGFRCR SERPLBLD CKD-EPI 2021: 95 ML/MIN/1.73M2 (ref 60–?)
EOSINOPHIL # BLD AUTO: 0.14 X10(3) UL (ref 0–0.7)
EOSINOPHIL NFR BLD AUTO: 1.5 %
ERYTHROCYTE [DISTWIDTH] IN BLOOD BY AUTOMATED COUNT: 18.8 % (ref 11–15)
GLOBULIN PLAS-MCNC: 3.3 G/DL (ref 2–3.5)
GLUCOSE BLD-MCNC: 102 MG/DL (ref 70–99)
HCT VFR BLD AUTO: 35.6 %
HGB BLD-MCNC: 11.1 G/DL
IMM GRANULOCYTES # BLD AUTO: 0.04 X10(3) UL (ref 0–1)
IMM GRANULOCYTES NFR BLD: 0.4 %
LYMPHOCYTES # BLD AUTO: 1.6 X10(3) UL (ref 1–4)
LYMPHOCYTES NFR BLD AUTO: 17.5 %
MCH RBC QN AUTO: 24.9 PG (ref 26–34)
MCHC RBC AUTO-ENTMCNC: 31.2 G/DL (ref 31–37)
MCV RBC AUTO: 80 FL
MONOCYTES # BLD AUTO: 0.7 X10(3) UL (ref 0.1–1)
MONOCYTES NFR BLD AUTO: 7.7 %
NEUTROPHILS # BLD AUTO: 6.58 X10 (3) UL (ref 1.5–7.7)
NEUTROPHILS # BLD AUTO: 6.58 X10(3) UL (ref 1.5–7.7)
NEUTROPHILS NFR BLD AUTO: 72.2 %
OSMOLALITY SERPL CALC.SUM OF ELEC: 266 MOSM/KG (ref 275–295)
PLATELET # BLD AUTO: 325 10(3)UL (ref 150–450)
POTASSIUM SERPL-SCNC: 4 MMOL/L (ref 3.5–5.1)
PROT SERPL-MCNC: 7.4 G/DL (ref 5.7–8.2)
RBC # BLD AUTO: 4.45 X10(6)UL
SODIUM SERPL-SCNC: 127 MMOL/L (ref 136–145)
WBC # BLD AUTO: 9.1 X10(3) UL (ref 4–11)

## 2025-03-03 RX ORDER — FAMOTIDINE 10 MG/ML
20 INJECTION, SOLUTION INTRAVENOUS ONCE
OUTPATIENT
Start: 2025-04-01

## 2025-03-03 RX ORDER — DIPHENHYDRAMINE HYDROCHLORIDE 50 MG/ML
25 INJECTION INTRAMUSCULAR; INTRAVENOUS ONCE
OUTPATIENT
Start: 2025-04-01

## 2025-03-03 RX ORDER — FAMOTIDINE 10 MG/ML
20 INJECTION, SOLUTION INTRAVENOUS ONCE
OUTPATIENT
Start: 2025-03-18

## 2025-03-03 RX ORDER — FAMOTIDINE 10 MG/ML
20 INJECTION, SOLUTION INTRAVENOUS ONCE
OUTPATIENT
Start: 2025-03-04

## 2025-03-03 RX ORDER — DIPHENHYDRAMINE HYDROCHLORIDE 50 MG/ML
25 INJECTION INTRAMUSCULAR; INTRAVENOUS ONCE
OUTPATIENT
Start: 2025-03-25

## 2025-03-03 RX ORDER — DIPHENHYDRAMINE HYDROCHLORIDE 50 MG/ML
25 INJECTION INTRAMUSCULAR; INTRAVENOUS ONCE
OUTPATIENT
Start: 2025-03-04

## 2025-03-03 RX ORDER — DIPHENHYDRAMINE HYDROCHLORIDE 50 MG/ML
25 INJECTION INTRAMUSCULAR; INTRAVENOUS ONCE
OUTPATIENT
Start: 2025-03-11

## 2025-03-03 RX ORDER — FAMOTIDINE 10 MG/ML
20 INJECTION, SOLUTION INTRAVENOUS ONCE
OUTPATIENT
Start: 2025-03-11

## 2025-03-03 RX ORDER — FAMOTIDINE 10 MG/ML
20 INJECTION, SOLUTION INTRAVENOUS ONCE
OUTPATIENT
Start: 2025-03-25

## 2025-03-03 RX ORDER — DIPHENHYDRAMINE HYDROCHLORIDE 50 MG/ML
25 INJECTION INTRAMUSCULAR; INTRAVENOUS ONCE
OUTPATIENT
Start: 2025-03-18

## 2025-03-03 NOTE — PROGRESS NOTES
[facility] Radiation Treatment Management Note 1-5    Patient:  Yi Torres  Age:  74 year old  Visit Diagnosis:    1. Malignant neoplasm of upper lobe of right lung (HCC)      Primary Rad/Onc:  Dr. Abdirizak Alvarado    Site Delivered Dose (cGy) Prescribed Dose (cGy) Fraction #    [id number] 1/35           First treatment date:  3/4/25  Concurrent chemotherapy:  OP CARBOplatin / PACLitaxel with RT         2/25/2025    10:46 AM 3/4/2025     9:29 AM 3/4/2025     1:11 PM   Oncology Vitals   Height 5' 4\"     Height 163 cm     Weight 215 lb  215 lb 6.4 oz   Weight 97.523 kg  97.705 kg   BSA (m2) 2.02 m2  2.02 m2   BMI 36.9 kg/m2  36.97 kg/m2   /62 127/85    Pulse 87 81    Resp 18 7 14   Temp  97.9 °F (36.6 °C)    SpO2 96 % 100 %    Pain Score  1 0        Toxicities:  Fatigue Grade 0= None  Dermatitis associated with radiation Grade 0= None- per patient   Moisturizer used: discussed moisturizer use  Dysphagia Grade 0= None  Dyspepsia Grade 0= None  Mucositis Grade 0= None  Nausea Grade 0= None  Vomiting Grade 0= None  Cough Grade 0= None  Dyspnea Grade 1= Shortness of breath with moderate exertion    Nursing Note:  Patient seen for OTV with Dr. Reyna.   Vitals taken in chemo.   Decreased appetite, drinks 1-2 protein shakes per day.   New chemo/RT start.     Recent Labs   Lab 02/21/25  0909 03/03/25  1109   WBC 14.1* 9.1   RBC 4.20 4.45   HGB 10.3* 11.1*   HCT 33.9* 35.6   .0 325.0   MCV 80.7 80.0   MCH 24.5* 24.9*   MCHC 30.4* 31.2   RDW 18.4* 18.8*   RDWSD 51.8* 54.5*   NEPRELIM 11.16* 6.58   NE 11.16* 6.58   LYMABS 1.65 1.60   MOABSO 1.07* 0.70   EOABSO 0.14 0.14   BAABSO 0.06 0.06   IMMGRAN 0.06 0.04   NEPERCENT 78.9 72.2   LYPERCENT 11.7 17.5   MOPERCENT 7.6 7.7   EOPERCENT 1.0 1.5   BAPERCENT 0.4 0.7   IMMGRANPERC 0.4 0.4      Wt Readings from Last 6 Encounters:   03/04/25 97.7 kg (215 lb 6.4 oz)   02/25/25 97.5 kg (215 lb)   02/21/25 97.5 kg (215 lb)   02/17/25 93.4 kg (206  lb)   02/13/25 93 kg (205 lb)   02/11/25 90.9 kg (200 lb 6.4 oz)     Adelina JOAQUIN RN    Physician Note:  Subjective:    New start  Objective:  NAD      Treatment setup imaging have been reviewed:  Yes    Assessment/Plan:    RUL lung ca, C/RT    Reviewed potential side effects      Continue radiotherapy per plan    Next visit:  1 week  Scar Reyna MD for   Dr. Abdirizak Alvarado    Oncology Chemo Meds          2/11/2025    09:15 2/14/2025 2/15/2025 2/16/2025 2/17/2025 Cycle 1; Day 1    3/4/2025   Oncology Flowsheet   Day, Cycle      Day 1, Cycle 1   CARBOplatin 450 mg/45mL (Paraplatin) IV      290 mg   dexAMETHasone 100 MG/10ML (Decadron) IV      New Bag (unknown dose, 20 mg ordered)   diphenhydrAMINE 50 mg/mL (Benadryl) IV      25 mg   enoxaparin 100 MG/ML (Lovenox) SC  1 mg/kg = 90 mg    1 mg/kg = 90 mg 1 mg/kg = 90 mg    1 mg/kg = 90 mg 1 mg/kg = 90 mg    1 mg/kg = 90 mg 1 mg/kg = 90 mg    ondansetron 4 MG/2ML (Zofran) IV      New Bag (unknown dose, 16 mg ordered)   PACLitaxel 300 mg/50mL (Taxol) IV      50 mg/m2 = 96 mg       +blood return   sodium chloride 0.9% IV New Bag (unknown dose)   New Bag (unknown dose) 250 mL 250 mL    250 mL       +blood return    New Bag (unknown dose, 100 mL ordered)      Details          Administered dose cannot be determined

## 2025-03-04 ENCOUNTER — OFFICE VISIT (OUTPATIENT)
Dept: RADIATION ONCOLOGY | Facility: HOSPITAL | Age: 75
End: 2025-03-04
Attending: RADIOLOGY
Payer: MEDICARE

## 2025-03-04 ENCOUNTER — OFFICE VISIT (OUTPATIENT)
Age: 75
End: 2025-03-04
Attending: INTERNAL MEDICINE
Payer: MEDICARE

## 2025-03-04 ENCOUNTER — NURSE ONLY (OUTPATIENT)
Age: 75
End: 2025-03-04
Attending: INTERNAL MEDICINE
Payer: MEDICARE

## 2025-03-04 ENCOUNTER — LAB REQUISITION (OUTPATIENT)
Dept: LAB | Facility: HOSPITAL | Age: 75
End: 2025-03-04
Payer: MEDICARE

## 2025-03-04 ENCOUNTER — SOCIAL WORK SERVICES (OUTPATIENT)
Age: 75
End: 2025-03-04

## 2025-03-04 VITALS — RESPIRATION RATE: 14 BRPM | WEIGHT: 215.38 LBS | BODY MASS INDEX: 37 KG/M2

## 2025-03-04 VITALS
DIASTOLIC BLOOD PRESSURE: 85 MMHG | HEART RATE: 81 BPM | TEMPERATURE: 98 F | SYSTOLIC BLOOD PRESSURE: 127 MMHG | RESPIRATION RATE: 7 BRPM | OXYGEN SATURATION: 100 %

## 2025-03-04 DIAGNOSIS — E87.1 HYPONATREMIA: ICD-10-CM

## 2025-03-04 DIAGNOSIS — C34.11 MALIGNANT NEOPLASM OF UPPER LOBE OF RIGHT LUNG (HCC): Primary | ICD-10-CM

## 2025-03-04 DIAGNOSIS — C34.11 MALIGNANT NEOPLASM OF UPPER LOBE, RIGHT BRONCHUS OR LUNG (HCC): ICD-10-CM

## 2025-03-04 DIAGNOSIS — C77.0 MALIGNANT NEOPLASM METASTATIC TO LYMPH NODE OF NECK (HCC): ICD-10-CM

## 2025-03-04 DIAGNOSIS — T85.868A: ICD-10-CM

## 2025-03-04 DIAGNOSIS — I87.1 SVC SYNDROME: ICD-10-CM

## 2025-03-04 DIAGNOSIS — R60.9 FLUID RETENTION: ICD-10-CM

## 2025-03-04 PROCEDURE — 77386 HC IMRT COMPLEX: CPT | Performed by: RADIOLOGY

## 2025-03-04 RX ORDER — FAMOTIDINE 10 MG/ML
INJECTION, SOLUTION INTRAVENOUS
Status: DISCONTINUED
Start: 2025-03-04 | End: 2025-03-04

## 2025-03-04 RX ORDER — FUROSEMIDE 20 MG/1
20 TABLET ORAL DAILY
Qty: 30 TABLET | Refills: 1 | Status: SHIPPED | OUTPATIENT
Start: 2025-03-04

## 2025-03-04 RX ORDER — FAMOTIDINE 10 MG/ML
20 INJECTION, SOLUTION INTRAVENOUS ONCE
Status: COMPLETED | OUTPATIENT
Start: 2025-03-04 | End: 2025-03-04

## 2025-03-04 RX ORDER — DIPHENHYDRAMINE HYDROCHLORIDE 50 MG/ML
25 INJECTION INTRAMUSCULAR; INTRAVENOUS ONCE
Status: COMPLETED | OUTPATIENT
Start: 2025-03-04 | End: 2025-03-04

## 2025-03-04 RX ORDER — DIPHENHYDRAMINE HYDROCHLORIDE 50 MG/ML
INJECTION INTRAMUSCULAR; INTRAVENOUS
Status: DISCONTINUED
Start: 2025-03-04 | End: 2025-03-04

## 2025-03-04 RX ADMIN — DIPHENHYDRAMINE HYDROCHLORIDE 25 MG: 50 INJECTION INTRAMUSCULAR; INTRAVENOUS at 09:03:00

## 2025-03-04 RX ADMIN — FAMOTIDINE 20 MG: 10 INJECTION, SOLUTION INTRAVENOUS at 09:04:00

## 2025-03-04 NOTE — PATIENT INSTRUCTIONS
Medication Education Record: IV Therapy  Recommended Anti-nausea medications (as directed by your provider):  Prochloperazine (Compazine) 10 mg every 6 hours and Ondansetron (Zofran) 8 mg every 8 hours  Take as needed    Helpful hints during cancer treatment:    Diet:  Avoid greasy or spicy foods on days surrounding chemotherapy  Eat small frequent meals per day (6-7 meals) rather than 3 large meals  Choose high calorie/high protein foods (chicken, hard cooked eggs, peanut butter, cheese)  If nauseated, try dry foods, such as toast, crackers or pretzels; light or bland foods, such as applesauce or oatmeal.    Fluid intake:  Drink 8-10 cups of liquid a day and take a water bottle wherever you go.  Any fluid is acceptable, but caffeinated products do not count towards your intake and should be limited to 1-2 drinks/day.    Physical Activity    If your doctor approves, be as physically active as you can, but start out slowly, and increase your activity over time as you feel stronger.  Listen to your body and rest when you need to.  Do what you can when you feel up to it.      Oral Care  Keep your mouth clean.  Rinse your mouth before and after meals with plain water or with a mild mouth rinse (made with 1 quart water, 1 teaspoon salt, and 1 teaspoon baking soda, shake before using)   Avoid commercial mouthwashes that contain alcohol, alcoholic or acidic drinks or tobacco  An acceptable mouthwash is Biotene®   If soreness or sores develop, contact the office.    Diarrhea or Constipation    Imodium A-D use for diarrhea:  Take 2 tabs (4mg) at the first sign of diarrhea; then take 1 tab (2mg) every 2 hours until you have had no diarrhea for at least 12 hours; during the night take 2 tabs (4mg) every 4 hours as needed.  Maximum of 8 tablets in 24 hours.                 Constipation: Over-the-counter recommendations include: Senokot, Ducolax, Milk of Magnesia or Miralax (generics are ok)    If you have persistent diarrhea or  constipation, please contact the triage nurse for further instructions.  Skin Care  Avoid direct sunlight.  Wear a broad-spectrum sunscreen with an SPF of 30 or higher on any skin exposed to the sun.  Re-apply every 2 hours if in the sun and after bathing or sweating.  For dry skin, use an alcohol-free lotion twice per day, especially after baths.  If scalp hair loss has occurred, protect the scalp from the sun by wearing a hat and use sunscreen.  Apply alcohol-free moisturizer as needed.    When to call the doctor:  Fever of 100.4 or greater or shaking chills  Nausea/vomiting not controlled with anti-nausea medications: Unable to drink for 24 hours or have signs of dehydration: tiredness, thirst, dry mouth, dark and decreased amount of urine  Diarrhea - not controlled with Imodium AD or more than 6 episodes in 24 hours  Constipation -no bowel movement x 3 days, no response to stool softeners or laxatives  Mouth sores, sore throat or blisters on the lips affecting oral intake  Difficulty breathing, chest pain, or new cough  Excessive tiredness or weakness, confusion or loss of balance  New rash  Tingling or burning, redness, swelling of the palms of hands or soles of feet  Sudden new unexpected symptom -such as change in vision, swelling in arm or leg  Increase in numbness and tingling of hands or feet  Unusual bleeding (nose bleeds, blood in urine, stool or phlegm)  Pain with urination  Persistent mood changes, depression, nervousness, difficulty sleeping   Pain, redness, swelling or blistering at the IV site  If you go to Emergency Room for any reason or seek medical attention elsewhere  If you should need to cancel or reschedule any visit, it is important that you contact the office    What Phone Number to Call:  Cancer Center [phone] / Triage Nurse    Safety and Handling of Chemotherapy  While you or your family member is receiving chemotherapy, whether in the clinic or at home, the following precautions  must be taken to lessen any exposure to the medication.     Handling Body Waste:   Caregivers must wear gloves if exposed to the patient’s blood, urine, stool or vomit.  Dispose of the used gloves after each use and wash hands with soap and water.  Any sheets or clothes soiled with the bodily fluids should be machine washed twice in hot water with regular laundry detergent.  Do not wash soiled garments with hands.  If the soiled garments cannot be washed right away, place them in a sealed plastic bag until they can be washed.   Absorbable undergarments, or any other items contaminated with chemotherapy, should be placed in a sealed plastic bag for disposal, separate from other trash.  Toilets should be flushed twice with the lid closed while taking this medication and for 48 hours after the last dose.  Wash your hands after using the toilet.  Wash any skin area that has come in contact with urine or stool.      Safety for my family and friends:  Due to safety concerns and the nature of this treatment environment, children are not permitted in the infusion center.  Please make appropriate arrangements.   Hugging and kissing is safe for you and your partner or family members.  You can visit, sit with, hug and kiss the children in your life.    You can be around pregnant women, though (if possible) they should not clean up any of your body fluids after you have treatment.   Sexual activity is safe while throughout treatment.  It is possible that traces of the oral chemotherapy may be present in vaginal fluid or semen for 48 hours after taking.  A condom should be used during this time.  Effective birth control should be used throughout treatment to prevent pregnancy while on these medications and for several months or years after therapy.  Chemotherapy can have harmful side effects to the fetus, especially in the first trimester.  In addition, menstrual cycles can become irregular during and after treatment, so you may  not know if you are at a time in your cycle when you could become pregnant or if you are actually pregnant.

## 2025-03-04 NOTE — PROGRESS NOTES
SW completed an assessment with pt to provide support and encouragement due to new chemo starting today.        met with patient and spouse for introduction and role explanation. Patient scored 4 on Distress Screening, with emotional (worry/anxiety) and physical (taking care of self) concerns     Patient lives in [place] with her hsb [employer].  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  Pt questioned the need to see dietician, and disclosed that she has lost over 120 lbs over the past 2 years..      educated on FMLA/STD benefits, encouraging patient/family to confirm benefits with employer if needed. Provided Forms Department flyer as well for any paperwork that may be required.      educated on Power of  for Healthcare, providing document for review; Patient is aware to reach out if they choose to complete.      Educated on available resources, providing Social Work Support Packet including Cancer Center Support Services, [facility], Transportation, and Home Care contacts. Educated on BHI if desired. Contact provided and family is aware to reach out with any needs. Bringing Sabina Bag given, which patient was grateful for.      [email or web address]    Merissa ELLIS, CINTHYAW  [facility] Licensed Clinical

## 2025-03-04 NOTE — PROGRESS NOTES
Pt here for C 1D1 Drug(s)Taxol Carbo       .  Arrives Via wheelchair, accompanied by Spouse     Patient was evaluated today by Treatment Nurse.    Oral medications included in this regimen:  no    Patient confirms comprehension of cancer treatment schedule:  yes    Pregnancy screening:  Not applicable    Modifications in dose or schedule:  No    Medications appearance and physical integrity checked by RN: yes.    Chemotherapy IV pump settings verified by 2 RNs:  Yes.  Frequency of blood return and site check throughout administration: Prior to administration, Prior to each drug, and At completion of therapy     Infusion/treatment outcome:  patient tolerated treatment without incident    Education Record    Learner:  Spouse  Barriers / Limitations:  None  Method:  Discussion and Reinforcement  Education / instructions given:  review medication profile, possible SE, ways to prevent and mange them, when to contact MD  Outcome:  Shows understanding    Discharged Home, Via wheelchair, accompanied by:Spouse    Patient/family verbalized understanding of future appointments: by printed AVS

## 2025-03-04 NOTE — PROGRESS NOTES
[facility] Office Note    Diagnosis  1. Malignant neoplasm of upper lobe of right lung (HCC)    2. 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 SVC syndrome    4. Hyponatremia    5. Fluid retention    6. Thrombosis due to vascular catheter      Current Therapy  S/p SVC stenting  Starting concurrent chemo-RT with carbo/taxol week 1 - 3/4/25    INTERVAL HISTORY  She returns for follow-up after her recent hospitalization.  Continues to have some exertional dyspnea.  Started radiation this week and he had to start her first cycle of carboplatin paclitaxel.  She is anxious.  Has increased lower extremity edema.  Labs also showed mild hyponatremia.  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  Recall she is s/p thrombectomy.  She does have a port in situ    Past Oncologic History  1/9/25 she had complained of increasing dyspnea for about 3 months.  Saw PCP and underwent a CT chest that showed a very large centrally necrotic right upper lobe lung mass measuring 8.2 x 7.4 x 10.8 cm with extension to the right paratracheal upper mediastinum.  Extensive right upper lobe bronchovascular structure encasement.  Mildly enlarged right paratracheal mediastinal lymph nodes bilateral adrenal nodules were noted.     1/17/2025 PET/CT showed a 9 x 8 cm hypermetabolic mass within the right upper lobe extending into the mediastinum and right hilum peripherally hypermetabolic and centrally cystic and necrotic.  Effacement of the IVC.  Subcentimeter pulmonary nodules bilaterally were not FDG avid  Hypermetabolic lymph nodes within the anterior superior mediastinum subcarinal right paratracheal area and right clavicle chains were all compatible with metastatic disease.     1/22/25 she underwent a robotic navigational bronchoscopy with EBUS bx of RUL mass and  TBNA of lymph node station 7.  BAL from the right upper lobe was consistent with squamous cell carcinoma as was the  needle biopsy from the right upper lobe mass.  EBUS sampling from station node 7 showed no malignant cells.  Lymphocytes were present. PD-L1 TPS was 0, EGFR was negative, ALK is pending      2/13/2025 She had a port placed  and then developed increasing redness and swelling as well as inability to lie flat.  She was seen in the oncology clinic earlier today and referred to the ED.  CT chest showed thrombus within the left brachiocephalic and left subclavian vein which is new since prior study. The right upper lobe mass is causing circumferential encasement and as well as invasion and narrowing of the SVC which is completely obliterated    2/14 she underwent SVC stenting as well as thrombectomy from left subclavian vein on 2/13/2025.  Her port was still functional.  She is now on enoxaparin.  She is feeling much better.  Her dyspnea has improved.  No longer has any postural flushing congestion.    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     Cholecystectomy      Laparoscopic incisional / umbilical / ventral hernia repair  09/19/2024       Current Medications:   [COMPLETED] ondansetron (Zofran) 16 mg, dexAMETHasone (Decadron) 20 mg in sodium chloride 0.9% 110 mL IVPB   Intravenous  Once    [COMPLETED] diphenhydrAMINE (Benadryl) 50 mg/mL  injection 25 mg  25 mg Intravenous Once    [COMPLETED] famotidine (Pepcid) 20 mg/2mL injection 20 mg  20 mg Intravenous Once    PACLitaxel (Taxol) 96 mg in sodium chloride 0.9% 266 mL infusion  50 mg/m2 (Treatment Plan Recorded) Intravenous Once    CARBOplatin (Paraplatin) 290 mg in sodium chloride 0.9% 279 mL infusion (by AUC)  290 mg Intravenous Once    diphenhydrAMINE (Benadryl) 50 mg/mL  injection        famotidine (Pepcid) 20 mg/2mL injection           Allergies:   Allergies[1]    Family Medical History:  Family History   Problem Relation Age of Onset    Hypertension Father     Stroke Father     Heart Attack Mother        Social History:  Social History     Socioeconomic History    Marital status:      Spouse name: Not on file    Number of children: 1    Years of education: Not on file    Highest education level: Not on file   Occupational History    Not on file   Tobacco Use    Smoking status: Former     Current packs/day: 0.00     Average packs/day: 0.5 packs/day for 45.0 years (22.5 ttl pk-yrs)     Types: Cigarettes     Start date: 1973     Quit date: 2018     Years since quittin.0     Passive exposure: Past    Smokeless tobacco: Never    Tobacco comments:     1 pack every 3 days   Vaping Use    Vaping status: Never Used   Substance and Sexual Activity    Alcohol use: Not Currently     Comment: very rarely    Drug use: No    Sexual activity: Not on file   Other Topics Concern     Service Not Asked    Blood Transfusions Not Asked    Caffeine Concern Yes     Comment: Coffee, 2 cups per day     Occupational Exposure Not Asked    Hobby Hazards Not Asked    Sleep Concern Not Asked    Stress Concern Not Asked    Weight Concern Not Asked    Special Diet Not Asked    Back Care Not Asked    Exercise Not Asked    Bike Helmet Not Asked    Seat Belt Not Asked    Self-Exams Not Asked    Grew up on a farm Not Asked    History of tanning  No    Outdoor occupation Not Asked    Breast feeding Not Asked    Reaction to local anesthetic No    Left Handed No    Right Handed Yes    Currently spends a great deal of time in the sun No    Past Sunlamp Treatments for Acne Not Asked    Hx of Spending Great Deal of Time in Sun No    Bad sunburns in the past Yes    Tanning Salons in the Past No    Hx of Radiation Treatments No    Regular use of sun block Not Asked   Social History Narrative    - lives with     1 son      Social Drivers of Health     Food Insecurity: No Food Insecurity (2025)    NCSS - Food Insecurity     Worried About Running Out of Food in the Last Year: No     Ran Out of Food in the Last Year: No   Transportation Needs: No Transportation Needs (2025)    NCSS - Transportation     Lack of Transportation: No   Housing Stability: Not At Risk (2025)    NCSS - Housing/Utilities     Has Housing: Yes     Worried About Losing Housing: No     Unable to Get Utilities: No       Gyn History:  OB History    Para Term  AB Living   1 1 0 0 0 0   SAB IAB Ectopic Multiple Live Births   0 0 0 0 0       Objective:    Physical Exam:  General: A&Ox3, NAD  HEENT: PERRL, OP clear  Face flushing has resolved  Neck: supple,   Erythema, swelling left IJ vein : Has improved.  Left s/c port site swelling is better  CV: RRR, no murmurs, + pulses  Pulm: CTA b/l, no w/r/r, normal effort  Neurological: Grossly intact    Labs:  Lab Results   Component Value Date/Time    WBC 9.1 2025 11:09 AM    RBC 4.45 2025 11:09 AM    HGB 11.1 (L) 2025 11:09 AM    HCT 35.6 2025 11:09 AM    MCV 80.0 2025 11:09 AM    MCH 24.9 (L) 2025 11:09 AM    MCHC 31.2 2025 11:09 AM    RDW 18.8 (H) 2025 11:09 AM    NEPRELIM 6.58 2025 11:09 AM    .0 2025 11:09 AM       Lab Results   Component Value Date/Time     (H) 2025 11:09 AM    BUN 17 2025 11:09 AM    CREATSERUM 0.61 2025  11:09 AM    GFRNAA 45 (L) 06/13/2022 08:02 AM    CA 9.6 03/03/2025 11:09 AM    ALB 4.1 03/03/2025 11:09 AM     (L) 03/03/2025 11:09 AM    K 4.0 03/03/2025 11:09 AM    CL 95 (L) 03/03/2025 11:09 AM    CO2 26.0 03/03/2025 11:09 AM    ALKPHO 63 03/03/2025 11:09 AM    AST 18 03/03/2025 11:09 AM    ALT 22 03/03/2025 11:09 AM       Imaging:         Assessment & Plan:    Yi Torres is a 74 year old female with locally advanced Rt lung cancer, with mediastinal invasion, now with SVC syndrome and left subclavian thrombus    # s/p SVC stenting and left thrombectomy, with symptomatic improvement. Now on lovenox, will transition to apixiban soon    # Proceed with concurrent chemo-RT with weekly carbo/taxol. 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 Please do contact me if I may be of any further assistance    José Miguel Goodson MD  [facility]   [facility]    This note was created using a voice-recognition transcribing system. Incorrect words or phrases may have been missed during proofreading. Please interpret accordingly.    High complexity MDM. Our clinic is continuing focal point for all oncologic services the patient needs.         [1]   Allergies  Allergen Reactions    Erythromycin DIARRHEA     Stomach pain

## 2025-03-05 ENCOUNTER — DIETICIAN VISIT (OUTPATIENT)
Dept: NUTRITION | Facility: HOSPITAL | Age: 75
End: 2025-03-05

## 2025-03-05 ENCOUNTER — TELEPHONE (OUTPATIENT)
Age: 75
End: 2025-03-05

## 2025-03-05 VITALS — BODY MASS INDEX: 37 KG/M2 | WEIGHT: 214.38 LBS

## 2025-03-05 PROCEDURE — 77386 HC IMRT COMPLEX: CPT | Performed by: RADIOLOGY

## 2025-03-05 RX ORDER — VERAPAMIL HYDROCHLORIDE 240 MG/1
240 TABLET, FILM COATED, EXTENDED RELEASE ORAL NIGHTLY
Qty: 90 TABLET | Refills: 3 | OUTPATIENT
Start: 2025-03-05

## 2025-03-05 NOTE — PROGRESS NOTES
Oncology Nutrition Assessment    Ht Readings from Last 1 Encounters:   02/25/25 162.6 cm (5' 4\")       Wt Readings from Last 1 Encounters:   03/05/25 97.3 kg (214 lb 6.4 oz)     BMI Calculated:  Body mass index is 36.8 kg/m².  Weight History:    Wt Readings from Last 10 Encounters:   03/05/25 97.3 kg (214 lb 6.4 oz)   03/04/25 97.7 kg (215 lb 6.4 oz)   02/25/25 97.5 kg (215 lb)   02/21/25 97.5 kg (215 lb)   02/17/25 93.4 kg (206 lb)   02/13/25 93 kg (205 lb)   02/11/25 90.9 kg (200 lb 6.4 oz)   02/07/25 92 kg (202 lb 12.8 oz)   01/29/25 91.6 kg (202 lb)   01/29/25 91.2 kg (201 lb)     IBW:  120#  UBW:  has lost wt in the past and likes to stay around 200#. Recent gain with edema and SVC.    Diagnoses:  left lung cancer with chemo and RT  Significant Medical History:   has a past medical history of Anesthesia complication, Anxiety state, Cancer (HCC), Cataract, Diabetes (HCC), Diabetes mellitus (HCC), Essential hypertension, High blood pressure, High cholesterol, Incontinence, Obesity, unspecified, Osteoarthritis, Renal disorder, Squamous cell carcinoma in situ (SCCIS) (2018), and Visual impairment.   Diet History:  lower sugar diet--A1C 5.8  Oral Liquid Supplement Use:  boost low sugar high protein-1 g sugar, 160 calories and 30 g protein most days.      Appetite: fair    Nutritional Physical History:   overweight per visual exam, edema masking true wt.    GI Problems:   GI intact, mild constipation at times.    Adequacy of PO Intake:  fair to good    Nutrition Risk Status:  moderate nutritional risk  Risk Status Based On:  potential treatment side effects and decreased appetite.     Nutritional Goals:  aid in management of treatment side effects via dietary measures and wt loss due to fluid losses ok.   Handouts Provided:  None at this time    Weekly Visits:  Nutritional Interventions:  3/5/2025  Discussed importance of good oral intake for wt maint during tx.  Urged adequate fluids. Balance rest and activity.  Pt  with hyponatremia and has been told to salt her foods. Being careful with this due to edema--MD monitoring labs.  Discussed potential tx side effects and discussed nutritional hints.  See weekly as schedule permits.  NANCY Ramos RD, LDN   Clinical Dietitian [id number]

## 2025-03-05 NOTE — TELEPHONE ENCOUNTER
Disp Refills Start End    verapamil  MG Oral Tab CR 90 tablet 3 6/3/2024 --    Sig - Route: Take 1 tablet (240 mg total) by mouth nightly. - Oral    Sent to pharmacy as: Verapamil HCl  MG Oral Tablet Extended Release ([facility]-SR)    E-Prescribing Status: Receipt confirmed by pharmacy (6/3/2024  3:57 PM CDT)      Associated Diagnoses    Essential hypertension, benign        Pharmacy    [id number] - [place], [place] - [address] AT [address] FROM [address], [phone], [phone]

## 2025-03-05 NOTE — TELEPHONE ENCOUNTER
Toxicities: C1 D1 Carboplatin/Paclitaxel with RT on 3/4/2025    Elva reports that she feels \"fine.\" She is not having any side effects at this time. I encouraged her to please call the office if she is not feeling well or she has any questions or concerns. She agreed and thanked me for checking on her.

## 2025-03-06 PROCEDURE — 77386 HC IMRT COMPLEX: CPT | Performed by: RADIOLOGY

## 2025-03-07 ENCOUNTER — NURSE ONLY (OUTPATIENT)
Age: 75
End: 2025-03-07
Attending: INTERNAL MEDICINE
Payer: MEDICARE

## 2025-03-07 DIAGNOSIS — C34.11 MALIGNANT NEOPLASM OF UPPER LOBE OF RIGHT LUNG (HCC): ICD-10-CM

## 2025-03-07 LAB
ANION GAP SERPL CALC-SCNC: 8 MMOL/L (ref 0–18)
BUN BLD-MCNC: 29 MG/DL (ref 9–23)
BUN/CREAT SERPL: 43.3 (ref 10–20)
CALCIUM BLD-MCNC: 9.3 MG/DL (ref 8.7–10.4)
CHLORIDE SERPL-SCNC: 97 MMOL/L (ref 98–112)
CO2 SERPL-SCNC: 28 MMOL/L (ref 21–32)
CREAT BLD-MCNC: 0.67 MG/DL
EGFRCR SERPLBLD CKD-EPI 2021: 93 ML/MIN/1.73M2 (ref 60–?)
GLUCOSE BLD-MCNC: 127 MG/DL (ref 70–99)
OSMOLALITY SERPL CALC.SUM OF ELEC: 283 MOSM/KG (ref 275–295)
POTASSIUM SERPL-SCNC: 3.6 MMOL/L (ref 3.5–5.1)
SODIUM SERPL-SCNC: 133 MMOL/L (ref 136–145)

## 2025-03-07 PROCEDURE — 77386 HC IMRT COMPLEX: CPT | Performed by: RADIOLOGY

## 2025-03-07 PROCEDURE — 77336 RADIATION PHYSICS CONSULT: CPT | Performed by: RADIOLOGY

## 2025-03-10 ENCOUNTER — OFFICE VISIT (OUTPATIENT)
Age: 75
End: 2025-03-10
Attending: INTERNAL MEDICINE
Payer: MEDICARE

## 2025-03-10 ENCOUNTER — NURSE ONLY (OUTPATIENT)
Age: 75
End: 2025-03-10
Attending: INTERNAL MEDICINE
Payer: MEDICARE

## 2025-03-10 VITALS
HEART RATE: 93 BPM | DIASTOLIC BLOOD PRESSURE: 75 MMHG | HEIGHT: 64.5 IN | BODY MASS INDEX: 36.01 KG/M2 | RESPIRATION RATE: 16 BRPM | OXYGEN SATURATION: 96 % | WEIGHT: 213.5 LBS | TEMPERATURE: 98 F | SYSTOLIC BLOOD PRESSURE: 142 MMHG

## 2025-03-10 DIAGNOSIS — C34.11 MALIGNANT NEOPLASM OF UPPER LOBE OF RIGHT LUNG (HCC): Primary | ICD-10-CM

## 2025-03-10 DIAGNOSIS — E87.6 HYPOKALEMIA: ICD-10-CM

## 2025-03-10 DIAGNOSIS — D64.9 ANEMIA, UNSPECIFIED TYPE: ICD-10-CM

## 2025-03-10 DIAGNOSIS — K59.09 OTHER CONSTIPATION: ICD-10-CM

## 2025-03-10 DIAGNOSIS — Z51.11 ENCOUNTER FOR ANTINEOPLASTIC CHEMOTHERAPY: ICD-10-CM

## 2025-03-10 DIAGNOSIS — Z45.2 ENCOUNTER FOR INSERTION OF TUNNELED CENTRAL VENOUS CATHETER (CVC) WITH PORT: ICD-10-CM

## 2025-03-10 LAB
ANION GAP SERPL CALC-SCNC: 8 MMOL/L (ref 0–18)
BASOPHILS # BLD AUTO: 0.04 X10(3) UL (ref 0–0.2)
BASOPHILS NFR BLD AUTO: 0.5 %
BUN BLD-MCNC: 33 MG/DL (ref 9–23)
BUN/CREAT SERPL: 54.1 (ref 10–20)
CALCIUM BLD-MCNC: 9.1 MG/DL (ref 8.7–10.4)
CHLORIDE SERPL-SCNC: 95 MMOL/L (ref 98–112)
CO2 SERPL-SCNC: 26 MMOL/L (ref 21–32)
CREAT BLD-MCNC: 0.61 MG/DL
DEPRECATED HBV CORE AB SER IA-ACNC: 380 NG/ML
DEPRECATED RDW RBC AUTO: 55.6 FL (ref 35.1–46.3)
EGFRCR SERPLBLD CKD-EPI 2021: 95 ML/MIN/1.73M2 (ref 60–?)
EOSINOPHIL # BLD AUTO: 0.07 X10(3) UL (ref 0–0.7)
EOSINOPHIL NFR BLD AUTO: 0.9 %
ERYTHROCYTE [DISTWIDTH] IN BLOOD BY AUTOMATED COUNT: 19.1 % (ref 11–15)
GLUCOSE BLD-MCNC: 111 MG/DL (ref 70–99)
HCT VFR BLD AUTO: 27.2 %
HGB BLD-MCNC: 8.4 G/DL
IMM GRANULOCYTES # BLD AUTO: 0.06 X10(3) UL (ref 0–1)
IMM GRANULOCYTES NFR BLD: 0.8 %
LYMPHOCYTES # BLD AUTO: 0.8 X10(3) UL (ref 1–4)
LYMPHOCYTES NFR BLD AUTO: 10.6 %
MAGNESIUM SERPL-MCNC: 1.8 MG/DL (ref 1.6–2.6)
MCH RBC QN AUTO: 25.3 PG (ref 26–34)
MCHC RBC AUTO-ENTMCNC: 30.9 G/DL (ref 31–37)
MCV RBC AUTO: 81.9 FL
MONOCYTES # BLD AUTO: 0.35 X10(3) UL (ref 0.1–1)
MONOCYTES NFR BLD AUTO: 4.6 %
NEUTROPHILS # BLD AUTO: 6.26 X10 (3) UL (ref 1.5–7.7)
NEUTROPHILS # BLD AUTO: 6.26 X10(3) UL (ref 1.5–7.7)
NEUTROPHILS NFR BLD AUTO: 82.6 %
OSMOLALITY SERPL CALC.SUM OF ELEC: 276 MOSM/KG (ref 275–295)
PLATELET # BLD AUTO: 283 10(3)UL (ref 150–450)
POTASSIUM SERPL-SCNC: 3.2 MMOL/L (ref 3.5–5.1)
RBC # BLD AUTO: 3.32 X10(6)UL
SODIUM SERPL-SCNC: 129 MMOL/L (ref 136–145)
WBC # BLD AUTO: 7.6 X10(3) UL (ref 4–11)

## 2025-03-10 PROCEDURE — 77386 HC IMRT COMPLEX: CPT | Performed by: RADIOLOGY

## 2025-03-10 RX ORDER — AMOXICILLIN 250 MG
2 CAPSULE ORAL DAILY
Qty: 30 TABLET | Refills: 1 | Status: SHIPPED | OUTPATIENT
Start: 2025-03-10

## 2025-03-10 RX ORDER — SODIUM CHLORIDE 9 MG/ML
10 INJECTION, SOLUTION INTRAMUSCULAR; INTRAVENOUS; SUBCUTANEOUS ONCE
OUTPATIENT
Start: 2025-03-10

## 2025-03-10 RX ORDER — POTASSIUM CHLORIDE 750 MG/1
10 TABLET, EXTENDED RELEASE ORAL 2 TIMES DAILY
Qty: 3 TABLET | Refills: 0 | Status: SHIPPED | OUTPATIENT
Start: 2025-03-10 | End: 2025-03-17

## 2025-03-10 RX ORDER — LIDOCAINE AND PRILOCAINE 25; 25 MG/G; MG/G
CREAM TOPICAL
Qty: 5 G | Refills: 1 | Status: SHIPPED | OUTPATIENT
Start: 2025-03-10

## 2025-03-10 NOTE — PROGRESS NOTES
[facility] Radiation Treatment Management Note 6-10    Patient:  Yi Torres  Age:  74 year old  Visit Diagnosis:    1. Malignant neoplasm of upper lobe of right lung (HCC)      Primary Rad/Onc:  Dr. Abdirizak Alvarado    Site Delivered Dose (cGy) Prescribed Dose (cGy) Fraction #   RUL [id number] 6/35           First treatment date:  3/4/25   Concurrent chemotherapy:  OP CARBOplatin / PACLitaxel with RT         3/10/2025     9:38 AM 3/11/2025     8:18 AM 3/11/2025    10:21 AM   Oncology Vitals   Height 5' 4.5\"     Height 164 cm     Weight 213 lb 8 oz     Weight 96.843 kg     BSA (m2) 2.02 m2     BMI 36.08 kg/m2     /75 135/52    Pulse 93 97    Resp 16 18    Temp 98.2 °F (36.8 °C) 98.3 °F (36.8 °C)    SpO2 96 % 94 %    Pain Score 7 7 2        Toxicities:  Fatigue Grade 0= None- at baseline   Dermatitis associated with radiation Grade 0= None  Moisturizer used  aveeno  Number of times: 1 times daily. Going to start increasing   Pruritis denies none  Dry Skin absent- per patient   Hyperpigmentation absent- per patient   Dysphagia Grade 0= None  Dyspepsia Grade 0= None  Mucositis Grade 0= None  Nausea Grade 0= None- mild. Takes compazine PRN.   Vomiting Grade 0= None  Cough Grade 1= Mild symptoms; nonprescription intervention indicated- due to postnasal drip   Dyspnea Grade 1= Shortness of breath with moderate exertion    Nursing Note:  Patient seen for OTV with Dr. Yony Alvarado.   Vitals taken in chemo- stable.  Mild nausea after chemo, takes compazine PRN   Pt has been wearing O2 during treatment, on room air otherwise.   Appetite a bit low, drinks 1-2 protein shakes per day.     Recent Labs   Lab 03/03/25  1109 03/10/25  0825   WBC 9.1 7.6   RBC 4.45 3.32*   HGB 11.1* 8.4*   HCT 35.6 27.2*   .0 283.0   MCV 80.0 81.9   MCH 24.9* 25.3*   MCHC 31.2 30.9*   RDW 18.8* 19.1*   RDWSD 54.5* 55.6*   NEPRELIM 6.58 6.26   NE 6.58 6.26   LYMABS 1.60 0.80*   MOABSO 0.70 0.35   EOABSO 0.14  0.07   BAABSO 0.06 0.04   IMMGRAN 0.04 0.06   NEPERCENT 72.2 82.6   LYPERCENT 17.5 10.6   MOPERCENT 7.7 4.6   EOPERCENT 1.5 0.9   BAPERCENT 0.7 0.5   IMMGRANPERC 0.4 0.8      Wt Readings from Last 6 Encounters:   03/10/25 96.8 kg (213 lb 8 oz)   03/05/25 97.3 kg (214 lb 6.4 oz)   03/04/25 97.7 kg (215 lb 6.4 oz)   02/25/25 97.5 kg (215 lb)   02/21/25 97.5 kg (215 lb)   02/17/25 93.4 kg (206 lb)     Adelina JOAQUIN RN    Physician Note:  Subjective:  Doing well.  No issues or c/o.  Tolerating first week of RT without issues.  Swallowing well.      Objective:  Unchanged      Treatment setup imaging have been reviewed:  Yes    Assessment/Plan:    Continue radiotherapy per plan    Next visit:  1 week    Dr. Abdirizak Alvarado

## 2025-03-11 ENCOUNTER — OFFICE VISIT (OUTPATIENT)
Age: 75
End: 2025-03-11
Attending: INTERNAL MEDICINE
Payer: MEDICARE

## 2025-03-11 ENCOUNTER — OFFICE VISIT (OUTPATIENT)
Dept: RADIATION ONCOLOGY | Facility: HOSPITAL | Age: 75
End: 2025-03-11
Attending: RADIOLOGY
Payer: MEDICARE

## 2025-03-11 VITALS
DIASTOLIC BLOOD PRESSURE: 52 MMHG | TEMPERATURE: 98 F | SYSTOLIC BLOOD PRESSURE: 135 MMHG | OXYGEN SATURATION: 94 % | HEART RATE: 97 BPM | RESPIRATION RATE: 18 BRPM

## 2025-03-11 DIAGNOSIS — C34.11 MALIGNANT NEOPLASM OF UPPER LOBE OF RIGHT LUNG (HCC): Primary | ICD-10-CM

## 2025-03-11 PROCEDURE — 77386 HC IMRT COMPLEX: CPT | Performed by: RADIOLOGY

## 2025-03-11 RX ORDER — DIPHENHYDRAMINE HYDROCHLORIDE 50 MG/ML
INJECTION INTRAMUSCULAR; INTRAVENOUS
Status: COMPLETED
Start: 2025-03-11 | End: 2025-03-11

## 2025-03-11 RX ORDER — DIPHENHYDRAMINE HYDROCHLORIDE 50 MG/ML
25 INJECTION INTRAMUSCULAR; INTRAVENOUS ONCE
Status: COMPLETED | OUTPATIENT
Start: 2025-03-11 | End: 2025-03-11

## 2025-03-11 RX ORDER — FAMOTIDINE 10 MG/ML
INJECTION, SOLUTION INTRAVENOUS
Status: COMPLETED
Start: 2025-03-11 | End: 2025-03-11

## 2025-03-11 RX ORDER — FAMOTIDINE 10 MG/ML
20 INJECTION, SOLUTION INTRAVENOUS ONCE
Status: COMPLETED | OUTPATIENT
Start: 2025-03-11 | End: 2025-03-11

## 2025-03-11 RX ADMIN — FAMOTIDINE 20 MG: 10 INJECTION, SOLUTION INTRAVENOUS at 08:58:00

## 2025-03-11 RX ADMIN — DIPHENHYDRAMINE HYDROCHLORIDE 25 MG: 50 INJECTION INTRAMUSCULAR; INTRAVENOUS at 09:00:00

## 2025-03-11 NOTE — PROGRESS NOTES
Patient arrives to infusion center for C1D8 Taxol and Carboplatin. Arrives Via wheelchair, accompanied by Spouse, Christiano. Patient denies new issues or complaints.     Patient was evaluated today by Treatment Nurse. Seen by MISA Nascimento yesterday.    Oral medications included in this regimen:  no    Patient confirms comprehension of cancer treatment schedule:  yes    Pregnancy screening:  Not applicable    Modifications in dose or schedule:  No    Medications appearance and physical integrity checked by RN: yes.    Chemotherapy IV pump settings verified by 2 RNs:  Yes.    Frequency of blood return and site check throughout administration: Prior to administration, Prior to each drug, and At completion of therapy     Infusion/treatment outcome:  patient tolerated treatment without incident    Education Record    Learner:  Patient and Spouse  Barriers / Limitations:  None  Method:  Reinforcement  Education / instructions given: Reviewed plan of care and infusion process. Reinforced with patient to take PO potassium as prescribed.   Outcome:  Shows understanding    Discharged Home, Via wheelchair, accompanied by:Spouse    Patient/family verbalized understanding of future appointments: by [employer] messaging

## 2025-03-12 ENCOUNTER — DIETICIAN VISIT (OUTPATIENT)
Dept: NUTRITION | Facility: HOSPITAL | Age: 75
End: 2025-03-12

## 2025-03-12 ENCOUNTER — PATIENT MESSAGE (OUTPATIENT)
Dept: FAMILY MEDICINE CLINIC | Facility: CLINIC | Age: 75
End: 2025-03-12

## 2025-03-12 VITALS — BODY MASS INDEX: 36 KG/M2 | WEIGHT: 214.63 LBS

## 2025-03-12 PROBLEM — K59.09 OTHER CONSTIPATION: Status: ACTIVE | Noted: 2025-03-12

## 2025-03-12 PROBLEM — D64.9 ANEMIA: Status: ACTIVE | Noted: 2025-03-12

## 2025-03-12 PROBLEM — Z51.11 ENCOUNTER FOR ANTINEOPLASTIC CHEMOTHERAPY: Status: ACTIVE | Noted: 2025-03-12

## 2025-03-12 PROCEDURE — 77386 HC IMRT COMPLEX: CPT | Performed by: RADIOLOGY

## 2025-03-12 NOTE — PROGRESS NOTES
Oncology Nutrition Assessment    Ht Readings from Last 1 Encounters:   03/10/25 163.8 cm (5' 4.5\")       Wt Readings from Last 1 Encounters:   03/12/25 97.3 kg (214 lb 9.6 oz)     BMI Calculated:  Body mass index is 36.27 kg/m².  Weight History:    Wt Readings from Last 10 Encounters:   03/12/25 97.3 kg (214 lb 9.6 oz)   03/10/25 96.8 kg (213 lb 8 oz)   03/05/25 97.3 kg (214 lb 6.4 oz)   03/04/25 97.7 kg (215 lb 6.4 oz)   02/25/25 97.5 kg (215 lb)   02/21/25 97.5 kg (215 lb)   02/17/25 93.4 kg (206 lb)   02/13/25 93 kg (205 lb)   02/11/25 90.9 kg (200 lb 6.4 oz)   02/07/25 92 kg (202 lb 12.8 oz)     IBW:  120#  UBW:  has lost wt in the past and likes to stay around 200#. Recent gain with edema and SVC.    Diagnoses:  left lung cancer with chemo and RT  Significant Medical History:   has a past medical history of Anesthesia complication, Anxiety state, Cancer (HCC), Cataract, Diabetes (HCC), Diabetes mellitus (HCC), Essential hypertension, High blood pressure, High cholesterol, Incontinence, Obesity, unspecified, Osteoarthritis, Renal disorder, Squamous cell carcinoma in situ (SCCIS) (2018), and Visual impairment.   Diet History:  lower sugar diet--A1C 5.8  Oral Liquid Supplement Use:  boost low sugar high protein-1 g sugar, 160 calories and 30 g protein most days.      Appetite: fair    Nutritional Physical History:   overweight per visual exam, edema masking true wt.    GI Problems:   GI intact, mild constipation at times.    Adequacy of PO Intake:  fair to good    Nutrition Risk Status:  moderate nutritional risk  Risk Status Based On:  potential treatment side effects and decreased appetite.     Nutritional Goals:  aid in management of treatment side effects via dietary measures and wt loss due to fluid losses ok.   Handouts Provided:  None at this time    Weekly Visits:  Nutritional Interventions:  3/5/2025  Discussed importance of good oral intake for wt maint during tx.  Urged adequate fluids. Balance rest and  activity.  Pt with hyponatremia and has been told to salt her foods. Being careful with this due to edema--MD monitoring labs.  Discussed potential tx side effects and discussed nutritional hints.  See weekly as schedule permits.  CPM    3/12/25 214.6#--stable. Edema seems stable. Chemo this week--great appetite yesterday, usually decreases towards the end of the week. Knows to push intake despite decreased appetite. Uses 2 protein drinks per day.  Denies any heartburn or dysphagia. Fluid intake good.  [facility]      Faith Ramos RD, LDN   Clinical Dietitian [id number]

## 2025-03-12 NOTE — PROGRESS NOTES
[facility] Office Note      3/10/25  Diagnosis  1. Malignant neoplasm of upper lobe of right lung (HCC)    2. Encounter for insertion of tunneled central venous catheter (CVC) with port    3. Anemia, unspecified type    4. Hypokalemia    5. Other constipation    6. Encounter for antineoplastic chemotherapy      Current Therapy  S/p SVC stenting  Starting concurrent chemo-RT with carbo/taxol week 1 - 3/4/25    INTERVAL HISTORY  She returns for week 3 Carbo/Taxol.  Continues to have some exertional dyspnea.     Continues fletcher lower extremity edema, and wt lowered since last visit but overall still elevated since hospitalization.  Continues mild hyponatremia, was at 50 oz water limit since last visit. Drinks 30 oz water and 20oz gatorade and 1-3 cans protein shakes.  Today changed from enoxaparin to eliquis for her left subclavian thrombosis,s/p thrombectomy.    She does have a port in situ  Has bruising at injections sites and right thigh. Denies melena, or bleeding from any orifice. Denies fever, cough, sob, rash, or chills.    Past Oncologic History  1/9/25 she had complained of increasing dyspnea for about 3 months.  Saw PCP and underwent a CT chest that showed a very large centrally necrotic right upper lobe lung mass measuring 8.2 x 7.4 x 10.8 cm with extension to the right paratracheal upper mediastinum.  Extensive right upper lobe bronchovascular structure encasement.  Mildly enlarged right paratracheal mediastinal lymph nodes bilateral adrenal nodules were noted.     1/17/2025 PET/CT showed a 9 x 8 cm hypermetabolic mass within the right upper lobe extending into the mediastinum and right hilum peripherally hypermetabolic and centrally cystic and necrotic.  Effacement of the IVC.  Subcentimeter pulmonary nodules bilaterally were not FDG avid  Hypermetabolic lymph nodes within the anterior superior mediastinum subcarinal right paratracheal area and right clavicle chains were all  compatible with metastatic disease.     1/22/25 she underwent a robotic navigational bronchoscopy with EBUS bx of RUL mass and  TBNA of lymph node station 7.  BAL from the right upper lobe was consistent with squamous cell carcinoma as was the needle biopsy from the right upper lobe mass.  EBUS sampling from station node 7 showed no malignant cells.  Lymphocytes were present. PD-L1 TPS was 0, EGFR was negative, ALK is pending      2/13/2025 She had a port placed  and then developed increasing redness and swelling as well as inability to lie flat.  She was seen in the oncology clinic earlier today and referred to the ED.  CT chest showed thrombus within the left brachiocephalic and left subclavian vein which is new since prior study. The right upper lobe mass is causing circumferential encasement and as well as invasion and narrowing of the SVC which is completely obliterated    2/14 she underwent SVC stenting as well as thrombectomy from left subclavian vein on 2/13/2025.  Her port was still functional.  She is now on enoxaparin.  She is feeling much better.  Her dyspnea has improved.  No longer has any postural flushing congestion.    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      Cholecystectomy      Laparoscopic incisional / umbilical / ventral hernia repair  2024       Current Medications:  No current facility-administered medications on file as of 3/10/2025.       Allergies:   Allergies[1]    Family Medical History:  Family History   Problem Relation Age of Onset    Hypertension Father     Stroke Father     Heart Attack Mother        Social History:  Social History     Socioeconomic History    Marital status:      Spouse name: Not on file    Number of children: 1    Years of education: Not on file    Highest education level: Not on file   Occupational History    Not on file   Tobacco Use    Smoking status: Former     Current packs/day: 0.00     Average packs/day: 0.5 packs/day for 45.0 years (22.5 ttl pk-yrs)     Types: Cigarettes     Start date: 1973     Quit date: 2018     Years since quittin.1     Passive exposure: Past    Smokeless tobacco: Never    Tobacco comments:     1 pack every 3 days   Vaping Use    Vaping status: Never Used   Substance and Sexual Activity    Alcohol use: Not Currently     Comment: very rarely    Drug use: No    Sexual activity: Not on file   Other Topics Concern     Service Not Asked    Blood Transfusions Not Asked    Caffeine Concern Yes     Comment: Coffee, 2 cups per day     Occupational Exposure Not Asked    Hobby Hazards Not Asked    Sleep Concern Not Asked    Stress Concern Not Asked    Weight Concern Not Asked    Special Diet Not Asked    Back Care Not Asked    Exercise Not Asked    Bike Helmet Not Asked    Seat Belt Not Asked    Self-Exams Not Asked    Grew up on a farm Not Asked    History of tanning No    Outdoor occupation Not Asked    Breast feeding Not Asked    Reaction to local anesthetic No    Left Handed No    Right Handed Yes    Currently spends a great deal of time in the sun No    Past Sunlamp Treatments for Acne Not Asked    Hx of Spending Great Deal of Time in Sun No    Bad sunburns in the past Yes     Tanning Salons in the Past No    Hx of Radiation Treatments No    Regular use of sun block Not Asked   Social History Narrative    - lives with     1 son      Social Drivers of Health     Food Insecurity: No Food Insecurity (2025)    NCSS - Food Insecurity     Worried About Running Out of Food in the Last Year: No     Ran Out of Food in the Last Year: No   Transportation Needs: No Transportation Needs (2025)    NCSS - Transportation     Lack of Transportation: No   Housing Stability: Not At Risk (2025)    NCSS - Housing/Utilities     Has Housing: Yes     Worried About Losing Housing: No     Unable to Get Utilities: No       Gyn History:  OB History    Para Term  AB Living   1 1 0 0 0 0   SAB IAB Ectopic Multiple Live Births   0 0 0 0 0       Objective:    Physical Exam:  General: A&Ox3, NAD, uses wheelchair but able to stand and walk short distances  HEENT: PERRL, OP clear  Face flushing has resolved  Neck: supple, no LAD  No Erythema, swelling left IJ vein ,Left s/c port site wellhealed  CV: RRR, no murmurs, + pulses  Pulm: CTA b/l, no w/r/r, normal effort  Neurological: Grossly intact  Skin: Righ LE ecchymoses    Labs:  Lab Results   Component Value Date/Time    WBC 7.6 03/10/2025 08:25 AM    RBC 3.32 (L) 03/10/2025 08:25 AM    HGB 8.4 (L) 03/10/2025 08:25 AM    HCT 27.2 (L) 03/10/2025 08:25 AM    MCV 81.9 03/10/2025 08:25 AM    MCH 25.3 (L) 03/10/2025 08:25 AM    MCHC 30.9 (L) 03/10/2025 08:25 AM    RDW 19.1 (H) 03/10/2025 08:25 AM    NEPRELIM 6.26 03/10/2025 08:25 AM    .0 03/10/2025 08:25 AM       Lab Results   Component Value Date/Time     (H) 03/10/2025 08:25 AM    BUN 33 (H) 03/10/2025 08:25 AM    CREATSERUM 0.61 03/10/2025 08:25 AM    GFRNAA 45 (L) 2022 08:02 AM    CA 9.1 03/10/2025 08:25 AM    ALB 4.1 2025 11:09 AM     (L) 03/10/2025 08:25 AM    K 3.2 (L) 03/10/2025 08:25 AM    CL 95 (L) 03/10/2025 08:25 AM    CO2 26.0 03/10/2025  08:25 AM    ALKPHO 63 03/03/2025 11:09 AM    AST 18 03/03/2025 11:09 AM    ALT 22 03/03/2025 11:09 AM       Imaging:         Assessment & Plan:    Yi Torres is a 74 year old female with locally advanced Rt lung cancer, with mediastinal invasion, 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 Now on lovenox,  transition to apixiban 5 mg bid today    # Lung cancer  Proceed with concurrent chemo-RT with weekly carbo/taxol.   Week 1 3/4/25  Cleared for week 2, Carbo/Taxol, 3/11/25, patient with anemia see below,   RTC 1 week, 3/17/25 Dr. Goodson, labs and chemo on 3/18/25    #anemia  HGB 8.4, denies bleeding or worsening sob.  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 Pt to fluid restrict to 40 oz, cont lasix and repeat BMP later this week  If worsening, she will return to nephrology    #hypokalemia  Potassium 3.2, to take 10 meq kcl bid for 3 days, denies diarrhea or vomiting      MISA Tipton    [facility]   [facility]      High complexity MDM. Our clinic is continuing focal point for all oncologic services the patient needs.         [1]   Allergies  Allergen Reactions    Erythromycin DIARRHEA     Stomach pain

## 2025-03-13 LAB
CELLS ANALYZED ALK FISH LUNG NSC: 50
CELLS COUNTEDALK FISH LUNG NSC: 50

## 2025-03-13 PROCEDURE — 77336 RADIATION PHYSICS CONSULT: CPT | Performed by: RADIOLOGY

## 2025-03-13 PROCEDURE — 77386 HC IMRT COMPLEX: CPT | Performed by: RADIOLOGY

## 2025-03-14 PROCEDURE — 77386 HC IMRT COMPLEX: CPT | Performed by: RADIOLOGY

## 2025-03-17 ENCOUNTER — NURSE ONLY (OUTPATIENT)
Age: 75
End: 2025-03-17
Attending: INTERNAL MEDICINE
Payer: MEDICARE

## 2025-03-17 ENCOUNTER — OFFICE VISIT (OUTPATIENT)
Age: 75
End: 2025-03-17
Attending: INTERNAL MEDICINE
Payer: MEDICARE

## 2025-03-17 ENCOUNTER — TELEPHONE (OUTPATIENT)
Age: 75
End: 2025-03-17

## 2025-03-17 VITALS
SYSTOLIC BLOOD PRESSURE: 123 MMHG | RESPIRATION RATE: 18 BRPM | HEIGHT: 64.5 IN | OXYGEN SATURATION: 96 % | TEMPERATURE: 98 F | HEART RATE: 93 BPM | BODY MASS INDEX: 35.52 KG/M2 | WEIGHT: 210.63 LBS | DIASTOLIC BLOOD PRESSURE: 64 MMHG

## 2025-03-17 DIAGNOSIS — C34.11 MALIGNANT NEOPLASM OF UPPER LOBE OF RIGHT LUNG (HCC): Primary | ICD-10-CM

## 2025-03-17 DIAGNOSIS — E87.6 HYPOKALEMIA: ICD-10-CM

## 2025-03-17 DIAGNOSIS — Z45.2 ENCOUNTER FOR INSERTION OF TUNNELED CENTRAL VENOUS CATHETER (CVC) WITH PORT: ICD-10-CM

## 2025-03-17 DIAGNOSIS — E87.1 HYPONATREMIA: ICD-10-CM

## 2025-03-17 DIAGNOSIS — C77.0 MALIGNANT NEOPLASM METASTATIC TO LYMPH NODE OF NECK (HCC): ICD-10-CM

## 2025-03-17 DIAGNOSIS — D64.9 ANEMIA, UNSPECIFIED TYPE: ICD-10-CM

## 2025-03-17 DIAGNOSIS — I87.1 SVC SYNDROME: ICD-10-CM

## 2025-03-17 DIAGNOSIS — T85.868A: ICD-10-CM

## 2025-03-17 PROBLEM — D50.9 IRON DEFICIENCY ANEMIA: Status: ACTIVE | Noted: 2025-03-17

## 2025-03-17 LAB
ANION GAP SERPL CALC-SCNC: 7 MMOL/L (ref 0–18)
BASOPHILS # BLD AUTO: 0.03 X10(3) UL (ref 0–0.2)
BASOPHILS NFR BLD AUTO: 0.7 %
BUN BLD-MCNC: 30 MG/DL (ref 9–23)
BUN/CREAT SERPL: 49.2 (ref 10–20)
CALCIUM BLD-MCNC: 9.2 MG/DL (ref 8.7–10.4)
CHLORIDE SERPL-SCNC: 98 MMOL/L (ref 98–112)
CO2 SERPL-SCNC: 27 MMOL/L (ref 21–32)
CREAT BLD-MCNC: 0.61 MG/DL
DEPRECATED RDW RBC AUTO: 58.9 FL (ref 35.1–46.3)
EGFRCR SERPLBLD CKD-EPI 2021: 95 ML/MIN/1.73M2 (ref 60–?)
EOSINOPHIL # BLD AUTO: 0.02 X10(3) UL (ref 0–0.7)
EOSINOPHIL NFR BLD AUTO: 0.5 %
ERYTHROCYTE [DISTWIDTH] IN BLOOD BY AUTOMATED COUNT: 21.4 % (ref 11–15)
FOLATE SERPL-MCNC: 26.1 NG/ML (ref 5.4–?)
GLUCOSE BLD-MCNC: 143 MG/DL (ref 70–99)
HCT VFR BLD AUTO: 25.8 %
HGB BLD-MCNC: 8.3 G/DL
IMM GRANULOCYTES # BLD AUTO: 0.04 X10(3) UL (ref 0–1)
IMM GRANULOCYTES NFR BLD: 0.9 %
IRON SATN MFR SERPL: 16 %
IRON SERPL-MCNC: 44 UG/DL
LYMPHOCYTES # BLD AUTO: 0.53 X10(3) UL (ref 1–4)
LYMPHOCYTES NFR BLD AUTO: 12.3 %
MCH RBC QN AUTO: 26.9 PG (ref 26–34)
MCHC RBC AUTO-ENTMCNC: 32.2 G/DL (ref 31–37)
MCV RBC AUTO: 83.8 FL
MONOCYTES # BLD AUTO: 0.29 X10(3) UL (ref 0.1–1)
MONOCYTES NFR BLD AUTO: 6.7 %
NEUTROPHILS # BLD AUTO: 3.4 X10 (3) UL (ref 1.5–7.7)
NEUTROPHILS # BLD AUTO: 3.4 X10(3) UL (ref 1.5–7.7)
NEUTROPHILS NFR BLD AUTO: 78.9 %
OSMOLALITY SERPL CALC.SUM OF ELEC: 283 MOSM/KG (ref 275–295)
PLATELET # BLD AUTO: 273 10(3)UL (ref 150–450)
POTASSIUM SERPL-SCNC: 3.1 MMOL/L (ref 3.5–5.1)
RBC # BLD AUTO: 3.08 X10(6)UL
SODIUM SERPL-SCNC: 132 MMOL/L (ref 136–145)
TOTAL IRON BINDING CAPACITY: 273 UG/DL (ref 250–425)
TRANSFERRIN SERPL-MCNC: 208 MG/DL (ref 250–380)
VIT B12 SERPL-MCNC: 643 PG/ML (ref 211–911)
WBC # BLD AUTO: 4.3 X10(3) UL (ref 4–11)

## 2025-03-17 PROCEDURE — 77386 HC IMRT COMPLEX: CPT | Performed by: RADIOLOGY

## 2025-03-17 RX ORDER — POTASSIUM CHLORIDE 1500 MG/1
20 TABLET, EXTENDED RELEASE ORAL DAILY
Qty: 30 TABLET | Refills: 3 | Status: SHIPPED | OUTPATIENT
Start: 2025-03-17

## 2025-03-17 NOTE — PROGRESS NOTES
[facility] Radiation Treatment Management Note 11-15    Patient:  Yi Torres  Age:  74 year old  Visit Diagnosis:    1. Malignant neoplasm of upper lobe of right lung (HCC)      Primary Rad/Onc:  Dr. Abdirizak Alvarado    Site Delivered Dose (cGy) Prescribed Dose (cGy) Fraction #   Lung, Right 1980 [id number] 11/35           First treatment date:  3/4/25  Concurrent chemotherapy:  OP CARBOplatin / PACLitaxel with RT         3/12/2025     9:26 AM 3/17/2025     9:22 AM 3/18/2025     9:37 AM   Oncology Vitals   Height  5' 4.5\"    Height  164 cm    Weight 214 lb 9.6 oz 210 lb 9.6 oz    Weight 97.342 kg 95.528 kg    BSA (m2) 2.03 m2 2.01 m2    BMI 36.27 kg/m2 35.59 kg/m2    BP  123/64 119/52   Pulse  93 90   Resp  18 16   Temp  97.8 °F (36.6 °C) 97.3 °F (36.3 °C)   SpO2  96 % 98 %   Pain Score  0 5        Toxicities:  Fatigue Grade 1= Fatigue relieved by rest  Dermatitis associated with radiation Grade 0= None  Moisturizer used  aveeno  Number of times: 1 times daily.  Pruritis denies none  Dry Skin absent  Hyperpigmentation absent  Dysphagia Grade 0= None  Dyspepsia Grade 0= None  Mucositis Grade 0= None  Nausea Grade 0= None- takes compazine on chemo days   Vomiting Grade 0= None  Cough Grade 1= Mild symptoms; nonprescription intervention indicated- worse last night. Dry cough  Dyspnea Grade 1= Shortness of breath with moderate exertion    Nursing Note:  Patient seen for OTV with Dr. Yony Alvarado  Patient complains of sore throat starting last night after coughing a lot  Dry nonproductive cough.   Denies fevers  Denies trouble swallowing   Eating and drinking the same as usual. Thinks weight loss may be from water retention improving. Drinking protein drinks (1-2 per day)    Recent Labs   Lab 03/10/25  0825 03/17/25  0803   WBC 7.6 4.3   RBC 3.32* 3.08*   HGB 8.4* 8.3*   HCT 27.2* 25.8*   .0 273.0   MCV 81.9 83.8   MCH 25.3* 26.9   MCHC 30.9* 32.2   RDW 19.1* 21.4*   RDWSD 55.6* 58.9*    NEPRELIM 6.26 3.40   NE 6.26 3.40   LYMABS 0.80* 0.53*   MOABSO 0.35 0.29   EOABSO 0.07 0.02   BAABSO 0.04 0.03   IMMGRAN 0.06 0.04   NEPERCENT 82.6 78.9   LYPERCENT 10.6 12.3   MOPERCENT 4.6 6.7   EOPERCENT 0.9 0.5   BAPERCENT 0.5 0.7   IMMGRANPERC 0.8 0.9      Wt Readings from Last 6 Encounters:   03/17/25 95.5 kg (210 lb 9.6 oz)   03/12/25 97.3 kg (214 lb 9.6 oz)   03/10/25 96.8 kg (213 lb 8 oz)   03/05/25 97.3 kg (214 lb 6.4 oz)   03/04/25 97.7 kg (215 lb 6.4 oz)   02/25/25 97.5 kg (215 lb)     Adelina JOAQUIN RN    Physician Note:  Subjective:  Doing well, no c/o related to RT.  Some sore throat related to recent coughing, not esophagitis.  Weight down due to diuretic.      Objective:  Unchanegd      Treatment setup imaging have been reviewed:  Yes    Assessment/Plan:    Continue radiotherapy per plan    Next visit:  1 week    Dr. Abdirizak Alvarado

## 2025-03-17 NOTE — TELEPHONE ENCOUNTER
Called [facility] to let her know they are able to do Iron Dextran infusion tomorrow with her Chemotherapy appointment. Notified her that she will be here about 1.5-2 hours longer. She voiced understanding and has agreed to do it tomorrow.

## 2025-03-17 NOTE — PROGRESS NOTES
[facility] Office Note      Diagnosis  1. Malignant neoplasm of upper lobe of right lung (HCC)    2. Anemia, unspecified type    3. 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 SVC syndrome    5. Thrombosis due to vascular catheter      Current Therapy  S/p SVC stenting  Concurrent chemo-RT with carbo/taxol week 3 - due 3/18/25    INTERVAL HISTORY  She returns prior to week 3 Carbo/Taxol.  She is tolerating this reasonably well.  Dyspnea stable.  Continues to have some exertional dyspnea.   She has lost some weight but reports appetite is okay.  Hyponatremia is better.  She has increased her oral salt intake now off sodium chloride tablets.    Now on apixaban that she is tolerating well.  Has some occasional nosebleeds early in the morning likely secondary to dry heat.  Using saline spray.    Past Oncologic History  1/9/25 she had complained of increasing dyspnea for about 3 months.  Saw PCP and underwent a CT chest that showed a very large centrally necrotic right upper lobe lung mass measuring 8.2 x 7.4 x 10.8 cm with extension to the right paratracheal upper mediastinum.  Extensive right upper lobe bronchovascular structure encasement.  Mildly enlarged right paratracheal mediastinal lymph nodes bilateral adrenal nodules were noted.     1/17/2025 PET/CT showed a 9 x 8 cm hypermetabolic mass within the right upper lobe extending into the mediastinum and right hilum peripherally hypermetabolic and centrally cystic and necrotic.  Effacement of the IVC.  Subcentimeter pulmonary nodules bilaterally were not FDG avid  Hypermetabolic lymph nodes within the anterior superior mediastinum subcarinal right paratracheal area and right clavicle chains were all compatible with metastatic disease.     1/22/25 she underwent a robotic navigational bronchoscopy with EBUS bx of RUL mass and  TBNA of lymph node station 7.  BAL from the right upper lobe was consistent with squamous  cell carcinoma as was the needle biopsy from the right upper lobe mass.  EBUS sampling from station node 7 showed no malignant cells.  Lymphocytes were present. PD-L1 TPS was 0, EGFR was negative, ALK is negative    2/13/2025 She had a port placed  and then developed increasing redness and swelling as well as inability to lie flat.  She was seen in the oncology clinic earlier today and referred to the ED.  CT chest showed thrombus within the left brachiocephalic and left subclavian vein which is new since prior study. The right upper lobe mass is causing circumferential encasement and as well as invasion and narrowing of the SVC which is completely obliterated    2/14 she underwent SVC stenting as well as thrombectomy from left subclavian vein on 2/13/2025.  Her port was still functional.  She is now on enoxaparin.  She is feeling much better.  Her dyspnea has improved.  No longer has any postural flushing congestion.    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     Cholecystectomy      Laparoscopic incisional / umbilical / ventral hernia repair  09/19/2024       Current Medications:  No current facility-administered  medications on file as of 3/17/2025.       Allergies:   Allergies[1]    Family Medical History:  Family History   Problem Relation Age of Onset    Hypertension Father     Stroke Father     Heart Attack Mother        Social History:  Social History     Socioeconomic History    Marital status:      Spouse name: Not on file    Number of children: 1    Years of education: Not on file    Highest education level: Not on file   Occupational History    Not on file   Tobacco Use    Smoking status: Former     Current packs/day: 0.00     Average packs/day: 0.5 packs/day for 45.0 years (22.5 ttl pk-yrs)     Types: Cigarettes     Start date: 1973     Quit date: 2018     Years since quittin.1     Passive exposure: Past    Smokeless tobacco: Never    Tobacco comments:     1 pack every 3 days   Vaping Use    Vaping status: Never Used   Substance and Sexual Activity    Alcohol use: Not Currently     Comment: very rarely    Drug use: No    Sexual activity: Not on file   Other Topics Concern     Service Not Asked    Blood Transfusions Not Asked    Caffeine Concern Yes     Comment: Coffee, 2 cups per day     Occupational Exposure Not Asked    Hobby Hazards Not Asked    Sleep Concern Not Asked    Stress Concern Not Asked    Weight Concern Not Asked    Special Diet Not Asked    Back Care Not Asked    Exercise Not Asked    Bike Helmet Not Asked    Seat Belt Not Asked    Self-Exams Not Asked    Grew up on a farm Not Asked    History of tanning No    Outdoor occupation Not Asked    Breast feeding Not Asked    Reaction to local anesthetic No    Left Handed No    Right Handed Yes    Currently spends a great deal of time in the sun No    Past Sunlamp Treatments for Acne Not Asked    Hx of Spending Great Deal of Time in Sun No    Bad sunburns in the past Yes    Tanning Salons in the Past No    Hx of Radiation Treatments No    Regular use of sun block Not Asked   Social History Narrative    - lives with      1 son      Social Drivers of Health     Food Insecurity: No Food Insecurity (2025)    NCSS - Food Insecurity     Worried About Running Out of Food in the Last Year: No     Ran Out of Food in the Last Year: No   Transportation Needs: No Transportation Needs (2025)    NCSS - Transportation     Lack of Transportation: No   Housing Stability: Not At Risk (2025)    NCSS - Housing/Utilities     Has Housing: Yes     Worried About Losing Housing: No     Unable to Get Utilities: No       Gyn History:  OB History    Para Term  AB Living   1 1 0 0 0 0   SAB IAB Ectopic Multiple Live Births   0 0 0 0 0       Objective:    Physical Exam:  General: A&Ox3, NAD, uses wheelchair but able to stand and walk short distances  HEENT: PERRL, OP clear  Face flushing has resolved  Neck: supple, no LAD   swelling left IJ vein has resolved ,Left s/c port site wellhealed  CV: RRR, no murmurs, + pulses  Pulm: CTA b/l, no w/r/r, normal effort  Neurological: Grossly intact    Labs:  Lab Results   Component Value Date/Time    WBC 4.3 2025 08:03 AM    RBC 3.08 (L) 2025 08:03 AM    HGB 8.3 (L) 2025 08:03 AM    HCT 25.8 (L) 2025 08:03 AM    MCV 83.8 2025 08:03 AM    MCH 26.9 2025 08:03 AM    MCHC 32.2 2025 08:03 AM    RDW 21.4 (H) 2025 08:03 AM    NEPRELIM 3.40 2025 08:03 AM    .0 2025 08:03 AM       Lab Results   Component Value Date/Time     (H) 2025 08:03 AM    BUN 30 (H) 2025 08:03 AM    CREATSERUM 0.61 2025 08:03 AM    GFRNAA 45 (L) 2022 08:02 AM    CA 9.2 2025 08:03 AM    ALB 4.1 2025 11:09 AM     (L) 2025 08:03 AM    K 3.1 (L) 2025 08:03 AM    CL 98 2025 08:03 AM    CO2 27.0 2025 08:03 AM    ALKPHO 63 2025 11:09 AM    AST 18 2025 11:09 AM    ALT 22 2025 11:09 AM       Imaging:         Assessment & Plan:    Yi Torres is a 74 year old  female with locally advanced Rt lung cancer, with mediastinal invasion, 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 Was on lovenox, now on Eliquis   -No significant bleeding issues.    # Lung cancer  Proceed with concurrent chemo-RT with weekly carbo/taxol.   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 Pt to fluid restrict to 40 oz,  off Nacl tabs  If recurrent refer to Nephrology    #hypokalemia  Potassium 3.2, stay on KCL 20meq daily      RTC in 1 wk    José Miguel Godoson MD   [facility]   [facility]      High complexity MDM. Our clinic is continuing focal point for all oncologic services the patient needs.         [1]   Allergies  Allergen Reactions    Erythromycin DIARRHEA     Stomach pain

## 2025-03-18 ENCOUNTER — OFFICE VISIT (OUTPATIENT)
Age: 75
End: 2025-03-18
Attending: INTERNAL MEDICINE
Payer: MEDICARE

## 2025-03-18 ENCOUNTER — OFFICE VISIT (OUTPATIENT)
Dept: RADIATION ONCOLOGY | Facility: HOSPITAL | Age: 75
End: 2025-03-18
Attending: RADIOLOGY
Payer: MEDICARE

## 2025-03-18 VITALS — HEART RATE: 82 BPM | DIASTOLIC BLOOD PRESSURE: 52 MMHG | SYSTOLIC BLOOD PRESSURE: 129 MMHG

## 2025-03-18 VITALS
RESPIRATION RATE: 16 BRPM | DIASTOLIC BLOOD PRESSURE: 52 MMHG | TEMPERATURE: 97 F | OXYGEN SATURATION: 98 % | SYSTOLIC BLOOD PRESSURE: 119 MMHG | HEART RATE: 90 BPM

## 2025-03-18 DIAGNOSIS — C34.11 MALIGNANT NEOPLASM OF UPPER LOBE OF RIGHT LUNG (HCC): Primary | ICD-10-CM

## 2025-03-18 DIAGNOSIS — D50.9 IRON DEFICIENCY ANEMIA, UNSPECIFIED IRON DEFICIENCY ANEMIA TYPE: ICD-10-CM

## 2025-03-18 PROCEDURE — 77386 HC IMRT COMPLEX: CPT | Performed by: RADIOLOGY

## 2025-03-18 RX ORDER — DIPHENHYDRAMINE HYDROCHLORIDE 50 MG/ML
INJECTION, SOLUTION INTRAMUSCULAR; INTRAVENOUS
Status: COMPLETED
Start: 2025-03-18 | End: 2025-03-18

## 2025-03-18 RX ORDER — FAMOTIDINE 10 MG/ML
INJECTION, SOLUTION INTRAVENOUS
Status: COMPLETED
Start: 2025-03-18 | End: 2025-03-18

## 2025-03-18 RX ORDER — SODIUM CHLORIDE 9 MG/ML
10 INJECTION, SOLUTION INTRAMUSCULAR; INTRAVENOUS; SUBCUTANEOUS ONCE
OUTPATIENT
Start: 2025-03-18

## 2025-03-18 RX ORDER — DIPHENHYDRAMINE HYDROCHLORIDE 50 MG/ML
25 INJECTION, SOLUTION INTRAMUSCULAR; INTRAVENOUS ONCE
Status: COMPLETED | OUTPATIENT
Start: 2025-03-18 | End: 2025-03-18

## 2025-03-18 RX ORDER — FAMOTIDINE 10 MG/ML
20 INJECTION, SOLUTION INTRAVENOUS ONCE
Status: COMPLETED | OUTPATIENT
Start: 2025-03-18 | End: 2025-03-18

## 2025-03-18 RX ADMIN — FAMOTIDINE 20 MG: 10 INJECTION, SOLUTION INTRAVENOUS at 10:20:00

## 2025-03-18 RX ADMIN — DIPHENHYDRAMINE HYDROCHLORIDE 25 MG: 50 INJECTION, SOLUTION INTRAMUSCULAR; INTRAVENOUS at 10:21:00

## 2025-03-18 NOTE — PROGRESS NOTES
Patient arrives to infusion center for C1D15 Taxol and Carboplatin. Patient also receiving iron dextran today. Arrives Via wheelchair, accompanied by Spouse. Labs from yesterday reviewed, patient denies new issues or complaints.     Patient was evaluated today by Treatment Nurse.    Oral medications included in this regimen:  no    Patient confirms comprehension of cancer treatment schedule:  yes    Pregnancy screening:  Not applicable    Modifications in dose or schedule:  No    Medications appearance and physical integrity checked by RN: yes.    Chemotherapy IV pump settings verified by 2 RNs:  Yes.    Frequency of blood return and site check throughout administration: Prior to administration, Prior to each drug, and At completion of therapy     Infusion/treatment outcome:  patient tolerated treatment without incident. Patient monitored for 15mins following iron dextran test dose, no s/s of adverse reaction noted.     Education Record    Learner:  Patient  Barriers / Limitations:  None  Method:  Reinforcement  Education / instructions given: Reinforced infusion process and plan of care. Discussed IV iron infusion process including test dose, possible side effects and s/s of adverse reaction.   Outcome:  Shows understanding    Discharged Home, Via wheelchair, accompanied by:Spouse    Patient/family verbalized understanding of future appointments: by printed AVS

## 2025-03-19 ENCOUNTER — DIETICIAN VISIT (OUTPATIENT)
Dept: NUTRITION | Facility: HOSPITAL | Age: 75
End: 2025-03-19

## 2025-03-19 VITALS — BODY MASS INDEX: 36 KG/M2 | WEIGHT: 212 LBS

## 2025-03-19 PROCEDURE — 77386 HC IMRT COMPLEX: CPT | Performed by: RADIOLOGY

## 2025-03-19 NOTE — PROGRESS NOTES
Oncology Nutrition Assessment    Ht Readings from Last 1 Encounters:   03/17/25 163.8 cm (5' 4.5\")       Wt Readings from Last 1 Encounters:   03/19/25 96.2 kg (212 lb)     BMI Calculated:  Body mass index is 35.83 kg/m².  Weight History:    Wt Readings from Last 10 Encounters:   03/19/25 96.2 kg (212 lb)   03/17/25 95.5 kg (210 lb 9.6 oz)   03/12/25 97.3 kg (214 lb 9.6 oz)   03/10/25 96.8 kg (213 lb 8 oz)   03/05/25 97.3 kg (214 lb 6.4 oz)   03/04/25 97.7 kg (215 lb 6.4 oz)   02/25/25 97.5 kg (215 lb)   02/21/25 97.5 kg (215 lb)   02/17/25 93.4 kg (206 lb)   02/13/25 93 kg (205 lb)     IBW:  120#  UBW:  has lost wt in the past and likes to stay around 200#. Recent gain with edema and SVC.    Diagnoses:  left lung cancer with chemo and RT  Significant Medical History:   has a past medical history of Anesthesia complication, Anxiety state, Cancer (HCC), Cataract, Diabetes (HCC), Diabetes mellitus (HCC), Essential hypertension, High blood pressure, High cholesterol, Incontinence, Obesity, unspecified, Osteoarthritis, Renal disorder, Squamous cell carcinoma in situ (SCCIS) (2018), and Visual impairment.   Diet History:  lower sugar diet--A1C 5.8  Oral Liquid Supplement Use:  boost low sugar high protein-1 g sugar, 160 calories and 30 g protein most days.      Appetite: fair    Nutritional Physical History:   overweight per visual exam, edema masking true wt.    GI Problems:   GI intact, mild constipation at times.    Adequacy of PO Intake:  fair to good    Nutrition Risk Status:  moderate nutritional risk  Risk Status Based On:  potential treatment side effects and decreased appetite.     Nutritional Goals:  aid in management of treatment side effects via dietary measures and wt loss due to fluid losses ok.   Handouts Provided:  None at this time    Weekly Visits:  Nutritional Interventions:  3/5/2025  Discussed importance of good oral intake for wt maint during tx.  Urged adequate fluids. Balance rest and activity.   Pt with hyponatremia and has been told to salt her foods. Being careful with this due to edema--MD monitoring labs.  Discussed potential tx side effects and discussed nutritional hints.  See weekly as schedule permits.  CPM    3/12/25 214.6#--stable. Edema seems stable. Chemo this week--great appetite yesterday, usually decreases towards the end of the week. Knows to push intake despite decreased appetite. Uses 2 protein drinks per day.  Denies any heartburn or dysphagia. Fluid intake good.  CPM    3/19/25  212#--2.6# less than wt last week, but likely fluid losses. Po intake seems to be adequate. Uses 1-2 protein drinks per day. Takes stool softener. Denies any heartburn or dysphagia, but throat soreness--discussed foods to limit to not aggravate.  Fluid intake is about 40oz, she is allowed 50oz. Doing ok. [facility]      Faith Ramos RD, LDN   Clinical Dietitian [id number]

## 2025-03-20 PROCEDURE — 77386 HC IMRT COMPLEX: CPT | Performed by: RADIOLOGY

## 2025-03-21 PROCEDURE — 77336 RADIATION PHYSICS CONSULT: CPT | Performed by: RADIOLOGY

## 2025-03-21 PROCEDURE — 77386 HC IMRT COMPLEX: CPT | Performed by: RADIOLOGY

## 2025-03-24 ENCOUNTER — OFFICE VISIT (OUTPATIENT)
Age: 75
End: 2025-03-24
Attending: INTERNAL MEDICINE
Payer: MEDICARE

## 2025-03-24 ENCOUNTER — NURSE ONLY (OUTPATIENT)
Age: 75
End: 2025-03-24
Attending: INTERNAL MEDICINE
Payer: MEDICARE

## 2025-03-24 VITALS
SYSTOLIC BLOOD PRESSURE: 130 MMHG | HEIGHT: 64.5 IN | BODY MASS INDEX: 34.07 KG/M2 | OXYGEN SATURATION: 98 % | RESPIRATION RATE: 16 BRPM | WEIGHT: 202 LBS | HEART RATE: 97 BPM | TEMPERATURE: 98 F | DIASTOLIC BLOOD PRESSURE: 74 MMHG

## 2025-03-24 DIAGNOSIS — C34.11 MALIGNANT NEOPLASM OF UPPER LOBE OF RIGHT LUNG (HCC): Primary | ICD-10-CM

## 2025-03-24 DIAGNOSIS — Z51.11 ENCOUNTER FOR ANTINEOPLASTIC CHEMOTHERAPY: ICD-10-CM

## 2025-03-24 DIAGNOSIS — R60.9 FLUID RETENTION: ICD-10-CM

## 2025-03-24 DIAGNOSIS — R19.7 DIARRHEA, UNSPECIFIED TYPE: ICD-10-CM

## 2025-03-24 DIAGNOSIS — K12.30 MUCOSITIS: ICD-10-CM

## 2025-03-24 DIAGNOSIS — E87.6 HYPOKALEMIA: ICD-10-CM

## 2025-03-24 LAB
ALBUMIN SERPL-MCNC: 3.8 G/DL (ref 3.2–4.8)
ALP LIVER SERPL-CCNC: 59 U/L
ALT SERPL-CCNC: 19 U/L
ANION GAP SERPL CALC-SCNC: 7 MMOL/L (ref 0–18)
AST SERPL-CCNC: 18 U/L (ref ?–34)
BASOPHILS # BLD AUTO: 0.03 X10(3) UL (ref 0–0.2)
BASOPHILS NFR BLD AUTO: 0.8 %
BILIRUB DIRECT SERPL-MCNC: 0.3 MG/DL (ref ?–0.3)
BILIRUB SERPL-MCNC: 0.7 MG/DL (ref 0.2–1.1)
BUN BLD-MCNC: 33 MG/DL (ref 9–23)
BUN/CREAT SERPL: 55 (ref 10–20)
CALCIUM BLD-MCNC: 9.6 MG/DL (ref 8.7–10.4)
CHLORIDE SERPL-SCNC: 102 MMOL/L (ref 98–112)
CO2 SERPL-SCNC: 26 MMOL/L (ref 21–32)
CREAT BLD-MCNC: 0.6 MG/DL
DEPRECATED RDW RBC AUTO: 64.4 FL (ref 35.1–46.3)
EGFRCR SERPLBLD CKD-EPI 2021: 95 ML/MIN/1.73M2 (ref 60–?)
EOSINOPHIL # BLD AUTO: 0 X10(3) UL (ref 0–0.7)
EOSINOPHIL NFR BLD AUTO: 0 %
ERYTHROCYTE [DISTWIDTH] IN BLOOD BY AUTOMATED COUNT: 23.1 % (ref 11–15)
GLUCOSE BLD-MCNC: 102 MG/DL (ref 70–99)
HCT VFR BLD AUTO: 29.3 %
HGB BLD-MCNC: 8.9 G/DL
IMM GRANULOCYTES # BLD AUTO: 0.04 X10(3) UL (ref 0–1)
IMM GRANULOCYTES NFR BLD: 1.1 %
LYMPHOCYTES # BLD AUTO: 0.47 X10(3) UL (ref 1–4)
LYMPHOCYTES NFR BLD AUTO: 12.7 %
MAGNESIUM SERPL-MCNC: 1.8 MG/DL (ref 1.6–2.6)
MCH RBC QN AUTO: 26.6 PG (ref 26–34)
MCHC RBC AUTO-ENTMCNC: 30.4 G/DL (ref 31–37)
MCV RBC AUTO: 87.7 FL
MONOCYTES # BLD AUTO: 0.29 X10(3) UL (ref 0.1–1)
MONOCYTES NFR BLD AUTO: 7.9 %
NEUTROPHILS # BLD AUTO: 2.86 X10 (3) UL (ref 1.5–7.7)
NEUTROPHILS # BLD AUTO: 2.86 X10(3) UL (ref 1.5–7.7)
NEUTROPHILS NFR BLD AUTO: 77.5 %
OSMOLALITY SERPL CALC.SUM OF ELEC: 287 MOSM/KG (ref 275–295)
PLATELET # BLD AUTO: 207 10(3)UL (ref 150–450)
PLATELET MORPHOLOGY: NORMAL
POTASSIUM SERPL-SCNC: 4.1 MMOL/L (ref 3.5–5.1)
PROT SERPL-MCNC: 6.7 G/DL (ref 5.7–8.2)
RBC # BLD AUTO: 3.34 X10(6)UL
SODIUM SERPL-SCNC: 135 MMOL/L (ref 136–145)
WBC # BLD AUTO: 3.7 X10(3) UL (ref 4–11)

## 2025-03-24 PROCEDURE — 77386 HC IMRT COMPLEX: CPT | Performed by: RADIOLOGY

## 2025-03-24 RX ORDER — DIPHENHYDRAMINE HYDROCHLORIDE AND LIDOCAINE HYDROCHLORIDE AND ALUMINUM HYDROXIDE AND MAGNESIUM HYDRO
10 KIT EVERY 6 HOURS PRN
Qty: 119 ML | Refills: 0 | Status: SHIPPED | OUTPATIENT
Start: 2025-03-24

## 2025-03-24 RX ORDER — POTASSIUM CHLORIDE 750 MG/1
10 TABLET, EXTENDED RELEASE ORAL DAILY
Qty: 14 TABLET | Refills: 0 | Status: SHIPPED | OUTPATIENT
Start: 2025-03-24

## 2025-03-24 NOTE — PROGRESS NOTES
[facility] Radiation Treatment Management Note 16-20    Patient:  Yi Torres  Age:  74 year old  Visit Diagnosis:    1. Malignant neoplasm of upper lobe of right lung (HCC)      Primary Rad/Onc:  Dr. Abdirizak Alvarado    Site Delivered Dose (cGy) Prescribed Dose (cGy) Fraction #   RUL [id number] 16/35           First treatment date:  3/4/25  Concurrent chemotherapy:   OP CARBOplatin / PACLitaxel with RT         3/19/2025     9:53 AM 3/24/2025    10:27 AM 3/25/2025     8:43 AM   Oncology Vitals   Height  5' 4.5\"    Height  164 cm    Weight 212 lb 202 lb    Weight 96.163 kg 91.627 kg    BSA (m2) 2.02 m2 1.98 m2    BMI 35.83 kg/m2 34.14 kg/m2    BP  130/74 135/51   Pulse  97 93   Resp  16 18   Temp  97.7 °F (36.5 °C) 98.7 °F (37.1 °C)   SpO2  98 % 97 %   Pain Score  4 0        Toxicities:  Fatigue Grade 1= Fatigue relieved by rest  Dermatitis associated with radiation Grade 0= None  Moisturizer used  Aveeno  Number of times: 1 times daily.  Pruritis denies none  Dry Skin absent  Hyperpigmentation absent  Dysphagia Grade 1=  Symptomatic, able to eat regular diet- softer foods. Sore throat   Dyspepsia Grade 0= None  Mucositis Grade 0= None  Nausea Grade 0= None- takes compazine on chemo days   Vomiting Grade 0= None  Cough Grade 1= Mild symptoms; nonprescription intervention indicated- mildly increased (dry).  Dyspnea Grade 1= Shortness of breath with moderate exertion    Nursing Note:  Patient seen for OTV with Dr. Yony Sosa.   Bumps on base of tongue. Same color as the rest of her tongue. Pt unsure if this is where pain is.   Using maalox and benadryl solution   Eating soft foods due to sore throat  Mildly increased cough.   Weight down, patient doing 2 protein shakes per day.     Recent Labs   Lab 03/17/25  0803 03/24/25  0932   WBC 4.3 3.7*   RBC 3.08* 3.34*   HGB 8.3* 8.9*   HCT 25.8* 29.3*   .0 207.0   MCV 83.8 87.7   MCH 26.9 26.6   MCHC 32.2 30.4*   RDW 21.4* 23.1*   RDWSD  58.9* 64.4*   NEPRELIM 3.40 2.86   NE 3.40 2.86   LYMABS 0.53* 0.47*   MOABSO 0.29 0.29   EOABSO 0.02 0.00   BAABSO 0.03 0.03   IMMGRAN 0.04 0.04   NEPERCENT 78.9 77.5   LYPERCENT 12.3 12.7   MOPERCENT 6.7 7.9   EOPERCENT 0.5 0.0   BAPERCENT 0.7 0.8   IMMGRANPERC 0.9 1.1      Wt Readings from Last 6 Encounters:   03/24/25 91.6 kg (202 lb)   03/19/25 96.2 kg (212 lb)   03/17/25 95.5 kg (210 lb 9.6 oz)   03/12/25 97.3 kg (214 lb 9.6 oz)   03/10/25 96.8 kg (213 lb 8 oz)   03/05/25 97.3 kg (214 lb 6.4 oz)     Adelina JOAQUIN RN    Physician Note:  Subjective:  Diong fairly well.  Some dysphagia, still eating reasonably well.  Was given triple mix from med onc, has not filled yet.  No cough, SOB, hemoptysis      Objective:  Unchanged.  Prominent circumvallate papillae noted, normal variant.      Treatment setup imaging have been reviewed:  Yes    Assessment/Plan:    Continue radiotherapy per plan    Next visit:  1 week    Dr. Abdirizak Alvarado

## 2025-03-24 NOTE — PROGRESS NOTES
[facility] Office Note      Diagnosis  1. Malignant neoplasm of upper lobe of right lung (HCC)    2. Encounter for antineoplastic chemotherapy    3. Fluid retention    4. Diarrhea, unspecified type    5. Mucositis      Current Therapy  S/p SVC stenting  Concurrent chemo-RT with carbo/taxol week 4 - due 3/25/25    INTERVAL HISTORY  She returns prior to week 3 Carbo/Taxol.  She is tolerating this reasonably well.  Dyspnea stable.  Continues to have some exertional dyspnea.   She has lost 10 lbs weight, back to pre hospital wt.  Hyponatremia is better.  She has increased her oral salt intake now off sodium chloride tablets.  Reports pain in throat when swallowing, causing decreased food intake.  Pt taking senna for constipation, took 2 tabs few days ago and had large amount diarrheax2 , taking one tablet now and has regular bm, no diarrhea  Now on apixaban that she is tolerating well.      Past Oncologic History  1/9/25 she had complained of increasing dyspnea for about 3 months.  Saw PCP and underwent a CT chest that showed a very large centrally necrotic right upper lobe lung mass measuring 8.2 x 7.4 x 10.8 cm with extension to the right paratracheal upper mediastinum.  Extensive right upper lobe bronchovascular structure encasement.  Mildly enlarged right paratracheal mediastinal lymph nodes bilateral adrenal nodules were noted.     1/17/2025 PET/CT showed a 9 x 8 cm hypermetabolic mass within the right upper lobe extending into the mediastinum and right hilum peripherally hypermetabolic and centrally cystic and necrotic.  Effacement of the IVC.  Subcentimeter pulmonary nodules bilaterally were not FDG avid  Hypermetabolic lymph nodes within the anterior superior mediastinum subcarinal right paratracheal area and right clavicle chains were all compatible with metastatic disease.     1/22/25 she underwent a robotic navigational bronchoscopy with EBUS bx of RUL mass and  TBNA of lymph  node station 7.  BAL from the right upper lobe was consistent with squamous cell carcinoma as was the needle biopsy from the right upper lobe mass.  EBUS sampling from station node 7 showed no malignant cells.  Lymphocytes were present. PD-L1 TPS was 0, EGFR was negative, ALK is negative    2/13/2025 She had a port placed  and then developed increasing redness and swelling as well as inability to lie flat.  She was seen in the oncology clinic earlier today and referred to the ED.  CT chest showed thrombus within the left brachiocephalic and left subclavian vein which is new since prior study. The right upper lobe mass is causing circumferential encasement and as well as invasion and narrowing of the SVC which is completely obliterated    2/14 she underwent SVC stenting as well as thrombectomy from left subclavian vein on 2/13/2025.  Her port was still functional.  She is now on enoxaparin.  She is feeling much better.  Her dyspnea has improved.  No longer has any postural flushing congestion.    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     Cholecystectomy      Laparoscopic incisional / umbilical / ventral hernia repair   2024       Current Medications:  No current facility-administered medications on file as of 3/24/2025.       Allergies:   Allergies[1]    Family Medical History:  Family History   Problem Relation Age of Onset    Hypertension Father     Stroke Father     Heart Attack Mother        Social History:  Social History     Socioeconomic History    Marital status:      Spouse name: Not on file    Number of children: 1    Years of education: Not on file    Highest education level: Not on file   Occupational History    Not on file   Tobacco Use    Smoking status: Former     Current packs/day: 0.00     Average packs/day: 0.5 packs/day for 45.0 years (22.5 ttl pk-yrs)     Types: Cigarettes     Start date: 1973     Quit date: 2018     Years since quittin.1     Passive exposure: Past    Smokeless tobacco: Never    Tobacco comments:     1 pack every 3 days   Vaping Use    Vaping status: Never Used   Substance and Sexual Activity    Alcohol use: Not Currently     Comment: very rarely    Drug use: No    Sexual activity: Not on file   Other Topics Concern     Service Not Asked    Blood Transfusions Not Asked    Caffeine Concern Yes     Comment: Coffee, 2 cups per day     Occupational Exposure Not Asked    Hobby Hazards Not Asked    Sleep Concern Not Asked    Stress Concern Not Asked    Weight Concern Not Asked    Special Diet Not Asked    Back Care Not Asked    Exercise Not Asked    Bike Helmet Not Asked    Seat Belt Not Asked    Self-Exams Not Asked    Grew up on a farm Not Asked    History of tanning No    Outdoor occupation Not Asked    Breast feeding Not Asked    Reaction to local anesthetic No    Left Handed No    Right Handed Yes    Currently spends a great deal of time in the sun No    Past Sunlamp Treatments for Acne Not Asked    Hx of Spending Great Deal of Time in Sun No    Bad sunburns in the past Yes    Tanning Salons in the Past No    Hx of Radiation Treatments No    Regular use of sun  block Not Asked   Social History Narrative    - lives with     1 son      Social Drivers of Health     Food Insecurity: No Food Insecurity (2025)    NCSS - Food Insecurity     Worried About Running Out of Food in the Last Year: No     Ran Out of Food in the Last Year: No   Transportation Needs: No Transportation Needs (2025)    NCSS - Transportation     Lack of Transportation: No   Housing Stability: Not At Risk (2025)    NCSS - Housing/Utilities     Has Housing: Yes     Worried About Losing Housing: No     Unable to Get Utilities: No       Gyn History:  OB History    Para Term  AB Living   1 1 0 0 0 0   SAB IAB Ectopic Multiple Live Births   0 0 0 0 0       Objective:    Physical Exam:  General: A&Ox3, NAD, uses wheelchair but able to stand and walk short distances  HEENT: PERRL, OP clear, oral mucosa pink, no white patches, tonsils intact, not enlarged +some erythema back of throat  Neck: supple, no LAD fletcher cervical, supraclavicular  CV: RRR, no murmurs, + pulses  Pulm: CTA b/l, no w/r/r, normal effort  Neurological: Grossly intact  Skin warm and pink, no ecchymoses, no edema fletcher ue, +fletcher LE edema     Labs:  Lab Results   Component Value Date/Time    WBC 3.7 (L) 2025 09:32 AM    RBC 3.34 (L) 2025 09:32 AM    HGB 8.9 (L) 2025 09:32 AM    HCT 29.3 (L) 2025 09:32 AM    MCV 87.7 2025 09:32 AM    MCH 26.6 2025 09:32 AM    MCHC 30.4 (L) 2025 09:32 AM    RDW 23.1 (H) 2025 09:32 AM    NEPRELIM 2.86 2025 09:32 AM    .0 2025 09:32 AM       Lab Results   Component Value Date/Time     (H) 2025 09:32 AM    BUN 33 (H) 2025 09:32 AM    CREATSERUM 0.60 2025 09:32 AM    GFRNAA 45 (L) 2022 08:02 AM    CA 9.6 2025 09:32 AM    ALB 3.8 2025 09:32 AM     (L) 2025 09:32 AM    K 4.1 2025 09:32 AM     2025 09:32 AM    CO2 26.0 2025 09:32 AM    ALKPHO 59  03/24/2025 09:32 AM    AST 18 03/24/2025 09:32 AM    ALT 19 03/24/2025 09:32 AM       Imaging:         Assessment & Plan:    Yi Torres is a 74 year old female with locally advanced Rt lung cancer, with mediastinal invasion, 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 Was on lovenox, now on Eliquis   -No significant bleeding issues, compliant with eliquis 5 mg bid    # Lung cancer  Proceed with concurrent chemo-RT with weekly carbo/taxol.   Cleared for week 4, Carbo/Taxol, 3/25/25, wt has decreased back to pre hospitalization wt  -pt taking senna for constipation, took 2 tabs few days ago and had large amount diarrheax2 , taking one tablet now and has regular bm, no diarrhea  -lost 10 lbs but is back to pre hospital admission wt, much of it fluid edema related  -Mucositis: Reports pain in throat when swallowing, causing decreased food intake,  oral mucosa pink, no white patches, some erythema back of throat, tonsils intact, not enlarged, gave recipe for magic mouthwash (maalox and diphenhydramine) ordered first mouthwash if continues with discomfort  -RTC with ANP next week, may need to add week 6 chemo if Radiation continues in to week 6    #anemia  Hgb 8.3, iron is low  - completed 1 dose InFED given ongoing chemo 3/18/25, without complications  HGB improving    #Hyponatremia:   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 Pt cont fluid restrict to 40 oz,  off Nacl tabs  Eating pretzels  If recurrent refer to Nephrology    #hypokalemia  Potassium stable , had diarrhea x2, will have her decrease the KCL from 20meq daily to 10 meq daily  RTC in 1 wk    MISA Tipton    [facility]   [facility]      High complexity MDM. Our clinic is continuing focal point for all oncologic services the patient needs.         [1]    Allergies  Allergen Reactions    Erythromycin DIARRHEA     Stomach pain

## 2025-03-25 ENCOUNTER — OFFICE VISIT (OUTPATIENT)
Dept: RADIATION ONCOLOGY | Facility: HOSPITAL | Age: 75
End: 2025-03-25
Attending: RADIOLOGY
Payer: MEDICARE

## 2025-03-25 ENCOUNTER — OFFICE VISIT (OUTPATIENT)
Age: 75
End: 2025-03-25
Attending: INTERNAL MEDICINE
Payer: MEDICARE

## 2025-03-25 ENCOUNTER — TELEPHONE (OUTPATIENT)
Age: 75
End: 2025-03-25

## 2025-03-25 VITALS
DIASTOLIC BLOOD PRESSURE: 51 MMHG | RESPIRATION RATE: 18 BRPM | HEART RATE: 93 BPM | OXYGEN SATURATION: 97 % | TEMPERATURE: 99 F | SYSTOLIC BLOOD PRESSURE: 135 MMHG

## 2025-03-25 DIAGNOSIS — C34.11 MALIGNANT NEOPLASM OF UPPER LOBE OF RIGHT LUNG (HCC): Primary | ICD-10-CM

## 2025-03-25 PROCEDURE — 77386 HC IMRT COMPLEX: CPT | Performed by: RADIOLOGY

## 2025-03-25 RX ORDER — DIPHENHYDRAMINE HYDROCHLORIDE 50 MG/ML
25 INJECTION, SOLUTION INTRAMUSCULAR; INTRAVENOUS ONCE
Status: COMPLETED | OUTPATIENT
Start: 2025-03-25 | End: 2025-03-25

## 2025-03-25 RX ORDER — FAMOTIDINE 10 MG/ML
20 INJECTION, SOLUTION INTRAVENOUS ONCE
Status: COMPLETED | OUTPATIENT
Start: 2025-03-25 | End: 2025-03-25

## 2025-03-25 RX ORDER — DIPHENHYDRAMINE HYDROCHLORIDE 50 MG/ML
INJECTION, SOLUTION INTRAMUSCULAR; INTRAVENOUS
Status: COMPLETED
Start: 2025-03-25 | End: 2025-03-25

## 2025-03-25 RX ORDER — FAMOTIDINE 10 MG/ML
INJECTION, SOLUTION INTRAVENOUS
Status: COMPLETED
Start: 2025-03-25 | End: 2025-03-25

## 2025-03-25 RX ADMIN — DIPHENHYDRAMINE HYDROCHLORIDE 25 MG: 50 INJECTION, SOLUTION INTRAMUSCULAR; INTRAVENOUS at 10:01:00

## 2025-03-25 RX ADMIN — FAMOTIDINE 20 MG: 10 INJECTION, SOLUTION INTRAVENOUS at 09:58:00

## 2025-03-25 NOTE — PROGRESS NOTES
Pt here for C1D22 Taxol/Carbo.  Arrives Via wheelchair, accompanied by Self and Spouse     Patient was evaluated today by Treatment Nurse.    Oral medications included in this regimen:  no    Patient confirms comprehension of cancer treatment schedule:  yes    Pregnancy screening:  Not applicable    Modifications in dose or schedule:  No    Medications appearance and physical integrity checked by RN: yes.    Chemotherapy IV pump settings verified by 2 RNs:  Yes.  Frequency of blood return and site check throughout administration: Prior to administration, Prior to each drug, and At completion of therapy     Infusion/treatment outcome:  patient tolerated treatment without incident    Education Record    Learner:  Patient and Spouse  Barriers / Limitations:  None  Method:  Discussion  Education / instructions given:  Plan of care  Outcome:  Shows understanding    Discharged Home, Via wheelchair, accompanied by:Self and Spouse    Patient/family verbalized understanding of future appointments: by printed AVS

## 2025-03-26 ENCOUNTER — DIETICIAN VISIT (OUTPATIENT)
Dept: NUTRITION | Facility: HOSPITAL | Age: 75
End: 2025-03-26

## 2025-03-26 VITALS — WEIGHT: 204.38 LBS | BODY MASS INDEX: 35 KG/M2

## 2025-03-26 PROCEDURE — 77386 HC IMRT COMPLEX: CPT | Performed by: RADIOLOGY

## 2025-03-26 RX ORDER — FUROSEMIDE 20 MG/1
20 TABLET ORAL DAILY
Qty: 30 TABLET | Refills: 1 | OUTPATIENT
Start: 2025-03-26

## 2025-03-26 NOTE — PROGRESS NOTES
Oncology Nutrition Assessment    Ht Readings from Last 1 Encounters:   03/24/25 163.8 cm (5' 4.5\")       Wt Readings from Last 1 Encounters:   03/26/25 92.7 kg (204 lb 6.4 oz)     BMI Calculated:  Body mass index is 34.54 kg/m².  Weight History:    Wt Readings from Last 10 Encounters:   03/26/25 92.7 kg (204 lb 6.4 oz)   03/24/25 91.6 kg (202 lb)   03/19/25 96.2 kg (212 lb)   03/17/25 95.5 kg (210 lb 9.6 oz)   03/12/25 97.3 kg (214 lb 9.6 oz)   03/10/25 96.8 kg (213 lb 8 oz)   03/05/25 97.3 kg (214 lb 6.4 oz)   03/04/25 97.7 kg (215 lb 6.4 oz)   02/25/25 97.5 kg (215 lb)   02/21/25 97.5 kg (215 lb)     IBW:  120#  UBW:  has lost wt in the past and likes to stay around 200#. Recent gain with edema and SVC.    Diagnoses:  left lung cancer with chemo and RT  Significant Medical History:   has a past medical history of Anesthesia complication, Anxiety state, Cancer (HCC), Cataract, Diabetes (HCC), Diabetes mellitus (HCC), Essential hypertension, High blood pressure, High cholesterol, Incontinence, Obesity, unspecified, Osteoarthritis, Renal disorder, Squamous cell carcinoma in situ (SCCIS) (2018), and Visual impairment.   Diet History:  lower sugar diet--A1C 5.8  Oral Liquid Supplement Use:  boost low sugar high protein-1 g sugar, 160 calories and 30 g protein most days.      Appetite: fair    Nutritional Physical History:   overweight per visual exam, edema masking true wt.    GI Problems:   GI intact, mild constipation at times.    Adequacy of PO Intake:  fair to good    Nutrition Risk Status:  moderate nutritional risk  Risk Status Based On:  potential treatment side effects and decreased appetite.     Nutritional Goals:  aid in management of treatment side effects via dietary measures and wt loss due to fluid losses ok.   Handouts Provided:  None at this time    Weekly Visits:  Nutritional Interventions:  3/5/2025  Discussed importance of good oral intake for wt maint during tx.  Urged adequate fluids. Balance rest  and activity.  Pt with hyponatremia and has been told to salt her foods. Being careful with this due to edema--MD monitoring labs.  Discussed potential tx side effects and discussed nutritional hints.  See weekly as schedule permits.  CPM    3/12/25 214.6#--stable. Edema seems stable. Chemo this week--great appetite yesterday, usually decreases towards the end of the week. Knows to push intake despite decreased appetite. Uses 2 protein drinks per day.  Denies any heartburn or dysphagia. Fluid intake good.  CPM    3/19/25  212#--2.6# less than wt last week, but likely fluid losses. Po intake seems to be adequate. Uses 1-2 protein drinks per day. Takes stool softener. Denies any heartburn or dysphagia, but throat soreness--discussed foods to limit to not aggravate.  Fluid intake is about 40oz, she is allowed 50oz. Doing ok. [facility]    3/26/25 204.4#--7.6# less than wt last week, pt doubts fluid loss related--edema stable.  Po less-decreased appetite, doing better the past 2 days. Had a significant diarrhea event on 3/24--some loss may be related to this. Tired. Continues to drink protein drinks daily. [facility]      Faith Ramos RD, LDN   Clinical Dietitian [id number]

## 2025-03-27 PROCEDURE — 77386 HC IMRT COMPLEX: CPT | Performed by: RADIOLOGY

## 2025-03-28 PROCEDURE — 77336 RADIATION PHYSICS CONSULT: CPT | Performed by: RADIOLOGY

## 2025-03-28 PROCEDURE — 77386 HC IMRT COMPLEX: CPT | Performed by: RADIOLOGY

## 2025-03-28 RX ORDER — FUROSEMIDE 20 MG/1
20 TABLET ORAL DAILY
Qty: 30 TABLET | Refills: 1 | OUTPATIENT
Start: 2025-03-28

## 2025-03-31 ENCOUNTER — NURSE ONLY (OUTPATIENT)
Age: 75
End: 2025-03-31
Attending: INTERNAL MEDICINE
Payer: MEDICARE

## 2025-03-31 ENCOUNTER — OFFICE VISIT (OUTPATIENT)
Age: 75
End: 2025-03-31
Attending: INTERNAL MEDICINE
Payer: MEDICARE

## 2025-03-31 VITALS
WEIGHT: 198 LBS | OXYGEN SATURATION: 97 % | DIASTOLIC BLOOD PRESSURE: 80 MMHG | SYSTOLIC BLOOD PRESSURE: 133 MMHG | TEMPERATURE: 98 F | HEIGHT: 64.5 IN | BODY MASS INDEX: 33.39 KG/M2 | HEART RATE: 89 BPM | RESPIRATION RATE: 18 BRPM

## 2025-03-31 DIAGNOSIS — K12.30 MUCOSITIS: ICD-10-CM

## 2025-03-31 DIAGNOSIS — I87.1 SVC SYNDROME: ICD-10-CM

## 2025-03-31 DIAGNOSIS — C34.11 MALIGNANT NEOPLASM OF UPPER LOBE OF RIGHT LUNG (HCC): Primary | ICD-10-CM

## 2025-03-31 DIAGNOSIS — D50.9 IRON DEFICIENCY ANEMIA, UNSPECIFIED IRON DEFICIENCY ANEMIA TYPE: ICD-10-CM

## 2025-03-31 DIAGNOSIS — Z51.11 ENCOUNTER FOR ANTINEOPLASTIC CHEMOTHERAPY: ICD-10-CM

## 2025-03-31 LAB
ANION GAP SERPL CALC-SCNC: 8 MMOL/L (ref 0–18)
BASOPHILS # BLD AUTO: 0.02 X10(3) UL (ref 0–0.2)
BASOPHILS NFR BLD AUTO: 0.8 %
BUN BLD-MCNC: 32 MG/DL (ref 9–23)
BUN/CREAT SERPL: 55.2 (ref 10–20)
CALCIUM BLD-MCNC: 9.6 MG/DL (ref 8.7–10.4)
CHLORIDE SERPL-SCNC: 103 MMOL/L (ref 98–112)
CO2 SERPL-SCNC: 25 MMOL/L (ref 21–32)
CREAT BLD-MCNC: 0.58 MG/DL
DEPRECATED RDW RBC AUTO: 70.4 FL (ref 35.1–46.3)
EGFRCR SERPLBLD CKD-EPI 2021: 96 ML/MIN/1.73M2 (ref 60–?)
EOSINOPHIL # BLD AUTO: 0.01 X10(3) UL (ref 0–0.7)
EOSINOPHIL NFR BLD AUTO: 0.4 %
ERYTHROCYTE [DISTWIDTH] IN BLOOD BY AUTOMATED COUNT: 23.9 % (ref 11–15)
GLUCOSE BLD-MCNC: 91 MG/DL (ref 70–99)
HCT VFR BLD AUTO: 30.2 %
HGB BLD-MCNC: 9.9 G/DL
IMM GRANULOCYTES # BLD AUTO: 0.01 X10(3) UL (ref 0–1)
IMM GRANULOCYTES NFR BLD: 0.4 %
LYMPHOCYTES # BLD AUTO: 0.46 X10(3) UL (ref 1–4)
LYMPHOCYTES NFR BLD AUTO: 18.5 %
MCH RBC QN AUTO: 28.8 PG (ref 26–34)
MCHC RBC AUTO-ENTMCNC: 32.8 G/DL (ref 31–37)
MCV RBC AUTO: 87.8 FL
MONOCYTES # BLD AUTO: 0.18 X10(3) UL (ref 0.1–1)
MONOCYTES NFR BLD AUTO: 7.2 %
NEUTROPHILS # BLD AUTO: 1.81 X10 (3) UL (ref 1.5–7.7)
NEUTROPHILS # BLD AUTO: 1.81 X10(3) UL (ref 1.5–7.7)
NEUTROPHILS NFR BLD AUTO: 72.7 %
OSMOLALITY SERPL CALC.SUM OF ELEC: 288 MOSM/KG (ref 275–295)
PLATELET # BLD AUTO: 117 10(3)UL (ref 150–450)
PLATELET MORPHOLOGY: NORMAL
POTASSIUM SERPL-SCNC: 3.8 MMOL/L (ref 3.5–5.1)
RBC # BLD AUTO: 3.44 X10(6)UL
SODIUM SERPL-SCNC: 136 MMOL/L (ref 136–145)
WBC # BLD AUTO: 2.5 X10(3) UL (ref 4–11)

## 2025-03-31 PROCEDURE — 77386 HC IMRT COMPLEX: CPT | Performed by: RADIOLOGY

## 2025-03-31 RX ORDER — WATER 10 ML/10ML
INJECTION INTRAMUSCULAR; INTRAVENOUS; SUBCUTANEOUS
Status: DISCONTINUED
Start: 2025-03-31 | End: 2025-03-31

## 2025-03-31 RX ORDER — SODIUM CHLORIDE 9 MG/ML
10 INJECTION, SOLUTION INTRAMUSCULAR; INTRAVENOUS; SUBCUTANEOUS ONCE
OUTPATIENT
Start: 2025-03-31

## 2025-03-31 NOTE — PROGRESS NOTES
[facility] Radiation Treatment Management Note 21-25    Patient:  Yi Torres  Age:  74 year old  Visit Diagnosis:    1. Malignant neoplasm of upper lobe of right lung (HCC)      Primary Rad/Onc:  Dr. Abdirizak Alvarado    Site Delivered Dose (cGy) Prescribed Dose (cGy) Fraction #   RUL [id number] 21/35           First treatment date:  3/4/25  Concurrent chemotherapy:    OP CARBOplatin / PACLitaxel with RT         3/26/2025    10:04 AM 3/31/2025    10:48 AM 4/1/2025     9:24 AM   Oncology Vitals   Height  5' 4.5\"    Height  164 cm    Weight 204 lb 6.4 oz 198 lb    Weight 92.715 kg 89.812 kg    BSA (m2) 1.99 m2 1.96 m2    BMI 34.54 kg/m2 33.46 kg/m2    BP  133/80 125/53   Pulse  89 88   Resp  18 16   Temp  97.8 °F (36.6 °C) 97.2 °F (36.2 °C)   SpO2  97 % 99 %   Pain Score  8 2        Toxicities:  Fatigue Grade 1= Fatigue relieved by rest  Dermatitis associated with radiation Grade 0= None  Moisturizer used  Aveeno  Number of times: 1 times daily.  Pruritis denies none  Dry Skin absent  Hyperpigmentation noted  Dysphagia Grade 1=  Symptomatic, able to eat regular diet- mild. Improved from last week   Dyspepsia Grade 0= None- took tums and resolved  Mucositis Grade 0= None  Nausea Grade 0= None- takes compazine on chemo days   Vomiting Grade 0= None  Cough Grade 1= Mild symptoms; nonprescription intervention indicated- mild dry cough  Dyspnea Grade 2= Shortness of breath with minimal exertion; limiting instrumental ADL    Nursing Note:  Patient seen for OTV with Dr. Reyna   Throat pain, able to tolerate regular diet. Just has to chew well. Has not started triple mix yet.   Weight down. Drinking 1-2 protein shakes per day. Discussed increasing to 3 if tolerated.   Getting full fast     Recent Labs   Lab 03/24/25  0932 03/31/25  0943   WBC 3.7* 2.5*   RBC 3.34* 3.44*   HGB 8.9* 9.9*   HCT 29.3* 30.2*   .0 117.0*   MCV 87.7 87.8   MCH 26.6 28.8   MCHC 30.4* 32.8   RDW 23.1* 23.9*   RDWSD  64.4* 70.4*   NEPRELIM 2.86 1.81   NE 2.86 1.81   LYMABS 0.47* 0.46*   MOABSO 0.29 0.18   EOABSO 0.00 0.01   BAABSO 0.03 0.02   IMMGRAN 0.04 0.01   NEPERCENT 77.5 72.7   LYPERCENT 12.7 18.5   MOPERCENT 7.9 7.2   EOPERCENT 0.0 0.4   BAPERCENT 0.8 0.8   IMMGRANPERC 1.1 0.4      Wt Readings from Last 6 Encounters:   03/31/25 89.8 kg (198 lb)   03/26/25 92.7 kg (204 lb 6.4 oz)   03/24/25 91.6 kg (202 lb)   03/19/25 96.2 kg (212 lb)   03/17/25 95.5 kg (210 lb 9.6 oz)   03/12/25 97.3 kg (214 lb 9.6 oz)     Adelina JOAQUIN RN    Physician Note:  Subjective:    Mild dysphagia, tolerating solids  Objective:  NAD      Treatment setup imaging have been reviewed:  Yes    Assessment/Plan:    RUL lung ca, C/RT    Reviewed potential side effects      Continue radiotherapy per plan    Next visit:  1 week  Scar Reyna MD for   Dr. Abdirizak Alvarado    Oncology Chemo Meds          Cycle 1; Day 1    3/4/2025 Cycle 1; Day 8    3/11/2025 Cycle 1; Day 15    3/18/2025 Cycle 1; Day 22    3/25/2025 Cycle 1    3/31/2025 10:01 Cycle 1; [ Day 29 ]    4/1/2025   Oncology Flowsheet   Day, Cycle Day 1, Cycle 1 Day 8, Cycle 1 Day 15, Cycle 1 Day 22, Cycle 1  [ Day 29, Cycle 1 ]   alteplase (Activase) IV     2 mg    CARBOplatin 450 mg/45mL (Paraplatin)  mg 290 mg       +blood return 290 mg       +blood return 290 mg  [ 290 mg ]   dexAMETHasone 100 MG/10ML (Decadron) IV New Bag (unknown dose, 20 mg ordered) New Bag (unknown dose, 20 mg ordered) New Bag (unknown dose, 20 mg ordered) New Bag (unknown dose, 20 mg ordered)  [ 20 mg ]   diphenhydrAMINE 50 mg/mL (Benadryl) IV 25 mg 25 mg 25 mg 25 mg  [ 25 mg ]   iron dextran 50 mg/mL (Infed) IV   975 mg    25 mg      ondansetron 4 MG/2ML (Zofran) IV New Bag (unknown dose, 16 mg ordered) New Bag (unknown dose, 16 mg ordered) New Bag (unknown dose, 16 mg ordered) New Bag (unknown dose, 16 mg ordered)  [ 16 mg ]   PACLitaxel 300 mg/50mL (Taxol) IV 50 mg/m2 = 96 mg       +blood return 50 mg/m2 = 102 mg       +blood  return 50 mg/m2 = 102 mg       +blood return 50 mg/m2 = 102 mg       +blood +filter  [ 50 mg/m2 ] = 102 mg   sodium chloride 0.9%  mL    250 mL       +blood return    New Bag (unknown dose, 100 mL ordered) 250 mL       +blood return    250 mL       +blood return    New Bag (unknown dose, 100 mL ordered) 250 mL    250 mL       +blood return    250 mL       +blood return    New Bag (unknown dose, 100 mL ordered) 250 mL    250 mL       +blood +filter    New Bag (unknown dose, 100 mL ordered)  [ 250 mL ]    [ 250 mL ]    [ 100 mL ]      Details          [ Planned dose ]    Administered dose cannot be determined

## 2025-03-31 NOTE — PROGRESS NOTES
[facility] Office Note      Diagnosis  1. Malignant neoplasm of upper lobe of right lung (HCC)    2. Anemia, unspecified type    3. 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 SVC syndrome    5. Thrombosis due to vascular catheter          Current Therapy  S/p SVC stenting  Concurrent chemo-RT with carbo/taxol week 5 - due 4/1/25    INTERVAL HISTORY    Pt is here for a follow up appt, she returns prior to week 5 Carbo/Taxol and is here with her .    She is overall feeling pretty good.  Her mucositis has improved with Biotin and denies pain with oral intake.  She denies diarrhea or constipation, having normal BM.  Pt has been wearing compression stockings at night for BLE swelling which she says help a great deal.  No other acute complaints.    Denies headache, fever/chills, vision change,  worsening SOB/GALLARDO, chest pain, cough, N/V/D/C, and pain.       Past Oncologic History  1/9/25 she had complained of increasing dyspnea for about 3 months.  Saw PCP and underwent a CT chest that showed a very large centrally necrotic right upper lobe lung mass measuring 8.2 x 7.4 x 10.8 cm with extension to the right paratracheal upper mediastinum.  Extensive right upper lobe bronchovascular structure encasement.  Mildly enlarged right paratracheal mediastinal lymph nodes bilateral adrenal nodules were noted.     1/17/2025 PET/CT showed a 9 x 8 cm hypermetabolic mass within the right upper lobe extending into the mediastinum and right hilum peripherally hypermetabolic and centrally cystic and necrotic.  Effacement of the IVC.  Subcentimeter pulmonary nodules bilaterally were not FDG avid  Hypermetabolic lymph nodes within the anterior superior mediastinum subcarinal right paratracheal area and right clavicle chains were all compatible with metastatic disease.     1/22/25 she underwent a robotic navigational bronchoscopy with EBUS bx of RUL mass and  TBNA of lymph node  station 7.  BAL from the right upper lobe was consistent with squamous cell carcinoma as was the needle biopsy from the right upper lobe mass.  EBUS sampling from station node 7 showed no malignant cells.  Lymphocytes were present. PD-L1 TPS was 0, EGFR was negative, ALK is negative    2/13/2025 She had a port placed  and then developed increasing redness and swelling as well as inability to lie flat.  She was seen in the oncology clinic earlier today and referred to the ED.  CT chest showed thrombus within the left brachiocephalic and left subclavian vein which is new since prior study. The right upper lobe mass is causing circumferential encasement and as well as invasion and narrowing of the SVC which is completely obliterated    2/14 she underwent SVC stenting as well as thrombectomy from left subclavian vein on 2/13/2025.  Her port was still functional.  She is now on enoxaparin.  She is feeling much better.  Her dyspnea has improved.  No longer has any postural flushing congestion.    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     Cholecystectomy      Laparoscopic incisional / umbilical / ventral hernia repair   09/19/2024       Current Medications:   Potassium Chloride ER 10 MEQ Oral Tab CR Take 1 tablet (10 mEq total) by mouth daily. 3/24/25 decrease potassium chloride dose to one capsule (10 meq) daily 14 tablet 0    lidocaine-prilocaine 2.5-2.5 % External Cream Apply to site 1 hour prior to port a cath needle insertion 5 g 1    sennosides-docusate (SENNA PLUS) 8.6-50 MG Oral Tab Take 2 tablets by mouth daily. 30 tablet 1    furosemide (LASIX) 20 MG Oral Tab Take 1 tablet (20 mg total) by mouth daily. 30 tablet 1    apixaban (ELIQUIS) 5 MG Oral Tab Take 1 tablet (5 mg total) by mouth 2 (two) times daily. After completion of lovenox injections 60 tablet 3    acetaminophen 500 MG Oral Tab Take 1 tablet (500 mg total) by mouth every 4 (four) hours as needed.      fluticasone propionate 50 MCG/ACT Nasal Suspension 2 sprays by Each Nare route daily. 1 each 0    lidocaine-menthol 4-1 % External Patch Place 2 patches onto the skin daily. 30 patch 0    sodium chloride 1 g Oral Tab Take 1 tablet (1 g total) by mouth 2 (two) times daily with meals. 20 tablet 0    prochlorperazine (COMPAZINE) 10 mg tablet Take 1 tablet (10 mg total) by mouth every 6 (six) hours as needed for Nausea. 30 tablet 3    Spironolactone-HCTZ 25-25 MG Oral Tab Take 1 tablet by mouth daily. 90 tablet 3    atorvastatin 20 MG Oral Tab TAKE 1 TABLET (20MG) BY MOUTH EVERY DAY 90 tablet 3    Cetirizine HCl 10 MG Oral Cap Take 10 mg by mouth daily.      Psyllium (METAMUCIL FREE & NATURAL) 43 % Oral Powder Take 1 Scoop by mouth as needed.      metFORMIN  MG Oral Tablet 24 Hr Take 1 tablet (500 mg total) by mouth daily with food. 90 tablet 3    metoprolol tartrate 25 MG Oral Tab Take 1 tablet (25 mg total) by mouth 2 (two) times daily. 180 tablet 3    verapamil  MG Oral Tab CR Take 1 tablet (240 mg total) by mouth nightly. 90 tablet 3    alendronate 70 MG Oral Tab Take 1 tablet (70 mg total) by mouth once a week. 12 tablet 3    cholecalciferol 50 MCG  ( UT) Oral Cap Take 1 capsule (2,000 Units total) by mouth daily.      Glucose Blood (ACCU-CHEK ADITYA PLUS) In Vitro Strip USE ONCE DAILY IN THE MORNING BEFORE BREAKFAST 100 strip 3    LANCETS ULTRA THIN Does not apply Misc Use one daily 100 each 6    Multiple Vitamins-Minerals (CENTRUM SILVER) Oral Tab Take 1 tablet by mouth daily.          Allergies:   Allergies[1]    Family Medical History:  Family History   Problem Relation Age of Onset    Hypertension Father     Stroke Father     Heart Attack Mother        Social History:  Social History     Socioeconomic History    Marital status:      Spouse name: Not on file    Number of children: 1    Years of education: Not on file    Highest education level: Not on file   Occupational History    Not on file   Tobacco Use    Smoking status: Former     Current packs/day: 0.00     Average packs/day: 0.5 packs/day for 45.0 years (22.5 ttl pk-yrs)     Types: Cigarettes     Start date: 1973     Quit date: 2018     Years since quittin.1     Passive exposure: Past    Smokeless tobacco: Never    Tobacco comments:     1 pack every 3 days   Vaping Use    Vaping status: Never Used   Substance and Sexual Activity    Alcohol use: Not Currently     Comment: very rarely    Drug use: No    Sexual activity: Not on file   Other Topics Concern     Service Not Asked    Blood Transfusions Not Asked    Caffeine Concern Yes     Comment: Coffee, 2 cups per day     Occupational Exposure Not Asked    Hobby Hazards Not Asked    Sleep Concern Not Asked    Stress Concern Not Asked    Weight Concern Not Asked    Special Diet Not Asked    Back Care Not Asked    Exercise Not Asked    Bike Helmet Not Asked    Seat Belt Not Asked    Self-Exams Not Asked    Grew up on a farm Not Asked    History of tanning No    Outdoor occupation Not Asked    Breast feeding Not Asked    Reaction to local anesthetic No    Left Handed No    Right Handed Yes    Currently spends a great deal of  time in the sun No    Past Sunlamp Treatments for Acne Not Asked    Hx of Spending Great Deal of Time in Sun No    Bad sunburns in the past Yes    Tanning Salons in the Past No    Hx of Radiation Treatments No    Regular use of sun block Not Asked   Social History Narrative    - lives with     1 son      Social Drivers of Health     Food Insecurity: No Food Insecurity (2025)    NCSS - Food Insecurity     Worried About Running Out of Food in the Last Year: No     Ran Out of Food in the Last Year: No   Transportation Needs: No Transportation Needs (2025)    NCSS - Transportation     Lack of Transportation: No   Housing Stability: Not At Risk (2025)    NCSS - Housing/Utilities     Has Housing: Yes     Worried About Losing Housing: No     Unable to Get Utilities: No       Gyn History:  OB History    Para Term  AB Living   1 1 0 0 0 0   SAB IAB Ectopic Multiple Live Births   0 0 0 0 0       Objective:     Vitals:    25 1048   BP: 133/80   Pulse: 89   Resp: 18   Temp: 97.8 °F (36.6 °C)         Physical Exam:  General: A&Ox3, NAD, in wheelchair but able to stand and walk short distances  HEENT: PERRL, OP clear  Neck: supple, no lymphadenopathy  CV: RRR, no murmurs, + pulses  Pulm: CTAB, normal effort  Neurological: Grossly intact  Skin: BLE edema 1+    Labs:  Lab Results   Component Value Date/Time    WBC 2.5 (L) 2025 09:43 AM    RBC 3.44 (L) 2025 09:43 AM    HGB 9.9 (L) 2025 09:43 AM    HCT 30.2 (L) 2025 09:43 AM    MCV 87.8 2025 09:43 AM    MCH 28.8 2025 09:43 AM    MCHC 32.8 2025 09:43 AM    RDW 23.9 (H) 2025 09:43 AM    NEPRELIM 1.81 2025 09:43 AM    .0 (L) 2025 09:43 AM       Lab Results   Component Value Date/Time    GLU 91 2025 09:43 AM    BUN 32 (H) 2025 09:43 AM    CREATSERUM 0.58 2025 09:43 AM    GFRNAA 45 (L) 2022 08:02 AM    CA 9.6 2025 09:43 AM    ALB 3.8 2025  09:32 AM     03/31/2025 09:43 AM    K 3.8 03/31/2025 09:43 AM     03/31/2025 09:43 AM    CO2 25.0 03/31/2025 09:43 AM    ALKPHO 59 03/24/2025 09:32 AM    AST 18 03/24/2025 09:32 AM    ALT 19 03/24/2025 09:32 AM       Imaging:         Assessment & Plan:    Yi Torres is a 74 year old female with locally advanced Rt lung cancer, with mediastinal invasion, 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 Was on lovenox, now on Eliquis   -No significant bleeding issues, compliant with eliquis 5 mg bid    # Lung cancer  -Proceed with concurrent chemo-RT with weekly carbo/taxol.   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 Patient aware to call our clinic sooner with any questions or concerns     SIMRAN Brannon  Hematology/Oncology         High complexity MDM. Our clinic is continuing focal point for all oncologic services the patient needs.         [1]   Allergies  Allergen Reactions    Erythromycin DIARRHEA     Stomach pain

## 2025-04-01 ENCOUNTER — OFFICE VISIT (OUTPATIENT)
Age: 75
End: 2025-04-01
Attending: INTERNAL MEDICINE
Payer: MEDICARE

## 2025-04-01 ENCOUNTER — OFFICE VISIT (OUTPATIENT)
Dept: RADIATION ONCOLOGY | Facility: HOSPITAL | Age: 75
End: 2025-04-01
Attending: RADIOLOGY
Payer: MEDICARE

## 2025-04-01 VITALS
RESPIRATION RATE: 16 BRPM | HEART RATE: 88 BPM | OXYGEN SATURATION: 99 % | DIASTOLIC BLOOD PRESSURE: 53 MMHG | TEMPERATURE: 97 F | SYSTOLIC BLOOD PRESSURE: 125 MMHG

## 2025-04-01 DIAGNOSIS — C34.11 MALIGNANT NEOPLASM OF UPPER LOBE OF RIGHT LUNG (HCC): Primary | ICD-10-CM

## 2025-04-01 PROCEDURE — 77386 HC IMRT COMPLEX: CPT | Performed by: RADIOLOGY

## 2025-04-01 RX ORDER — FAMOTIDINE 10 MG/ML
INJECTION, SOLUTION INTRAVENOUS
Status: COMPLETED
Start: 2025-04-01 | End: 2025-04-01

## 2025-04-01 RX ORDER — FAMOTIDINE 10 MG/ML
20 INJECTION, SOLUTION INTRAVENOUS ONCE
Status: COMPLETED | OUTPATIENT
Start: 2025-04-01 | End: 2025-04-01

## 2025-04-01 RX ORDER — DIPHENHYDRAMINE HYDROCHLORIDE 50 MG/ML
25 INJECTION, SOLUTION INTRAMUSCULAR; INTRAVENOUS ONCE
Status: COMPLETED | OUTPATIENT
Start: 2025-04-01 | End: 2025-04-01

## 2025-04-01 RX ORDER — DIPHENHYDRAMINE HYDROCHLORIDE 50 MG/ML
INJECTION, SOLUTION INTRAMUSCULAR; INTRAVENOUS
Status: COMPLETED
Start: 2025-04-01 | End: 2025-04-01

## 2025-04-01 RX ADMIN — DIPHENHYDRAMINE HYDROCHLORIDE 25 MG: 50 INJECTION, SOLUTION INTRAMUSCULAR; INTRAVENOUS at 11:02:00

## 2025-04-01 RX ADMIN — FAMOTIDINE 20 MG: 10 INJECTION, SOLUTION INTRAVENOUS at 11:03:00

## 2025-04-01 NOTE — PROGRESS NOTES
Pt here for C1 D29 Taxol and carboplatin.  Arrives Ambulating independently, accompanied by Spouse     Patient was evaluated today by Treatment Nurse. Pt evaluated by MISA Yancey yesterday 3/31.     Oral medications included in this regimen:  no    Patient confirms comprehension of cancer treatment schedule:  yes    Pregnancy screening:  Not applicable    Modifications in dose or schedule:  No    Medications appearance and physical integrity checked by RN: yes.    Chemotherapy IV pump settings verified by 2 RNs:  Yes.  Frequency of blood return and site check throughout administration: Prior to administration, Prior to each drug, and At completion of therapy     Infusion/treatment outcome:  patient tolerated treatment without incident    Education Record    Learner:  Patient  Barriers / Limitations:  None  Method:  Discussion, Printed material, and Reinforcement  Education / instructions given:  Plan of care reviewed and discussed  Outcome:  Shows understanding    Discharged Home, Ambulating independently, accompanied by:Spouse    Patient/family verbalized understanding of future appointments: by printed AVS

## 2025-04-02 ENCOUNTER — DIETICIAN VISIT (OUTPATIENT)
Dept: NUTRITION | Facility: HOSPITAL | Age: 75
End: 2025-04-02

## 2025-04-02 VITALS — BODY MASS INDEX: 34 KG/M2 | WEIGHT: 201.81 LBS

## 2025-04-02 PROCEDURE — 77386 HC IMRT COMPLEX: CPT | Performed by: RADIOLOGY

## 2025-04-02 NOTE — PROGRESS NOTES
Oncology Nutrition Assessment    Ht Readings from Last 1 Encounters:   03/31/25 163.8 cm (5' 4.5\")       Wt Readings from Last 1 Encounters:   04/02/25 91.5 kg (201 lb 12.8 oz)     BMI Calculated:  Body mass index is 34.1 kg/m².  Weight History:    Wt Readings from Last 10 Encounters:   04/02/25 91.5 kg (201 lb 12.8 oz)   03/31/25 89.8 kg (198 lb)   03/26/25 92.7 kg (204 lb 6.4 oz)   03/24/25 91.6 kg (202 lb)   03/19/25 96.2 kg (212 lb)   03/17/25 95.5 kg (210 lb 9.6 oz)   03/12/25 97.3 kg (214 lb 9.6 oz)   03/10/25 96.8 kg (213 lb 8 oz)   03/05/25 97.3 kg (214 lb 6.4 oz)   03/04/25 97.7 kg (215 lb 6.4 oz)     IBW:  120#  UBW:  has lost wt in the past and likes to stay around 200#. Recent gain with edema and SVC.    Diagnoses:  left lung cancer with chemo and RT  Significant Medical History:   has a past medical history of Anesthesia complication, Anxiety state, Cancer (HCC), Cataract, Diabetes (HCC), Diabetes mellitus (HCC), Essential hypertension, High blood pressure, High cholesterol, Incontinence, Obesity, unspecified, Osteoarthritis, Renal disorder, Squamous cell carcinoma in situ (SCCIS) (2018), and Visual impairment.   Diet History:  lower sugar diet--A1C 5.8  Oral Liquid Supplement Use:  boost low sugar high protein-1 g sugar, 160 calories and 30 g protein most days.      Appetite: fair    Nutritional Physical History:   overweight per visual exam, edema masking true wt.    GI Problems:   GI intact, mild constipation at times.    Adequacy of PO Intake:  fair to good    Nutrition Risk Status:  moderate nutritional risk  Risk Status Based On:  potential treatment side effects and decreased appetite.     Nutritional Goals:  aid in management of treatment side effects via dietary measures and wt loss due to fluid losses ok.   Handouts Provided:  None at this time    Weekly Visits:  Nutritional Interventions:  3/5/2025  Discussed importance of good oral intake for wt maint during tx.  Urged adequate fluids.  Balance rest and activity.  Pt with hyponatremia and has been told to salt her foods. Being careful with this due to edema--MD monitoring labs.  Discussed potential tx side effects and discussed nutritional hints.  See weekly as schedule permits.  CPM    3/12/25 214.6#--stable. Edema seems stable. Chemo this week--great appetite yesterday, usually decreases towards the end of the week. Knows to push intake despite decreased appetite. Uses 2 protein drinks per day.  Denies any heartburn or dysphagia. Fluid intake good.  CPM    3/19/25  212#--2.6# less than wt last week, but likely fluid losses. Po intake seems to be adequate. Uses 1-2 protein drinks per day. Takes stool softener. Denies any heartburn or dysphagia, but throat soreness--discussed foods to limit to not aggravate.  Fluid intake is about 40oz, she is allowed 50oz. Doing ok. CPM    3/26/25 204.4#--7.6# less than wt last week, pt doubts fluid loss related--edema stable.  Po less-decreased appetite, doing better the past 2 days. Had a significant diarrhea event on 3/24--some loss may be related to this. Tired. Continues to drink protein drinks daily. CPM    4/2/25 201.8#--further loss. Suspect combination of true wt loss and some fluid losses. Appetite is decreased overall, but pushing intake. Drinking 1-2 protein drinks per day. Heartburn episode, but resolved with tums.  Fluid intake fair--she is restricted due to edema. Fatigue.  [facility]      Faith Ramos RD, LDN   Clinical Dietitian [id number]

## 2025-04-03 PROCEDURE — 77386 HC IMRT COMPLEX: CPT | Performed by: RADIOLOGY

## 2025-04-05 DIAGNOSIS — M81.0 OSTEOPOROSIS, UNSPECIFIED OSTEOPOROSIS TYPE, UNSPECIFIED PATHOLOGICAL FRACTURE PRESENCE: ICD-10-CM

## 2025-04-07 ENCOUNTER — NURSE ONLY (OUTPATIENT)
Age: 75
End: 2025-04-07
Attending: INTERNAL MEDICINE
Payer: MEDICARE

## 2025-04-07 ENCOUNTER — OFFICE VISIT (OUTPATIENT)
Age: 75
End: 2025-04-07
Attending: INTERNAL MEDICINE
Payer: MEDICARE

## 2025-04-07 ENCOUNTER — TELEPHONE (OUTPATIENT)
Age: 75
End: 2025-04-07

## 2025-04-07 VITALS
WEIGHT: 199 LBS | TEMPERATURE: 98 F | HEIGHT: 64.5 IN | HEART RATE: 101 BPM | BODY MASS INDEX: 33.56 KG/M2 | SYSTOLIC BLOOD PRESSURE: 126 MMHG | OXYGEN SATURATION: 95 % | DIASTOLIC BLOOD PRESSURE: 58 MMHG | RESPIRATION RATE: 18 BRPM

## 2025-04-07 DIAGNOSIS — C34.11 MALIGNANT NEOPLASM OF UPPER LOBE OF RIGHT LUNG (HCC): Primary | ICD-10-CM

## 2025-04-07 DIAGNOSIS — C34.11 MALIGNANT NEOPLASM OF UPPER LOBE OF RIGHT LUNG (HCC): ICD-10-CM

## 2025-04-07 DIAGNOSIS — Z51.11 ENCOUNTER FOR ANTINEOPLASTIC CHEMOTHERAPY: Primary | ICD-10-CM

## 2025-04-07 DIAGNOSIS — I87.1 SVC SYNDROME: ICD-10-CM

## 2025-04-07 DIAGNOSIS — T85.868A: ICD-10-CM

## 2025-04-07 DIAGNOSIS — R60.9 FLUID RETENTION: ICD-10-CM

## 2025-04-07 DIAGNOSIS — T45.1X5A CHEMOTHERAPY INDUCED NEUTROPENIA: ICD-10-CM

## 2025-04-07 DIAGNOSIS — Z51.11 ENCOUNTER FOR ANTINEOPLASTIC CHEMOTHERAPY: ICD-10-CM

## 2025-04-07 DIAGNOSIS — D70.1 CHEMOTHERAPY INDUCED NEUTROPENIA: ICD-10-CM

## 2025-04-07 LAB
ANION GAP SERPL CALC-SCNC: 9 MMOL/L (ref 0–18)
BASOPHILS # BLD AUTO: 0.02 X10(3) UL (ref 0–0.2)
BASOPHILS NFR BLD AUTO: 1 %
BUN BLD-MCNC: 42 MG/DL (ref 9–23)
BUN/CREAT SERPL: 75 (ref 10–20)
CALCIUM BLD-MCNC: 9.2 MG/DL (ref 8.7–10.4)
CHLORIDE SERPL-SCNC: 102 MMOL/L (ref 98–112)
CO2 SERPL-SCNC: 26 MMOL/L (ref 21–32)
CREAT BLD-MCNC: 0.56 MG/DL
DEPRECATED RDW RBC AUTO: 76.1 FL (ref 35.1–46.3)
EGFRCR SERPLBLD CKD-EPI 2021: 97 ML/MIN/1.73M2 (ref 60–?)
EOSINOPHIL # BLD AUTO: 0.02 X10(3) UL (ref 0–0.7)
EOSINOPHIL NFR BLD AUTO: 1 %
ERYTHROCYTE [DISTWIDTH] IN BLOOD BY AUTOMATED COUNT: 24.7 % (ref 11–15)
GLUCOSE BLD-MCNC: 113 MG/DL (ref 70–99)
HCT VFR BLD AUTO: 30.2 %
HGB BLD-MCNC: 9.6 G/DL
IMM GRANULOCYTES # BLD AUTO: 0.01 X10(3) UL (ref 0–1)
IMM GRANULOCYTES NFR BLD: 0.5 %
LYMPHOCYTES # BLD AUTO: 0.62 X10(3) UL (ref 1–4)
LYMPHOCYTES NFR BLD AUTO: 30.2 %
MCH RBC QN AUTO: 28.5 PG (ref 26–34)
MCHC RBC AUTO-ENTMCNC: 31.8 G/DL (ref 31–37)
MCV RBC AUTO: 89.6 FL
MONOCYTES # BLD AUTO: 0.1 X10(3) UL (ref 0.1–1)
MONOCYTES NFR BLD AUTO: 4.9 %
NEUTROPHILS # BLD AUTO: 1.28 X10 (3) UL (ref 1.5–7.7)
NEUTROPHILS # BLD AUTO: 1.28 X10(3) UL (ref 1.5–7.7)
NEUTROPHILS NFR BLD AUTO: 62.4 %
OSMOLALITY SERPL CALC.SUM OF ELEC: 295 MOSM/KG (ref 275–295)
PLATELET # BLD AUTO: 72 10(3)UL (ref 150–450)
PLATELET MORPHOLOGY: NORMAL
PLATELETS.RETICULATED NFR BLD AUTO: 4.7 % (ref 0–7)
POTASSIUM SERPL-SCNC: 3.4 MMOL/L (ref 3.5–5.1)
RBC # BLD AUTO: 3.37 X10(6)UL
SODIUM SERPL-SCNC: 137 MMOL/L (ref 136–145)
WBC # BLD AUTO: 2.1 X10(3) UL (ref 4–11)

## 2025-04-07 PROCEDURE — 85060 BLOOD SMEAR INTERPRETATION: CPT

## 2025-04-07 RX ORDER — DIPHENHYDRAMINE HYDROCHLORIDE 50 MG/ML
25 INJECTION, SOLUTION INTRAMUSCULAR; INTRAVENOUS ONCE
OUTPATIENT
Start: 2025-04-08

## 2025-04-07 RX ORDER — FAMOTIDINE 10 MG/ML
20 INJECTION, SOLUTION INTRAVENOUS ONCE
OUTPATIENT
Start: 2025-04-08

## 2025-04-07 NOTE — PROGRESS NOTES
[facility] Radiation Treatment Management Note 26-30    Patient:  Yi Torres  Age:  74 year old  Visit Diagnosis:    1. Malignant neoplasm of upper lobe of right lung (HCC)      Primary Rad/Onc:  Dr. Abdirizak Alvarado    Site Delivered Dose (cGy) Prescribed Dose (cGy) Fraction #   RUL [id number] 26/35           First treatment date:  3/4/25  Concurrent chemotherapy: OP CARBOplatin / PACLitaxel with RT         4/2/2025     9:44 AM 4/7/2025    10:15 AM 4/8/2025     9:42 AM   Oncology Vitals   Height  5' 4.5\"    Height  164 cm    Weight 201 lb 12.8 oz 199 lb    Weight 91.536 kg 90.266 kg    BSA (m2) 1.97 m2 1.96 m2    BMI 34.1 kg/m2 33.63 kg/m2    BP  126/58 106/59   Pulse  101 93   Resp  18 16   Temp  97.7 °F (36.5 °C) 97.5 °F (36.4 °C)   SpO2  95 % 99 %   Pain Score  2 3        Toxicities:  Fatigue Grade 2= Fatigue not relieved by rest limiting instrumental ADL  Dermatitis associated with radiation Grade 0= None  Moisturizer used  aveeno  Number of times: 1 times daily.  Pruritis - mild   Dry Skin noted  Hyperpigmentation noted  Dysphagia Grade 1=  Symptomatic, able to eat regular diet- softer foods   Dyspepsia Grade 1= Mild symptoms; intervention not indicated- one time, resolved with tums  Mucositis Grade 0= None  Nausea Grade 0= None- takes compazine on chemo days  Vomiting Grade 0= None  Cough Grade 1= Mild symptoms; nonprescription intervention indicated  Dyspnea Grade 2= Shortness of breath with minimal exertion; limiting instrumental ADL    Nursing Note:  Patient seen for OTV with Dr. Reyna.   3 protein shakes per day, low appetite.   Throat still sore, mild dysphagia. On soft diet  Voice raspy.   Chemo held today due to labs.   Skin irritation to right upper chest. Skin intact but red. Encouraged to apply moisturizer BID.     Recent Labs   Lab 03/31/25  0943 04/07/25  0943   WBC 2.5* 2.1*   RBC 3.44* 3.37*   HGB 9.9* 9.6*   HCT 30.2* 30.2*   .0* 72.0*   MCV 87.8 89.6   MCH  28.8 28.5   MCHC 32.8 31.8   RDW 23.9* 24.7*   RDWSD 70.4* 76.1*   NEPRELIM 1.81 1.28*   NE 1.81 1.28*   LYMABS 0.46* 0.62*   MOABSO 0.18 0.10   EOABSO 0.01 0.02   BAABSO 0.02 0.02   IMMGRAN 0.01 0.01   NEPERCENT 72.7 62.4   LYPERCENT 18.5 30.2   MOPERCENT 7.2 4.9   EOPERCENT 0.4 1.0   BAPERCENT 0.8 1.0   IMMGRANPERC 0.4 0.5      Wt Readings from Last 6 Encounters:   04/07/25 90.3 kg (199 lb)   04/02/25 91.5 kg (201 lb 12.8 oz)   03/31/25 89.8 kg (198 lb)   03/26/25 92.7 kg (204 lb 6.4 oz)   03/24/25 91.6 kg (202 lb)   03/19/25 96.2 kg (212 lb)     Adelina JOAQUIN RN    Physician Note:  Subjective:    Increasing dysphagia, tolerating soft solids  Objective:  NAD  G1 dermatitis in the tx field         Treatment setup imaging have been reviewed:  Yes    Assessment/Plan:    RUL lung ca, C/RT    Reviewed potential side effects  Pain meds for dysphagia  Increase protein shakes        Continue radiotherapy per plan    Next visit:  1 week  Scar Reyna MD for   Dr. Abdirizak Alvarado    Oncology Chemo Meds          Cycle 1; Day 8    3/11/2025 Cycle 1; Day 15    3/18/2025 Cycle 1; Day 22    3/25/2025 Cycle 1    3/31/2025 10:01 Cycle 1; Day 29    4/1/2025 Cycle 1; [ Day 36 ]    4/8/2025   Oncology Flowsheet   Day, Cycle Day 8, Cycle 1 Day 15, Cycle 1 Day 22, Cycle 1  Day 29, Cycle 1 [ Day 36, Cycle 1 ]   alteplase (Activase) IV    2 mg     CARBOplatin 450 mg/45mL (Paraplatin)  mg       +blood return 290 mg       +blood return 290 mg  290 mg       +blood [ 225 mg ]   dexAMETHasone 100 MG/10ML (Decadron) IV New Bag (unknown dose, 20 mg ordered) New Bag (unknown dose, 20 mg ordered) New Bag (unknown dose, 20 mg ordered)  New Bag (unknown dose, 20 mg ordered) [ 20 mg ]   diphenhydrAMINE 50 mg/mL (Benadryl) IV 25 mg 25 mg 25 mg  25 mg [ 25 mg ]   iron dextran 50 mg/mL (Infed) IV  975 mg    25 mg       ondansetron 4 MG/2ML (Zofran) IV New Bag (unknown dose, 16 mg ordered) New Bag (unknown dose, 16 mg ordered) New Bag (unknown dose, 16 mg  ordered)  New Bag (unknown dose, 16 mg ordered) [ 16 mg ]   PACLitaxel 300 mg/50mL (Taxol) IV 50 mg/m2 = 102 mg       +blood return 50 mg/m2 = 102 mg       +blood return 50 mg/m2 = 102 mg       +blood +filter  50 mg/m2 = 102 mg       +blood +filter [ 50 mg/m2 ] = 102 mg   sodium chloride 0.9%  mL       +blood return    250 mL       +blood return    New Bag (unknown dose, 100 mL ordered) 250 mL    250 mL       +blood return    250 mL       +blood return    New Bag (unknown dose, 100 mL ordered) 250 mL    250 mL       +blood +filter    New Bag (unknown dose, 100 mL ordered)  250 mL       +blood    250 mL       +blood +filter    New Bag (unknown dose, 100 mL ordered) [ 250 mL ]    [ 250 mL ]    [ 100 mL ]      Details          [ Planned dose ]    Administered dose cannot be determined

## 2025-04-07 NOTE — TELEPHONE ENCOUNTER
Called Elva to notify her that per Dr. Goodson her chemo will have to be cancelled due to low platelet count and low ANC. She voiced understanding and thanked me for the call. She is aware that she still has to come tomorrow for her RT appointment.

## 2025-04-07 NOTE — PROGRESS NOTES
[facility] Office Note      Diagnosis  1. Malignant neoplasm of upper lobe of right lung (HCC)    2. Encounter for antineoplastic chemotherapy    3. SVC syndrome    4. Fluid retention    5. Thrombosis due to vascular catheter    6. Chemotherapy induced neutropenia      Current Therapy  S/p SVC stenting  Concurrent chemo-RT with carbo/taxol week 5 - due 4/8/25    INTERVAL HISTORY  She returns prior to week 5 Carbo/Taxol.  She is tolerating this reasonably well.   Continues to have some exertional dyspnea.  Voice hoarse, weight is stable/ Continues on apixaban that she is tolerating well.    Using saline spray.    Past Oncologic History  1/9/25 she had complained of increasing dyspnea for about 3 months.  Saw PCP and underwent a CT chest that showed a very large centrally necrotic right upper lobe lung mass measuring 8.2 x 7.4 x 10.8 cm with extension to the right paratracheal upper mediastinum.  Extensive right upper lobe bronchovascular structure encasement.  Mildly enlarged right paratracheal mediastinal lymph nodes bilateral adrenal nodules were noted.     1/17/2025 PET/CT showed a 9 x 8 cm hypermetabolic mass within the right upper lobe extending into the mediastinum and right hilum peripherally hypermetabolic and centrally cystic and necrotic.  Effacement of the IVC.  Subcentimeter pulmonary nodules bilaterally were not FDG avid  Hypermetabolic lymph nodes within the anterior superior mediastinum subcarinal right paratracheal area and right clavicle chains were all compatible with metastatic disease.     1/22/25 she underwent a robotic navigational bronchoscopy with EBUS bx of RUL mass and  TBNA of lymph node station 7.  BAL from the right upper lobe was consistent with squamous cell carcinoma as was the needle biopsy from the right upper lobe mass.  EBUS sampling from station node 7 showed no malignant cells.  Lymphocytes were present. PD-L1 TPS was 0, EGFR was negative, ALK is  negative    2/13/2025 She had a port placed  and then developed increasing redness and swelling as well as inability to lie flat.  She was seen in the oncology clinic earlier today and referred to the ED.  CT chest showed thrombus within the left brachiocephalic and left subclavian vein which is new since prior study. The right upper lobe mass is causing circumferential encasement and as well as invasion and narrowing of the SVC which is completely obliterated    2/14 she underwent SVC stenting as well as thrombectomy from left subclavian vein on 2/13/2025.  Her port was still functional.  She is now on enoxaparin.  She is feeling much better.  Her dyspnea has improved.  No longer has any postural flushing congestion.    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     Cholecystectomy      Laparoscopic incisional / umbilical / ventral hernia repair  09/19/2024       Current Medications:  No current facility-administered medications on file as of 4/7/2025.       Allergies:   Allergies[1]    Family Medical History:  Family History   Problem Relation Age of Onset    Hypertension Father     Stroke Father     Heart Attack  Mother        Social History:  Social History     Socioeconomic History    Marital status:      Spouse name: Not on file    Number of children: 1    Years of education: Not on file    Highest education level: Not on file   Occupational History    Not on file   Tobacco Use    Smoking status: Former     Current packs/day: 0.00     Average packs/day: 0.5 packs/day for 45.0 years (22.5 ttl pk-yrs)     Types: Cigarettes     Start date: 1973     Quit date: 2018     Years since quittin.1     Passive exposure: Past    Smokeless tobacco: Never    Tobacco comments:     1 pack every 3 days   Vaping Use    Vaping status: Never Used   Substance and Sexual Activity    Alcohol use: Not Currently     Comment: very rarely    Drug use: No    Sexual activity: Not on file   Other Topics Concern     Service Not Asked    Blood Transfusions Not Asked    Caffeine Concern Yes     Comment: Coffee, 2 cups per day     Occupational Exposure Not Asked    Hobby Hazards Not Asked    Sleep Concern Not Asked    Stress Concern Not Asked    Weight Concern Not Asked    Special Diet Not Asked    Back Care Not Asked    Exercise Not Asked    Bike Helmet Not Asked    Seat Belt Not Asked    Self-Exams Not Asked    Grew up on a farm Not Asked    History of tanning No    Outdoor occupation Not Asked    Breast feeding Not Asked    Reaction to local anesthetic No    Left Handed No    Right Handed Yes    Currently spends a great deal of time in the sun No    Past Sunlamp Treatments for Acne Not Asked    Hx of Spending Great Deal of Time in Sun No    Bad sunburns in the past Yes    Tanning Salons in the Past No    Hx of Radiation Treatments No    Regular use of sun block Not Asked   Social History Narrative    - lives with     1 son      Social Drivers of Health     Food Insecurity: No Food Insecurity (2025)    NCSS - Food Insecurity     Worried About Running Out of Food in the Last Year: No     Ran Out of Food in  the Last Year: No   Transportation Needs: No Transportation Needs (2025)    NCSS - Transportation     Lack of Transportation: No   Housing Stability: Not At Risk (2025)    NCSS - Housing/Utilities     Has Housing: Yes     Worried About Losing Housing: No     Unable to Get Utilities: No       Gyn History:  OB History    Para Term  AB Living   1 1 0 0 0 0   SAB IAB Ectopic Multiple Live Births   0 0 0 0 0       Objective:    Physical Exam:  General: A&Ox3, NAD, uses wheelchair but able to stand and walk short distances  HEENT: PERRL, OP clear  Face flushing has resolved  Neck: supple, no LAD   swelling left IJ vein has resolved ,Left s/c port site healed  CV: RRR, no murmurs, + pulses  Pulm: CTA b/l, no w/r/r, normal effort  Neurological: Grossly intact    Labs:  Lab Results   Component Value Date/Time    WBC 2.1 (L) 2025 09:43 AM    RBC 3.37 (L) 2025 09:43 AM    HGB 9.6 (L) 2025 09:43 AM    HCT 30.2 (L) 2025 09:43 AM    MCV 89.6 2025 09:43 AM    MCH 28.5 2025 09:43 AM    MCHC 31.8 2025 09:43 AM    RDW 24.7 (H) 2025 09:43 AM    NEPRELIM 1.28 (L) 2025 09:43 AM    .0 (L) 2025 09:43 AM       Lab Results   Component Value Date/Time     (H) 2025 09:43 AM    BUN 42 (H) 2025 09:43 AM    CREATSERUM 0.56 2025 09:43 AM    GFRNAA 45 (L) 2022 08:02 AM    CA 9.2 2025 09:43 AM    ALB 3.8 2025 09:32 AM     2025 09:43 AM    K 3.4 (L) 2025 09:43 AM     2025 09:43 AM    CO2 26.0 2025 09:43 AM    ALKPHO 59 2025 09:32 AM    AST 18 2025 09:32 AM    ALT 19 2025 09:32 AM       Imaging:         Assessment & Plan:    Yi Torres is a 74 year old female with locally advanced Rt lung cancer, with mediastinal invasion, now with SVC syndrome and left subclavian thrombus    # SVC syndrome and left subclavian thrombus   s/p SVC stenting and left  thrombectomy, with symptomatic improvement.   Continue Eliquis  -No significant bleeding issues.    # Lung cancer  Proceed with concurrent chemo-RT with weekly carbo/taxol.   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 Pt to fluid restrict to 40 oz,    off Nacl tabs      RTC in 1 wk    José Miguel Goodson MD   [facility]   [facility]      High complexity MDM. Our clinic is continuing focal point for all oncologic services the patient needs.         [1]   Allergies  Allergen Reactions    Erythromycin DIARRHEA     Stomach pain

## 2025-04-08 ENCOUNTER — OFFICE VISIT (OUTPATIENT)
Dept: RADIATION ONCOLOGY | Facility: HOSPITAL | Age: 75
End: 2025-04-08
Attending: RADIOLOGY
Payer: MEDICARE

## 2025-04-08 ENCOUNTER — MED REC SCAN ONLY (OUTPATIENT)
Dept: FAMILY MEDICINE CLINIC | Facility: CLINIC | Age: 75
End: 2025-04-08

## 2025-04-08 ENCOUNTER — APPOINTMENT (OUTPATIENT)
Age: 75
End: 2025-04-08
Attending: INTERNAL MEDICINE
Payer: MEDICARE

## 2025-04-08 VITALS
RESPIRATION RATE: 16 BRPM | TEMPERATURE: 98 F | HEART RATE: 93 BPM | DIASTOLIC BLOOD PRESSURE: 59 MMHG | SYSTOLIC BLOOD PRESSURE: 106 MMHG | OXYGEN SATURATION: 99 %

## 2025-04-08 DIAGNOSIS — Z51.11 ENCOUNTER FOR ANTINEOPLASTIC CHEMOTHERAPY: ICD-10-CM

## 2025-04-08 DIAGNOSIS — R19.7 DIARRHEA, UNSPECIFIED TYPE: ICD-10-CM

## 2025-04-08 DIAGNOSIS — E87.6 HYPOKALEMIA: ICD-10-CM

## 2025-04-08 DIAGNOSIS — C34.11 MALIGNANT NEOPLASM OF UPPER LOBE OF RIGHT LUNG (HCC): ICD-10-CM

## 2025-04-08 DIAGNOSIS — C34.11 MALIGNANT NEOPLASM OF UPPER LOBE OF RIGHT LUNG (HCC): Primary | ICD-10-CM

## 2025-04-08 RX ORDER — POTASSIUM CHLORIDE 750 MG/1
10 TABLET, EXTENDED RELEASE ORAL DAILY
Qty: 14 TABLET | Refills: 0 | OUTPATIENT
Start: 2025-04-08

## 2025-04-08 RX ORDER — POTASSIUM CHLORIDE 750 MG/1
10 TABLET, EXTENDED RELEASE ORAL DAILY
Qty: 7 TABLET | Refills: 1 | Status: SHIPPED | OUTPATIENT
Start: 2025-04-08

## 2025-04-09 ENCOUNTER — DIETICIAN VISIT (OUTPATIENT)
Dept: NUTRITION | Facility: HOSPITAL | Age: 75
End: 2025-04-09

## 2025-04-09 RX ORDER — POTASSIUM CHLORIDE 750 MG/1
20 TABLET, EXTENDED RELEASE ORAL DAILY
Qty: 20 TABLET | Refills: 0 | OUTPATIENT
Start: 2025-04-09 | End: 2025-04-19

## 2025-04-09 RX ORDER — ALENDRONATE SODIUM 70 MG/1
70 TABLET ORAL WEEKLY
Qty: 12 TABLET | Refills: 3 | Status: SHIPPED | OUTPATIENT
Start: 2025-04-09

## 2025-04-09 NOTE — TELEPHONE ENCOUNTER
Refill passed per Clinic protocol.  Requested Prescriptions   Pending Prescriptions Disp Refills    ALENDRONATE 70 MG Oral Tab [Pharmacy Med Name: ALENDRONATE SODIUM 70 MG TAB] 12 tablet 3     Sig: Take 1 tablet (70 mg total) by mouth once a week.       Osteoporosis Medication Protocol Passed - 4/9/2025  8:08 AM        Passed - In person appointment or virtual visit in the past 6 mos or appointment in next 3 mos     Recent Outpatient Visits              Yesterday Malignant neoplasm of upper lobe of right lung (HCC)    [facility] - Rad/Onc Maribell, Scar Doherty MD    Office Visit    2 days ago Malignant neoplasm of upper lobe of right lung (HCC)    [facility] [employer] José Miguel Goodson [facility]    Office Visit    2 days ago [facility]ineoplastic [facility]    [facility]    1 week ago Malignant neoplasm of upper lobe of right lung (HCC)    [facility] Scar Doherty MD    Office Visit    1 week ago Malignant neoplasm of upper lobe of right lung (HCC)    [facility]    [facility]          Future Appointments         Provider Department Appt Notes    [id number]    In 5 days [facility] [employer] PRE CHEMO VIS[facility]    In 6 days Abdirizak Monroy MD; [facility] RAD ONC RN [facility] - Rad/Onc     In 6 days [facility] TAXOL-CARBO-PORT-MYJ *RT @9am*    In 2 months Shaji Genao MD [facility], [facility] 5 months                    Passed - DEXA scan within past 2 years         Passed - CMP within the past 12 months        Passed - Calcium level between 8.3 and 10.3     Lab Results   Component Value Date    CA 9.2 04/07/2025               Passed - GFR level greater than 35     GFR Evaluation  EGFRCR: 97 , resulted on 4/7/2025          Passed - Medication is active on med list

## 2025-04-09 NOTE — PROGRESS NOTES
Oncology Nutrition Assessment    Ht Readings from Last 1 Encounters:   04/07/25 163.8 cm (5' 4.5\")       Wt Readings from Last 1 Encounters:   04/07/25 90.3 kg (199 lb)     BMI Calculated:  There is no height or weight on file to calculate BMI.  Weight History:    Wt Readings from Last 10 Encounters:   04/07/25 90.3 kg (199 lb)   04/02/25 91.5 kg (201 lb 12.8 oz)   03/31/25 89.8 kg (198 lb)   03/26/25 92.7 kg (204 lb 6.4 oz)   03/24/25 91.6 kg (202 lb)   03/19/25 96.2 kg (212 lb)   03/17/25 95.5 kg (210 lb 9.6 oz)   03/12/25 97.3 kg (214 lb 9.6 oz)   03/10/25 96.8 kg (213 lb 8 oz)   03/05/25 97.3 kg (214 lb 6.4 oz)     IBW:  120#  UBW:  has lost wt in the past and likes to stay around 200#. Recent gain with edema and SVC.    Diagnoses:  left lung cancer with chemo and RT  Significant Medical History:   has a past medical history of Anesthesia complication, Anxiety state, Cancer (HCC), Cataract, Diabetes (HCC), Diabetes mellitus (HCC), Essential hypertension, High blood pressure, High cholesterol, Incontinence, Obesity, unspecified, Osteoarthritis, Renal disorder, Squamous cell carcinoma in situ (SCCIS) (2018), and Visual impairment.   Diet History:  lower sugar diet--A1C 5.8  Oral Liquid Supplement Use:  boost low sugar high protein-1 g sugar, 160 calories and 30 g protein most days.      Appetite: fair    Nutritional Physical History:   overweight per visual exam, edema masking true wt.    GI Problems:   GI intact, mild constipation at times.    Adequacy of PO Intake:  fair to good    Nutrition Risk Status:  moderate nutritional risk  Risk Status Based On:  potential treatment side effects and decreased appetite.     Nutritional Goals:  aid in management of treatment side effects via dietary measures and wt loss due to fluid losses ok.   Handouts Provided:  None at this time    Weekly Visits:  Nutritional Interventions:  3/5/2025  Discussed importance of good oral intake for wt maint during tx.  Urged adequate  fluids. Balance rest and activity.  Pt with hyponatremia and has been told to salt her foods. Being careful with this due to edema--MD monitoring labs.  Discussed potential tx side effects and discussed nutritional hints.  See weekly as schedule permits.  [facility]    3/12/25 214.6#--stable. Edema seems stable. Chemo this week--great appetite yesterday, usually decreases towards the end of the week. Knows to push intake despite decreased appetite. Uses 2 protein drinks per day.  Denies any heartburn or dysphagia. Fluid intake good.  CPM    3/19/25  212#--2.6# less than wt last week, but likely fluid losses. Po intake seems to be adequate. Uses 1-2 protein drinks per day. Takes stool softener. Denies any heartburn or dysphagia, but throat soreness--discussed foods to limit to not aggravate.  Fluid intake is about 40oz, she is allowed 50oz. Doing ok. [facility]    3/26/25 204.4#--7.6# less than wt last week, pt doubts fluid loss related--edema stable.  Po less-decreased appetite, doing better the past 2 days. Had a significant diarrhea event on 3/24--some loss may be related to this. Tired. Continues to drink protein drinks daily. CPM    4/2/25 201.8#--further loss. Suspect combination of true wt loss and some fluid losses. Appetite is decreased overall, but pushing intake. Drinking 1-2 protein drinks per day. Heartburn episode, but resolved with tums.  Fluid intake fair--she is restricted due to edema. Fatigue.  [facility]    4/9/25 199# on 4/7--continued slow gradual wt loss which some may be decrease in edema. Intake is small. 1 protein drink per day. Soft foods due to sore and tender mouth. Tired, but pt encouraged by less than 10tx's to go.  CPM      Faith Ramos RD, LDN   Clinical Dietitian [phone]

## 2025-04-09 NOTE — TELEPHONE ENCOUNTER
Patient advised that KCL is managed by my Hematology-Oncology, Dr. Goodson. Patient sent request to correct provider through [employer] yesterday.

## 2025-04-14 ENCOUNTER — NURSE ONLY (OUTPATIENT)
Age: 75
End: 2025-04-14
Attending: INTERNAL MEDICINE
Payer: MEDICARE

## 2025-04-14 ENCOUNTER — OFFICE VISIT (OUTPATIENT)
Age: 75
End: 2025-04-14
Attending: INTERNAL MEDICINE
Payer: MEDICARE

## 2025-04-14 VITALS
OXYGEN SATURATION: 96 % | BODY MASS INDEX: 34.24 KG/M2 | WEIGHT: 203 LBS | RESPIRATION RATE: 18 BRPM | SYSTOLIC BLOOD PRESSURE: 117 MMHG | TEMPERATURE: 98 F | DIASTOLIC BLOOD PRESSURE: 62 MMHG | HEIGHT: 64.5 IN | HEART RATE: 100 BPM

## 2025-04-14 DIAGNOSIS — T85.868A: ICD-10-CM

## 2025-04-14 DIAGNOSIS — T45.1X5A CHEMOTHERAPY INDUCED NEUTROPENIA: ICD-10-CM

## 2025-04-14 DIAGNOSIS — Z51.11 ENCOUNTER FOR ANTINEOPLASTIC CHEMOTHERAPY: Primary | ICD-10-CM

## 2025-04-14 DIAGNOSIS — Z51.11 ENCOUNTER FOR ANTINEOPLASTIC CHEMOTHERAPY: ICD-10-CM

## 2025-04-14 DIAGNOSIS — C34.11 MALIGNANT NEOPLASM OF UPPER LOBE OF RIGHT LUNG (HCC): Primary | ICD-10-CM

## 2025-04-14 DIAGNOSIS — D50.9 IRON DEFICIENCY ANEMIA, UNSPECIFIED IRON DEFICIENCY ANEMIA TYPE: ICD-10-CM

## 2025-04-14 DIAGNOSIS — D70.1 CHEMOTHERAPY INDUCED NEUTROPENIA: ICD-10-CM

## 2025-04-14 DIAGNOSIS — C34.11 MALIGNANT NEOPLASM OF UPPER LOBE OF RIGHT LUNG (HCC): ICD-10-CM

## 2025-04-14 DIAGNOSIS — I87.1 SVC SYNDROME: ICD-10-CM

## 2025-04-14 LAB
ANION GAP SERPL CALC-SCNC: 8 MMOL/L (ref 0–18)
BASOPHILS # BLD AUTO: 0.01 X10(3) UL (ref 0–0.2)
BASOPHILS NFR BLD AUTO: 0.5 %
BUN BLD-MCNC: 34 MG/DL (ref 9–23)
BUN/CREAT SERPL: 52.3 (ref 10–20)
CALCIUM BLD-MCNC: 8.9 MG/DL (ref 8.7–10.4)
CHLORIDE SERPL-SCNC: 101 MMOL/L (ref 98–112)
CO2 SERPL-SCNC: 26 MMOL/L (ref 21–32)
CREAT BLD-MCNC: 0.65 MG/DL (ref 0.55–1.02)
DEPRECATED RDW RBC AUTO: 87.9 FL (ref 35.1–46.3)
EGFRCR SERPLBLD CKD-EPI 2021: 93 ML/MIN/1.73M2 (ref 60–?)
EOSINOPHIL # BLD AUTO: 0.02 X10(3) UL (ref 0–0.7)
EOSINOPHIL NFR BLD AUTO: 1.1 %
ERYTHROCYTE [DISTWIDTH] IN BLOOD BY AUTOMATED COUNT: 27.5 % (ref 11–15)
GLUCOSE BLD-MCNC: 131 MG/DL (ref 70–99)
HCT VFR BLD AUTO: 28.2 % (ref 35–48)
HGB BLD-MCNC: 9.1 G/DL (ref 12–16)
IMM GRANULOCYTES # BLD AUTO: 0 X10(3) UL (ref 0–1)
IMM GRANULOCYTES NFR BLD: 0 %
LYMPHOCYTES # BLD AUTO: 0.59 X10(3) UL (ref 1–4)
LYMPHOCYTES NFR BLD AUTO: 31.4 %
MCH RBC QN AUTO: 29.8 PG (ref 26–34)
MCHC RBC AUTO-ENTMCNC: 32.3 G/DL (ref 31–37)
MCV RBC AUTO: 92.5 FL (ref 80–100)
MONOCYTES # BLD AUTO: 0.3 X10(3) UL (ref 0.1–1)
MONOCYTES NFR BLD AUTO: 16 %
NEUTROPHILS # BLD AUTO: 0.96 X10 (3) UL (ref 1.5–7.7)
NEUTROPHILS # BLD AUTO: 0.96 X10(3) UL (ref 1.5–7.7)
NEUTROPHILS NFR BLD AUTO: 51 %
OSMOLALITY SERPL CALC.SUM OF ELEC: 289 MOSM/KG (ref 275–295)
PLATELET # BLD AUTO: 112 10(3)UL (ref 150–450)
PLATELET MORPHOLOGY: NORMAL
POTASSIUM SERPL-SCNC: 3.3 MMOL/L (ref 3.5–5.1)
RBC # BLD AUTO: 3.05 X10(6)UL (ref 3.8–5.3)
SODIUM SERPL-SCNC: 135 MMOL/L (ref 136–145)
WBC # BLD AUTO: 1.9 X10(3) UL (ref 4–11)

## 2025-04-14 RX ORDER — WATER 10 ML/10ML
INJECTION INTRAMUSCULAR; INTRAVENOUS; SUBCUTANEOUS
Status: DISCONTINUED
Start: 2025-04-14 | End: 2025-04-14

## 2025-04-14 RX ORDER — SODIUM CHLORIDE 9 MG/ML
10 INJECTION, SOLUTION INTRAMUSCULAR; INTRAVENOUS; SUBCUTANEOUS ONCE
OUTPATIENT
Start: 2025-04-14

## 2025-04-14 NOTE — PROGRESS NOTES
[facility] Radiation Treatment Management Note 31-35    Patient:  Yi Torres  Age:  74 year old  Visit Diagnosis:    1. Malignant neoplasm of upper lobe of right lung (HCC)      Primary Rad/Onc:  Dr. Abdirizak Alvarado    Site Delivered Dose (cGy) Prescribed Dose (cGy) Fraction #   RUL [id number] 31/35           First treatment date:  3/4/25  Concurrent chemotherapy:  OP CARBOplatin / PACLitaxel with RT         4/8/2025     9:42 AM 4/14/2025     9:48 AM 4/15/2025     9:28 AM   Oncology Vitals   Height  5' 4.5\"    Height  164 cm    Weight  203 lb    Weight  92.08 kg    BSA (m2)  1.98 m2    BMI  34.31 kg/m2    /59 117/62 120/51   Pulse 93 100 78   Resp 16 18 16   Temp 97.5 °F (36.4 °C) 98.3 °F (36.8 °C) 98 °F (36.7 °C)   SpO2 99 % 96 % 96 %   Pain Score 3 2 2        Toxicities:  Fatigue Grade 2= Fatigue not relieved by rest limiting instrumental ADL  Dermatitis associated with radiation Grade 0= None  Moisturizer used  aveeno  Number of times: 1 times daily.  Pruritis reports mild  Dry Skin noted  Hyperpigmentation noted  Dysphagia Grade 2= Symptomatic and altered eating/swallowing  Dyspepsia Grade 1= Mild symptoms; intervention not indicated  Mucositis Grade 0= None  Nausea Grade 0= None- takes after chemo  Vomiting Grade 0= None  Cough Grade 2= Moderate symptoms, medical intervention indicated; limiting instrumental ADL  Dyspnea Grade 2= Shortness of breath with minimal exertion; limiting instrumental ADL    Nursing Note:  Patient seen for OTV with Dr. Yony Alvarado.   Appetite is good until food in front of her, then appetite is gone. Slight pain with swallowing. Does have constant sore throat.  Drinking 1.5 protein shakes per day.  Weight stable  Cough worse this week, taking robitussin.  Skin dry, flaky, and red. Nothing open. Encouraged moisturizer use.   AVS given to patient- follow up in 3 months    Recent Labs   Lab 04/07/25  0943 04/14/25  0847   WBC 2.1* 1.9*   RBC 3.37*  3.05*   HGB 9.6* 9.1*   HCT 30.2* 28.2*   PLT 72.0* 112.0*   MCV 89.6 92.5   MCH 28.5 29.8   MCHC 31.8 32.3   RDW 24.7* 27.5*   RDWSD 76.1* 87.9*   NEPRELIM 1.28* 0.96*   NE 1.28* 0.96*   LYMABS 0.62* 0.59*   MOABSO 0.10 0.30   EOABSO 0.02 0.02   BAABSO 0.02 0.01   IMMGRAN 0.01 0.00   NEPERCENT 62.4 51.0   LYPERCENT 30.2 31.4   MOPERCENT 4.9 16.0   EOPERCENT 1.0 1.1   BAPERCENT 1.0 0.5   IMMGRANPERC 0.5 0.0      Wt Readings from Last 6 Encounters:   04/14/25 92.1 kg (203 lb)   04/07/25 90.3 kg (199 lb)   04/02/25 91.5 kg (201 lb 12.8 oz)   03/31/25 89.8 kg (198 lb)   03/26/25 92.7 kg (204 lb 6.4 oz)   03/24/25 91.6 kg (202 lb)     Adelina JOAQUIN RN    Physician Note:  Subjective:  DOing well, finishing soon.  Weight stable.  Some fatigue, increased cough.  Swallowing with only minimal pain.      Objective:  Unchanged      Treatment setup imaging have been reviewed:  Yes    Assessment/Plan:    Continue radiotherapy per plan    Next visit:  1 week    Dr. Abdirizak Alvarado

## 2025-04-14 NOTE — PROGRESS NOTES
[facility] Office Note      Diagnosis  1. Malignant neoplasm of upper lobe of right lung (HCC)    2. Encounter for antineoplastic chemotherapy    3. SVC syndrome    4. Thrombosis due to vascular catheter    5. Chemotherapy induced neutropenia      Current Therapy  S/p SVC stenting  Concurrent chemo-RT with carbo/taxol week 5 - due 4/15/25    INTERVAL HISTORY  She returns prior to week 5 Carbo/Taxol. Recall this was delayed last week due to neutropenia and thrombocytopenia.  She denies any febrile illness.  No bleeding issues.  She is tolerating this reasonably well.  Voice hoarse, weight is stable/ Continues on apixaban that she is tolerating well.    Using saline spray.  She will complete RT this week    Past Oncologic History  1/9/25 she had complained of increasing dyspnea for about 3 months.  Saw PCP and underwent a CT chest that showed a very large centrally necrotic right upper lobe lung mass measuring 8.2 x 7.4 x 10.8 cm with extension to the right paratracheal upper mediastinum.  Extensive right upper lobe bronchovascular structure encasement.  Mildly enlarged right paratracheal mediastinal lymph nodes bilateral adrenal nodules were noted.     1/17/2025 PET/CT showed a 9 x 8 cm hypermetabolic mass within the right upper lobe extending into the mediastinum and right hilum peripherally hypermetabolic and centrally cystic and necrotic.  Effacement of the IVC.  Subcentimeter pulmonary nodules bilaterally were not FDG avid  Hypermetabolic lymph nodes within the anterior superior mediastinum subcarinal right paratracheal area and right clavicle chains were all compatible with metastatic disease.     1/22/25 she underwent a robotic navigational bronchoscopy with EBUS bx of RUL mass and  TBNA of lymph node station 7.  BAL from the right upper lobe was consistent with squamous cell carcinoma as was the needle biopsy from the right upper lobe mass.  EBUS sampling from station node 7  showed no malignant cells.  Lymphocytes were present. PD-L1 TPS was 0, EGFR was negative, ALK is negative    2/13/2025 She had a port placed  and then developed increasing redness and swelling as well as inability to lie flat.  She was seen in the oncology clinic earlier today and referred to the ED.  CT chest showed thrombus within the left brachiocephalic and left subclavian vein which is new since prior study. The right upper lobe mass is causing circumferential encasement and as well as invasion and narrowing of the SVC which is completely obliterated    2/14 she underwent SVC stenting as well as thrombectomy from left subclavian vein on 2/13/2025.  Her port was still functional.  She is now on enoxaparin.  She is feeling much better.  Her dyspnea has improved.  No longer has any postural flushing congestion.    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     Cholecystectomy      Laparoscopic incisional / umbilical / ventral hernia repair  09/19/2024       Current Medications:  No current facility-administered medications on file as of 4/14/2025.       Allergies:   Allergies[1]    Family Medical History:  Family  History   Problem Relation Age of Onset    Hypertension Father     Stroke Father     Heart Attack Mother        Social History:  Social History     Socioeconomic History    Marital status:      Spouse name: Not on file    Number of children: 1    Years of education: Not on file    Highest education level: Not on file   Occupational History    Not on file   Tobacco Use    Smoking status: Former     Current packs/day: 0.00     Average packs/day: 0.5 packs/day for 45.0 years (22.5 ttl pk-yrs)     Types: Cigarettes     Start date: 1973     Quit date: 2018     Years since quittin.2     Passive exposure: Past    Smokeless tobacco: Never    Tobacco comments:     1 pack every 3 days   Vaping Use    Vaping status: Never Used   Substance and Sexual Activity    Alcohol use: Not Currently     Comment: very rarely    Drug use: No    Sexual activity: Not on file   Other Topics Concern     Service Not Asked    Blood Transfusions Not Asked    Caffeine Concern Yes     Comment: Coffee, 2 cups per day     Occupational Exposure Not Asked    Hobby Hazards Not Asked    Sleep Concern Not Asked    Stress Concern Not Asked    Weight Concern Not Asked    Special Diet Not Asked    Back Care Not Asked    Exercise Not Asked    Bike Helmet Not Asked    Seat Belt Not Asked    Self-Exams Not Asked    Grew up on a farm Not Asked    History of tanning No    Outdoor occupation Not Asked    Breast feeding Not Asked    Reaction to local anesthetic No    Left Handed No    Right Handed Yes    Currently spends a great deal of time in the sun No    Past Sunlamp Treatments for Acne Not Asked    Hx of Spending Great Deal of Time in Sun No    Bad sunburns in the past Yes    Tanning Salons in the Past No    Hx of Radiation Treatments No    Regular use of sun block Not Asked   Social History Narrative    - lives with     1 son      Social Drivers of Health     Food Insecurity: No Food Insecurity (2025)    [facility]     Worried About Running Out of Food in the Last Year: No     Ran Out of Food in the Last Year: No   Transportation Needs: No Transportation Needs (2025)    [facility] - Transportation     Lack of Transportation: No   Housing Stability: Not At Risk (2025)    [facility] - Housing/Utilities     Has Housing: Yes     Worried About Losing Housing: No     Unable to Get Utilities: No       Gyn History:  OB History    Para Term  AB Living   1 1 0 0 0 0   SAB IAB Ectopic Multiple Live Births   0 0 0 0 0       Objective:    Physical Exam:  General: A&Ox3, NAD, uses wheelchair but able to stand and walk short distances  HEENT: PERRL, OP clear  Face flushing has resolved  Neck: supple, no LAD   swelling left IJ vein has resolved ,Left s/c port site healed  CV: RRR, no murmurs, + pulses  Pulm: CTA b/l, no w/r/r, normal effort  Neurological: Grossly intact    Labs:  Lab Results   Component Value Date/Time    WBC 2.1 (L) 2025 09:43 AM    RBC 3.37 (L) 2025 09:43 AM    HGB 9.6 (L) 2025 09:43 AM    HCT 30.2 (L) 2025 09:43 AM    MCV 89.6 2025 09:43 AM    MCH 28.5 2025 09:43 AM    MCHC 31.8 2025 09:43 AM    RDW 24.7 (H) 2025 09:43 AM    NEPRELIM 1.28 (L) 2025 09:43 AM    PLT 72.0 (L) 2025 09:43 AM       Lab Results   Component Value Date/Time     (H) 2025 09:43 AM    BUN 42 (H) 2025 09:43 AM    CREATSERUM 0.56 2025 09:43 AM    GFRNAA 45 (L) 2022 08:02 AM    CA 9.2 2025 09:43 AM    ALB 3.8 2025 09:32 AM     2025 09:43 AM    K 3.4 (L) 2025 09:43 AM     2025 09:43 AM    CO2 26.0 2025 09:43 AM    ALKPHO 59 2025 09:32 AM    AST 18 2025 09:32 AM    ALT 19 2025 09:32 AM       Imaging:         Assessment & Plan:    Yi Muñoz Carlton is a 74 year old female with locally advanced Rt lung cancer, with mediastinal invasion, now with SVC syndrome and left  subclavian thrombus    # SVC syndrome and left subclavian thrombus   s/p SVC stenting and left thrombectomy, with symptomatic improvement.   Continue Eliquis  -No significant bleeding issues.    # Lung cancer  Proceed with concurrent chemo-RT with weekly carbo/taxol.   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 Pt to fluid restrict to 40 oz,      RTC 4-5 wks after fu CT      José Miguel Goodson MD   [facility]   [facility]      High complexity MDM. Our clinic is continuing focal point for all oncologic services the patient needs.         [1]   Allergies  Allergen Reactions    Erythromycin DIARRHEA     Stomach pain

## 2025-04-15 ENCOUNTER — OFFICE VISIT (OUTPATIENT)
Age: 75
End: 2025-04-15
Attending: INTERNAL MEDICINE
Payer: MEDICARE

## 2025-04-15 ENCOUNTER — OFFICE VISIT (OUTPATIENT)
Dept: RADIATION ONCOLOGY | Facility: HOSPITAL | Age: 75
End: 2025-04-15
Attending: RADIOLOGY
Payer: MEDICARE

## 2025-04-15 VITALS
RESPIRATION RATE: 16 BRPM | OXYGEN SATURATION: 96 % | DIASTOLIC BLOOD PRESSURE: 51 MMHG | TEMPERATURE: 98 F | SYSTOLIC BLOOD PRESSURE: 120 MMHG | HEART RATE: 78 BPM

## 2025-04-15 DIAGNOSIS — C34.11 MALIGNANT NEOPLASM OF UPPER LOBE OF RIGHT LUNG (HCC): Primary | ICD-10-CM

## 2025-04-15 RX ORDER — FAMOTIDINE 10 MG/ML
INJECTION, SOLUTION INTRAVENOUS
Status: COMPLETED
Start: 2025-04-15 | End: 2025-04-15

## 2025-04-15 RX ORDER — DIPHENHYDRAMINE HYDROCHLORIDE 50 MG/ML
25 INJECTION, SOLUTION INTRAMUSCULAR; INTRAVENOUS ONCE
Status: COMPLETED | OUTPATIENT
Start: 2025-04-15 | End: 2025-04-15

## 2025-04-15 RX ORDER — DIPHENHYDRAMINE HYDROCHLORIDE 50 MG/ML
INJECTION, SOLUTION INTRAMUSCULAR; INTRAVENOUS
Status: COMPLETED
Start: 2025-04-15 | End: 2025-04-15

## 2025-04-15 RX ORDER — FAMOTIDINE 10 MG/ML
20 INJECTION, SOLUTION INTRAVENOUS ONCE
Status: COMPLETED | OUTPATIENT
Start: 2025-04-15 | End: 2025-04-15

## 2025-04-15 RX ADMIN — DIPHENHYDRAMINE HYDROCHLORIDE 25 MG: 50 INJECTION, SOLUTION INTRAMUSCULAR; INTRAVENOUS at 10:26:00

## 2025-04-15 RX ADMIN — FAMOTIDINE 20 MG: 10 INJECTION, SOLUTION INTRAVENOUS at 10:24:00

## 2025-04-15 NOTE — PROGRESS NOTES
Pt here for C1D36 Drug(s)Taxol/Carbo.  Arrives via wheelchair, accompanied by Self and Spouse     Patient was evaluated today by Treatment Nurse.    Oral medications included in this regimen:  no    Patient confirms comprehension of cancer treatment schedule:  yes    Pregnancy screening:  Not applicable    Modifications in dose or schedule:  Yes, was deferred last time due to platelets being low.  Carbo dose decreased.  Anc 0.96 yesterday, okay to treat in the treatment plan.  Medications appearance and physical integrity checked by RN: yes.    Chemotherapy IV pump settings verified by 2 RNs:  Yes.  Frequency of blood return and site check throughout administration: Prior to administration, Prior to each drug, and At completion of therapy     Infusion/treatment outcome:  patient tolerated treatment without incident    Education Record    Learner:  Patient and Spouse  Barriers / Limitations:  None  Method:  Brief focused and Discussion  Education / instructions given:  Plan of care for treatment today  Outcome:  Shows understanding    Discharged Home, Via wheelchair, accompanied by:Self and Spouse    Patient/family verbalized understanding of future appointments: by printed AVS

## 2025-04-15 NOTE — PATIENT INSTRUCTIONS
Follow up with Dr. Yony Alvarado in 3 months.  Rivka will call you to schedule your follow up appointment.   Please call [phone] with any radiation questions.

## 2025-04-16 ENCOUNTER — DIETICIAN VISIT (OUTPATIENT)
Dept: NUTRITION | Facility: HOSPITAL | Age: 75
End: 2025-04-16

## 2025-04-16 ENCOUNTER — NURSE ONLY (OUTPATIENT)
Age: 75
End: 2025-04-16
Attending: INTERNAL MEDICINE
Payer: MEDICARE

## 2025-04-16 DIAGNOSIS — C34.11 MALIGNANT NEOPLASM OF UPPER LOBE OF RIGHT LUNG (HCC): Primary | ICD-10-CM

## 2025-04-16 DIAGNOSIS — D50.9 IRON DEFICIENCY ANEMIA, UNSPECIFIED IRON DEFICIENCY ANEMIA TYPE: ICD-10-CM

## 2025-04-16 RX ORDER — SODIUM CHLORIDE 9 MG/ML
10 INJECTION, SOLUTION INTRAMUSCULAR; INTRAVENOUS; SUBCUTANEOUS ONCE
OUTPATIENT
Start: 2025-04-17

## 2025-04-16 NOTE — PROGRESS NOTES
Oncology Nutrition Assessment    Ht Readings from Last 1 Encounters:   04/14/25 163.8 cm (5' 4.5\")       Wt Readings from Last 1 Encounters:   04/14/25 92.1 kg (203 lb)     BMI Calculated:  There is no height or weight on file to calculate BMI.  Weight History:    Wt Readings from Last 10 Encounters:   04/14/25 92.1 kg (203 lb)   04/07/25 90.3 kg (199 lb)   04/02/25 91.5 kg (201 lb 12.8 oz)   03/31/25 89.8 kg (198 lb)   03/26/25 92.7 kg (204 lb 6.4 oz)   03/24/25 91.6 kg (202 lb)   03/19/25 96.2 kg (212 lb)   03/17/25 95.5 kg (210 lb 9.6 oz)   03/12/25 97.3 kg (214 lb 9.6 oz)   03/10/25 96.8 kg (213 lb 8 oz)     IBW:  120#  UBW:  has lost wt in the past and likes to stay around 200#. Recent gain with edema and SVC.    Diagnoses:  left lung cancer with chemo and RT  Significant Medical History:   has a past medical history of Anesthesia complication, Anxiety state, Cancer (HCC), Cataract, Diabetes (HCC), Diabetes mellitus (HCC), Essential hypertension, High blood pressure, High cholesterol, Incontinence, Obesity, unspecified, Osteoarthritis, Renal disorder, Squamous cell carcinoma in situ (SCCIS) (2018), and Visual impairment.   Diet History:  lower sugar diet--A1C 5.8  Oral Liquid Supplement Use:  boost low sugar high protein-1 g sugar, 160 calories and 30 g protein most days.      Appetite: fair    Nutritional Physical History:   overweight per visual exam, edema masking true wt.    GI Problems:   GI intact, mild constipation at times.    Adequacy of PO Intake:  fair to good    Nutrition Risk Status:  moderate nutritional risk  Risk Status Based On:  potential treatment side effects and decreased appetite.     Nutritional Goals:  aid in management of treatment side effects via dietary measures and wt loss due to fluid losses ok.   Handouts Provided:  None at this time    Weekly Visits:  Nutritional Interventions:  3/5/2025  Discussed importance of good oral intake for wt maint during tx.  Urged adequate fluids.  Balance rest and activity.  Pt with hyponatremia and has been told to salt her foods. Being careful with this due to edema--MD monitoring labs.  Discussed potential tx side effects and discussed nutritional hints.  See weekly as schedule permits.  CPM    3/12/25 214.6#--stable. Edema seems stable. Chemo this week--great appetite yesterday, usually decreases towards the end of the week. Knows to push intake despite decreased appetite. Uses 2 protein drinks per day.  Denies any heartburn or dysphagia. Fluid intake good.  CPM    3/19/25  212#--2.6# less than wt last week, but likely fluid losses. Po intake seems to be adequate. Uses 1-2 protein drinks per day. Takes stool softener. Denies any heartburn or dysphagia, but throat soreness--discussed foods to limit to not aggravate.  Fluid intake is about 40oz, she is allowed 50oz. Doing ok. CPM    3/26/25 204.4#--7.6# less than wt last week, pt doubts fluid loss related--edema stable.  Po less-decreased appetite, doing better the past 2 days. Had a significant diarrhea event on 3/24--some loss may be related to this. Tired. Continues to drink protein drinks daily. CPM    4/2/25 201.8#--further loss. Suspect combination of true wt loss and some fluid losses. Appetite is decreased overall, but pushing intake. Drinking 1-2 protein drinks per day. Heartburn episode, but resolved with tums.  Fluid intake fair--she is restricted due to edema. Fatigue.  CPM    4/9/25 199# on 4/7--continued slow gradual wt loss which some may be decrease in edema. Intake is small. 1 protein drink per day. Soft foods due to sore and tender mouth. Tired, but pt encouraged by less than 10tx's to go.  CPM    4/16/25 203# on 4/14. Difficult to assess wt status due to diuretics/edema. Wt increase since last week. Pt reports she is eating less. Drinking 1-2 protein drinks and eating meals, but small. Urged increased intake. Discussed with pt when she is done with tx that she will need to weigh herself  at home to monitor wt changes.  Throat soreness and pain with swallowing and fatigue affecting intake.  NANCY Ramos RD, LDN   Clinical Dietitian [id number]

## 2025-04-17 ENCOUNTER — APPOINTMENT (OUTPATIENT)
Dept: GENERAL RADIOLOGY | Facility: HOSPITAL | Age: 75
End: 2025-04-17
Attending: EMERGENCY MEDICINE
Payer: MEDICARE

## 2025-04-17 ENCOUNTER — APPOINTMENT (OUTPATIENT)
Dept: GENERAL RADIOLOGY | Facility: HOSPITAL | Age: 75
DRG: 252 | End: 2025-04-17
Attending: EMERGENCY MEDICINE
Payer: MEDICARE

## 2025-04-17 ENCOUNTER — HOSPITAL ENCOUNTER (INPATIENT)
Facility: HOSPITAL | Age: 75
LOS: 5 days | Discharge: HOME | DRG: 252 | End: 2025-04-22
Attending: EMERGENCY MEDICINE | Admitting: STUDENT IN AN ORGANIZED HEALTH CARE EDUCATION/TRAINING PROGRAM
Payer: MEDICARE

## 2025-04-17 ENCOUNTER — NURSE ONLY (OUTPATIENT)
Age: 75
End: 2025-04-17
Attending: INTERNAL MEDICINE
Payer: MEDICARE

## 2025-04-17 ENCOUNTER — HOSPITAL ENCOUNTER (INPATIENT)
Facility: HOSPITAL | Age: 75
LOS: 5 days | Discharge: HOME | End: 2025-04-22
Attending: EMERGENCY MEDICINE | Admitting: STUDENT IN AN ORGANIZED HEALTH CARE EDUCATION/TRAINING PROGRAM
Payer: MEDICARE

## 2025-04-17 DIAGNOSIS — D50.9 IRON DEFICIENCY ANEMIA, UNSPECIFIED IRON DEFICIENCY ANEMIA TYPE: ICD-10-CM

## 2025-04-17 DIAGNOSIS — J96.01 ACUTE RESPIRATORY FAILURE WITH HYPOXIA (HCC): Primary | ICD-10-CM

## 2025-04-17 DIAGNOSIS — C34.11 MALIGNANT NEOPLASM OF UPPER LOBE OF RIGHT LUNG (HCC): Primary | ICD-10-CM

## 2025-04-17 LAB
ALBUMIN SERPL-MCNC: 3.7 G/DL (ref 3.2–4.8)
ALBUMIN/GLOB SERPL: 1.3 {RATIO} (ref 1–2)
ALP LIVER SERPL-CCNC: 59 U/L (ref 55–142)
ALT SERPL-CCNC: 20 U/L (ref 10–49)
ANION GAP SERPL CALC-SCNC: 8 MMOL/L (ref 0–18)
AST SERPL-CCNC: 20 U/L (ref ?–34)
BASOPHILS # BLD: 0 X10(3) UL (ref 0–0.2)
BASOPHILS NFR BLD: 0 %
BILIRUB SERPL-MCNC: 0.9 MG/DL (ref 0.2–1.1)
BUN BLD-MCNC: 33 MG/DL (ref 9–23)
BUN/CREAT SERPL: 53.2 (ref 10–20)
CALCIUM BLD-MCNC: 8.5 MG/DL (ref 8.7–10.4)
CHLORIDE SERPL-SCNC: 100 MMOL/L (ref 98–112)
CO2 SERPL-SCNC: 27 MMOL/L (ref 21–32)
CREAT BLD-MCNC: 0.62 MG/DL (ref 0.55–1.02)
DEPRECATED RDW RBC AUTO: 84.9 FL (ref 35.1–46.3)
EGFRCR SERPLBLD CKD-EPI 2021: 95 ML/MIN/1.73M2 (ref 60–?)
EOSINOPHIL # BLD: 0 X10(3) UL (ref 0–0.7)
EOSINOPHIL NFR BLD: 0 %
ERYTHROCYTE [DISTWIDTH] IN BLOOD BY AUTOMATED COUNT: 26.7 % (ref 11–15)
GLOBULIN PLAS-MCNC: 2.8 G/DL (ref 2–3.5)
GLUCOSE BLD-MCNC: 129 MG/DL (ref 70–99)
GLUCOSE BLDC GLUCOMTR-MCNC: 109 MG/DL (ref 70–99)
HCT VFR BLD AUTO: 26.7 % (ref 35–48)
HGB BLD-MCNC: 8.8 G/DL (ref 12–16)
LYMPHOCYTES NFR BLD: 0.22 X10(3) UL (ref 1–4)
LYMPHOCYTES NFR BLD: 7 %
MCH RBC QN AUTO: 30 PG (ref 26–34)
MCHC RBC AUTO-ENTMCNC: 33 G/DL (ref 31–37)
MCV RBC AUTO: 91.1 FL (ref 80–100)
METAMYELOCYTES # BLD: 0.12 X10(3) UL (ref ?–0.01)
METAMYELOCYTES NFR BLD: 4 %
MONOCYTES # BLD: 0.03 X10(3) UL (ref 0.1–1)
MONOCYTES NFR BLD: 1 %
MYELOCYTES # BLD: 0.09 X10(3) UL (ref ?–0.01)
MYELOCYTES NFR BLD: 3 %
NEUTROPHILS # BLD AUTO: 2.25 X10 (3) UL (ref 1.5–7.7)
NEUTROPHILS NFR BLD: 83 %
NEUTS BAND NFR BLD: 2 %
NEUTS HYPERSEG # BLD: 2.64 X10(3) UL (ref 1.5–7.7)
OSMOLALITY SERPL CALC.SUM OF ELEC: 289 MOSM/KG (ref 275–295)
PLATELET # BLD AUTO: 114 10(3)UL (ref 150–450)
PLATELET MORPHOLOGY: NORMAL
POTASSIUM SERPL-SCNC: 3 MMOL/L (ref 3.5–5.1)
PROT SERPL-MCNC: 6.5 G/DL (ref 5.7–8.2)
RBC # BLD AUTO: 2.93 X10(6)UL (ref 3.8–5.3)
SODIUM SERPL-SCNC: 135 MMOL/L (ref 136–145)
TOTAL CELLS COUNTED BLD: 100
TROPONIN I SERPL HS-MCNC: 6 NG/L (ref ?–34)
WBC # BLD AUTO: 3.1 X10(3) UL (ref 4–11)

## 2025-04-17 PROCEDURE — 99223 1ST HOSP IP/OBS HIGH 75: CPT | Performed by: HOSPITALIST

## 2025-04-17 PROCEDURE — 71045 X-RAY EXAM CHEST 1 VIEW: CPT | Performed by: EMERGENCY MEDICINE

## 2025-04-17 RX ORDER — NICOTINE POLACRILEX 4 MG
15 LOZENGE BUCCAL
Status: DISCONTINUED | OUTPATIENT
Start: 2025-04-17 | End: 2025-04-22

## 2025-04-17 RX ORDER — SODIUM CHLORIDE 9 MG/ML
10 INJECTION, SOLUTION INTRAMUSCULAR; INTRAVENOUS; SUBCUTANEOUS ONCE
OUTPATIENT
Start: 2025-04-18

## 2025-04-17 RX ORDER — MORPHINE SULFATE 2 MG/ML
2 INJECTION, SOLUTION INTRAMUSCULAR; INTRAVENOUS EVERY 2 HOUR PRN
Status: DISCONTINUED | OUTPATIENT
Start: 2025-04-17 | End: 2025-04-22

## 2025-04-17 RX ORDER — DEXTROSE MONOHYDRATE 25 G/50ML
50 INJECTION, SOLUTION INTRAVENOUS
Status: DISCONTINUED | OUTPATIENT
Start: 2025-04-17 | End: 2025-04-22

## 2025-04-17 RX ORDER — CETIRIZINE HYDROCHLORIDE 10 MG/1
10 TABLET ORAL DAILY
Status: DISCONTINUED | OUTPATIENT
Start: 2025-04-18 | End: 2025-04-22

## 2025-04-17 RX ORDER — SENNOSIDES 8.6 MG
17.2 TABLET ORAL NIGHTLY PRN
Status: DISCONTINUED | OUTPATIENT
Start: 2025-04-17 | End: 2025-04-22

## 2025-04-17 RX ORDER — ONDANSETRON 2 MG/ML
4 INJECTION INTRAMUSCULAR; INTRAVENOUS EVERY 6 HOURS PRN
Status: DISCONTINUED | OUTPATIENT
Start: 2025-04-17 | End: 2025-04-22

## 2025-04-17 RX ORDER — METOPROLOL TARTRATE 50 MG
50 TABLET ORAL 2 TIMES DAILY
Status: DISCONTINUED | OUTPATIENT
Start: 2025-04-17 | End: 2025-04-22

## 2025-04-17 RX ORDER — POTASSIUM CHLORIDE 1500 MG/1
40 TABLET, EXTENDED RELEASE ORAL ONCE
Status: DISCONTINUED | OUTPATIENT
Start: 2025-04-17 | End: 2025-04-22

## 2025-04-17 RX ORDER — METOCLOPRAMIDE HYDROCHLORIDE 5 MG/ML
10 INJECTION INTRAMUSCULAR; INTRAVENOUS EVERY 8 HOURS PRN
Status: DISCONTINUED | OUTPATIENT
Start: 2025-04-17 | End: 2025-04-22

## 2025-04-17 RX ORDER — MORPHINE SULFATE 4 MG/ML
4 INJECTION, SOLUTION INTRAMUSCULAR; INTRAVENOUS EVERY 2 HOUR PRN
Status: DISCONTINUED | OUTPATIENT
Start: 2025-04-17 | End: 2025-04-22

## 2025-04-17 RX ORDER — FUROSEMIDE 10 MG/ML
40 INJECTION INTRAMUSCULAR; INTRAVENOUS ONCE
Status: COMPLETED | OUTPATIENT
Start: 2025-04-17 | End: 2025-04-17

## 2025-04-17 RX ORDER — NICOTINE POLACRILEX 4 MG
30 LOZENGE BUCCAL
Status: DISCONTINUED | OUTPATIENT
Start: 2025-04-17 | End: 2025-04-22

## 2025-04-17 RX ORDER — BENZONATATE 100 MG/1
200 CAPSULE ORAL 3 TIMES DAILY PRN
Status: DISCONTINUED | OUTPATIENT
Start: 2025-04-17 | End: 2025-04-22

## 2025-04-17 RX ORDER — POLYETHYLENE GLYCOL 3350 17 G/17G
17 POWDER, FOR SOLUTION ORAL DAILY PRN
Status: DISCONTINUED | OUTPATIENT
Start: 2025-04-17 | End: 2025-04-22

## 2025-04-17 RX ORDER — PROCHLORPERAZINE MALEATE 10 MG
10 TABLET ORAL EVERY 6 HOURS PRN
Status: DISCONTINUED | OUTPATIENT
Start: 2025-04-17 | End: 2025-04-22

## 2025-04-17 RX ORDER — MORPHINE SULFATE 2 MG/ML
1 INJECTION, SOLUTION INTRAMUSCULAR; INTRAVENOUS EVERY 2 HOUR PRN
Status: DISCONTINUED | OUTPATIENT
Start: 2025-04-17 | End: 2025-04-22

## 2025-04-17 RX ORDER — SODIUM PHOSPHATE, DIBASIC AND SODIUM PHOSPHATE, MONOBASIC 7; 19 G/230ML; G/230ML
1 ENEMA RECTAL ONCE AS NEEDED
Status: DISCONTINUED | OUTPATIENT
Start: 2025-04-17 | End: 2025-04-22

## 2025-04-17 RX ORDER — VERAPAMIL HYDROCHLORIDE 120 MG/1
240 TABLET, FILM COATED, EXTENDED RELEASE ORAL NIGHTLY
Status: DISCONTINUED | OUTPATIENT
Start: 2025-04-17 | End: 2025-04-22

## 2025-04-17 RX ORDER — BISACODYL 10 MG
10 SUPPOSITORY, RECTAL RECTAL
Status: DISCONTINUED | OUTPATIENT
Start: 2025-04-17 | End: 2025-04-22

## 2025-04-17 RX ORDER — FUROSEMIDE 20 MG/1
20 TABLET ORAL DAILY
Status: DISCONTINUED | OUTPATIENT
Start: 2025-04-18 | End: 2025-04-22

## 2025-04-17 RX ORDER — ACETAMINOPHEN 500 MG
500 TABLET ORAL EVERY 4 HOURS PRN
Status: DISCONTINUED | OUTPATIENT
Start: 2025-04-17 | End: 2025-04-22

## 2025-04-17 NOTE — ED INITIAL ASSESSMENT (HPI)
To [facility] by [facility] ems for sob. Currently getting radiation and chemo for lung ca. EKG showed afib per ems. No history of afib. Arrived on 4L o2 nc. 85% on room air.

## 2025-04-18 ENCOUNTER — APPOINTMENT (OUTPATIENT)
Dept: CT IMAGING | Facility: HOSPITAL | Age: 75
End: 2025-04-18
Attending: INTERNAL MEDICINE
Payer: MEDICARE

## 2025-04-18 ENCOUNTER — APPOINTMENT (OUTPATIENT)
Dept: CT IMAGING | Facility: HOSPITAL | Age: 75
DRG: 252 | End: 2025-04-18
Attending: INTERNAL MEDICINE
Payer: MEDICARE

## 2025-04-18 PROBLEM — C34.91 NON-SMALL CELL CANCER OF RIGHT LUNG (HCC): Status: ACTIVE | Noted: 2025-04-18

## 2025-04-18 PROBLEM — T85.868A: Status: ACTIVE | Noted: 2025-04-18

## 2025-04-18 LAB
ANION GAP SERPL CALC-SCNC: 8 MMOL/L (ref 0–18)
ATRIAL RATE: 123 BPM
BASOPHILS # BLD: 0 X10(3) UL (ref 0–0.2)
BASOPHILS NFR BLD: 0 %
BUN BLD-MCNC: 24 MG/DL (ref 9–23)
BUN/CREAT SERPL: 39.3 (ref 10–20)
CALCIUM BLD-MCNC: 8.8 MG/DL (ref 8.7–10.4)
CHLORIDE SERPL-SCNC: 98 MMOL/L (ref 98–112)
CO2 SERPL-SCNC: 32 MMOL/L (ref 21–32)
CREAT BLD-MCNC: 0.61 MG/DL (ref 0.55–1.02)
DEPRECATED RDW RBC AUTO: 84.5 FL (ref 35.1–46.3)
EGFRCR SERPLBLD CKD-EPI 2021: 95 ML/MIN/1.73M2 (ref 60–?)
EOSINOPHIL # BLD: 0.04 X10(3) UL (ref 0–0.7)
EOSINOPHIL NFR BLD: 1 %
ERYTHROCYTE [DISTWIDTH] IN BLOOD BY AUTOMATED COUNT: 26.5 % (ref 11–15)
GLUCOSE BLD-MCNC: 112 MG/DL (ref 70–99)
GLUCOSE BLDC GLUCOMTR-MCNC: 109 MG/DL (ref 70–99)
GLUCOSE BLDC GLUCOMTR-MCNC: 126 MG/DL (ref 70–99)
GLUCOSE BLDC GLUCOMTR-MCNC: 131 MG/DL (ref 70–99)
GLUCOSE BLDC GLUCOMTR-MCNC: 153 MG/DL (ref 70–99)
GLUCOSE BLDC GLUCOMTR-MCNC: 98 MG/DL (ref 70–99)
HCT VFR BLD AUTO: 26.7 % (ref 35–48)
HGB BLD-MCNC: 8.8 G/DL (ref 12–16)
LYMPHOCYTES NFR BLD: 0.22 X10(3) UL (ref 1–4)
LYMPHOCYTES NFR BLD: 6 %
MCH RBC QN AUTO: 30.2 PG (ref 26–34)
MCHC RBC AUTO-ENTMCNC: 33 G/DL (ref 31–37)
MCV RBC AUTO: 91.8 FL (ref 80–100)
METAMYELOCYTES # BLD: 0.11 X10(3) UL (ref ?–0.01)
METAMYELOCYTES NFR BLD: 3 %
MONOCYTES # BLD: 0.07 X10(3) UL (ref 0.1–1)
MONOCYTES NFR BLD: 2 %
NEUTROPHILS # BLD AUTO: 2.39 X10 (3) UL (ref 1.5–7.7)
NEUTROPHILS NFR BLD: 85 %
NEUTS BAND NFR BLD: 3 %
NEUTS HYPERSEG # BLD: 3.17 X10(3) UL (ref 1.5–7.7)
OSMOLALITY SERPL CALC.SUM OF ELEC: 291 MOSM/KG (ref 275–295)
P AXIS: 53 DEGREES
P-R INTERVAL: 182 MS
PLATELET # BLD AUTO: 123 10(3)UL (ref 150–450)
PLATELET MORPHOLOGY: NORMAL
POTASSIUM SERPL-SCNC: 3 MMOL/L (ref 3.5–5.1)
POTASSIUM SERPL-SCNC: 3.7 MMOL/L (ref 3.5–5.1)
Q-T INTERVAL: 300 MS
QRS DURATION: 78 MS
QTC CALCULATION (BEZET): 429 MS
R AXIS: 13 DEGREES
RBC # BLD AUTO: 2.91 X10(6)UL (ref 3.8–5.3)
SODIUM SERPL-SCNC: 138 MMOL/L (ref 136–145)
T AXIS: 70 DEGREES
TOTAL CELLS COUNTED BLD: 100
VENTRICULAR RATE: 123 BPM
WBC # BLD AUTO: 3.6 X10(3) UL (ref 4–11)

## 2025-04-18 PROCEDURE — 99223 1ST HOSP IP/OBS HIGH 75: CPT | Performed by: INTERNAL MEDICINE

## 2025-04-18 PROCEDURE — 99233 SBSQ HOSP IP/OBS HIGH 50: CPT | Performed by: HOSPITALIST

## 2025-04-18 PROCEDURE — 71260 CT THORAX DX C+: CPT | Performed by: INTERNAL MEDICINE

## 2025-04-18 RX ORDER — POTASSIUM CHLORIDE 14.9 MG/ML
20 INJECTION INTRAVENOUS ONCE
Status: DISCONTINUED | OUTPATIENT
Start: 2025-04-18 | End: 2025-04-18

## 2025-04-18 RX ORDER — POTASSIUM CHLORIDE 1500 MG/1
40 TABLET, EXTENDED RELEASE ORAL EVERY 4 HOURS
Status: COMPLETED | OUTPATIENT
Start: 2025-04-18 | End: 2025-04-18

## 2025-04-18 NOTE — ED QUICK NOTES
MD took patient off of O2 to see if she can tolerate room air. Patient dropped down and stayed at 86% on RA. Patient placed back on 3L via NC

## 2025-04-18 NOTE — PAYOR COMM NOTE
--------------  ADMISSION REVIEW     Payor: [employer]  Subscriber #:  [id number]  Authorization Number: [id number]    Admit date: 4/17/25  Admit time: 11:14 PM       REVIEW DOCUMENTATION:     ED Provider Notes        HPI    The patient presents to the ED complaining of shortness of breath and low O2 saturations that she noted at home today.  Patient has a history of lung CA and is currently receiving chemo and radiation.  Is on Eliquis.  She reports taking this medication.  She denies other complaints.  No chest pain, Leg pain or swelling or other complaints.    Physical Exam     ED Triage Vitals [04/17/25 1730]   /67   Pulse (!) 122   Resp 18   Temp 98.3 °F (36.8 °C)   Temp src Temporal   SpO2 93 %   O2 Device Nasal cannula           Physical Exam  Vitals and nursing note reviewed.   Constitutional:       General: She is not in acute distress.     Appearance: She is well-developed.   HENT:      Head: Normocephalic and atraumatic.   Eyes:      General:         Right eye: No discharge.         Left eye: No discharge.      Conjunctiva/sclera: Conjunctivae normal.   Neck:      Trachea: No tracheal deviation.   Cardiovascular:      Rate and Rhythm: Regular rhythm. Tachycardia present.   Pulmonary:      Effort: Pulmonary effort is normal. No respiratory distress.      Breath sounds: Normal breath sounds. No stridor.   Abdominal:      General: There is no distension.      Palpations: Abdomen is soft.   Musculoskeletal:         General: No deformity.   Skin:     General: Skin is warm and dry.   Neurological:      Mental Status: She is alert and oriented to person, place, and time.   Psychiatric:         Mood and Affect: Mood normal.         Behavior: Behavior normal.         ED Course        Labs Reviewed   COMP METABOLIC PANEL (14) - Abnormal; Notable for the following components:       Result Value    Glucose 129 (*)     Sodium 135 (*)     Potassium 3.0 (*)     BUN 33 (*)     BUN/CREA Ratio 53.2 (*)      Calcium, Total 8.5 (*)     All other components within normal limits   CBC WITH DIFFERENTIAL WITH PLATELET - Abnormal; Notable for the following components:    WBC 3.1 (*)     RBC 2.93 (*)     HGB 8.8 (*)     HCT 26.7 (*)     RDW-SD 84.9 (*)     RDW 26.7 (*)     .0 (*)     All other components within normal limits   MANUAL DIFFERENTIAL - Abnormal; Notable for the following components:    Lymphocyte Absolute Manual 0.22 (*)     Monocyte Absolute Manual 0.03 (*)     Metamyelocyte Absolute Manual 0.12 (*)     Myelocyte Absolute Manual 0.09 (*)     All other components within normal limits   POCT GLUCOSE - Abnormal; Notable for the following components:    POC Glucose  109 (*)     All other components within normal limits   TROPONIN I HIGH SENSITIVITY - Normal   BASIC METABOLIC PANEL (8)   CBC WITH DIFFERENTIAL WITH PLATELET     EKG    Rate, intervals and axes as noted on EKG Report.  Rate: Tachycardic, 123 bpm  Rhythm: Sinus Rhythm  Reading: Sinus tachycardia, PACs, anterior Q waves, abnormal EKG           As Interpreted by me    Imaging Results Available and Reviewed while in ED: XR CHEST AP PORTABLE  (CPT=71045)  Result Date: 4/17/2025  CONCLUSION:  1. Large right perihilar lung mass has slightly decreased since comparison chest radiograph from January, 2025. 2. No other focal airspace disease or significant pleural effusion.  3. Elevation of the right hemidiaphragm.  Mild discoid bibasilar atelectasis/scarring. 4. Left chest port and right perihilar vascular stent are new since prior.     Dictated by (CST): Rasheed Whitmore MD on 4/17/2025 at 6:16 PM     Finalized by (CST): Rasheed Whitmore MD on 4/17/2025 at 6:18 PM           MDM     Vitals:    04/17/25 2327 04/17/25 2346 04/18/25 0030 04/18/25 0120   BP: 113/58  117/65    BP Location: Right arm  Right arm    Pulse:  115 114 95   Resp:       Temp:       TempSrc:       SpO2:  92% 95%    Weight:    88.9 kg     *I personally reviewed and interpreted all ED  vitals.    Pulse Ox: 95%, Room air, Normal     Monitor Interpretation:   normal sinus rhythm, sinus tachycardia as interpreted by me.  The cardiac monitor was ordered to monitor heart rate.    Differential Diagnosis/ Diagnostic Considerations: Lung CA, pleural effusion, mucous plugging, respiratory failure, other    Complicating Factors: The patient already has does not have any pertinent problems on file. to contribute to the complexity of this ED evaluation.    Medical Decision Making  Patient presents to the ED with mild shortness of breath and hypoxemia.  O2 saturations 85% on room air.  Patient requiring O2 to maintain normal saturations.  Chest x-ray with no worsening in known lung mass.  Laboratory testing with mild hypokalemia but otherwise at baseline.  Patient was given Lasix and diuresed well but O2 saturations not improved.  Will admit for management.  I discussed with Dr. Hathaway for admission, Dr. Mahan for pulmonology consultation and Dr. Goodson for oncology consultation.    Problems Addressed:  Acute respiratory failure with hypoxia (HCC): acute illness or injury that poses a threat to life or bodily functions    Amount and/or Complexity of Data Reviewed  Labs: ordered. Decision-making details documented in ED Course.  Radiology: ordered and independent interpretation performed. Decision-making details documented in ED Course.     Details: I personally reviewed the patient's chest x-ray image and noted no pneumothorax or pneumonia  ECG/medicine tests: ordered and independent interpretation performed. Decision-making details documented in ED Course.        Condition upon leaving the department: Stable    Disposition and Plan     Clinical Impression:  1. Acute respiratory failure with hypoxia (HCC)        Disposition:  Admit          History & Physical           Yi Torres Patient Status:  Emergency    1950 MRN [id number]   [facility] EMERGENCY DEPARTMENT Attending  Venkata Garcia MD   Hosp Day # 0 PCP Tammy Chan MD      Date:  4/17/2025  Date of Admission:  4/17/2025     History provided by:patient  Chief Complaint:          Chief Complaint   Patient presents with    Difficulty Breathing         HPI:   Yi Torres is a 74 year old female with a history of lung cancer on chemo, who presents with dyspnea and low pulse ox readings. Patient was recently diagnosed with right lung cancer in 01/2025. She received thrombectomy for SVC syndrome in 02/2025, and is on Eliquis since. She started chemoradiation therapy in 03/2025. Patient follows Dr. Goodson, Dr. Reyna and Dr. Garrido. Last chemo was 2 days 04/15/25. Patient developed generalized weakness, and dyspnea on exertion since the chemo. Patient does not have oxygen at home. She denied cough, fever, chest pain, emesis or diarrhea.     In ED, patient has HR 120s, SpO2 86% on RA. ECG showed sinus tachycardia with PACs. Blood work and CXR unremarkable. Lasix 40 mg IV given. Pulm and Onc consulted.           Assessment/Plan:   Yi Torres is a 74-year-old female with a history of lung cancer on chemo, who presents with dyspnea and low pulse ox readings.      # Acute hypoxemic respiratory failure  - SpO2 86% on RA  - CXR at baseline  - Pulm consulted     # Right lung cancer  - diagnosed in 01/2025; follows Dr. Goodson, Dr. Reyna and Dr. Garrido.  - last chemo is 04/15/25  - Onc consulted     # Pancytopenia due to chemotherapy  - WBC 3.1, hgb 8.8, plt 114     # SVC syndrome and left subclanivan thrombus  - s/p SVC stenting and left subclavian vein thrombectomy on 02/14/25  - continue home Eliquis     # Hypokalemia  - K 3.0, replenish     # Hyponatremia  - Na 135     # DM2  - hold home metformin  - SSI AC/HS     # Hypertension  - ECG showed sinus tachycardia with PACs  - increase home metoprolol 25 mg BID to 50 mg BID  - continue home verapamil     Diet: regular  PT/OT: deferred  DVT ppx: Eliquis  Line: none  Code  status: Full  Dispo: expect greater than 2 midnights     MDM: patricia Fraser MD, PhD  Message via Secure Chat  [facility] Hospitalist      4/18 PULMONARY:    Date of Admission:  4/17/2025     Reason for Consultation:   Acute hypoxemic respiratory failure     History of Present Illness:   Patient is a 74-year-old female with past medical history significant for lung cancer status post chemoradiation, SVC syndrome status post thrombectomy and stenting who presents with chief complaint of hypoxia and some underlying dyspnea.  States she feels somewhat congested but denies fevers or chills or productive cough.  Denies wheezing.  Oxygen saturation mid 80s on presentation to emergency department.  Currently on 5 L oxygen.      Review of Systems:   Constitutional: denies fevers, chills, weakness, fatigue, recent illness  HEENT: denies headache, sore throat, vision loss  Cardio: denies chest pain, chest pressure, palpitations  Respiratory: dyspnea, cough, denies wheezing, hemoptysis   GI: denies nausea, vomiting, abdominal pain  : denies dysuria, hematuria  Musculoskeletal: denies arthralgia, myalgia  Integumentary: denies rash, itching  Neurological: denies syncope, weakness, dizziness,   Psychiatric: denies depression, anxiety  Hematologic: denies bruising           Physical Exam:   Blood pressure 117/65, pulse 83, temperature 98.2 °F (36.8 °C), resp. rate 20, weight 196 lb (88.9 kg), SpO2 94%.     Constitutional: no acute distress  Eyes: PERRL  ENT: nares patent  Neck: neck supple, no JVD  Cardio: RRR, S1 S2  Respiratory: clear to auscultation bilaterally, no wheezing, rales, rhonchi, crackles  GI: abdomen soft, non tender, active bowel souds, no organomegaly  Extremities: no clubbing, cyanosis, edema  Neurologic: no gross motor deficits  Skin: warm, dry     Results:   Laboratory Data        Lab Results   Component Value Date     WBC 3.6 (L) 04/18/2025     HGB 8.8 (L) 04/18/2025     HCT 26.7 (L) 04/18/2025      .0 (L) 04/18/2025     CREATSERUM 0.61 04/18/2025     BUN 24 (H) 04/18/2025      04/18/2025     K 3.0 (L) 04/18/2025     CL 98 04/18/2025     CO2 32.0 04/18/2025      (H) 04/18/2025     CA 8.8 04/18/2025     ALB 3.7 04/17/2025     ALKPHO 59 04/17/2025     TP 6.5 04/17/2025     AST 20 04/17/2025     ALT 20 04/17/2025     PTT 26.1 02/13/2025     INR 1.15 02/13/2025     PTP 15.4 (H) 02/13/2025     T4F 1.1 06/02/2021     TSH 2.859 04/19/2024     MG 1.8 03/24/2025     PHOS 3.1 02/21/2025     TROP <0.045 10/09/2020     CK 78 11/08/2019     B12 643 03/17/2025          Imaging  XR CHEST AP PORTABLE  (CPT=71045)  Result Date: 4/17/2025  CONCLUSION:     1. Large right perihilar lung mass has slightly decreased since comparison chest radiograph from January, 2025. 2. No other focal airspace disease or significant pleural effusion.  3. Elevation of the right hemidiaphragm.  Mild discoid bibasilar atelectasis/scarring. 4. Left chest port and right perihilar vascular stent are new since prior.     Dictated by (CST): Rasheed Whitmore MD on 4/17/2025 at 6:16 PM     Finalized by (CST): Rasheed Whitmore MD on 4/17/2025 at 6:18 PM              Assessment   1.  Squamous cell lung cancer  2.  Acute hypoxemic respiratory failure  3.  SVC syndrome status post stenting  4.  Pancytopenia     Plan   -Patient presents with evidence of dyspnea and acute hypoxemic respiratory failure.  - Patient with prior history of squamous cell lung cancer status post chemoradiation.  Also found to have SVC syndrome with left innominate/subclavian thrombectomy with SVC stent placement on 2/13/2025.  - Chest x-ray with no obvious acute findings  - Will obtain CT chest to further evaluate  - Hold off antibiotic therapy at this time  - Recommend outpatient pulmonary function testing  - Evaluate for home oxygen prior to discharge  - DVT prophylaxis: Eliquis  - Reviewed vitals, labs and imaging     Gabrielle Garrido DO  Pulmonary Critical Care  [facility]  4/18/2025  9:14 AM                    MEDICATIONS ADMINISTERED IN LAST 1 DAY:  apixaban (Eliquis) tab 5 mg       Date Action Dose Route User    4/18/2025 1102 Given 5 mg Oral Mary Crespo RN    4/18/2025 0037 Given 5 mg Oral Viola Lopez RN          benzonatate (Tessalon) cap 200 mg       Date Action Dose Route User    4/18/2025 0622 Given 200 mg Oral Viola Lopez RN          cetirizine (ZyrTEC) tab 10 mg       Date Action Dose Route User    4/18/2025 1103 Given 10 mg Oral Mary Crespo RN          furosemide (Lasix) 10 mg/mL injection 40 mg       Date Action Dose Route User    4/17/2025 1901 Given 40 mg Intravenous CzuprynHelene gant RN          furosemide (Lasix) tab 20 mg       Date Action Dose Route User    4/18/2025 1102 Given 20 mg Oral Mary Crespo RN          guaiFENesin (Robitussin) 100 MG/5 ML oral liquid 200 mg       Date Action Dose Route User    4/18/2025 0340 Given 200 mg Oral Viola Lopez RN          iopamidol 76% (ISOVUE-370) injection for power injector       Date Action Dose Route User    4/18/2025 0957 Given 80 mL Intravenous Robin Rojas          metoprolol tartrate (Lopressor) tab 50 mg       Date Action Dose Route User    4/18/2025 1103 Given 50 mg Oral Mary Crespo RN    4/18/2025 0037 Given 50 mg Oral Viola Lopez RN          potassium chloride (Klor-Con M20) tab 40 mEq       Date Action Dose Route User    4/18/2025 1111 Given 40 mEq Oral Mayr Crespo RN          verapamil ER (Calan-SR) tab 240 mg       Date Action Dose Route User    4/18/2025 0037 Given 240 mg Oral Viola Lopez RN            Vitals (last day)       Date/Time Temp Pulse Resp BP SpO2 Weight O2 Device O2 Flow Rate (L/min) Who    04/18/25 1100 -- 91 20 117/51 100 % -- Nasal cannula 5 L/min CW    04/18/25 0539 -- 83 -- -- 94 % -- -- -- KS    04/18/25 0336 -- 87 -- -- 97 % -- Nasal cannula 5 L/min KS    04/18/25 0120 -- -- -- -- -- 196 lb (88.9 kg) -- -- AC     04/18/25 0120 -- 95 -- -- -- -- -- -- MO    04/18/25 0030 -- 114 -- 117/65 95 % -- Nasal cannula 5 L/min KS    04/17/25 2346 -- 115 -- -- 92 % -- Nasal cannula 5 L/min KS    04/17/25 2327 -- -- -- 113/58 -- -- -- -- KS    04/17/25 2323 -- -- -- -- 91 % -- Nasal cannula 5 L/min KS    04/17/25 2319 98.2 °F (36.8 °C) 116 20 98/65 89 % -- Nasal cannula 4 L/min KS    04/17/25 2318 -- -- -- -- 87 % -- Nasal cannula 3 L/min KS    04/17/25 2318 -- -- -- -- -- 203 lb 0.7 oz (92.1 kg) -- -- GG    04/17/25 2259 -- -- -- -- -- -- Nasal cannula 3 L/min     04/17/25 2245 -- 112 24 -- 94 % -- Nasal cannula 3 L/min     04/17/25 2230 -- 113 25 132/61 94 % -- Nasal cannula 3 L/min     04/17/25 2115 -- 114 19 -- 95 % -- Nasal cannula 3 L/min     04/17/25 2100 -- 119 24 156/72 93 % -- Nasal cannula --     04/17/25 2030 -- 109 22 147/66 93 % -- Nasal cannula 3 L/min SC    04/17/25 2000 -- 119 18 155/68 86 % -- None (Room air) -- SC    04/17/25 1930 -- 109 18 151/79 95 % -- Nasal cannula 3 L/min SC    04/17/25 1850 -- -- -- -- -- -- Nasal cannula 4 L/min SC    04/17/25 1730 98.3 °F (36.8 °C) 122 18 131/67 93 % -- Nasal cannula 4 L/min SZ

## 2025-04-18 NOTE — PROGRESS NOTES
Progress Note     Yi Torres Patient Status:  Inpatient    1950 MRN [id number]   Location [facility] 4W/SW/SE Attending Manuel Teixeira MD   Hosp Day # 1 PCP Tammy Chan MD     Chief Complaint: sob, hypoxia    Subjective:   S: Patient here with sob   Denies cp, dizziness  Overall feels optimistic    Review of Systems:   10 point ROS completed and was negative, except for pertinent positive and negatives stated in subjective.    Objective:   Vital signs:  Temp:  [98.2 °F (36.8 °C)-98.9 °F (37.2 °C)] 98.9 °F (37.2 °C)  Pulse:  [] 81  Resp:  [18-25] 18  BP: ()/(51-79) 118/57  SpO2:  [86 %-100 %] 100 %    Wt Readings from Last 6 Encounters:   25 196 lb (88.9 kg)   25 203 lb (92.1 kg)   25 199 lb (90.3 kg)   25 201 lb 12.8 oz (91.5 kg)   25 198 lb (89.8 kg)   25 204 lb 6.4 oz (92.7 kg)         Physical Exam:    General: No acute distress. Alert ,         Respiratory: b/l wheezing  Cardiovascular: S1, S2. Regular rate and rhythm. No murmurs, rubs or gallops.   Abdomen: Soft, nontender, nondistended.  Positive bowel sounds. No rebound or guarding.  Neurologic: No focal neurological deficits.   Musculoskeletal: Moves all extremities.  Extremities: No edema.    Results:   Diagnostic Data:      Labs:    Labs Last 24 Hours:   BMP     CBC    Other     Na 138 Cl 98 BUN 24 Glu 112   Hb 8.8   PTT - Procal -   K 3.0 CO2 32.0 Cr 0.61   WBC 3.6 >< .0  INR - CRP -   Renal Lytes Endo    Hct 26.7   Trop - D dim -   eGFR - Ca 8.8 POC Gluc  98    LFT   pBNP - Lactic -   eGFR AA - PO4 - A1c -   AST 20 APk 59 Prot 6.5  LDL -     Mg - TSH -   ALT 20 T fletcher 0.9 Alb 3.7        COVID-19 Lab Results    COVID-19  No results found for: \"COVID19\"    Pro-Calcitonin  No results for input(s): \"PCT\" in the last 168 hours.    Cardiac  No results for input(s): \"TROP\", \"PBNP\" in the last 168 hours.    Creatinine Kinase  No results for input(s): \"CK\" in the last 168  hours.    Inflammatory Markers  No results for input(s): \"CRP\", \"VANNA\", \"LDH\", \"DDIMER\" in the last 168 hours.    Imaging: Imaging data reviewed in Epic.    Medications: Scheduled Medications[1]    Assessment & Plan:   ASSESSMENT / PLAN:     Yi Torres is a 74-year-old female with a history of lung cancer on chemo, who presents with dyspnea and low pulse ox readings.      # Acute hypoxemic respiratory failure  - SpO2 86% on RA  - CXR at baseline  - Pulm consulted  - Ordered CT chest for further evaluation  -Home oxygen evaluation     # Right lung cancer  - diagnosed in 01/2025; follows Dr. Goodson, Dr. Reyna and Dr. Garrido.  - last chemo is 04/15/25  - Onc consult appreciated  -Completed chemotherapy but now is due for more radiation therapy then follow-up for immunotherapy     # Pancytopenia due to chemotherapy  - Counts are stable     # SVC syndrome and left subclanivan thrombus  - s/p SVC stenting and left subclavian vein thrombectomy on 02/14/25  - continue home Eliquis  -CT scan with evidence of no flow in SVC stent  -Planning for venogram on Monday 4/21     # Hypokalemia  - K 3.0, replenish     # Hyponatremia  - Na 135     # DM2  - hold home metformin  - SSI AC/HS     # Hypertension  - ECG showed sinus tachycardia with PACs  - increase home metoprolol 25 mg BID to 50 mg BID  - continue home verapamil     Diet: regular  PT/OT: deferred  DVT ppx: Eliquis  Line: none  Code status: Full  Dispo: expect greater than 2 midnights  DVT Prophylaxis: hold eliquis prior to venogram per ir  CODE status: full  Fabian:    Central line: n/a  Dispo: further recs pending clinical course      Will the patient be referred to TCC on discharge?: yes  Estimated date of discharge: TBD  Discharge is dependent on: clinical improvemetn  At this point Ms. Torres is expected to be discharge to: home    Plan of care discussed with patient, nursing, oncology and pulmonology    Outpatient records or previous hospital records reviewed.    Patient and/or patient's family given opportunity to ask questions and note understanding and agreeing with therapeutic plan as outlined     Coordinated care with providers and counseling re: treatment plan and workup    MDM: High complexity     LIOR PENA MD    Supplementary Documentation:         **Certification      PHYSICIAN Certification of Need for Inpatient Hospitalization - Initial Certification    Patient will require inpatient services that will reasonably be expected to span two midnight's based on the clinical documentation in H+P.   Based on patients current state of illness, I anticipate that, after discharge, patient will require TBD.                    [1]    potassium chloride  40 mEq Oral Once    insulin aspart  1-7 Units Subcutaneous TID CC    apixaban  5 mg Oral BID    cetirizine  10 mg Oral Daily    furosemide  20 mg Oral Daily    metoprolol tartrate  50 mg Oral BID    verapamil ER  240 mg Oral Nightly

## 2025-04-18 NOTE — ED QUICK NOTES
Rounding Completed    Plan of Care reviewed. Waiting for results and disposition.  Elimination needs assessed, d/t O2 needs and tachycardia, patient placed on purewick.  Provided bed repositioning and gave patient boost in bed.    Bed is locked and in lowest position. Call light within reach.

## 2025-04-18 NOTE — PLAN OF CARE
Problem: Patient Centered Care  Goal: Patient preferences are identified and integrated in the patient's plan of care  Description: Interventions:- What would you like us to know as we care for you? - Provide timely, complete, and accurate information to patient/family- Incorporate patient and family knowledge, values, beliefs, and cultural backgrounds into the planning and delivery of care- Encourage patient/family to participate in care and decision-making at the level they choose- Honor patient and family perspectives and choices  Outcome: Progressing     Problem: Diabetes/Glucose Control  Goal: Glucose maintained within prescribed range  Description: INTERVENTIONS:- Monitor Blood Glucose as ordered- Assess for signs and symptoms of hyperglycemia and hypoglycemia- Administer ordered medications to maintain glucose within target range- Assess barriers to adequate nutritional intake and initiate nutrition consult as needed- Instruct patient on self management of diabetes  Outcome: Progressing     Problem: Patient/Family Goals  Goal: Patient/Family Long Term Goal  Description: Patient's Long Term Goal: Interventions:- - See additional Care Plan goals for specific interventions  Outcome: Progressing  Goal: Patient/Family Short Term Goal  Description: Patient's Short Term Goal: Interventions: - - See additional Care Plan goals for specific interventions  Outcome: Progressing    Pt admitted to room 468. 5L O2 in use. Moist cough, robitussin and tessalon prn. Purewick in place. Remote tele initiated. Accuchecks ACHS. Pillows used for butt discomfort. Edema to legs. Safety measures in place. Frequent rounding being done.

## 2025-04-18 NOTE — ED QUICK NOTES
Orders for admission, patient is aware of plan and ready to go upstairs. Any questions, please call ED RN Andrea at extension [phone].     Patient Covid vaccination status: Fully vaccinated     COVID Test Ordered in ED: None    COVID Suspicion at Admission: N/A    Running Infusions: Medication Infusions[1] None    Mental Status/LOC at time of transport: a/ox4    Other pertinent information:   CIWA score: N/A   NIH score:  N/A             [1]

## 2025-04-18 NOTE — PLAN OF CARE
Patient alert and oriented X4, vitals stable. Denies pain. Saline locked. Potassium replaced per protocol. Patient sent for CT chest r/o pe, no pe noted. Patient to have IR angiogram with stent placement on Monday, patient to be NPO at midnight on Sunday. Tolerating diet, accuchecks with insulin sliding scale coverage. Up to bathroom with walker and assist. Bed locked and in lowest position, call light within reach, calls appropriately for assistance.       Problem: Patient Centered Care  Goal: Patient preferences are identified and integrated in the patient's plan of care  Description: Interventions:- What would you like us to know as we care for you? - Provide timely, complete, and accurate information to patient/family- Incorporate patient and family knowledge, values, beliefs, and cultural backgrounds into the planning and delivery of care- Encourage patient/family to participate in care and decision-making at the level they choose- Honor patient and family perspectives and choices  Outcome: Progressing     Problem: Diabetes/Glucose Control  Goal: Glucose maintained within prescribed range  Description: INTERVENTIONS:- Monitor Blood Glucose as ordered- Assess for signs and symptoms of hyperglycemia and hypoglycemia- Administer ordered medications to maintain glucose within target range- Assess barriers to adequate nutritional intake and initiate nutrition consult as needed- Instruct patient on self management of diabetes  Outcome: Progressing

## 2025-04-18 NOTE — CONSULTS
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  Her oncologic history is detailed below.    She presented to the hospital with generalized weakness and dyspnea on exertion over the last couple of days.  In the ER she was found to be tachycardic and hypoxic with O2 sats of 86% on room air.  Chest x-ray showed some improvement of her lung mass but otherwise no evidence of pneumonia.  She was hospitalized and started on oxygen therapy and Lasix.  Feeling a little bit better but still not back to her baseline.    2025 CT angiogram showed no PE shows a decrease in the right suprahilar mass along with cavitation suggestive of tumor necrosis previously seen satellite nodule is almost completely resolved.  SVC stent was seen but no flow within it.  Thrombus within the distal left brachiocephalic is unchanged.  Chest wall collaterals consistent with SVC occlusion      Review of Systems:  Hematology/Oncology ROS performed and negative except as above in HPI    History/Other:   Past Medical History:  Past Medical History[1]    Past Surgical History:  Past Surgical History[2]    Current Medications:  Current Medications[3]    Allergies:   Allergies[4]    Family Medical History:  Family History[5]    Social History:  Social History     Socioeconomic History    Marital status:      Spouse name: Not on file    Number of children: 1    Years of education: Not on file    Highest education level: Not on file   Occupational History    Not on file   Tobacco Use    Smoking status: Former     Current  packs/day: 0.00     Average packs/day: 0.5 packs/day for 45.0 years (22.5 ttl pk-yrs)     Types: Cigarettes     Start date: 1973     Quit date: 2018     Years since quittin.2     Passive exposure: Past    Smokeless tobacco: Never    Tobacco comments:     1 pack every 3 days   Vaping Use    Vaping status: Never Used   Substance and Sexual Activity    Alcohol use: Not Currently     Comment: very rarely    Drug use: No    Sexual activity: Not on file   Other Topics Concern     Service Not Asked    Blood Transfusions Not Asked    Caffeine Concern Yes     Comment: Coffee, 2 cups per day     Occupational Exposure Not Asked    Hobby Hazards Not Asked    Sleep Concern Not Asked    Stress Concern Not Asked    Weight Concern Not Asked    Special Diet Not Asked    Back Care Not Asked    Exercise Not Asked    Bike Helmet Not Asked    Seat Belt Not Asked    Self-Exams Not Asked    Grew up on a farm Not Asked    History of tanning No    Outdoor occupation Not Asked    Breast feeding Not Asked    Reaction to local anesthetic No    Left Handed No    Right Handed Yes    Currently spends a great deal of time in the sun No    Past Sunlamp Treatments for Acne Not Asked    Hx of Spending Great Deal of Time in Sun No    Bad sunburns in the past Yes    Tanning Salons in the Past No    Hx of Radiation Treatments No    Regular use of sun block Not Asked   Social History Narrative    - lives with     1 son      Social Drivers of Health     Food Insecurity: No Food Insecurity (2025)    NCSS - Food Insecurity     Worried About Running Out of Food in the Last Year: No     Ran Out of Food in the Last Year: No   Transportation Needs: No Transportation Needs (2025)    NCSS - Transportation     Lack of Transportation: No   Housing Stability: Not At Risk (2025)    NCSS - Housing/Utilities     Has Housing: Yes     Worried About Losing Housing: No     Unable to Get Utilities: No       Gyn History:  OB  History    Para Term  AB Living   1 1 0 0 0 0   SAB IAB Ectopic Multiple Live Births   0 0 0 0 0       Objective:    /51 (BP Location: Right arm)   Pulse 91   Temp 98.2 °F (36.8 °C)   Resp 20   Wt 88.9 kg (196 lb)   SpO2 100%   BMI 33.12 kg/m²   Physical Exam:  General: A&Ox3, NAD  HEENT: PERRL, OP clear  Neck: supple, no LAD or JVD, no tenderness  CV: RRR, no murmurs, + pulses  Pulm: CTA b/l, no w/r/r,   Chest wall collaterals noted  Lymph: no palpable lymphadenopathy throughout the cervical, supraclavicular, or axillary regions  Extremities: no edema or calf tenderness  Neurological: Grossly intact    Labs:  Lab Results   Component Value Date/Time    WBC 3.6 (L) 2025 05:43 AM    RBC 2.91 (L) 2025 05:43 AM    HGB 8.8 (L) 2025 05:43 AM    HCT 26.7 (L) 2025 05:43 AM    MCV 91.8 2025 05:43 AM    MCH 30.2 2025 05:43 AM    MCHC 33.0 2025 05:43 AM    RDW 26.5 (H) 2025 05:43 AM    NEPRELIM 2.39 2025 05:43 AM    .0 (L) 2025 05:43 AM       Lab Results   Component Value Date/Time     (H) 2025 05:43 AM    BUN 24 (H) 2025 05:43 AM    CREATSERUM 0.61 2025 05:43 AM    GFRNAA 45 (L) 2022 08:02 AM    CA 8.8 2025 05:43 AM    ALB 3.7 2025 05:38 PM     2025 05:43 AM    K 3.0 (L) 2025 05:43 AM    CL 98 2025 05:43 AM    CO2 32.0 2025 05:43 AM    ALKPHO 59 2025 05:38 PM    AST 20 2025 05:38 PM    ALT 20 2025 05:38 PM       Imaging:  CT CHEST PE AORTA (IV ONLY) (CPT=71260)  Result Date: 2025  CONCLUSION:   No pulmonary embolus in the central, main, lobar, segmental pulmonary arteries. Nondiagnostic in the subsegmental pulmonary arteries due to respiratory motion artifact.  Right suprahilar mass has decreased in size since 2025 and developed a 6.1 cm cavitation along the inferior margin.  Findings are suggestive of tumor necrosis.  Previously  visualized 1.8 cm satellite nodule within the right lateral upper lobe has nearly completely resolved.  SVC stent seen.  No flow seen within the SVC stent.  Thrombus within the distal left brachiocephalic vein is unchanged.  Multiple chest wall collaterals consistent with SVC occlusion.  Patchy bilateral lower lobe airspace opacities are new and may be secondary to atelectasis or pneumonia. Short-term followup suggested to ensure resolution.  Unchanged nodularity of the bilateral adrenal glands may be secondary to a metastases.      Dictated by (CST): Eugenio French MD on 4/18/2025 at 10:35 AM     Finalized by (CST): Eugenio French MD on 4/18/2025 at 10:50 AM          XR CHEST AP PORTABLE  (CPT=71045)  Result Date: 4/17/2025  CONCLUSION:  1. Large right perihilar lung mass has slightly decreased since comparison chest radiograph from January, 2025. 2. No other focal airspace disease or significant pleural effusion.  3. Elevation of the right hemidiaphragm.  Mild discoid bibasilar atelectasis/scarring. 4. Left chest port and right perihilar vascular stent are new since prior.     Dictated by (CST): Rasheed hWitmore MD on 4/17/2025 at 6:16 PM     Finalized by (CST): Rasheed Whitmore MD on 4/17/2025 at 6:18 PM           Assessment & Plan:    Yi Torres is a 74 year old female with locally advanced lung cancer and SVC syndrome s/p SVC stenting and nearly completed concurrent chemo RT with weekly carboplatin paclitaxel.  Now hospitalized here with hypoxia.    # CTA shows occlusion of the SVC stent.  Will check with interventional radiology to see if she is a candidate for any further endovascular intervention    # Neutropenia has improved.  She does not require any further G-CSF therapy    # Will likely need oxygen therapy as an outpatient.  Will arrange oncologic follow-up as an outpatient postdischarge.    # Continue anticoagulation with apixaban    Thank you  for the opportunity to participate in the care of  this interesting patient. Please do contact me if I may be of any further assistance    José Miguel Goodson MD  [facility]   [facility]    This note was created using a voice-recognition transcribing system. Incorrect words or phrases may have been missed during proofreading. Please interpret accordingly.         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     Cholecystectomy      Laparoscopic incisional / umbilical / ventral hernia repair  09/19/2024    Port, indwelling, imp     [3]    potassium chloride (Klor-Con M20) tab 40 mEq  40 mEq Oral Q4H    [COMPLETED] iopamidol 76% (ISOVUE-370) injection for power injector  80 mL Intravenous ONCE PRN    [COMPLETED] filgrastim-aafi (Nivestym) 480 MCG/0.8ML prefilled syringe 480 mcg  480 mcg Subcutaneous Once    [COMPLETED] furosemide (Lasix) 10 mg/mL injection 40 mg  40 mg Intravenous Once    glucose (Dex4) 15 GM/59ML oral liquid 15 g  15 g Oral Q15 Min PRN    Or    glucose (Glutose) 40% oral gel 15 g  15 g Oral Q15 Min PRN    Or    glucose-vitamin C (Dex-4) chewable tab 4 tablet  4 tablet Oral Q15 Min PRN    Or    dextrose 50%  injection 50 mL  50 mL Intravenous Q15 Min PRN    Or    glucose (Dex4) 15 GM/59ML oral liquid 30 g  30 g Oral Q15 Min PRN    Or    glucose (Glutose) 40% oral gel 30 g  30 g Oral Q15 Min PRN    Or    glucose-vitamin C (Dex-4) chewable tab 8 tablet  8 tablet Oral Q15 Min PRN    potassium chloride (Klor-Con M20) tab 40 mEq  40 mEq Oral Once    acetaminophen (Tylenol Extra Strength) tab 500 mg  500 mg Oral Q4H PRN    morphINE PF 2 MG/ML injection 1 mg  1 mg Intravenous Q2H PRN    Or    morphINE PF 2 MG/ML injection 2 mg  2 mg Intravenous Q2H PRN    Or    morphINE PF 4 MG/ML injection 4 mg  4 mg Intravenous Q2H PRN    melatonin tab 3 mg  3 mg Oral Nightly PRN    polyethylene glycol (PEG 3350) (Miralax) 17 g oral packet 17 g  17 g Oral Daily PRN    sennosides (Senokot) tab 17.2 mg  17.2 mg Oral Nightly PRN    bisacodyl (Dulcolax) 10 MG rectal suppository 10 mg  10 mg Rectal Daily PRN    fleet enema (Fleet) rectal enema 133 mL  1 enema Rectal Once PRN    ondansetron (Zofran) 4 MG/2ML injection 4 mg  4 mg Intravenous Q6H PRN    metoclopramide (Reglan) 5 mg/mL injection 10 mg  10 mg Intravenous Q8H PRN    insulin aspart (NovoLOG) 100 Units/mL FlexPen 1-7 Units  1-7 Units Subcutaneous TID CC    benzonatate (Tessalon) cap 200 mg  200 mg Oral TID PRN    guaiFENesin (Robitussin) 100 MG/5 ML oral liquid 200 mg  200 mg Oral Q4H PRN    apixaban (Eliquis) tab 5 mg  5 mg Oral BID    cetirizine (ZyrTEC) tab 10 mg  10 mg Oral Daily    furosemide (Lasix) tab 20 mg  20 mg Oral Daily    metoprolol tartrate (Lopressor) tab 50 mg  50 mg Oral BID    verapamil ER (Calan-SR) tab 240 mg  240 mg Oral Nightly    prochlorperazine (Compazine) tab 10 mg  10 mg Oral Q6H PRN   [4]   Allergies  Allergen Reactions    Erythromycin DIARRHEA     Stomach pain   [5]   Family History  Problem Relation Age of Onset    Hypertension Father     Stroke Father     Heart Attack Mother

## 2025-04-18 NOTE — ED PROVIDER NOTES
Patient Seen in: [facility] Emergency Department    History     Chief Complaint   Patient presents with    Difficulty Breathing       HPI    The patient presents to the ED complaining of shortness of breath and low O2 saturations that she noted at home today.  Patient has a history of lung CA and is currently receiving chemo and radiation.  Is on Eliquis.  She reports taking this medication.  She denies other complaints.  No chest pain, Leg pain or swelling or other complaints.    History reviewed. Past Medical History[1]    History reviewed. Past Surgical History[2]      Medications :  Prescriptions Prior to Admission[3]     Family History[4]    Smoking Status: Social Hx on file[5]    Constitutional and vital signs reviewed.      Social History and Family History elements reviewed from today, pertinent positives to the presenting problem noted.    Physical Exam     ED Triage Vitals [04/17/25 1730]   /67   Pulse (!) 122   Resp 18   Temp 98.3 °F (36.8 °C)   Temp src Temporal   SpO2 93 %   O2 Device Nasal cannula       All measures to prevent infection transmission during my interaction with the patient were taken. Handwashing was performed prior to and after the exam.  Stethoscope and any equipment used during my examination was cleaned with super sani-cloth germicidal wipes following the exam.     Physical Exam  Vitals and nursing note reviewed.   Constitutional:       General: She is not in acute distress.     Appearance: She is well-developed.   HENT:      Head: Normocephalic and atraumatic.   Eyes:      General:         Right eye: No discharge.         Left eye: No discharge.      Conjunctiva/sclera: Conjunctivae normal.   Neck:      Trachea: No tracheal deviation.   Cardiovascular:      Rate and Rhythm: Regular rhythm. Tachycardia present.   Pulmonary:      Effort: Pulmonary effort is normal. No respiratory distress.      Breath sounds: Normal breath sounds. No stridor.   Abdominal:      General:  There is no distension.      Palpations: Abdomen is soft.   Musculoskeletal:         General: No deformity.   Skin:     General: Skin is warm and dry.   Neurological:      Mental Status: She is alert and oriented to person, place, and time.   Psychiatric:         Mood and Affect: Mood normal.         Behavior: Behavior normal.         ED Course        Labs Reviewed   COMP METABOLIC PANEL (14) - Abnormal; Notable for the following components:       Result Value    Glucose 129 (*)     Sodium 135 (*)     Potassium 3.0 (*)     BUN 33 (*)     BUN/CREA Ratio 53.2 (*)     Calcium, Total 8.5 (*)     All other components within normal limits   CBC WITH DIFFERENTIAL WITH PLATELET - Abnormal; Notable for the following components:    WBC 3.1 (*)     RBC 2.93 (*)     HGB 8.8 (*)     HCT 26.7 (*)     RDW-SD 84.9 (*)     RDW 26.7 (*)     .0 (*)     All other components within normal limits   MANUAL DIFFERENTIAL - Abnormal; Notable for the following components:    Lymphocyte Absolute Manual 0.22 (*)     Monocyte Absolute Manual 0.03 (*)     Metamyelocyte Absolute Manual 0.12 (*)     Myelocyte Absolute Manual 0.09 (*)     All other components within normal limits   POCT GLUCOSE - Abnormal; Notable for the following components:    POC Glucose  109 (*)     All other components within normal limits   TROPONIN I HIGH SENSITIVITY - Normal   BASIC METABOLIC PANEL (8)   CBC WITH DIFFERENTIAL WITH PLATELET     EKG    Rate, intervals and axes as noted on EKG Report.  Rate: Tachycardic, 123 bpm  Rhythm: Sinus Rhythm  Reading: Sinus tachycardia, PACs, anterior Q waves, abnormal EKG           As Interpreted by me    Imaging Results Available and Reviewed while in ED: XR CHEST AP PORTABLE  (CPT=71045)  Result Date: 4/17/2025  CONCLUSION:  1. Large right perihilar lung mass has slightly decreased since comparison chest radiograph from January, 2025. 2. No other focal airspace disease or significant pleural effusion.  3. Elevation of the  right hemidiaphragm.  Mild discoid bibasilar atelectasis/scarring. 4. Left chest port and right perihilar vascular stent are new since prior.     Dictated by (CST): Rasheed Whitmore MD on 4/17/2025 at 6:16 PM     Finalized by (CST): Rasheed Whitmore MD on 4/17/2025 at 6:18 PM          ED Medications Administered:   Medications   glucose (Dex4) 15 GM/59ML oral liquid 15 g (has no administration in time range)     Or   glucose (Glutose) 40% oral gel 15 g (has no administration in time range)     Or   glucose-vitamin C (Dex-4) chewable tab 4 tablet (has no administration in time range)     Or   dextrose 50% injection 50 mL (has no administration in time range)     Or   glucose (Dex4) 15 GM/59ML oral liquid 30 g (has no administration in time range)     Or   glucose (Glutose) 40% oral gel 30 g (has no administration in time range)     Or   glucose-vitamin C (Dex-4) chewable tab 8 tablet (has no administration in time range)   potassium chloride (Klor-Con M20) tab 40 mEq (40 mEq Oral Not Given 4/17/25 2330)   acetaminophen (Tylenol Extra Strength) tab 500 mg (has no administration in time range)   morphINE PF 2 MG/ML injection 1 mg (has no administration in time range)     Or   morphINE PF 2 MG/ML injection 2 mg (has no administration in time range)     Or   morphINE PF 4 MG/ML injection 4 mg (has no administration in time range)   melatonin tab 3 mg (has no administration in time range)   polyethylene glycol (PEG 3350) (Miralax) 17 g oral packet 17 g (has no administration in time range)   sennosides (Senokot) tab 17.2 mg (has no administration in time range)   bisacodyl (Dulcolax) 10 MG rectal suppository 10 mg (has no administration in time range)   fleet enema (Fleet) rectal enema 133 mL (has no administration in time range)   ondansetron (Zofran) 4 MG/2ML injection 4 mg (has no administration in time range)   metoclopramide (Reglan) 5 mg/mL injection 10 mg (has no administration in time range)   insulin aspart  (NovoLOG) 100 Units/mL FlexPen 1-7 Units (has no administration in time range)   benzonatate (Tessalon) cap 200 mg (has no administration in time range)   guaiFENesin (Robitussin) 100 MG/5 ML oral liquid 200 mg (has no administration in time range)   apixaban (Eliquis) tab 5 mg (5 mg Oral Given 4/18/25 0037)   cetirizine (ZyrTEC) tab 10 mg (has no administration in time range)   furosemide (Lasix) tab 20 mg (has no administration in time range)   metoprolol tartrate (Lopressor) tab 50 mg (50 mg Oral Given 4/18/25 0037)   verapamil ER (Calan-SR) tab 240 mg (240 mg Oral Given 4/18/25 0037)   prochlorperazine (Compazine) tab 10 mg (has no administration in time range)   furosemide (Lasix) 10 mg/mL injection 40 mg (40 mg Intravenous Given 4/17/25 1901)         MDM     Vitals:    04/17/25 2327 04/17/25 2346 04/18/25 0030 04/18/25 0120   BP: 113/58  117/65    BP Location: Right arm  Right arm    Pulse:  115 114 95   Resp:       Temp:       TempSrc:       SpO2:  92% 95%    Weight:    88.9 kg     *I personally reviewed and interpreted all ED vitals.    Pulse Ox: 95%, Room air, Normal     Monitor Interpretation:   normal sinus rhythm, sinus tachycardia as interpreted by me.  The cardiac monitor was ordered to monitor heart rate.    Differential Diagnosis/ Diagnostic Considerations: Lung CA, pleural effusion, mucous plugging, respiratory failure, other    Complicating Factors: The patient already has does not have any pertinent problems on file. to contribute to the complexity of this ED evaluation.    Medical Decision Making  Patient presents to the ED with mild shortness of breath and hypoxemia.  O2 saturations 85% on room air.  Patient requiring O2 to maintain normal saturations.  Chest x-ray with no worsening in known lung mass.  Laboratory testing with mild hypokalemia but otherwise at baseline.  Patient was given Lasix and diuresed well but O2 saturations not improved.  Will admit for management.  I discussed with   Rivas for admission, Dr. Mahan for pulmonology consultation and Dr. Goodson for oncology consultation.    Problems Addressed:  Acute respiratory failure with hypoxia (HCC): acute illness or injury that poses a threat to life or bodily functions    Amount and/or Complexity of Data Reviewed  Labs: ordered. Decision-making details documented in ED Course.  Radiology: ordered and independent interpretation performed. Decision-making details documented in ED Course.     Details: I personally reviewed the patient's chest x-ray image and noted no pneumothorax or pneumonia  ECG/medicine tests: ordered and independent interpretation performed. Decision-making details documented in ED Course.        Condition upon leaving the department: Stable    Disposition and Plan     Clinical Impression:  1. Acute respiratory failure with hypoxia (HCC)        Disposition:  Admit    Follow-up:  No follow-up provider specified.    Medications Prescribed:  Current Discharge Medication List          Hospital Problems       Present on Admission  Date Reviewed: 4/15/2025          ICD-10-CM Noted POA    * (Principal) Acute respiratory failure with hypoxia (HCC) J96.01 4/17/2025 Unknown                       [1]   Past Medical History:   Anesthesia complication    body aches for 3 days    Anxiety state    Cancer (HCC)    skin cancer    Cataract    COPD (chronic obstructive pulmonary disease) (HCC)    Diabetes (HCC)    Diabetes mellitus (HCC)    Essential hypertension    Exposure to medical diagnostic radiation    High blood pressure    High cholesterol    Incontinence    bladder    Neuropathy    neuropathy    Obesity, unspecified    Osteoarthritis    In knees    Personal history of antineoplastic chemotherapy    Problems with swallowing    pills    Renal disorder    CKD 2    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     Cholecystectomy      Laparoscopic incisional / umbilical / ventral hernia repair  09/19/2024    Port, indwelling, imp     [3]   Medications Prior to Admission   Medication Sig Dispense Refill Last Dose/Taking    alendronate 70 MG Oral Tab Take 1 tablet (70 mg total) by mouth once a week. 12 tablet 3 4/8/2025    furosemide (LASIX) 20 MG Oral Tab Take 1 tablet (20 mg total) by mouth daily. 30 tablet 1 4/16/2025 at  6:00 AM    apixaban (ELIQUIS) 5 MG Oral Tab Take 1 tablet (5 mg total) by mouth 2 (two) times daily. After completion of lovenox injections 60 tablet 3 4/17/2025 at  5:40 AM    acetaminophen 500 MG Oral Tab Take 1 tablet (500 mg total) by mouth every 4 (four) hours as needed.   4/15/2025    fluticasone propionate 50 MCG/ACT Nasal Suspension 2 sprays by Each Nare route daily. 1 each 0 4/17/2025 at  5:40 AM    prochlorperazine (COMPAZINE) 10 mg tablet Take 1 tablet (10 mg total) by mouth every 6 (six) hours as needed for Nausea. 30 tablet 3 4/16/2025 at  3:30 PM    Spironolactone-HCTZ 25-25 MG Oral Tab Take 1 tablet by mouth daily. 90 tablet 3 4/17/2025 at  5:40 AM    atorvastatin 20 MG Oral Tab TAKE 1 TABLET (20MG) BY MOUTH EVERY DAY (Patient taking differently: Take 1 tablet (20 mg total) by mouth nightly. TAKE 1 TABLET (20MG) BY MOUTH EVERY DAY) 90 tablet 3 4/16/2025 at  5:30 PM    Cetirizine HCl 10 MG Oral Cap Take 10 mg by mouth nightly.   4/16/2025 at  5:30 PM    metFORMIN  MG Oral Tablet 24 Hr Take 1 tablet (500 mg total) by mouth daily with food. 90 tablet 3 4/16/2025 at  5:30 PM    metoprolol tartrate 25 MG Oral Tab Take 1 tablet (25 mg total) by mouth 2 (two) times daily. 180 tablet 3 4/17/2025 at  5:40 AM    verapamil  MG Oral Tab CR Take 1 tablet (240 mg total) by mouth nightly. 90 tablet 3 4/16/2025 at  5:30 PM    cholecalciferol 50 MCG (2000 UT) Oral Cap Take 1 capsule (2,000  Units total) by mouth in the morning.   2025 at  5:40 AM    Multiple Vitamins-Minerals (CENTRUM SILVER) Oral Tab Take 1 tablet by mouth in the morning.   2025 at  5:40 AM    Potassium Chloride ER 10 MEQ Oral Tab CR Take 1 tablet (10 mEq total) by mouth daily. (Patient not taking: Reported on 2025) 7 tablet 1 Not Taking    lidocaine-prilocaine 2.5-2.5 % External Cream Apply to site 1 hour prior to port a cath needle insertion 5 g 1     sennosides-docusate (SENNA PLUS) 8.6-50 MG Oral Tab Take 2 tablets by mouth daily. (Patient not taking: Reported on 2025) 30 tablet 1 Not Taking    ondansetron (ZOFRAN) 8 MG tablet Take 1 tablet (8 mg total) by mouth every 8 (eight) hours as needed for Nausea. (Patient not taking: Reported on 2025) 30 tablet 3 Not Taking   [4]   Family History  Problem Relation Age of Onset    Hypertension Father     Stroke Father     Heart Attack Mother    [5]   Social History  Socioeconomic History    Marital status:     Number of children: 1   Tobacco Use    Smoking status: Former     Current packs/day: 0.00     Average packs/day: 0.5 packs/day for 45.0 years (22.5 ttl pk-yrs)     Types: Cigarettes     Start date: 1973     Quit date: 2018     Years since quittin.2     Passive exposure: Past    Smokeless tobacco: Never    Tobacco comments:     1 pack every 3 days   Vaping Use    Vaping status: Never Used   Substance and Sexual Activity    Alcohol use: Not Currently     Comment: very rarely    Drug use: No   Other Topics Concern    Caffeine Concern Yes     Comment: Coffee, 2 cups per day     History of tanning No    Reaction to local anesthetic No    Left Handed No    Right Handed Yes    Currently spends a great deal of time in the sun No    Hx of Spending Great Deal of Time in Sun No    Bad sunburns in the past Yes    Tanning Salons in the Past No    Hx of Radiation Treatments No

## 2025-04-18 NOTE — H&P
[facility]  part of [facility]    History & Physical    Yi Torres Patient Status:  Emergency    1950 MRN [id number]   Location [facility] EMERGENCY DEPARTMENT Attending Venkata Garcia MD   Hosp Day # 0 PCP Tammy Chan MD     Date:  2025  Date of Admission:  2025    History provided by:patient  Chief Complaint:     Chief Complaint   Patient presents with    Difficulty Breathing       HPI:   Yi Torres is a 74 year old female with a history of lung cancer on chemo, who presents with dyspnea and low pulse ox readings. Patient was recently diagnosed with right lung cancer in 2025. She received thrombectomy for SVC syndrome in 2025, and is on Eliquis since. She started chemoradiation therapy in 2025. Patient follows Dr. Goodson, Dr. Reyna and Dr. Garrido. Last chemo was 2 days 04/15/25. Patient developed generalized weakness, and dyspnea on exertion since the chemo. Patient does not have oxygen at home. She denied cough, fever, chest pain, emesis or diarrhea.    In ED, patient has HR 120s, SpO2 86% on RA. ECG showed sinus tachycardia with PACs. Blood work and CXR unremarkable. Lasix 40 mg IV given. Pulm and Onc consulted.    History   Past Medical History[1]  Past Surgical History[2]  Family History[3]  Social History:  Short Social Hx on File[4]  Allergies/Medications:   Allergies: Allergies[5]  Prescriptions Prior to Admission[6]    Review of Systems:   Negative except depicted in HPI.    A comprehensive 12 point review of systems was completed.  Pertinent positives and negatives noted in the the HPI.    Physical Exam:   Vital Signs:  Blood pressure 156/72, pulse 114, temperature 98.3 °F (36.8 °C), temperature source Temporal, resp. rate 19, SpO2 95%.     GENERAL:  The patient appeared to be in mild distress.    SKIN:  Warm and hydrated  HEENT:  Head was atraumatic and normocephalic.  Eyes:  Extraocular muscles were intact.  Sclera was  anicteric.  Pupils were equally reactive to light.  Ears:  There were no lesions.  Nose:  No lesions were noted.   NECK:  Supple.  There was no JVD.   CHEST:  Symmetrical movement on inspiration  HEART:  S1 and S2 heard.  RRR   LUNGS:  Air entry was good.  No crackles or wheezes   ABDOMEN: Soft and non-tender.  Bowel sounds were present.  MUSCULOSKELETAL:  There was no deformity.  There was full range of motion in all the extremities.   EXTREMITIES: There was no edema, clubbing or cyanosis  NEUROLOGICAL:  There was no focal deficit.  Cranial nerves II through XII were intact.  PSYCHIATRIC: Calm and cooperative     Results:     Lab Results   Component Value Date    WBC 3.1 (L) 04/17/2025    HGB 8.8 (L) 04/17/2025    HCT 26.7 (L) 04/17/2025    .0 (L) 04/17/2025    CREATSERUM 0.62 04/17/2025    BUN 33 (H) 04/17/2025     (L) 04/17/2025    K 3.0 (L) 04/17/2025     04/17/2025    CO2 27.0 04/17/2025     (H) 04/17/2025    CA 8.5 (L) 04/17/2025    ALB 3.7 04/17/2025    ALKPHO 59 04/17/2025    BILT 0.9 04/17/2025    TP 6.5 04/17/2025    AST 20 04/17/2025    ALT 20 04/17/2025    PTT 26.1 02/13/2025    INR 1.15 02/13/2025    T4F 1.1 06/02/2021    TSH 2.859 04/19/2024    MG 1.8 03/24/2025    PHOS 3.1 02/21/2025    TROP <0.045 10/09/2020    CK 78 11/08/2019    B12 643 03/17/2025       XR CHEST AP PORTABLE  (CPT=71045)  Result Date: 4/17/2025  CONCLUSION:  1. Large right perihilar lung mass has slightly decreased since comparison chest radiograph from January, 2025. 2. No other focal airspace disease or significant pleural effusion.  3. Elevation of the right hemidiaphragm.  Mild discoid bibasilar atelectasis/scarring. 4. Left chest port and right perihilar vascular stent are new since prior.     Dictated by (CST): Rasheed Whitmore MD on 4/17/2025 at 6:16 PM     Finalized by (CST): Rasheed Whitmore MD on 4/17/2025 at 6:18 PM          EKG  Result Date: 4/17/2025  Sinus tachycardia with Premature atrial  complexes Anterior infarct , age undetermined Abnormal ECG When compared with ECG of 17-FEB-2025 06:33, Premature atrial complexes are now Present Anterior infarct is now Present T wave inversion no longer evident in Inferior leads T wave inversion no longer evident in Lateral leads      Assessment/Plan:   Yi Torres is a 74-year-old female with a history of lung cancer on chemo, who presents with dyspnea and low pulse ox readings.     # Acute hypoxemic respiratory failure  - SpO2 86% on RA  - CXR at baseline  - Pulm consulted    # Right lung cancer  - diagnosed in 01/2025; follows Dr. Goodson, Dr. Reyna and Dr. Garrido.  - last chemo is 04/15/25  - Onc consulted    # Pancytopenia due to chemotherapy  - WBC 3.1, hgb 8.8, plt 114    # SVC syndrome and left subclanivan thrombus  - s/p SVC stenting and left subclavian vein thrombectomy on 02/14/25  - continue home Eliquis    # Hypokalemia  - K 3.0, replenish    # Hyponatremia  - Na 135    # DM2  - hold home metformin  - SSI AC/HS    # Hypertension  - ECG showed sinus tachycardia with PACs  - increase home metoprolol 25 mg BID to 50 mg BID  - continue home verapamil    Diet: regular  PT/OT: deferred  DVT ppx: Eliquis  Line: none  Code status: Full  Dispo: expect greater than 2 midnights    MDM: home    Felipe Fraser MD, PhD  Message via Secure Chat  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     Cholecystectomy      Laparoscopic incisional / umbilical / ventral hernia repair  2024   [3]   Family History  Problem Relation Age of Onset    Hypertension Father     Stroke Father     Heart Attack Mother    [4]   Social History  Socioeconomic History    Marital status:     Number of children: 1   Tobacco Use    Smoking status: Former     Current packs/day: 0.00     Average packs/day: 0.5 packs/day for 45.0 years (22.5 ttl pk-yrs)     Types: Cigarettes     Start date: 1973     Quit date: 2018     Years since quittin.2     Passive exposure: Past    Smokeless tobacco: Never    Tobacco comments:     1 pack every 3 days   Vaping Use    Vaping status: Never Used   Substance and Sexual Activity    Alcohol use: Not Currently     Comment: very rarely    Drug use: No   Other Topics Concern    Caffeine Concern Yes     Comment: Coffee, 2 cups per day     History of tanning No    Reaction to local anesthetic No    Left Handed No    Right Handed Yes    Currently spends a great deal of time in the sun No    Hx of Spending Great Deal of Time in Sun No    Bad sunburns in the past Yes    Tanning Salons in the Past No    Hx of Radiation Treatments No   Social History Narrative    - lives with     1 son      Social Drivers of Health     Food Insecurity: No Food Insecurity (2025)    NCSS - Food Insecurity     Worried About Running Out of Food in the Last Year: No     Ran Out of Food in the Last Year: No   Transportation Needs: No Transportation Needs (2025)    NCSS - Transportation     Lack of Transportation: No   Housing Stability: Not At Risk (2025)    NCSS - Housing/Utilities     Has Housing: Yes     Worried About Losing Housing: No     Unable to Get Utilities: No   [5]   Allergies  Allergen Reactions    Erythromycin DIARRHEA     Stomach pain   [6] (Not in a hospital admission)

## 2025-04-18 NOTE — ED QUICK NOTES
Patient used call light to inform staff that she is uncomfortable an requesting re-positioning. Pillow placed under hip. Patient also states she is feeling tightness in her stomach. MD notified.   SPECIAL DISCHARGE INSTRUCTIONS AND COMMENTS: 
 
General comments: Thank you for allowing us to provide you with excellent care today. We hope we addressed all of your concerns and needs. We strive to provide excellent quality care in the Emergency Depart ment. If you feel that you have not received excellent quality care or timely care, please ask to speak to the nurse manager. Please choose us in the future for your continued health care needs. Follow-up comments: The exam and treatment you rece ived in the Emergency Department were for an urgent problem that may be new for you and / or one which may be related to a worsening of a chronic or ongoing medical problem that you already had prior to this visit. In any case, today's treatment is not i ntended to be considered as complete care in all cases and thus, it is important that you follow-up with a doctor, nurse practitioner, or physician's assistant to: (1) confirm your diagnosis, (2) re-evaluation of changes in your illness and treatment, an d (3) for ongoing care. In some cases we may have contacted your doctor or a specific specialist who may be involved in your future evaluation and care but in any case, take this sheet with you when you go to your follow-up visit or refer to the infor rajeev on these sheets when you are calling to arrange an appointment - it may prove helpful in making the appointment. Prescribed Medications: Unless you have been directed by the provider to change your current medicines, you should continue to chantal e them as before. If you have been prescribed medicines, please take them as directed. In some cases, these new medicines are intended to replace a medicine that you are currently taking and if so, this will be noted below.  
 
 Our expectation is that y our condition will improve by following the doctor's recommendations, however, if in the event your condition worsens or does not improve as expected, please follow-up with your PCP or if unable to reach your usual health care provider, you should return  to the Emergency Department. We are available 24 hours a day.   
 
SPECIFIC INSTRUCTIONS PROVIDED BY YOUR DOCTOR, Larry Godoy MD:

## 2025-04-18 NOTE — CONSULTS
[facility]  part of [facility]      Consult     Yi Torres Patient Status:  Inpatient    1950 MRN [id number]   Location [facility] 4W/SW/SE Attending Manuel Teixeira MD   Hosp Day # 1 PCP Tammy Chan MD     Referring Provider: Dr Goodson  Reason for Consultation: SVC syndrome     Subjective:    Yi Torres is a 74 year old female with advanced lung cancer with SVC syndrome with dyspnea and neck/facial swelling which improved after SVC stenting with IR 25. She is re-admitted with worsening dyspnea and oxygen desaturation worse when laying down.  25 CT chest revealed no flow within SVC stent. IR consulted for intervention.     History/Other:      Past Medical History:Past Medical History[1]     Past Surgical History: Past Surgical History[2]    Social History:  reports that she quit smoking about 7 years ago. Her smoking use included cigarettes. She started smoking about 52 years ago. She has a 22.5 pack-year smoking history. She has been exposed to tobacco smoke. She has never used smokeless tobacco. She reports that she does not currently use alcohol. She reports that she does not use drugs.    Family History: Family History[3]    Allergies: Allergies[4]    Medications:  Medications Ordered Prior to Encounter[5]    Review of Systems:   Review of systems was completed.  Pertinent positives and negatives noted in the HPI.    Objective:     /57 (BP Location: Right arm)   Pulse 81   Temp 98.9 °F (37.2 °C) (Oral)   Resp 18   Wt 196 lb (88.9 kg)   SpO2 100%   BMI 33.12 kg/m²   General: No acute distress.  A/OX4.  Respiratory: Normal respiratory effort. 5L NC.  Cardiovascular: Rate regular  Extremities: No edema of the face/neck/upper extremities.    Results:    Recent Labs   Lab 25  0847 25  1738 25  0543   RBC 3.05* 2.93* 2.91*   HGB 9.1* 8.8* 8.8*   HCT 28.2* 26.7* 26.7*   MCV 92.5 91.1 91.8   MCH 29.8 30.0 30.2   MCHC  32.3 33.0 33.0   RDW 27.5* 26.7* 26.5*   NEPRELIM 0.96* 2.25 2.39   WBC 1.9* 3.1* 3.6*   .0* 114.0* 123.0*       Recent Labs   Lab 04/14/25  0847 04/17/25  1738 04/18/25  0543   * 129* 112*   BUN 34* 33* 24*   CREATSERUM 0.65 0.62 0.61   EGFRCR 93 95 95   CA 8.9 8.5* 8.8   * 135* 138   K 3.3* 3.0* 3.0*    100 98   CO2 26.0 27.0 32.0     CT CHEST PE AORTA (IV ONLY) (CPT=71260)  Result Date: 4/18/2025  CONCLUSION:   No pulmonary embolus in the central, main, lobar, segmental pulmonary arteries. Nondiagnostic in the subsegmental pulmonary arteries due to respiratory motion artifact.  Right suprahilar mass has decreased in size since 2/13/2025 and developed a 6.1 cm cavitation along the inferior margin.  Findings are suggestive of tumor necrosis.  Previously visualized 1.8 cm satellite nodule within the right lateral upper lobe has nearly completely resolved.  SVC stent seen.  No flow seen within the SVC stent.  Thrombus within the distal left brachiocephalic vein is unchanged.  Multiple chest wall collaterals consistent with SVC occlusion.  Patchy bilateral lower lobe airspace opacities are new and may be secondary to atelectasis or pneumonia. Short-term followup suggested to ensure resolution.  Unchanged nodularity of the bilateral adrenal glands may be secondary to a metastases.      Dictated by (CST): Eugenio French MD on 4/18/2025 at 10:35 AM     Finalized by (CST): Eugenio French MD on 4/18/2025 at 10:50 AM          XR CHEST AP PORTABLE  (CPT=71045)  Result Date: 4/17/2025  CONCLUSION:  1. Large right perihilar lung mass has slightly decreased since comparison chest radiograph from January, 2025. 2. No other focal airspace disease or significant pleural effusion.  3. Elevation of the right hemidiaphragm.  Mild discoid bibasilar atelectasis/scarring. 4. Left chest port and right perihilar vascular stent are new since prior.     Dictated by (CST): Rasheed Whitmore MD on 4/17/2025 at 6:16 PM      Finalized by (CST): Rasheed Whitmore MD on 4/17/2025 at 6:18 PM          Assessment & Plan:    75 yo female with advanced lung ca with SVC syndrome   - S/p 2/13/25 Right upper extremity venogram (basilic vein), left upper extremity venogram (brachial vein), selective left subclavian venogram with mechanical aspiration thrombectomy, superior vena cava and right brachiocephalic vein stent insertion   - Completed maximal RT and chemo for her SVC syndrome per oncology  - Re-admitted with worsening dyspnea, no swelling of neck/face/upper extremities  - 4/18/25 CT chest with no flow within the SVC stent    Plan: Venogram with intervention under general anesthesia tentatively on Monday 4/21/25. Discussed procedure, risks, benefits with patient who agrees to proceed     MISA Arias  4/18/2025                              [1]   Past Medical History:   Anesthesia complication    body aches for 3 days    Anxiety state    Cancer (HCC)    skin cancer    Cataract    COPD (chronic obstructive pulmonary disease) (HCC)    Diabetes (HCC)    Diabetes mellitus (HCC)    Essential hypertension    Exposure to medical diagnostic radiation    High blood pressure    High cholesterol    Incontinence    bladder    Neuropathy    neuropathy    Obesity, unspecified    Osteoarthritis    In knees    Personal history of antineoplastic chemotherapy    Problems with swallowing    pills    Renal disorder    CKD 2    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     Cholecystectomy      Laparoscopic incisional / umbilical / ventral hernia repair  09/19/2024    Port, indwelling, imp     [3]   Family History  Problem Relation Age of Onset    Hypertension Father     Stroke Father     Heart Attack Mother    [4]    Allergies  Allergen Reactions    Erythromycin DIARRHEA     Stomach pain   [5]   Current Facility-Administered Medications on File Prior to Encounter   Medication Dose Route Frequency Provider Last Rate Last Admin    [COMPLETED] filgrastim-aafi (Nivestym) 480 MCG/0.8ML prefilled syringe 480 mcg  480 mcg Subcutaneous Once José Miguel Goodson MD   480 mcg at 04/17/25 0950    [COMPLETED] filgrastim-aafi (Nivestym) 480 MCG/0.8ML prefilled syringe 480 mcg  480 mcg Subcutaneous Once José Miguel Goodson MD   480 mcg at 04/16/25 0959    [COMPLETED] ondansetron (Zofran) 16 mg, dexAMETHasone (Decadron) 20 mg in sodium chloride 0.9% 110 mL IVPB   Intravenous Once José Miguel Goodson MD   Stopped at 04/15/25 1044    [COMPLETED] diphenhydrAMINE (Benadryl) 50 mg/mL  injection 25 mg  25 mg Intravenous Once José Miguel Goodson MD   25 mg at 04/15/25 1026    [COMPLETED] famotidine (Pepcid) 20 mg/2mL injection 20 mg  20 mg Intravenous Once José Miguel Goodson MD   20 mg at 04/15/25 1024    [COMPLETED] PACLitaxel (Taxol) 102 mg in sodium chloride 0.9% 267 mL infusion  50 mg/m2 (Treatment Plan Recorded) Intravenous Once José Miguel Goodson MD   Stopped at 04/15/25 1201    [COMPLETED] CARBOplatin (Paraplatin) 210 mg in sodium chloride 0.9% 271 mL infusion (by AUC)  210 mg Intravenous Once José Miguel Goodson MD   Stopped at 04/15/25 1239    [COMPLETED] alteplase (Activase) injection 2 mg  2 mg Intravenous Once José Miguel Goodson MD   2 mg at 04/14/25 0909    [COMPLETED] ondansetron (Zofran) 16 mg, dexAMETHasone (Decadron) 20 mg in sodium chloride 0.9% 110 mL IVPB   Intravenous Once José Miguel Goodson MD   Stopped at 04/01/25 1120    [COMPLETED] diphenhydrAMINE (Benadryl) 50 mg/mL  injection 25 mg  25 mg Intravenous Once José Miguel Goodson MD   25 mg at 04/01/25 1102    [COMPLETED] famotidine (Pepcid) 20 mg/2mL injection 20 mg  20 mg Intravenous Once José Miguel Goodson MD   20 mg at 04/01/25 1103    [COMPLETED] PACLitaxel (Taxol) 102 mg in sodium chloride 0.9% 267 mL infusion  50 mg/m2 (Treatment Plan Recorded) Intravenous Once  Naye Perry, APRNICOLE   Stopped at 04/01/25 1246    [COMPLETED] CARBOplatin (Paraplatin) 290 mg in sodium chloride 0.9% 279 mL infusion (by AUC)  290 mg Intravenous Once José Miguel Goosdon MD   Stopped at 04/01/25 1326    [COMPLETED] alteplase (Activase) injection 2 mg  2 mg Intravenous Once José Miguel Goodson MD   2 mg at 03/31/25 1001    [COMPLETED] ondansetron (Zofran) 16 mg, dexAMETHasone (Decadron) 20 mg in sodium chloride 0.9% 110 mL IVPB   Intravenous Once José Miguel Goodson MD   Stopped at 03/25/25 1017    [COMPLETED] diphenhydrAMINE (Benadryl) 50 mg/mL  injection 25 mg  25 mg Intravenous Once José Miguel Goodson MD   25 mg at 03/25/25 1001    [COMPLETED] famotidine (Pepcid) 20 mg/2mL injection 20 mg  20 mg Intravenous Once José Miguel Goodson MD   20 mg at 03/25/25 0958    [COMPLETED] PACLitaxel (Taxol) 102 mg in sodium chloride 0.9% 267 mL infusion  50 mg/m2 (Treatment Plan Recorded) Intravenous Once Swati Wong APRN   Stopped at 03/25/25 1142    [COMPLETED] CARBOplatin (Paraplatin) 290 mg in sodium chloride 0.9% 279 mL infusion (by AUC)  290 mg Intravenous Once José Miguel Goodson MD   Stopped at 03/25/25 1232    [COMPLETED] ondansetron (Zofran) 16 mg, dexAMETHasone (Decadron) 20 mg in sodium chloride 0.9% 110 mL IVPB   Intravenous Once José Miguel Goodson MD   Stopped at 03/18/25 1041    [COMPLETED] diphenhydrAMINE (Benadryl) 50 mg/mL  injection 25 mg  25 mg Intravenous Once José Miguel Goodson MD   25 mg at 03/18/25 1021    [COMPLETED] famotidine (Pepcid) 20 mg/2mL injection 20 mg  20 mg Intravenous Once José Miguel Goodson MD   20 mg at 03/18/25 1020    [COMPLETED] PACLitaxel (Taxol) 102 mg in sodium chloride 0.9% 267 mL infusion  50 mg/m2 (Treatment Plan Recorded) Intravenous Once José Miguel Goodson MD   Stopped at 03/18/25 1148    [COMPLETED] CARBOplatin (Paraplatin) 290 mg in sodium chloride 0.9% 279 mL infusion (by AUC)  290 mg Intravenous Once José Miguel Goodson MD   Stopped at 03/18/25 1230    [COMPLETED] iron dextran (Infed) 25 mg in sodium chloride 0.9% PF 10 mL IV syringe  (test dose)  25 mg Intravenous Once José Miguel Goodson MD   25 mg at 03/18/25 1239    [COMPLETED] iron dextran (Infed) 975 mg in sodium chloride 0.9% 269.5 mL IVPB  975 mg Intravenous Once José Miguel Goodson MD   Stopped at 03/18/25 1427    [COMPLETED] ondansetron (Zofran) 16 mg, dexAMETHasone (Decadron) 20 mg in sodium chloride 0.9% 110 mL IVPB   Intravenous Once José Miguel Goodson MD   Stopped at 03/11/25 0935    [COMPLETED] diphenhydrAMINE (Benadryl) 50 mg/mL  injection 25 mg  25 mg Intravenous Once José Miguel Goodson MD   25 mg at 03/11/25 0900    [COMPLETED] famotidine (Pepcid) 20 mg/2mL injection 20 mg  20 mg Intravenous Once José Miguel Goodson MD   20 mg at 03/11/25 0858    [COMPLETED] CARBOplatin (Paraplatin) 290 mg in sodium chloride 0.9% 279 mL infusion (by AUC)  290 mg Intravenous Once José Miguel Goodson MD   Stopped at 03/11/25 1234    [COMPLETED] PACLitaxel (Taxol) 102 mg in sodium chloride 0.9% 267 mL infusion  50 mg/m2 (Treatment Plan Recorded) Intravenous Once Swati Wong APRN   Stopped at 03/11/25 1157    [COMPLETED] ondansetron (Zofran) 16 mg, dexAMETHasone (Decadron) 20 mg in sodium chloride 0.9% 110 mL IVPB   Intravenous Once José Miguel Goodson MD   Stopped at 03/04/25 0920    [COMPLETED] diphenhydrAMINE (Benadryl) 50 mg/mL  injection 25 mg  25 mg Intravenous Once José Miguel Goodson MD   25 mg at 03/04/25 0903    [COMPLETED] famotidine (Pepcid) 20 mg/2mL injection 20 mg  20 mg Intravenous Once José Miguel Goodson MD   20 mg at 03/04/25 0904    [COMPLETED] PACLitaxel (Taxol) 96 mg in sodium chloride 0.9% 266 mL infusion  50 mg/m2 (Treatment Plan Recorded) Intravenous Once José Miguel Goodson MD   Stopped at 03/04/25 1045    [COMPLETED] CARBOplatin (Paraplatin) 290 mg in sodium chloride 0.9% 279 mL infusion (by AUC)  290 mg Intravenous Once José Miguel Goodson MD   Stopped at 03/04/25 1140    [COMPLETED] potassium phosphate dibasic 15 mmol in sodium chloride 0.9% 250 mL IVPB  15 mmol Intravenous Once Vanessa Colon MD 62.5 mL/hr at 02/17/25 1103 15 mmol at 02/17/25 1103     Followed by    [COMPLETED] potassium chloride 20 mEq/100mL IVPB premix 20 mEq  20 mEq Intravenous Once Vanessa Colon MD 50 mL/hr at 02/17/25 1400 20 mEq at 02/17/25 1400    [COMPLETED] sodium phosphate 15 mmol in 0.9% NaCl 100mL IVPB premix  15 mmol Intravenous Once Vanessa Cloon MD   15 mmol at 02/16/25 0953    [COMPLETED] Perflutren Lipid Microsphere (DEFINITY) 6.52 MG/ML injection 1.5 mL  1.5 mL Intravenous ONCE PRN Vanessa Colon MD   1.5 mL at 02/16/25 1707    [COMPLETED] sodium chloride tab 1 g  1 g Oral TID CC Vanessa Colon MD   1 g at 02/17/25 1103    [COMPLETED] iopamidol 76% (ISOVUE-370) injection for power injector  80 mL Intravenous ONCE PRN Sandro Garg MD   80 mL at 02/13/25 1245    [COMPLETED] heparin (Porcine) 1000 UNIT/ML injection - BOLUS IV 7,400 Units  80 Units/kg Intravenous Once Sandro Garg MD   7,400 Units at 02/13/25 1427    [COMPLETED] heparin in sodium chloride 0.9% (Porcine) 2 Units/mL flush bag premix             [COMPLETED] midazolam (Versed) 2 MG/2ML injection             [COMPLETED] fentaNYL (Sublimaze) 50 mcg/mL injection             [COMPLETED] heparin (Porcine) 1000 UNIT/ML injection             [COMPLETED] iopamidol (ISOVUE-300) 61 % injection 200 mL  200 mL Injection ONCE PRN Konrad Maravilla MD   160 mL at 02/13/25 1833    [COMPLETED] midazolam (Versed) 2 MG/2ML injection             [COMPLETED] fentaNYL (Sublimaze) 50 mcg/mL injection             [COMPLETED] heparin (Porcine) 100 Units/mL lock flush             [COMPLETED] heparin in sodium chloride 0.9% (Porcine) 2 Units/mL flush bag premix             [COMPLETED] lidocaine (Xylocaine) 2 % injection             [COMPLETED] ceFAZolin (Ancef) 2 g/10mL IV syringe premix             [COMPLETED] midazolam (Versed) 2 MG/2ML injection             [COMPLETED] gadoterate meglumine (Dotarem) 10 MMOL/20ML injection 20 mL  20 mL Intravenous ONCE PRN José Miguel Goodson MD    19 mL at 02/02/25 1358    [COMPLETED] ipratropium-albuterol (Duoneb) 0.5-2.5 (3) MG/3ML inhalation solution 3 mL  3 mL Nebulization Once HemaGabrielle escalanteDO   3 mL at 01/22/25 1259    [COMPLETED] ipratropium-albuterol (Duoneb) 0.5-2.5 (3) MG/3ML inhalation solution 3 mL  3 mL Nebulization Once Boo Szymanski MD   3 mL at 01/22/25 1551     Current Outpatient Medications on File Prior to Encounter   Medication Sig Dispense Refill    alendronate 70 MG Oral Tab Take 1 tablet (70 mg total) by mouth once a week. 12 tablet 3    furosemide (LASIX) 20 MG Oral Tab Take 1 tablet (20 mg total) by mouth daily. 30 tablet 1    apixaban (ELIQUIS) 5 MG Oral Tab Take 1 tablet (5 mg total) by mouth 2 (two) times daily. After completion of lovenox injections 60 tablet 3    acetaminophen 500 MG Oral Tab Take 1 tablet (500 mg total) by mouth every 4 (four) hours as needed.      fluticasone propionate 50 MCG/ACT Nasal Suspension 2 sprays by Each Nare route daily. 1 each 0    prochlorperazine (COMPAZINE) 10 mg tablet Take 1 tablet (10 mg total) by mouth every 6 (six) hours as needed for Nausea. 30 tablet 3    Spironolactone-HCTZ 25-25 MG Oral Tab Take 1 tablet by mouth daily. 90 tablet 3    atorvastatin 20 MG Oral Tab TAKE 1 TABLET (20MG) BY MOUTH EVERY DAY (Patient taking differently: Take 1 tablet (20 mg total) by mouth nightly. TAKE 1 TABLET (20MG) BY MOUTH EVERY DAY) 90 tablet 3    Cetirizine HCl 10 MG Oral Cap Take 10 mg by mouth nightly.      metFORMIN  MG Oral Tablet 24 Hr Take 1 tablet (500 mg total) by mouth daily with food. 90 tablet 3    metoprolol tartrate 25 MG Oral Tab Take 1 tablet (25 mg total) by mouth 2 (two) times daily. 180 tablet 3    verapamil  MG Oral Tab CR Take 1 tablet (240 mg total) by mouth nightly. 90 tablet 3    cholecalciferol 50 MCG (2000 UT) Oral Cap Take 1 capsule (2,000 Units total) by mouth in the morning.      Multiple Vitamins-Minerals (CENTRUM SILVER) Oral Tab Take 1 tablet by mouth in  the morning.      Potassium Chloride ER 10 MEQ Oral Tab CR Take 1 tablet (10 mEq total) by mouth daily. (Patient not taking: Reported on 4/17/2025) 7 tablet 1    lidocaine-prilocaine 2.5-2.5 % External Cream Apply to site 1 hour prior to port a cath needle insertion 5 g 1    sennosides-docusate (SENNA PLUS) 8.6-50 MG Oral Tab Take 2 tablets by mouth daily. (Patient not taking: Reported on 4/17/2025) 30 tablet 1    ondansetron (ZOFRAN) 8 MG tablet Take 1 tablet (8 mg total) by mouth every 8 (eight) hours as needed for Nausea. (Patient not taking: Reported on 4/17/2025) 30 tablet 3

## 2025-04-19 ENCOUNTER — APPOINTMENT (OUTPATIENT)
Dept: GENERAL RADIOLOGY | Facility: HOSPITAL | Age: 75
DRG: 252 | End: 2025-04-19
Attending: STUDENT IN AN ORGANIZED HEALTH CARE EDUCATION/TRAINING PROGRAM
Payer: MEDICARE

## 2025-04-19 ENCOUNTER — APPOINTMENT (OUTPATIENT)
Dept: GENERAL RADIOLOGY | Facility: HOSPITAL | Age: 75
End: 2025-04-19
Attending: STUDENT IN AN ORGANIZED HEALTH CARE EDUCATION/TRAINING PROGRAM
Payer: MEDICARE

## 2025-04-19 LAB
ANION GAP SERPL CALC-SCNC: 7 MMOL/L (ref 0–18)
BASOPHILS # BLD AUTO: 0.04 X10(3) UL (ref 0–0.2)
BASOPHILS NFR BLD AUTO: 1.6 %
BUN BLD-MCNC: 16 MG/DL (ref 9–23)
BUN/CREAT SERPL: 30.2 (ref 10–20)
CALCIUM BLD-MCNC: 9 MG/DL (ref 8.7–10.4)
CHLORIDE SERPL-SCNC: 102 MMOL/L (ref 98–112)
CO2 SERPL-SCNC: 29 MMOL/L (ref 21–32)
CREAT BLD-MCNC: 0.53 MG/DL (ref 0.55–1.02)
DEPRECATED RDW RBC AUTO: 88.4 FL (ref 35.1–46.3)
EGFRCR SERPLBLD CKD-EPI 2021: 98 ML/MIN/1.73M2 (ref 60–?)
EOSINOPHIL # BLD AUTO: 0 X10(3) UL (ref 0–0.7)
EOSINOPHIL NFR BLD AUTO: 0 %
ERYTHROCYTE [DISTWIDTH] IN BLOOD BY AUTOMATED COUNT: 26.5 % (ref 11–15)
GLUCOSE BLD-MCNC: 117 MG/DL (ref 70–99)
GLUCOSE BLDC GLUCOMTR-MCNC: 102 MG/DL (ref 70–99)
GLUCOSE BLDC GLUCOMTR-MCNC: 128 MG/DL (ref 70–99)
GLUCOSE BLDC GLUCOMTR-MCNC: 215 MG/DL (ref 70–99)
GLUCOSE BLDC GLUCOMTR-MCNC: 93 MG/DL (ref 70–99)
HCT VFR BLD AUTO: 27.8 % (ref 35–48)
HGB BLD-MCNC: 8.8 G/DL (ref 12–16)
IMM GRANULOCYTES # BLD AUTO: 0.02 X10(3) UL (ref 0–1)
IMM GRANULOCYTES NFR BLD: 0.8 %
LYMPHOCYTES # BLD AUTO: 0.25 X10(3) UL (ref 1–4)
LYMPHOCYTES NFR BLD AUTO: 9.8 %
MCH RBC QN AUTO: 29.8 PG (ref 26–34)
MCHC RBC AUTO-ENTMCNC: 31.7 G/DL (ref 31–37)
MCV RBC AUTO: 94.2 FL (ref 80–100)
MONOCYTES # BLD AUTO: 0.18 X10(3) UL (ref 0.1–1)
MONOCYTES NFR BLD AUTO: 7 %
NEUTROPHILS # BLD AUTO: 2.07 X10 (3) UL (ref 1.5–7.7)
NEUTROPHILS # BLD AUTO: 2.07 X10(3) UL (ref 1.5–7.7)
NEUTROPHILS NFR BLD AUTO: 80.8 %
OSMOLALITY SERPL CALC.SUM OF ELEC: 288 MOSM/KG (ref 275–295)
PLATELET # BLD AUTO: 126 10(3)UL (ref 150–450)
POTASSIUM SERPL-SCNC: 3.7 MMOL/L (ref 3.5–5.1)
RBC # BLD AUTO: 2.95 X10(6)UL (ref 3.8–5.3)
SODIUM SERPL-SCNC: 138 MMOL/L (ref 136–145)
WBC # BLD AUTO: 2.6 X10(3) UL (ref 4–11)

## 2025-04-19 PROCEDURE — 99233 SBSQ HOSP IP/OBS HIGH 50: CPT | Performed by: HOSPITALIST

## 2025-04-19 PROCEDURE — 71045 X-RAY EXAM CHEST 1 VIEW: CPT | Performed by: STUDENT IN AN ORGANIZED HEALTH CARE EDUCATION/TRAINING PROGRAM

## 2025-04-19 PROCEDURE — 99232 SBSQ HOSP IP/OBS MODERATE 35: CPT | Performed by: INTERNAL MEDICINE

## 2025-04-19 RX ORDER — IPRATROPIUM BROMIDE AND ALBUTEROL SULFATE 2.5; .5 MG/3ML; MG/3ML
3 SOLUTION RESPIRATORY (INHALATION) EVERY 4 HOURS PRN
Status: DISCONTINUED | OUTPATIENT
Start: 2025-04-19 | End: 2025-04-22

## 2025-04-19 NOTE — PROGRESS NOTES
[facility]  part of [facility]     Progress Note    Yi Torres Patient Status:  Inpatient    1950 MRN [id number]   Location [facility] 4W/SW/SE Attending Manuel Teixeira MD   Hosp Day # 2 PCP Tammy Chan MD       Subjective:   Patient seen and examined.  Resting in bed.  Some ongoing dyspnea present.  Cough primarily nonproductive.    Objective:   Blood pressure 116/56, pulse 97, temperature 97.8 °F (36.6 °C), temperature source Temporal, resp. rate 18, weight 196 lb (88.9 kg), SpO2 95%.  Intake/Output:   Last 3 shifts: I/O last 3 completed shifts:  In: 238 [P.O.:238]  Out: 850 [Urine:850]   This shift: I/O this shift:  In: 240 [P.O.:240]  Out: -      Vent Settings:      Hemodynamic parameters (last 24 hours):      Scheduled Meds: Current Hospital Medications[1]    Continuous Infusions: Medication Infusions[2]    Physical Exam  Constitutional: no acute distress  Eyes: PERRL  ENT: nares pateint  Neck: supple, no JVD  Cardio: RRR, S1 S2  Respiratory: clear to auscultation bilaterally, no wheezing, rales, rhonchi, crackles  GI: abdomen soft, non tender, active bowel sounds, no organomegaly  Extremities: no clubbing, cyanosis, edema  Neurologic: no gross motor deficits  Skin: warm, dry      Results:     Lab Results   Component Value Date    WBC 2.6 2025    HGB 8.8 2025    HCT 27.8 2025    .0 2025    CREATSERUM 0.53 2025    BUN 16 2025     2025    K 3.7 2025     2025    CO2 29.0 2025     2025    CA 9.0 2025       XR CHEST AP PORTABLE  (CPT=71045)  Result Date: 2025  CONCLUSION:  1. Stable chest without radiographically evident acute intrathoracic process.  2. Large cavitary right lung mass is redemonstrated.    A preliminary report was issued by the [facility] teleradiology service. There are no major discrepancies.   Dictated by (CST): Steven Tong MD on  4/19/2025 at 5:26 AM     Finalized by (CST): Steven Tong MD on 4/19/2025 at 5:28 AM          CT CHEST PE AORTA (IV ONLY) (CPT=71260)  Result Date: 4/18/2025  CONCLUSION:   No pulmonary embolus in the central, main, lobar, segmental pulmonary arteries. Nondiagnostic in the subsegmental pulmonary arteries due to respiratory motion artifact.  Right suprahilar mass has decreased in size since 2/13/2025 and developed a 6.1 cm cavitation along the inferior margin.  Findings are suggestive of tumor necrosis.  Previously visualized 1.8 cm satellite nodule within the right lateral upper lobe has nearly completely resolved.  SVC stent seen.  No flow seen within the SVC stent.  Thrombus within the distal left brachiocephalic vein is unchanged.  Multiple chest wall collaterals consistent with SVC occlusion.  Patchy bilateral lower lobe airspace opacities are new and may be secondary to atelectasis or pneumonia. Short-term followup suggested to ensure resolution.  Unchanged nodularity of the bilateral adrenal glands may be secondary to a metastases.      Dictated by (CST): Eugenio French MD on 4/18/2025 at 10:35 AM     Finalized by (CST): Eugenio French MD on 4/18/2025 at 10:50 AM          XR CHEST AP PORTABLE  (CPT=71045)  Result Date: 4/17/2025  CONCLUSION:  1. Large right perihilar lung mass has slightly decreased since comparison chest radiograph from January, 2025. 2. No other focal airspace disease or significant pleural effusion.  3. Elevation of the right hemidiaphragm.  Mild discoid bibasilar atelectasis/scarring. 4. Left chest port and right perihilar vascular stent are new since prior.     Dictated by (CST): Rasheed Whitmore MD on 4/17/2025 at 6:16 PM     Finalized by (CST): Rasheed Whitmore MD on 4/17/2025 at 6:18 PM            EKG  Result Date: 4/18/2025  Sinus tachycardia with Premature atrial complexes Anterior infarct , age undetermined Abnormal ECG When compared with ECG of 17-FEB-2025 06:33, Premature atrial  complexes are now Present Anterior infarct is now Present T wave inversion no longer evident in Inferior leads T wave inversion no longer evident in Lateral leads Confirmed by SAMANTHA DOWNING ([id number]) on 4/18/2025 11:30:17 AM      Assessment   1.  Squamous cell lung cancer  2.  Acute hypoxemic respiratory failure  3.  SVC syndrome status post stenting  4.  Pancytopenia     Plan   -Patient presents with evidence of dyspnea and acute hypoxemic respiratory failure.  - Patient with prior history of squamous cell lung cancer status post chemoradiation.  Also found to have SVC syndrome with left innominate/subclavian thrombectomy with SVC stent placement on 2/13/2025.  - CT chest on 4/18/2025 with no evidence of pulmonary embolism seen.  Right suprahilar mass decreased in size.  SVC stent seen however no flow in seen within SVC stent.  -Evaluated by IR.  Plan for venogram and intervention on 4/21/2025.  - Hold off antibiotic therapy at this time  - Recommend outpatient pulmonary function testing  -Wean oxygen as tolerated.  Currently on 2 L  - Evaluate for home oxygen prior to discharge  - DVT prophylaxis: Muna Garrido,   Pulmonary Critical Care Medicine  [facility]        [1]   Current Facility-Administered Medications   Medication Dose Route Frequency    ipratropium-albuterol (Duoneb) 0.5-2.5 (3) MG/3ML inhalation solution 3 mL  3 mL Nebulization Q4H PRN    glucose (Dex4) 15 GM/59ML oral liquid 15 g  15 g Oral Q15 Min PRN    Or    glucose (Glutose) 40% oral gel 15 g  15 g Oral Q15 Min PRN    Or    glucose-vitamin C (Dex-4) chewable tab 4 tablet  4 tablet Oral Q15 Min PRN    Or    dextrose 50% injection 50 mL  50 mL Intravenous Q15 Min PRN    Or    glucose (Dex4) 15 GM/59ML oral liquid 30 g  30 g Oral Q15 Min PRN    Or    glucose (Glutose) 40% oral gel 30 g  30 g Oral Q15 Min PRN    Or    glucose-vitamin C (Dex-4) chewable tab 8 tablet  8 tablet Oral Q15 Min PRN    potassium chloride (Klor-Con M20)  tab 40 mEq  40 mEq Oral Once    acetaminophen (Tylenol Extra Strength) tab 500 mg  500 mg Oral Q4H PRN    morphINE PF 2 MG/ML injection 1 mg  1 mg Intravenous Q2H PRN    Or    morphINE PF 2 MG/ML injection 2 mg  2 mg Intravenous Q2H PRN    Or    morphINE PF 4 MG/ML injection 4 mg  4 mg Intravenous Q2H PRN    melatonin tab 3 mg  3 mg Oral Nightly PRN    polyethylene glycol (PEG 3350) (Miralax) 17 g oral packet 17 g  17 g Oral Daily PRN    sennosides (Senokot) tab 17.2 mg  17.2 mg Oral Nightly PRN    bisacodyl (Dulcolax) 10 MG rectal suppository 10 mg  10 mg Rectal Daily PRN    fleet enema (Fleet) rectal enema 133 mL  1 enema Rectal Once PRN    ondansetron (Zofran) 4 MG/2ML injection 4 mg  4 mg Intravenous Q6H PRN    metoclopramide (Reglan) 5 mg/mL injection 10 mg  10 mg Intravenous Q8H PRN    insulin aspart (NovoLOG) 100 Units/mL FlexPen 1-7 Units  1-7 Units Subcutaneous TID CC    benzonatate (Tessalon) cap 200 mg  200 mg Oral TID PRN    guaiFENesin (Robitussin) 100 MG/5 ML oral liquid 200 mg  200 mg Oral Q4H PRN    apixaban (Eliquis) tab 5 mg  5 mg Oral BID    cetirizine (ZyrTEC) tab 10 mg  10 mg Oral Daily    furosemide (Lasix) tab 20 mg  20 mg Oral Daily    metoprolol tartrate (Lopressor) tab 50 mg  50 mg Oral BID    verapamil ER (Calan-SR) tab 240 mg  240 mg Oral Nightly    prochlorperazine (Compazine) tab 10 mg  10 mg Oral Q6H PRN   [2]

## 2025-04-19 NOTE — PLAN OF CARE
Patient is alert and oriented. 2L NC. Patient complained of SOB overnight. Notified Dr. Hathaway - CXR and duonebs ordered. Prn Robitussin given for cough. Remote tele in place. Denies pain/nausea. Up with SBA and a walker. Voiding via purewick. General diet. ACHS accuchecks. Repositioned as tolerated. Call light and personal belongings within reach. Safety precautions in place.  at bedside.     Problem: Patient Centered Care  Goal: Patient preferences are identified and integrated in the patient's plan of care  Description: Interventions: - Provide timely, complete, and accurate information to patient/family- Incorporate patient and family knowledge, values, beliefs, and cultural backgrounds into the planning and delivery of care- Encourage patient/family to participate in care and decision-making at the level they choose- Honor patient and family perspectives and choices  Outcome: Progressing     Problem: Diabetes/Glucose Control  Goal: Glucose maintained within prescribed range  Description: INTERVENTIONS:- Monitor Blood Glucose as ordered- Assess for signs and symptoms of hyperglycemia and hypoglycemia- Administer ordered medications to maintain glucose within target range- Assess barriers to adequate nutritional intake and initiate nutrition consult as needed- Instruct patient on self management of diabetes  Outcome: Progressing     Problem: PAIN - ADULT  Goal: Verbalizes/displays adequate comfort level or patient's stated pain goal  Description: INTERVENTIONS:- Encourage pt to monitor pain and request assistance- Assess pain using appropriate pain scale- Administer analgesics based on type and severity of pain and evaluate response- Implement non-pharmacological measures as appropriate and evaluate response- Consider cultural and social influences on pain and pain management- Manage/alleviate anxiety- Utilize distraction and/or relaxation techniques- Monitor for opioid side effects- Notify MD/LIP if  interventions unsuccessful or patient reports new pain- Anticipate increased pain with activity and pre-medicate as appropriate  Outcome: Progressing     Problem: SAFETY ADULT - FALL  Goal: Free from fall injury  Description: INTERVENTIONS:- Assess pt frequently for physical needs- Identify cognitive and physical deficits and behaviors that affect risk of falls.- [place] fall precautions as indicated by assessment.- Educate pt/family on patient safety including physical limitations- Instruct pt to call for assistance with activity based on assessment- Modify environment to reduce risk of injury- Provide assistive devices as appropriate- Consider OT/PT consult to assist with strengthening/mobility- Encourage toileting schedule  Outcome: Progressing     Problem: DISCHARGE PLANNING  Goal: Discharge to home or other facility with appropriate resources  Description: INTERVENTIONS:- Identify barriers to discharge w/pt and caregiver- Include patient/family/discharge partner in discharge planning- Arrange for needed discharge resources and transportation as appropriate- Identify discharge learning needs (meds, wound care, etc)- Arrange for interpreters to assist at discharge as needed- Consider post-discharge preferences of patient/family/discharge partner- Complete POLST form as appropriate- Assess patient's ability to be responsible for managing their own health- Refer to Case Management Department for coordinating discharge planning if the patient needs post-hospital services based on physician/LIP order or complex needs related to functional status, cognitive ability or social support system  Outcome: Progressing     Problem: RESPIRATORY - ADULT  Goal: Achieves optimal ventilation and oxygenation  Description: INTERVENTIONS:- Assess for changes in respiratory status- Assess for changes in mentation and behavior- Position to facilitate oxygenation and minimize respiratory effort- Oxygen supplementation based on oxygen  saturation or ABGs- Provide Smoking Cessation handout, if applicable- Encourage broncho-pulmonary hygiene including cough, deep breathe, Incentive Spirometry- Assess the need for suctioning and perform as needed- Assess and instruct to report SOB or any respiratory difficulty- Respiratory Therapy support as indicated- Manage/alleviate anxiety- Monitor for signs/symptoms of CO2 retention  Outcome: Progressing

## 2025-04-19 NOTE — PROGRESS NOTES
Progress Note     Yi Torres Patient Status:  Inpatient    1950 MRN [id number]   Location [facility] 4W/SW/SE Attending Manuel Teixeira MD   Hosp Day # 2 PCP Tammy Chan MD     Chief Complaint: sob, hypoxia    Subjective:   S: Patient here with sob   Denies cp, dizziness  Discussed plans for venogram       Review of Systems:   10 point ROS completed and was negative, except for pertinent positive and negatives stated in subjective.    Objective:   Vital signs:  Temp:  [97.7 °F (36.5 °C)-99.6 °F (37.6 °C)] 97.8 °F (36.6 °C)  Pulse:  [73-97] 97  Resp:  [18] 18  BP: (114-116)/(53-90) 116/56  SpO2:  [92 %-98 %] 95 %    Wt Readings from Last 6 Encounters:   25 196 lb (88.9 kg)   25 203 lb (92.1 kg)   25 199 lb (90.3 kg)   25 201 lb 12.8 oz (91.5 kg)   25 198 lb (89.8 kg)   25 204 lb 6.4 oz (92.7 kg)         Physical Exam:    General: No acute distress. Alert ,         Respiratory: b/l wheezing  Cardiovascular: S1, S2. Regular rate and rhythm. No murmurs, rubs or gallops.   Abdomen: Soft, nontender, nondistended.  Positive bowel sounds. No rebound or guarding.  Neurologic: No focal neurological deficits.   Musculoskeletal: Moves all extremities.  Extremities: No edema.    Results:   Diagnostic Data:      Labs:    Labs Last 24 Hours:   BMP     CBC    Other     Na 138 Cl 102 BUN 16 Glu 117   Hb 8.8   PTT - Procal -   K 3.7 CO2 29.0 Cr 0.53   WBC 2.6 >< .0  INR - CRP -   Renal Lytes Endo    Hct 27.8   Trop - D dim -   eGFR - Ca 9.0 POC Gluc  102    LFT   pBNP - Lactic -   eGFR AA - PO4 - A1c -   AST - APk - Prot -  LDL -     Mg - TSH -   ALT - T fletcher - Alb -        COVID-19 Lab Results    COVID-19  No results found for: \"COVID19\"    Pro-Calcitonin  No results for input(s): \"PCT\" in the last 168 hours.    Cardiac  No results for input(s): \"TROP\", \"PBNP\" in the last 168 hours.    Creatinine Kinase  No results for input(s): \"CK\" in the last 168  hours.    Inflammatory Markers  No results for input(s): \"CRP\", \"VANNA\", \"LDH\", \"DDIMER\" in the last 168 hours.    Imaging: Imaging data reviewed in Epic.    Medications: Scheduled Medications[1]    Assessment & Plan:   ASSESSMENT / PLAN:     Yi Torres is a 74-year-old female with a history of lung cancer on chemo, who presents with dyspnea and low pulse ox readings.      # Acute hypoxemic respiratory failure  - SpO2 86% on RA  - CXR at baseline  - Pulm consulted  - Ordered CT chest for further evaluation  -Home oxygen evaluation     # Right lung cancer  - diagnosed in 01/2025; follows Dr. Goodson, Dr. Reyna and Dr. Garrido.  - last chemo is 04/15/25  - Onc consult appreciated  -Completed chemotherapy but now is due for more radiation therapy then follow-up for immunotherapy     # Pancytopenia due to chemotherapy  - Counts are stable     # SVC syndrome and left subclanivan thrombus  - s/p SVC stenting and left subclavian vein thrombectomy on 02/14/25  - continue home Eliquis  -CT scan with evidence of no flow in SVC stent  -Planning for venogram on Monday 4/21     # Hypokalemia  - K 3.0, replenish     # Hyponatremia  - Na 135     # DM2  - hold home metformin  - SSI AC/HS     # Hypertension  - ECG showed sinus tachycardia with PACs  - increase home metoprolol 25 mg BID to 50 mg BID  - continue home verapamil     Diet: regular  PT/OT: deferred  DVT ppx: Eliquis  Line: none  Code status: Full  Dispo: expect greater than 2 midnights  DVT Prophylaxis: hold eliquis prior to venogram per ir  CODE status: full  Fabian:    Central line: n/a  Dispo: further recs pending clinical course      Will the patient be referred to TCC on discharge?: yes  Estimated date of discharge: TBD  Discharge is dependent on: clinical improvemetn  At this point Ms. Torres is expected to be discharge to: home    Plan of care discussed with patient, nursing, oncology and pulmonology    Outpatient records or previous hospital records reviewed.    Patient and/or patient's family given opportunity to ask questions and note understanding and agreeing with therapeutic plan as outlined     Coordinated care with providers and counseling re: treatment plan and workup    MDM: High complexity     LIOR PENA MD    Supplementary Documentation:         **Certification      PHYSICIAN Certification of Need for Inpatient Hospitalization - Initial Certification    Patient will require inpatient services that will reasonably be expected to span two midnight's based on the clinical documentation in H+P.   Based on patients current state of illness, I anticipate that, after discharge, patient will require TBD.                    [1]    potassium chloride  40 mEq Oral Once    insulin aspart  1-7 Units Subcutaneous TID CC    apixaban  5 mg Oral BID    cetirizine  10 mg Oral Daily    furosemide  20 mg Oral Daily    metoprolol tartrate  50 mg Oral BID    verapamil ER  240 mg Oral Nightly

## 2025-04-19 NOTE — PHYSICAL THERAPY NOTE
PHYSICAL THERAPY EVALUATION - INPATIENT     Room Number: 468/468-A  Evaluation Date: 4/19/2025  Type of Evaluation: Initial   Physician Order: PT Eval and Treat    Presenting Problem: Acute respiratory failure  Co-Morbidities : obesity, Anemia, neoplasm, CKD, BPPV  Reason for Therapy: Mobility Dysfunction and Discharge Planning    PHYSICAL THERAPY ASSESSMENT   Patient is a 74 year old female admitted 4/17/2025 for Acute respiratory failure.  Prior to admission, patient's baseline is Lives at home with spouse mod indep with a RW for all mobility and ADL's.  Patient is currently functioning below baseline with bed mobility, transfers, gait, stair negotiation, maintaining seated position, standing prolonged periods, and performing household tasks.  Patient is requiring minimal assist as a result of the following impairments: decreased functional strength, decreased endurance/aerobic capacity, impaired coordination, impaired motor planning, decreased muscular endurance, and medical status.  Physical Therapy will continue to follow for duration of hospitalization.    Patient will benefit from continued skilled PT Services at discharge to promote prior level of function.  Anticipate patient will return home with home health PT.    PLAN DURING HOSPITALIZATION  Nursing Mobility Recommendation : 1 Assist  PT Device Recommendation: Rolling walker  PT Treatment Plan: Bed mobility, Body mechanics, Endurance, Energy conservation, Patient education, Family education, Gait training, Range of motion, Strengthening, Transfer training, Balance training, Stoop training, Stair training  Rehab Potential : Good  Frequency (Obs): 3-5x/week     PHYSICAL THERAPY MEDICAL/SOCIAL HISTORY   History related to current admission: The patient presents to the ED complaining of shortness of breath and low O2 saturations that she noted at home today.  Patient has a history of lung CA and is currently receiving chemo and radiation.  Is on Eliquis.   She reports taking this medication.  She denies other complaints.  No chest pain, Leg pain or swelling or other complaints.      Problem List  Principal Problem:    Acute respiratory failure with hypoxia (HCC)  Active Problems:    Non-small cell cancer of right lung (HCC)    Thrombosis due to vascular catheter      HOME SITUATION  Type of Home: Apartment  Home Layout: Multi-level  Stairs to Enter : 12   Railing: Yes              Lives With: Spouse              Prior Level of [place]: Pt lives with spouse in apartment multilevel walk up 12 steps uses a RW mod indep with all mobility.     SUBJECTIVE  I feel ok just a little weak and winded with walking.     PHYSICAL THERAPY EXAMINATION   OBJECTIVE  Precautions: Bed/chair alarm  Fall Risk: Standard fall risk    WEIGHT BEARING RESTRICTION       PAIN ASSESSMENT  Rating: Unable to rate          COGNITION  Overall Cognitive Status:  WFL - within functional limits    RANGE OF MOTION AND STRENGTH ASSESSMENT  Upper extremity ROM and strength are within functional limits   Lower extremity ROM is within functional limits   Lower extremity strength is within functional limits 4/5    BALANCE  Static Sitting: Fair +  Dynamic Sitting: Fair  Static Standing: Fair -  Dynamic Standing: Fair -       O2 WALK  Oxygen Therapy  SPO2% on Oxygen at Rest: 97  At rest oxygen flow (liters per minute): 2  SPO2% Ambulation on Oxygen: 96  Ambulation oxygen flow (liters per minute): 2    AM-PAC '6-Clicks' INPATIENT SHORT FORM - BASIC MOBILITY  How much difficulty does the patient currently have...  Patient Difficulty: Turning over in bed (including adjusting bedclothes, sheets and blankets)?: A Little   Patient Difficulty: Sitting down on and standing up from a chair with arms (e.g., wheelchair, bedside commode, etc.): A Little   Patient Difficulty: Moving from lying on back to sitting on the side of the bed?: A Little   How much help from another person does the patient currently need...    Help from Another: Moving to and from a bed to a chair (including a wheelchair)?: A Little   Help from Another: Need to walk in hospital room?: A Little   Help from Another: Climbing 3-5 steps with a railing?: A Lot     AM-PAC Score:  Raw Score: 17   Approx Degree of Impairment: 50.57%   Standardized Score (AM-PAC Scale): 42.13   CMS Modifier (G-Code): CK    FUNCTIONAL ABILITY STATUS  Functional Mobility/Gait Assessment  Gait Assistance: Minimum assistance  Distance (ft): 30  Assistive Device: Rolling walker  Pattern: Shuffle (decreased step length and cadencw)  Rolling: minimal assist  Supine to Sit: minimal assist  Sit to Supine: minimal assist  Sit to Stand: minimal assist    Exercise/Education Provided:  Bed mobility  Body mechanics  Energy conservation  Functional activity tolerated  Gait training  Posture  ROM  Strengthening  Lower therapeutic exercise:  Ankle pumps  Heel slides  LAQ  Transfer training    Skilled Therapy Provided: Pt ed with bed mobility and transfers with min A with a RW. Pt ed with gait progression with RW 30' with decreased step length and david on 2 lts 02 support sats 96% with min / CGA. Pt ed with therex in the chair x 10 reps for LE strength and ROM. Pt will benefit from [facility] PT with spouse assist as medical progress allows.     The patient's Approx Degree of Impairment: 50.57% has been calculated based on documentation in the [facility] '6 clicks' Inpatient Basic Mobility Short Form.  Research supports that patients with this level of impairment may benefit from [facility] PT spouse assist.    Final disposition will be made by interdisciplinary medical team.    Patient End of Session: Up in chair, With  staff, Needs met, Call light within reach, RN aware of session/findings, All patient questions and concerns addressed, Alarm set    CURRENT GOALS  Goals to be met by: 5/10/2025  Patient Goal Patient's self-stated goal is: Return home    Goal #1 Patient is able to demonstrate supine - sit EOB @  level: independent     Goal #1   Current Status    Goal #2 Patient is able to demonstrate transfers Sit to/from Stand at assistance level: modified independent with walker - rolling     Goal #2  Current Status    Goal #3 Patient is able to ambulate 300 feet with assist device: walker - rolling at assistance level: modified independent   Goal #3   Current Status    Goal #4 Patient will negotiate 12 stairs/one curb w/ assistive device and supervision   Goal #4   Current Status    Goal #5 Patient to demonstrate independence with home activity/exercise instructions provided to patient in preparation for discharge.   Goal #5   Current Status    Goal #6    Goal #6  Current Status      Patient Evaluation Complexity Level:  History Low - no personal factors and/or co-morbidities   Examination of body systems Low -  addressing 1-2 elements   Clinical Presentation Low- Stable   Clinical Decision Making  Low Complexity     Gait Training: 10 minutes

## 2025-04-19 NOTE — PLAN OF CARE
Patient A/Ox4. 2L O2. Robitussin and Tessalon provided for cough. PRN duonebs. Remote tele. Purevick in place. No complaint of pain. General diet tolerated well. AC/HS. Ambulates with walker/ stand by.  by the bedside. Call light within reach.   Problem: Patient Centered Care  Goal: Patient preferences are identified and integrated in the patient's plan of care  Description: Interventions:- What would you like us to know as we care for you? Provide timely, complete, and accurate information to patient/family- Incorporate patient and family knowledge, values, beliefs, and cultural backgrounds into the planning and delivery of care- Encourage patient/family to participate in care and decision-making at the level they choose- Honor patient and family perspectives and choices  Outcome: Progressing     Problem: Diabetes/Glucose Control  Goal: Glucose maintained within prescribed range  Description: INTERVENTIONS:- Monitor Blood Glucose as ordered- Assess for signs and symptoms of hyperglycemia and hypoglycemia- Administer ordered medications to maintain glucose within target range- Assess barriers to adequate nutritional intake and initiate nutrition consult as needed- Instruct patient on self management of diabetes  Outcome: Progressing     Problem: PAIN - ADULT  Goal: Verbalizes/displays adequate comfort level or patient's stated pain goal  Description: INTERVENTIONS:- Encourage pt to monitor pain and request assistance- Assess pain using appropriate pain scale- Administer analgesics based on type and severity of pain and evaluate response- Implement non-pharmacological measures as appropriate and evaluate response- Consider cultural and social influences on pain and pain management- Manage/alleviate anxiety- Utilize distraction and/or relaxation techniques- Monitor for opioid side effects- Notify MD/LIP if interventions unsuccessful or patient reports new pain- Anticipate increased pain with activity and  pre-medicate as appropriate  Outcome: Progressing     Problem: SAFETY ADULT - FALL  Goal: Free from fall injury  Description: INTERVENTIONS:- Assess pt frequently for physical needs- Identify cognitive and physical deficits and behaviors that affect risk of falls.- [place] fall precautions as indicated by assessment.- Educate pt/family on patient safety including physical limitations- Instruct pt to call for assistance with activity based on assessment- Modify environment to reduce risk of injury- Provide assistive devices as appropriate- Consider OT/PT consult to assist with strengthening/mobility- Encourage toileting schedule  Outcome: Progressing     Problem: DISCHARGE PLANNING  Goal: Discharge to home or other facility with appropriate resources  Description: INTERVENTIONS:- Identify barriers to discharge w/pt and caregiver- Include patient/family/discharge partner in discharge planning- Arrange for needed discharge resources and transportation as appropriate- Identify discharge learning needs (meds, wound care, etc)- Arrange for interpreters to assist at discharge as needed- Consider post-discharge preferences of patient/family/discharge partner- Complete POLST form as appropriate- Assess patient's ability to be responsible for managing their own health- Refer to Case Management Department for coordinating discharge planning if the patient needs post-hospital services based on physician/LIP order or complex needs related to functional status, cognitive ability or social support system  Outcome: Progressing     Problem: RESPIRATORY - ADULT  Goal: Achieves optimal ventilation and oxygenation  Description: INTERVENTIONS:- Assess for changes in respiratory status- Assess for changes in mentation and behavior- Position to facilitate oxygenation and minimize respiratory effort- Oxygen supplementation based on oxygen saturation or ABGs- Provide Smoking Cessation handout, if applicable- Encourage broncho-pulmonary  hygiene including cough, deep breathe, Incentive Spirometry- Assess the need for suctioning and perform as needed- Assess and instruct to report SOB or any respiratory difficulty- Respiratory Therapy support as indicated- Manage/alleviate anxiety- Monitor for signs/symptoms of CO2 retention  Outcome: Progressing

## 2025-04-20 LAB
GLUCOSE BLDC GLUCOMTR-MCNC: 120 MG/DL (ref 70–99)
GLUCOSE BLDC GLUCOMTR-MCNC: 130 MG/DL (ref 70–99)
GLUCOSE BLDC GLUCOMTR-MCNC: 136 MG/DL (ref 70–99)
GLUCOSE BLDC GLUCOMTR-MCNC: 236 MG/DL (ref 70–99)

## 2025-04-20 PROCEDURE — 99233 SBSQ HOSP IP/OBS HIGH 50: CPT | Performed by: HOSPITALIST

## 2025-04-20 PROCEDURE — 99232 SBSQ HOSP IP/OBS MODERATE 35: CPT | Performed by: INTERNAL MEDICINE

## 2025-04-20 RX ORDER — IPRATROPIUM BROMIDE AND ALBUTEROL SULFATE 2.5; .5 MG/3ML; MG/3ML
3 SOLUTION RESPIRATORY (INHALATION) EVERY 6 HOURS
Status: DISCONTINUED | OUTPATIENT
Start: 2025-04-20 | End: 2025-04-22

## 2025-04-20 NOTE — PLAN OF CARE
Patient A/Ox4. 2L O2. Duonebs scheduled and PRN. Remote tele. No complaint of pain. Ambulates with stand by and walker. Continue with Eliquis. Plan: Venogram on Monday.   Problem: Patient Centered Care  Goal: Patient preferences are identified and integrated in the patient's plan of care  Description: Interventions:- What would you like us to know as we care for you? Provide timely, complete, and accurate information to patient/family- Incorporate patient and family knowledge, values, beliefs, and cultural backgrounds into the planning and delivery of care- Encourage patient/family to participate in care and decision-making at the level they choose- Honor patient and family perspectives and choices  Outcome: Progressing     Problem: Diabetes/Glucose Control  Goal: Glucose maintained within prescribed range  Description: INTERVENTIONS:- Monitor Blood Glucose as ordered- Assess for signs and symptoms of hyperglycemia and hypoglycemia- Administer ordered medications to maintain glucose within target range- Assess barriers to adequate nutritional intake and initiate nutrition consult as needed- Instruct patient on self management of diabetes  Outcome: Progressing    Goal: Verbalizes/displays adequate comfort level or patient's stated pain goal  Description: INTERVENTIONS:- Encourage pt to monitor pain and request assistance- Assess pain using appropriate pain scale- Administer analgesics based on type and severity of pain and evaluate response- Implement non-pharmacological measures as appropriate and evaluate response- Consider cultural and social influences on pain and pain management- Manage/alleviate anxiety- Utilize distraction and/or relaxation techniques- Monitor for opioid side effects- Notify MD/LIP if interventions unsuccessful or patient reports new pain- Anticipate increased pain with activity and pre-medicate as appropriate  Outcome: Progressing     Problem: SAFETY ADULT - FALL  Goal: Free from fall  injury  Description: INTERVENTIONS:- Assess pt frequently for physical needs- Identify cognitive and physical deficits and behaviors that affect risk of falls.- [place] fall precautions as indicated by assessment.- Educate pt/family on patient safety including physical limitations- Instruct pt to call for assistance with activity based on assessment- Modify environment to reduce risk of injury- Provide assistive devices as appropriate- Consider OT/PT consult to assist with strengthening/mobility- Encourage toileting schedule  Outcome: Progressing     Problem: DISCHARGE PLANNING  Goal: Discharge to home or other facility with appropriate resources  Description: INTERVENTIONS:- Identify barriers to discharge w/pt and caregiver- Include patient/family/discharge partner in discharge planning- Arrange for needed discharge resources and transportation as appropriate- Identify discharge learning needs (meds, wound care, etc)- Arrange for interpreters to assist at discharge as needed- Consider post-discharge preferences of patient/family/discharge partner- Complete POLST form as appropriate- Assess patient's ability to be responsible for managing their own health- Refer to Case Management Department for coordinating discharge planning if the patient needs post-hospital services based on physician/LIP order or complex needs related to functional status, cognitive ability or social support system  Outcome: Progressing     Problem: RESPIRATORY - ADULT  Goal: Achieves optimal ventilation and oxygenation  Description: INTERVENTIONS:- Assess for changes in respiratory status- Assess for changes in mentation and behavior- Position to facilitate oxygenation and minimize respiratory effort- Oxygen supplementation based on oxygen saturation or ABGs- Provide Smoking Cessation handout, if applicable- Encourage broncho-pulmonary hygiene including cough, deep breathe, Incentive Spirometry- Assess the need for suctioning and perform as  needed- Assess and instruct to report SOB or any respiratory difficulty- Respiratory Therapy support as indicated- Manage/alleviate anxiety- Monitor for signs/symptoms of CO2 retention  Outcome: Progressing

## 2025-04-20 NOTE — PROGRESS NOTES
Progress Note     Yi Torres Patient Status:  Inpatient    1950 MRN [id number]   Location [facility] 4W/SW/SE Attending Manuel Teixeira MD   Hosp Day # 3 PCP Tammy Chan MD     Chief Complaint: sob, hypoxia    Subjective:   S: Patient here with sob   Denies cp, dizziness  Discussed plans for venogram  tomorrow  Feels wheezy    Review of Systems:   10 point ROS completed and was negative, except for pertinent positive and negatives stated in subjective.    Objective:   Vital signs:  Temp:  [98.1 °F (36.7 °C)-98.8 °F (37.1 °C)] 98.8 °F (37.1 °C)  Pulse:  [] 83  Resp:  [18-22] 18  BP: ()/(51-71) 107/60  SpO2:  [92 %-99 %] 99 %    Wt Readings from Last 6 Encounters:   25 196 lb (88.9 kg)   25 203 lb (92.1 kg)   25 199 lb (90.3 kg)   25 201 lb 12.8 oz (91.5 kg)   25 198 lb (89.8 kg)   25 204 lb 6.4 oz (92.7 kg)         Physical Exam:    General: No acute distress. Alert ,         Respiratory: b/l wheezing  Cardiovascular: S1, S2. Regular rate and rhythm. No murmurs, rubs or gallops.   Abdomen: Soft, nontender, nondistended.  Positive bowel sounds. No rebound or guarding.  Neurologic: No focal neurological deficits.   Musculoskeletal: Moves all extremities.  Extremities: No edema.    Results:   Diagnostic Data:      Labs:    Labs Last 24 Hours:   BMP     CBC    Other     Na - Cl - BUN - Glu -   Hb -   PTT - Procal -   K - CO2 - Cr -   WBC - >< PLT -  INR - CRP -   Renal Lytes Endo    Hct -   Trop - D dim -   eGFR - Ca - POC Gluc  120    LFT   pBNP - Lactic -   eGFR AA - PO4 - A1c -   AST - APk - Prot -  LDL -     Mg - TSH -   ALT - T fletcher - Alb -        COVID-19 Lab Results    COVID-19  No results found for: \"COVID19\"    Pro-Calcitonin  No results for input(s): \"PCT\" in the last 168 hours.    Cardiac  No results for input(s): \"TROP\", \"PBNP\" in the last 168 hours.    Creatinine Kinase  No results for input(s): \"CK\" in the last 168  hours.    Inflammatory Markers  No results for input(s): \"CRP\", \"VANNA\", \"LDH\", \"DDIMER\" in the last 168 hours.    Imaging: Imaging data reviewed in Epic.    Medications: Scheduled Medications[1]    Assessment & Plan:   ASSESSMENT / PLAN:     Yi Torres is a 74-year-old female with a history of lung cancer on chemo, who presents with dyspnea and low pulse ox readings.      # Acute hypoxemic respiratory failure  - SpO2 86% on RA  - CXR at baseline  - Pulm consulted  - Ordered CT chest for further evaluation  -Home oxygen evaluation     # Right lung cancer  - diagnosed in 01/2025; follows Dr. Goodson, Dr. Reyna and Dr. Garrido.  - last chemo is 04/15/25  - Onc consult appreciated  -Completed chemotherapy but now is due for more radiation therapy then follow-up for immunotherapy     # Pancytopenia due to chemotherapy  - Counts are stable     # SVC syndrome and left subclanivan thrombus  - s/p SVC stenting and left subclavian vein thrombectomy on 02/14/25  - continue home Eliquis  -CT scan with evidence of no flow in SVC stent  -Planning for venogram on Monday 4/21     # Hypokalemia  - K 3.0, replenish     # Hyponatremia  - Na 135     # DM2  - hold home metformin  - SSI AC/HS     # Hypertension  - ECG showed sinus tachycardia with PACs  - increase home metoprolol 25 mg BID to 50 mg BID  - continue home verapamil     Diet: regular  PT/OT: deferred  DVT ppx: Eliquis  Line: none  Code status: Full  Dispo: expect greater than 2 midnights  DVT Prophylaxis: hold eliquis prior to venogram per ir  CODE status: full  Fabian:    Central line: n/a  Dispo: further recs pending clinical course      Will the patient be referred to TCC on discharge?: yes  Estimated date of discharge: TBD  Discharge is dependent on: clinical improvemetn  At this point Ms. Torres is expected to be discharge to: home    Plan of care discussed with patient, nursing, oncology and pulmonology    Outpatient records or previous hospital records reviewed.    Patient and/or patient's family given opportunity to ask questions and note understanding and agreeing with therapeutic plan as outlined     Coordinated care with providers and counseling re: treatment plan and workup    MDM: High complexity     LIOR PENA MD    Supplementary Documentation:         **Certification      PHYSICIAN Certification of Need for Inpatient Hospitalization - Initial Certification    Patient will require inpatient services that will reasonably be expected to span two midnight's based on the clinical documentation in H+P.   Based on patients current state of illness, I anticipate that, after discharge, patient will require TBD.                    [1]    ipratropium-albuterol  3 mL Nebulization q6h    potassium chloride  40 mEq Oral Once    insulin aspart  1-7 Units Subcutaneous TID CC    apixaban  5 mg Oral BID    cetirizine  10 mg Oral Daily    furosemide  20 mg Oral Daily    metoprolol tartrate  50 mg Oral BID    verapamil ER  240 mg Oral Nightly

## 2025-04-20 NOTE — PROGRESS NOTES
[facility]  part of [facility]     Progress Note    Yi Torres Patient Status:  Inpatient    1950 MRN [id number]   Location [facility] 4W/SW/SE Attending Manuel Teixeira MD   Hosp Day # 3 PCP Tammy Chan MD       Subjective:   Patient seen and examined.  Resting in bed.  Some ongoing dyspnea present.  Cough primarily nonproductive.  States she feels weak    Objective:   Blood pressure 107/60, pulse 83, temperature 98.8 °F (37.1 °C), temperature source Oral, resp. rate 18, weight 196 lb (88.9 kg), SpO2 99%.  Intake/Output:   Last 3 shifts: I/O last 3 completed shifts:  In: 1320 [P.O.:1320]  Out: 350 [Urine:350]   This shift: I/O this shift:  In: 240 [P.O.:240]  Out: -      Vent Settings:      Hemodynamic parameters (last 24 hours):      Scheduled Meds: Current Hospital Medications[1]    Continuous Infusions: Medication Infusions[2]    Physical Exam  Constitutional: no acute distress  Eyes: PERRL  ENT: nares pateint  Neck: supple, no JVD  Cardio: RRR, S1 S2  Respiratory: clear to auscultation bilaterally, no wheezing, rales, rhonchi, crackles  GI: abdomen soft, non tender, active bowel sounds, no organomegaly  Extremities: no clubbing, cyanosis, edema  Neurologic: no gross motor deficits  Skin: warm, dry      Results:            XR CHEST AP PORTABLE  (CPT=71045)  Result Date: 2025  CONCLUSION:  1. Stable chest without radiographically evident acute intrathoracic process.  2. Large cavitary right lung mass is redemonstrated.    A preliminary report was issued by the [facility] teleradiology service. There are no major discrepancies.   Dictated by (CST): Steven Tong MD on 2025 at 5:26 AM     Finalized by (CST): Steven Tong MD on 2025 at 5:28 AM                    Assessment   1.  Squamous cell lung cancer  2.  Acute hypoxemic respiratory failure  3.  SVC syndrome status post stenting  4.  Pancytopenia     Plan   -Patient presents with  evidence of dyspnea and acute hypoxemic respiratory failure.  - Patient with prior history of squamous cell lung cancer status post chemoradiation.  Also found to have SVC syndrome with left innominate/subclavian thrombectomy with SVC stent placement on 2/13/2025.  - CT chest on 4/18/2025 with no evidence of pulmonary embolism seen.  Right suprahilar mass decreased in size.  SVC stent seen however no flow in seen within SVC stent.  -Evaluated by IR.  Plan for venogram and intervention on 4/21/2025.  - Hold off antibiotic therapy at this time  - Recommend outpatient pulmonary function testing  -Wean oxygen as tolerated.  Currently on 2 L  - Nebulizer treatments.  - Evaluate for home oxygen prior to discharge  - DVT prophylaxis: Muna Garrido DO  Pulmonary Critical Care Medicine  [facility]          [1]   Current Facility-Administered Medications   Medication Dose Route Frequency    ipratropium-albuterol (Duoneb) 0.5-2.5 (3) MG/3ML inhalation solution 3 mL  3 mL Nebulization q6h    ipratropium-albuterol (Duoneb) 0.5-2.5 (3) MG/3ML inhalation solution 3 mL  3 mL Nebulization Q4H PRN    glucose (Dex4) 15 GM/59ML oral liquid 15 g  15 g Oral Q15 Min PRN    Or    glucose (Glutose) 40% oral gel 15 g  15 g Oral Q15 Min PRN    Or    glucose-vitamin C (Dex-4) chewable tab 4 tablet  4 tablet Oral Q15 Min PRN    Or    dextrose 50% injection 50 mL  50 mL Intravenous Q15 Min PRN    Or    glucose (Dex4) 15 GM/59ML oral liquid 30 g  30 g Oral Q15 Min PRN    Or    glucose (Glutose) 40% oral gel 30 g  30 g Oral Q15 Min PRN    Or    glucose-vitamin C (Dex-4) chewable tab 8 tablet  8 tablet Oral Q15 Min PRN    potassium chloride (Klor-Con M20) tab 40 mEq  40 mEq Oral Once    acetaminophen (Tylenol Extra Strength) tab 500 mg  500 mg Oral Q4H PRN    morphINE PF 2 MG/ML injection 1 mg  1 mg Intravenous Q2H PRN    Or    morphINE PF 2 MG/ML injection 2 mg  2 mg Intravenous Q2H PRN    Or    morphINE PF 4 MG/ML injection 4  mg  4 mg Intravenous Q2H PRN    melatonin tab 3 mg  3 mg Oral Nightly PRN    polyethylene glycol (PEG 3350) (Miralax) 17 g oral packet 17 g  17 g Oral Daily PRN    sennosides (Senokot) tab 17.2 mg  17.2 mg Oral Nightly PRN    bisacodyl (Dulcolax) 10 MG rectal suppository 10 mg  10 mg Rectal Daily PRN    fleet enema (Fleet) rectal enema 133 mL  1 enema Rectal Once PRN    ondansetron (Zofran) 4 MG/2ML injection 4 mg  4 mg Intravenous Q6H PRN    metoclopramide (Reglan) 5 mg/mL injection 10 mg  10 mg Intravenous Q8H PRN    insulin aspart (NovoLOG) 100 Units/mL FlexPen 1-7 Units  1-7 Units Subcutaneous TID CC    benzonatate (Tessalon) cap 200 mg  200 mg Oral TID PRN    guaiFENesin (Robitussin) 100 MG/5 ML oral liquid 200 mg  200 mg Oral Q4H PRN    apixaban (Eliquis) tab 5 mg  5 mg Oral BID    cetirizine (ZyrTEC) tab 10 mg  10 mg Oral Daily    furosemide (Lasix) tab 20 mg  20 mg Oral Daily    metoprolol tartrate (Lopressor) tab 50 mg  50 mg Oral BID    verapamil ER (Calan-SR) tab 240 mg  240 mg Oral Nightly    prochlorperazine (Compazine) tab 10 mg  10 mg Oral Q6H PRN   [2]

## 2025-04-20 NOTE — PLAN OF CARE
Patient is AxO4. VSS. Receiving 2L of O2 via nasal cannula. Tessalon pearls and robitussin provided as needed. Duonebs prn for SOB. Tolerating diet. Denies nausea/vomiting overnight. Accuchecks AC/HS. No BM overnight. Voiding freely. C/o discomfort to sacrum, pillows and frequent repositioning provided to offload pressure. Ambulating with x1 assist + RW. Remote tele in place. Plan for Venogram on Monday. Appropriate safety measures maintained, call light within reach, and frequent rounding.               Problem: Patient Centered Care  Goal: Patient preferences are identified and integrated in the patient's plan of care  Description: Interventions:- What would you like us to know as we care for you? - Provide timely, complete, and accurate information to patient/family- Incorporate patient and family knowledge, values, beliefs, and cultural backgrounds into the planning and delivery of care- Encourage patient/family to participate in care and decision-making at the level they choose- Honor patient and family perspectives and choices  Outcome: Progressing     Problem: Diabetes/Glucose Control  Goal: Glucose maintained within prescribed range  Description: INTERVENTIONS:- Monitor Blood Glucose as ordered- Assess for signs and symptoms of hyperglycemia and hypoglycemia- Administer ordered medications to maintain glucose within target range- Assess barriers to adequate nutritional intake and initiate nutrition consult as needed- Instruct patient on self management of diabetes  Outcome: Progressing     Problem: Patient/Family Goals  Goal: Patient/Family Long Term Goal  Description: Patient's Long Term Goal: Interventions:- - See additional Care Plan goals for specific interventions  Outcome: Progressing  Goal: Patient/Family Short Term Goal  Description: Patient's Short Term Goal: Interventions: - - See additional Care Plan goals for specific interventions  Outcome: Progressing     Problem: PAIN - ADULT  Goal:  Verbalizes/displays adequate comfort level or patient's stated pain goal  Description: INTERVENTIONS:- Encourage pt to monitor pain and request assistance- Assess pain using appropriate pain scale- Administer analgesics based on type and severity of pain and evaluate response- Implement non-pharmacological measures as appropriate and evaluate response- Consider cultural and social influences on pain and pain management- Manage/alleviate anxiety- Utilize distraction and/or relaxation techniques- Monitor for opioid side effects- Notify MD/LIP if interventions unsuccessful or patient reports new pain- Anticipate increased pain with activity and pre-medicate as appropriate  Outcome: Progressing     Problem: SAFETY ADULT - FALL  Goal: Free from fall injury  Description: INTERVENTIONS:- Assess pt frequently for physical needs- Identify cognitive and physical deficits and behaviors that affect risk of falls.- [place] fall precautions as indicated by assessment.- Educate pt/family on patient safety including physical limitations- Instruct pt to call for assistance with activity based on assessment- Modify environment to reduce risk of injury- Provide assistive devices as appropriate- Consider OT/PT consult to assist with strengthening/mobility- Encourage toileting schedule  Outcome: Progressing     Problem: DISCHARGE PLANNING  Goal: Discharge to home or other facility with appropriate resources  Description: INTERVENTIONS:- Identify barriers to discharge w/pt and caregiver- Include patient/family/discharge partner in discharge planning- Arrange for needed discharge resources and transportation as appropriate- Identify discharge learning needs (meds, wound care, etc)- Arrange for interpreters to assist at discharge as needed- Consider post-discharge preferences of patient/family/discharge partner- Complete POLST form as appropriate- Assess patient's ability to be responsible for managing their own health- Refer to Case  Management Department for coordinating discharge planning if the patient needs post-hospital services based on physician/LIP order or complex needs related to functional status, cognitive ability or social support system  Outcome: Progressing     Problem: RESPIRATORY - ADULT  Goal: Achieves optimal ventilation and oxygenation  Description: INTERVENTIONS:- Assess for changes in respiratory status- Assess for changes in mentation and behavior- Position to facilitate oxygenation and minimize respiratory effort- Oxygen supplementation based on oxygen saturation or ABGs- Provide Smoking Cessation handout, if applicable- Encourage broncho-pulmonary hygiene including cough, deep breathe, Incentive Spirometry- Assess the need for suctioning and perform as needed- Assess and instruct to report SOB or any respiratory difficulty- Respiratory Therapy support as indicated- Manage/alleviate anxiety- Monitor for signs/symptoms of CO2 retention  Outcome: Progressing

## 2025-04-21 ENCOUNTER — ANESTHESIA (OUTPATIENT)
Dept: INTERVENTIONAL RADIOLOGY/VASCULAR | Facility: HOSPITAL | Age: 75
End: 2025-04-21
Payer: MEDICARE

## 2025-04-21 ENCOUNTER — APPOINTMENT (OUTPATIENT)
Dept: INTERVENTIONAL RADIOLOGY/VASCULAR | Facility: HOSPITAL | Age: 75
End: 2025-04-21
Attending: NURSE PRACTITIONER
Payer: MEDICARE

## 2025-04-21 LAB
ANION GAP SERPL CALC-SCNC: 9 MMOL/L (ref 0–18)
BASOPHILS # BLD: 0 X10(3) UL (ref 0–0.2)
BASOPHILS NFR BLD: 0 %
BUN BLD-MCNC: 8 MG/DL (ref 9–23)
BUN/CREAT SERPL: 15.7 (ref 10–20)
CALCIUM BLD-MCNC: 9.4 MG/DL (ref 8.7–10.4)
CHLORIDE SERPL-SCNC: 103 MMOL/L (ref 98–112)
CO2 SERPL-SCNC: 25 MMOL/L (ref 21–32)
CREAT BLD-MCNC: 0.51 MG/DL (ref 0.55–1.02)
DEPRECATED RDW RBC AUTO: 87.9 FL (ref 35.1–46.3)
EGFRCR SERPLBLD CKD-EPI 2021: 99 ML/MIN/1.73M2 (ref 60–?)
EOSINOPHIL # BLD: 0.06 X10(3) UL (ref 0–0.7)
EOSINOPHIL NFR BLD: 2 %
ERYTHROCYTE [DISTWIDTH] IN BLOOD BY AUTOMATED COUNT: 27 % (ref 11–15)
GLUCOSE BLD-MCNC: 117 MG/DL (ref 70–99)
GLUCOSE BLDC GLUCOMTR-MCNC: 115 MG/DL (ref 70–99)
GLUCOSE BLDC GLUCOMTR-MCNC: 119 MG/DL (ref 70–99)
GLUCOSE BLDC GLUCOMTR-MCNC: 127 MG/DL (ref 70–99)
GLUCOSE BLDC GLUCOMTR-MCNC: 138 MG/DL (ref 70–99)
GLUCOSE BLDC GLUCOMTR-MCNC: 194 MG/DL (ref 70–99)
HCT VFR BLD AUTO: 27.5 % (ref 35–48)
HGB BLD-MCNC: 8.8 G/DL (ref 12–16)
LYMPHOCYTES NFR BLD: 0.59 X10(3) UL (ref 1–4)
LYMPHOCYTES NFR BLD: 21 %
MAGNESIUM SERPL-MCNC: 1.7 MG/DL (ref 1.6–2.6)
MCH RBC QN AUTO: 30.1 PG (ref 26–34)
MCHC RBC AUTO-ENTMCNC: 32 G/DL (ref 31–37)
MCV RBC AUTO: 94.2 FL (ref 80–100)
MONOCYTES # BLD: 0.34 X10(3) UL (ref 0.1–1)
MONOCYTES NFR BLD: 12 %
NEUTROPHILS # BLD AUTO: 1.54 X10 (3) UL (ref 1.5–7.7)
NEUTROPHILS NFR BLD: 62 %
NEUTS BAND NFR BLD: 3 %
NEUTS HYPERSEG # BLD: 1.82 X10(3) UL (ref 1.5–7.7)
OSMOLALITY SERPL CALC.SUM OF ELEC: 283 MOSM/KG (ref 275–295)
PLATELET # BLD AUTO: 154 10(3)UL (ref 150–450)
PLATELET MORPHOLOGY: NORMAL
POTASSIUM SERPL-SCNC: 3.9 MMOL/L (ref 3.5–5.1)
RBC # BLD AUTO: 2.92 X10(6)UL (ref 3.8–5.3)
SODIUM SERPL-SCNC: 137 MMOL/L (ref 136–145)
TOTAL CELLS COUNTED BLD: 100
WBC # BLD AUTO: 2.8 X10(3) UL (ref 4–11)

## 2025-04-21 PROCEDURE — 99233 SBSQ HOSP IP/OBS HIGH 50: CPT | Performed by: HOSPITALIST

## 2025-04-21 PROCEDURE — 027V3ZZ DILATION OF SUPERIOR VENA CAVA, PERCUTANEOUS APPROACH: ICD-10-PCS | Performed by: RADIOLOGY

## 2025-04-21 PROCEDURE — 99232 SBSQ HOSP IP/OBS MODERATE 35: CPT | Performed by: INTERNAL MEDICINE

## 2025-04-21 PROCEDURE — 99232 SBSQ HOSP IP/OBS MODERATE 35: CPT | Performed by: PHYSICIAN ASSISTANT

## 2025-04-21 PROCEDURE — B5181ZZ FLUOROSCOPY OF SUPERIOR VENA CAVA USING LOW OSMOLAR CONTRAST: ICD-10-PCS | Performed by: RADIOLOGY

## 2025-04-21 PROCEDURE — 03723ZZ DILATION OF INNOMINATE ARTERY, PERCUTANEOUS APPROACH: ICD-10-PCS | Performed by: RADIOLOGY

## 2025-04-21 RX ORDER — LIDOCAINE HYDROCHLORIDE 20 MG/ML
INJECTION, SOLUTION INFILTRATION; PERINEURAL
Status: COMPLETED
Start: 2025-04-21 | End: 2025-04-21

## 2025-04-21 RX ORDER — MIDAZOLAM HYDROCHLORIDE 1 MG/ML
INJECTION INTRAMUSCULAR; INTRAVENOUS AS NEEDED
Status: DISCONTINUED | OUTPATIENT
Start: 2025-04-21 | End: 2025-04-21 | Stop reason: SURG

## 2025-04-21 RX ORDER — SODIUM CHLORIDE, SODIUM LACTATE, POTASSIUM CHLORIDE, CALCIUM CHLORIDE 600; 310; 30; 20 MG/100ML; MG/100ML; MG/100ML; MG/100ML
INJECTION, SOLUTION INTRAVENOUS CONTINUOUS
Status: DISCONTINUED | OUTPATIENT
Start: 2025-04-21 | End: 2025-04-21 | Stop reason: HOSPADM

## 2025-04-21 RX ORDER — HYDROMORPHONE HYDROCHLORIDE 1 MG/ML
0.2 INJECTION, SOLUTION INTRAMUSCULAR; INTRAVENOUS; SUBCUTANEOUS EVERY 5 MIN PRN
Status: DISCONTINUED | OUTPATIENT
Start: 2025-04-21 | End: 2025-04-21 | Stop reason: HOSPADM

## 2025-04-21 RX ORDER — EPHEDRINE SULFATE 50 MG/ML
INJECTION, SOLUTION INTRAVENOUS AS NEEDED
Status: DISCONTINUED | OUTPATIENT
Start: 2025-04-21 | End: 2025-04-21 | Stop reason: SURG

## 2025-04-21 RX ORDER — MIDAZOLAM HYDROCHLORIDE 1 MG/ML
INJECTION INTRAMUSCULAR; INTRAVENOUS
Status: COMPLETED
Start: 2025-04-21 | End: 2025-04-21

## 2025-04-21 RX ORDER — HEPARIN SODIUM 1000 [USP'U]/ML
INJECTION, SOLUTION INTRAVENOUS; SUBCUTANEOUS AS NEEDED
Status: DISCONTINUED | OUTPATIENT
Start: 2025-04-21 | End: 2025-04-21 | Stop reason: SURG

## 2025-04-21 RX ORDER — PHENYLEPHRINE HCL 10 MG/ML
VIAL (ML) INJECTION AS NEEDED
Status: DISCONTINUED | OUTPATIENT
Start: 2025-04-21 | End: 2025-04-21 | Stop reason: SURG

## 2025-04-21 RX ORDER — ROCURONIUM BROMIDE 10 MG/ML
INJECTION, SOLUTION INTRAVENOUS AS NEEDED
Status: DISCONTINUED | OUTPATIENT
Start: 2025-04-21 | End: 2025-04-21 | Stop reason: SURG

## 2025-04-21 RX ORDER — NALOXONE HYDROCHLORIDE 0.4 MG/ML
0.08 INJECTION, SOLUTION INTRAMUSCULAR; INTRAVENOUS; SUBCUTANEOUS AS NEEDED
Status: DISCONTINUED | OUTPATIENT
Start: 2025-04-21 | End: 2025-04-21 | Stop reason: HOSPADM

## 2025-04-21 RX ORDER — IOPAMIDOL 612 MG/ML
100 INJECTION, SOLUTION INTRAVASCULAR
Status: COMPLETED | OUTPATIENT
Start: 2025-04-21 | End: 2025-04-21

## 2025-04-21 RX ORDER — MORPHINE SULFATE 2 MG/ML
2 INJECTION, SOLUTION INTRAMUSCULAR; INTRAVENOUS EVERY 10 MIN PRN
Status: DISCONTINUED | OUTPATIENT
Start: 2025-04-21 | End: 2025-04-21 | Stop reason: HOSPADM

## 2025-04-21 RX ORDER — HEPARIN SODIUM 1000 [USP'U]/ML
INJECTION, SOLUTION INTRAVENOUS; SUBCUTANEOUS
Status: COMPLETED
Start: 2025-04-21 | End: 2025-04-21

## 2025-04-21 RX ORDER — MIDAZOLAM HYDROCHLORIDE 1 MG/ML
INJECTION INTRAMUSCULAR; INTRAVENOUS
Status: DISCONTINUED
Start: 2025-04-21 | End: 2025-04-21 | Stop reason: WASHOUT

## 2025-04-21 RX ORDER — MAGNESIUM OXIDE 400 MG/1
400 TABLET ORAL ONCE
Status: COMPLETED | OUTPATIENT
Start: 2025-04-21 | End: 2025-04-21

## 2025-04-21 RX ADMIN — PHENYLEPHRINE HCL 100 MCG: 10 MG/ML VIAL (ML) INJECTION at 17:15:00

## 2025-04-21 RX ADMIN — HEPARIN SODIUM 8000 UNITS: 1000 INJECTION, SOLUTION INTRAVENOUS; SUBCUTANEOUS at 17:36:00

## 2025-04-21 RX ADMIN — EPHEDRINE SULFATE 10 MG: 50 INJECTION, SOLUTION INTRAVENOUS at 17:01:00

## 2025-04-21 RX ADMIN — ROCURONIUM BROMIDE 30 MG: 10 INJECTION, SOLUTION INTRAVENOUS at 17:01:00

## 2025-04-21 RX ADMIN — PHENYLEPHRINE HCL 100 MCG: 10 MG/ML VIAL (ML) INJECTION at 17:03:00

## 2025-04-21 RX ADMIN — MIDAZOLAM HYDROCHLORIDE 2 MG: 1 INJECTION INTRAMUSCULAR; INTRAVENOUS at 16:49:00

## 2025-04-21 NOTE — ANESTHESIA POSTPROCEDURE EVALUATION
Patient: Yi Torres    Procedure Summary       Date: 04/21/25 Room / Location: [facility] [place] [facility]    Anesthesia Start: 1641 Anesthesia Stop:     Procedure: IR VENOGRAM Diagnosis: (svc stent occlude)    Scheduled Providers: Cesar Loaiza MD; Henok Tavarez MD; Rosemary Leiva MD Anesthesiologist: Rosemary Leiva MD    Anesthesia Type: general ASA Status: 3            Anesthesia Type: general    Vitals Value Taken Time   /80 04/21/25 18:08   Temp 97.8 °F (36.6 °C) 04/21/25 18:08   Pulse 118 04/21/25 18:09   Resp 26 04/21/25 18:09   SpO2 91 % 04/21/25 18:09   Vitals shown include unfiled device data.    [facility] AN Post Evaluation:   Patient Evaluated in PACU  Patient Participation: complete - patient participated  Level of Consciousness: awake  Pain Score: 0  Pain Management: adequate  Airway Patency:patent  Dental exam unchanged from preop  Yes    Nausea/Vomiting: none  Cardiovascular Status: acceptable  Respiratory Status: acceptable, spontaneous ventilation and airway suctioned  Postoperative Hydration acceptable  Comments: Resp status borderline  Duo neb steroids given      ROSEMARY LEIVA MD  4/21/2025 6:10 PM

## 2025-04-21 NOTE — PLAN OF CARE
Patient complained of shortness of breath even with 2L nasal cannula and saturation was 97%, Patient was given PRN nebulizer treatment. IR angio with stent planned, she has been  NPO since midnight. Continue to monitor O2 levels.   Problem: Patient Centered Care  Goal: Patient preferences are identified and integrated in the patient's plan of care  Description: Interventions:- What would you like us to know as we care for you? Keep family updated on plan of care- Provide timely, complete, and accurate information to patient/family- Incorporate patient and family knowledge, values, beliefs, and cultural backgrounds into the planning and delivery of care- Encourage patient/family to participate in care and decision-making at the level they choose- Honor patient and family perspectives and choices  Outcome: Progressing     Problem: Diabetes/Glucose Control  Goal: Glucose maintained within prescribed range  Description: INTERVENTIONS:- Monitor Blood Glucose as ordered- Assess for signs and symptoms of hyperglycemia and hypoglycemia- Administer ordered medications to maintain glucose within target range- Assess barriers to adequate nutritional intake and initiate nutrition consult as needed- Instruct patient on self management of diabetes  Outcome: Progressing     Problem: Patient/Family Goals  Goal: Patient/Family Long Term Goal  Description: Patient's Long Term Goal: Interventions:- - See additional Care Plan goals for specific interventions  Outcome: Progressing  Goal: Patient/Family Short Term Goal  Description: Patient's Short Term Goal: Interventions: - - See additional Care Plan goals for specific interventions  Outcome: Progressing     Problem: PAIN - ADULT  Goal: Verbalizes/displays adequate comfort level or patient's stated pain goal  Description: INTERVENTIONS:- Encourage pt to monitor pain and request assistance- Assess pain using appropriate pain scale- Administer analgesics based on type and severity of  pain and evaluate response- Implement non-pharmacological measures as appropriate and evaluate response- Consider cultural and social influences on pain and pain management- Manage/alleviate anxiety- Utilize distraction and/or relaxation techniques- Monitor for opioid side effects- Notify MD/LIP if interventions unsuccessful or patient reports new pain- Anticipate increased pain with activity and pre-medicate as appropriate  Outcome: Progressing     Problem: SAFETY ADULT - FALL  Goal: Free from fall injury  Description: INTERVENTIONS:- Assess pt frequently for physical needs- Identify cognitive and physical deficits and behaviors that affect risk of falls.- [place] fall precautions as indicated by assessment.- Educate pt/family on patient safety including physical limitations- Instruct pt to call for assistance with activity based on assessment- Modify environment to reduce risk of injury- Provide assistive devices as appropriate- Consider OT/PT consult to assist with strengthening/mobility- Encourage toileting schedule  Outcome: Progressing     Problem: DISCHARGE PLANNING  Goal: Discharge to home or other facility with appropriate resources  Description: INTERVENTIONS:- Identify barriers to discharge w/pt and caregiver- Include patient/family/discharge partner in discharge planning- Arrange for needed discharge resources and transportation as appropriate- Identify discharge learning needs (meds, wound care, etc)- Arrange for interpreters to assist at discharge as needed- Consider post-discharge preferences of patient/family/discharge partner- Complete POLST form as appropriate- Assess patient's ability to be responsible for managing their own health- Refer to Case Management Department for coordinating discharge planning if the patient needs post-hospital services based on physician/LIP order or complex needs related to functional status, cognitive ability or social support system  Outcome: Progressing      Problem: RESPIRATORY - ADULT  Goal: Achieves optimal ventilation and oxygenation  Description: INTERVENTIONS:- Assess for changes in respiratory status- Assess for changes in mentation and behavior- Position to facilitate oxygenation and minimize respiratory effort- Oxygen supplementation based on oxygen saturation or ABGs- Provide Smoking Cessation handout, if applicable- Encourage broncho-pulmonary hygiene including cough, deep breathe, Incentive Spirometry- Assess the need for suctioning and perform as needed- Assess and instruct to report SOB or any respiratory difficulty- Respiratory Therapy support as indicated- Manage/alleviate anxiety- Monitor for signs/symptoms of CO2 retention  Outcome: Progressing

## 2025-04-21 NOTE — ANESTHESIA PREPROCEDURE EVALUATION
Anesthesia PreOp Note    HPI:     Yi Torres is a 74 year old female who presents for preoperative consultation requested by: * No surgeons listed *    Date of Surgery: 4/21/2025    * No procedures listed *  Indication: * No pre-op diagnosis entered *    Relevant Problems   No relevant active problems       NPO:                         History Review:  Patient Active Problem List    Diagnosis Date Noted    Non-small cell cancer of right lung (HCC) 04/18/2025    Thrombosis due to vascular catheter 04/18/2025    Acute respiratory failure with hypoxia (HCC) 04/17/2025    Iron deficiency anemia 03/17/2025    Encounter for antineoplastic chemotherapy 03/12/2025    Anemia 03/12/2025    Other constipation 03/12/2025    SVC syndrome 02/13/2025    Venous thrombosis of upper extremity 02/13/2025    Acute embolism and thrombosis of left subclavian vein (HCC) 02/13/2025    Thrombosis of brachiocephalic vein (HCC) 02/13/2025    Malignant neoplasm of upper lobe of right lung (HCC) 01/29/2025    Mass of right lung 01/22/2025    Retinal drusen 12/19/2024    CKD (chronic kidney disease) stage 2, GFR 60-89 ml/min 07/26/2024    Osteoarthritis     Cataract of both eyes 12/03/2020    Hyponatremia 10/13/2020    Osteoporosis 09/30/2020    BPPV (benign paroxysmal positional vertigo) 06/16/2020    Type 2 diabetes mellitus with stage 3 chronic kidney disease, without long-term current use of insulin (HCC) 04/17/2019    Aortic atherosclerosis 04/17/2019    Personal history of skin cancer 03/14/2019    Hidradenitis suppurativa 03/14/2019    History of actinic keratoses 09/27/2018    Vitreous degeneration 09/08/2017    Cortical cataract 09/08/2017    Pure hypercholesterolemia 04/25/2016    Exposure to hepatitis C 04/25/2016    Morbid obesity due to excess calories (HCC) 04/25/2016    Essential hypertension, benign 11/03/2014    Inflamed seborrheic keratosis 04/24/2014    Disseminated superficial actinic porokeratosis (DSAP)  04/24/2014    Allergic rhinitis 09/15/2009       Past Medical History[1]    Past Surgical History[2]    Prescriptions Prior to Admission[3]  Current Medications and Prescriptions Ordered in Epic[4]    Allergies[5]    Family History[6]  Social Hx on file[7]    Available pre-op labs reviewed.  Lab Results   Component Value Date    WBC 2.8 (L) 04/21/2025    RBC 2.92 (L) 04/21/2025    HGB 8.8 (L) 04/21/2025    HCT 27.5 (L) 04/21/2025    MCV 94.2 04/21/2025    MCH 30.1 04/21/2025    MCHC 32.0 04/21/2025    RDW 27.0 (H) 04/21/2025    .0 04/21/2025     Lab Results   Component Value Date     04/21/2025    K 3.9 04/21/2025     04/21/2025    CO2 25.0 04/21/2025    BUN 8 (L) 04/21/2025    CREATSERUM 0.51 (L) 04/21/2025     (H) 04/21/2025    PGLU 115 (H) 04/21/2025    CA 9.4 04/21/2025          Vital Signs:  Body mass index is 33.12 kg/m².   weight is 88.9 kg (196 lb). Her temporal temperature is 98 °F (36.7 °C). Her blood pressure is 129/74 and her pulse is 108. Her respiration is 18 and oxygen saturation is 93%.   Vitals:    04/20/25 1955 04/21/25 0314 04/21/25 0848 04/21/25 1208   BP: 113/76 102/69 122/57 129/74   Pulse: 103 80 99 108   Resp: 20 16 18 18   Temp: 97.2 °F (36.2 °C) 98.1 °F (36.7 °C)  98 °F (36.7 °C)   TempSrc: Oral Oral  Temporal   SpO2: 96% 94% 93% 93%   Weight:            Anesthesia Evaluation     Patient summary reviewed and Nursing notes reviewed    History of anesthetic complications   Airway   Mallampati: II  TM distance: >3 FB  Neck ROM: full  Dental      Pulmonary    (+) COPD    ROS comment: Acute hypoxemic respiratory failure  Dyspnea  Cardiovascular   (+) hypertension    ROS comment: SVC syndrome s/p SVC stenting    Neuro/Psych    (+)  anxiety/panic attacks,        GI/Hepatic/Renal      Endo/Other    (+) diabetes mellitus type 2    Comments: Right lung CA  General weakness  Pancytopenia  Hyponatremia  Hypokalemia  Abdominal      Other findings: Poor dentition             Anesthesia Plan:   ASA:  3  Plan:   General  Airway:  ETT  Informed Consent Plan and Risks Discussed With:  Patient      I have informed Yi Muñoz Carlton and/or legal guardian or family member of the nature of the anesthetic plan, benefits, risks including possible dental damage if relevant, major complications, and any alternative forms of anesthetic management.   All of the patient's questions were answered to the best of my ability. The patient desires the anesthetic management as planned.  Henok Tavarez MD  4/21/2025 4:19 PM  Present on Admission:  **None**           [1]   Past Medical History:   Anesthesia complication    body aches for 3 days    Anxiety state    Cancer (HCC)    skin cancer    Cataract    COPD (chronic obstructive pulmonary disease) (HCC)    Diabetes (HCC)    Diabetes mellitus (HCC)    Essential hypertension    Exposure to medical diagnostic radiation    High blood pressure    High cholesterol    Incontinence    bladder    Neuropathy    neuropathy    Obesity, unspecified    Osteoarthritis    In knees    Personal history of antineoplastic chemotherapy    Problems with swallowing    pills    Renal disorder    CKD 2    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     Cholecystectomy      Laparoscopic incisional / umbilical / ventral hernia repair  09/19/2024    Port, indwelling, imp     [3]   Medications Prior to Admission   Medication Sig Dispense Refill Last Dose/Taking    alendronate 70 MG Oral Tab Take 1 tablet (70 mg total) by mouth once a week. 12 tablet 3 4/8/2025    furosemide (LASIX) 20 MG Oral Tab Take 1 tablet (20 mg total) by mouth daily. 30 tablet 1 4/16/2025 at  6:00 AM    apixaban (ELIQUIS) 5 MG Oral Tab Take 1 tablet (5 mg total) by mouth 2  (two) times daily. After completion of lovenox injections 60 tablet 3 4/17/2025 at  5:40 AM    acetaminophen 500 MG Oral Tab Take 1 tablet (500 mg total) by mouth every 4 (four) hours as needed.   4/15/2025    fluticasone propionate 50 MCG/ACT Nasal Suspension 2 sprays by Each Nare route daily. 1 each 0 4/17/2025 at  5:40 AM    prochlorperazine (COMPAZINE) 10 mg tablet Take 1 tablet (10 mg total) by mouth every 6 (six) hours as needed for Nausea. 30 tablet 3 4/16/2025 at  3:30 PM    Spironolactone-HCTZ 25-25 MG Oral Tab Take 1 tablet by mouth daily. 90 tablet 3 4/17/2025 at  5:40 AM    atorvastatin 20 MG Oral Tab TAKE 1 TABLET (20MG) BY MOUTH EVERY DAY (Patient taking differently: Take 1 tablet (20 mg total) by mouth nightly. TAKE 1 TABLET (20MG) BY MOUTH EVERY DAY) 90 tablet 3 4/16/2025 at  5:30 PM    Cetirizine HCl 10 MG Oral Cap Take 10 mg by mouth nightly.   4/16/2025 at  5:30 PM    metFORMIN  MG Oral Tablet 24 Hr Take 1 tablet (500 mg total) by mouth daily with food. 90 tablet 3 4/16/2025 at  5:30 PM    metoprolol tartrate 25 MG Oral Tab Take 1 tablet (25 mg total) by mouth 2 (two) times daily. 180 tablet 3 4/17/2025 at  5:40 AM    verapamil  MG Oral Tab CR Take 1 tablet (240 mg total) by mouth nightly. 90 tablet 3 4/16/2025 at  5:30 PM    cholecalciferol 50 MCG (2000 UT) Oral Cap Take 1 capsule (2,000 Units total) by mouth in the morning.   4/17/2025 at  5:40 AM    Multiple Vitamins-Minerals (CENTRUM SILVER) Oral Tab Take 1 tablet by mouth in the morning.   4/17/2025 at  5:40 AM    Potassium Chloride ER 10 MEQ Oral Tab CR Take 1 tablet (10 mEq total) by mouth daily. (Patient not taking: Reported on 4/17/2025) 7 tablet 1 Not Taking    lidocaine-prilocaine 2.5-2.5 % External Cream Apply to site 1 hour prior to port a cath needle insertion 5 g 1     sennosides-docusate (SENNA PLUS) 8.6-50 MG Oral Tab Take 2 tablets by mouth daily. (Patient not taking: Reported on 4/17/2025) 30 tablet 1 Not Taking     ondansetron (ZOFRAN) 8 MG tablet Take 1 tablet (8 mg total) by mouth every 8 (eight) hours as needed for Nausea. (Patient not taking: Reported on 4/17/2025) 30 tablet 3 Not Taking   [4]   Current Facility-Administered Medications Ordered in Epic   Medication Dose Route Frequency Provider Last Rate Last Admin    ipratropium-albuterol (Duoneb) 0.5-2.5 (3) MG/3ML inhalation solution 3 mL  3 mL Nebulization q6h Gabrielle Garrido DO   3 mL at 04/21/25 1413    ipratropium-albuterol (Duoneb) 0.5-2.5 (3) MG/3ML inhalation solution 3 mL  3 mL Nebulization Q4H PRN Trinidad Hathaway MD   3 mL at 04/20/25 2310    glucose (Dex4) 15 GM/59ML oral liquid 15 g  15 g Oral Q15 Min PRN Felipe Fraser MD        Or    glucose (Glutose) 40% oral gel 15 g  15 g Oral Q15 Min PRN Felipe Fraser MD        Or    glucose-vitamin C (Dex-4) chewable tab 4 tablet  4 tablet Oral Q15 Min PRN Felipe Fraser MD        Or    dextrose 50% injection 50 mL  50 mL Intravenous Q15 Min PRN Felipe Fraser MD        Or    glucose (Dex4) 15 GM/59ML oral liquid 30 g  30 g Oral Q15 Min PRN Felipe Fraser MD        Or    glucose (Glutose) 40% oral gel 30 g  30 g Oral Q15 Min PRN Felipe Fraser MD        Or    glucose-vitamin C (Dex-4) chewable tab 8 tablet  8 tablet Oral Q15 Min PRN Felipe Fraser MD        potassium chloride (Klor-Con M20) tab 40 mEq  40 mEq Oral Once Felipe Fraser MD        acetaminophen (Tylenol Extra Strength) tab 500 mg  500 mg Oral Q4H PRN Felipe Fraser MD        morphINE PF 2 MG/ML injection 1 mg  1 mg Intravenous Q2H PRN Felipe Fraser MD        Or    morphINE PF 2 MG/ML injection 2 mg  2 mg Intravenous Q2H PRN Felipe Fraser MD        Or    morphINE PF 4 MG/ML injection 4 mg  4 mg Intravenous Q2H PRN Felipe Fraser MD        melatonin tab 3 mg  3 mg Oral Nightly PRN Felipe Fraser MD   3 mg at 04/19/25 2035    polyethylene glycol (PEG 3350) (Miralax) 17 g oral packet 17 g  17 g Oral Daily PRN Felipe Fraser MD        sennosides (Senokot) tab 17.2 mg  17.2 mg  Oral Nightly PRN Felipe Fraser MD        bisacodyl (Dulcolax) 10 MG rectal suppository 10 mg  10 mg Rectal Daily PRN Felipe Fraser MD        fleet enema (Fleet) rectal enema 133 mL  1 enema Rectal Once PRN Felipe Fraser MD        ondansetron (Zofran) 4 MG/2ML injection 4 mg  4 mg Intravenous Q6H PRN Felipe Fraser MD        metoclopramide (Reglan) 5 mg/mL injection 10 mg  10 mg Intravenous Q8H PRN Felipe Fraser MD        insulin aspart (NovoLOG) 100 Units/mL FlexPen 1-7 Units  1-7 Units Subcutaneous TID CC Felipe Fraser MD        benzonatate (Tessalon) cap 200 mg  200 mg Oral TID PRN Felipe Fraser MD   200 mg at 25    guaiFENesin (Robitussin) 100 MG/5 ML oral liquid 200 mg  200 mg Oral Q4H PRN Felipe Fraser MD   200 mg at 25    apixaban (Eliquis) tab 5 mg  5 mg Oral BID Felipe Fraser MD   5 mg at 25    cetirizine (ZyrTEC) tab 10 mg  10 mg Oral Daily Felpie Fraser MD   10 mg at 25    furosemide (Lasix) tab 20 mg  20 mg Oral Daily Felipe Fraser MD   20 mg at 25    metoprolol tartrate (Lopressor) tab 50 mg  50 mg Oral BID Felipe Fraser MD   50 mg at 25    verapamil ER (Calan-SR) tab 240 mg  240 mg Oral Nightly Felipe Fraser MD   240 mg at 25    prochlorperazine (Compazine) tab 10 mg  10 mg Oral Q6H PRN Felipe Fraser MD         No current [facility]-ordered outpatient medications on file.   [5]   Allergies  Allergen Reactions    Erythromycin DIARRHEA     Stomach pain   [6]   Family History  Problem Relation Age of Onset    Hypertension Father     Stroke Father     Heart Attack Mother    [7]   Social History  Socioeconomic History    Marital status:     Number of children: 1   Tobacco Use    Smoking status: Former     Current packs/day: 0.00     Average packs/day: 0.5 packs/day for 45.0 years (22.5 ttl pk-yrs)     Types: Cigarettes     Start date: 1973     Quit date: 2018     Years since quittin.2     Passive exposure: Past    Smokeless tobacco:  Never    Tobacco comments:     1 pack every 3 days   Vaping Use    Vaping status: Never Used   Substance and Sexual Activity    Alcohol use: Not Currently     Comment: very rarely    Drug use: No   Other Topics Concern    Caffeine Concern Yes     Comment: Coffee, 2 cups per day     History of tanning No    Reaction to local anesthetic No    Left Handed No    Right Handed Yes    Currently spends a great deal of time in the sun No    Hx of Spending Great Deal of Time in Sun No    Bad sunburns in the past Yes    Tanning Salons in the Past No    Hx of Radiation Treatments No

## 2025-04-21 NOTE — PROCEDURES
[facility]  part of [facility]  Procedure Note    Yi Torres Patient Status:  Inpatient    1950 MRN [id number]   Location [facility] INTERVENTIONAL SUITES Attending Manuel Teixeira MD   Hosp Day # 4 PCP Tammy Chan MD     Procedure: SVC venography, PTA of occluded stent to 12 mm    Pre-Procedure Diagnosis:  occluded SVC stent    Post-Procedure Diagnosis: same    Anesthesia:  General    Findings:  occluded svc stent in mid portion, after pta to 12 mm, excellent flow through stent          Blood Loss:  minimal       Complications:  None         Dne Johansen MD  2025

## 2025-04-21 NOTE — ANESTHESIA PROCEDURE NOTES
Airway  Date/Time: 4/21/2025 4:53 PM  Reason: elective    Airway not difficult    General Information and Staff   Patient location during procedure: OR  Anesthesiologist: Henok Tavarez MD  Performed: anesthesiologist   Performed by: Henok Tavarez MD  Authorized by: Henok Tavarez MD        Indications and Patient Condition  Indications for airway management: anesthesia  Sedation level: deep      Preoxygenated: yesPatient position: sniffing  MILS not maintained throughout    Mask difficulty assessment: 0 - not attempted  Planned trial extubation    Final Airway Details    Final airway type: endotracheal airway    Successful airway: ETT  Cuffed: yes   Successful intubation technique: Video laryngoscopy  Facilitating devices/methods: intubating stylet  Endotracheal tube insertion site: oral  Blade: GlideScope  Blade size: #3  ETT size (mm): 7.5    Cormack-Lehane Classification: grade I - full view of glottis  Placement verified by: capnometry   Cuff volume (mL): 7  Measured from: lips  ETT to lips (cm): 22  Number of attempts at approach: 1  Ventilation between attempts: none  Number of other approaches attempted: 0

## 2025-04-21 NOTE — CM/SW NOTE
04/21/25 1200   CM/SW Referral Data   Referral Source Social Work (self-referral)   Reason for Referral Discharge planning   Informant Patient;Spouse/Significant Other   Medical Hx   Does patient have an established PCP? Yes   Significant Past Medical/Mental Health Hx Lung CA, HTN   Patient Info   Patient's Current Mental Status at Time of Assessment Alert;Oriented   Patient's Home Environment House   Number of Levels in Home 2   Number of Stair in Home 14   Patient lives with Spouse/Significant other   Patient Status Prior to Admission   Independent with ADLs and Mobility No   Pt. requires assistance with Housework;Driving;Meals;Bathing;Dressing;Ambulating   Services in place prior to admission DME/Supplies at home   Type of DME/Supplies Wheeled Walker   Discharge Needs   Anticipated D/C needs Home health care   Choice of Post-Acute Provider   Informed patient of right to choose their preferred provider Yes   List of appropriate post-acute services provided to patient/family with quality data Yes   Information given to Patient;Spouse/Significant other     Social work was able to meet with the patient and her spouse at bedside regarding discharge planning.    The patient lives in a 2 level home with her spouse.  The patient requires assistance with most ADLs at baseline.  The patient owns a walker.    Social work advised the patient that the Anticipated therapy need: Home with Home Healthcare.  The patient is indifferent and would like time to think about it.  Social work provided the home health list to the patient and her spouse at bedside.    Social work will follow up.    The patient and her spouse have no questions or concerns at this time.    SW/CM to remain available for support and/or discharge planning.     Ainsley Jones MSW, LSW  Discharge Planner [id number]

## 2025-04-21 NOTE — PROGRESS NOTES
Progress Note     Yi Torres Patient Status:  Inpatient    1950 MRN [id number]   Location [facility] 4W/SW/SE Attending Manuel Teixeira MD   Hosp Day # 4 PCP Tammy Chan MD     Chief Complaint: sob, hypoxia    Subjective:   S: Patient here with sob   Denies cp, dizziness  Discussed plans for venogram  today  Very thirsty, this is her main complaint    Review of Systems:   10 point ROS completed and was negative, except for pertinent positive and negatives stated in subjective.    Objective:   Vital signs:  Temp:  [97.2 °F (36.2 °C)-98.8 °F (37.1 °C)] 98.1 °F (36.7 °C)  Pulse:  [] 80  Resp:  [16-20] 16  BP: ()/(51-76) 102/69  SpO2:  [94 %-99 %] 94 %    Wt Readings from Last 6 Encounters:   25 196 lb (88.9 kg)   25 203 lb (92.1 kg)   25 199 lb (90.3 kg)   25 201 lb 12.8 oz (91.5 kg)   25 198 lb (89.8 kg)   25 204 lb 6.4 oz (92.7 kg)         Physical Exam:    General: No acute distress. Alert ,         Respiratory: b/l wheezing  Cardiovascular: S1, S2. Regular rate and rhythm. No murmurs, rubs or gallops.   Abdomen: Soft, nontender, nondistended.  Positive bowel sounds. No rebound or guarding.  Neurologic: No focal neurological deficits.   Musculoskeletal: Moves all extremities.  Extremities: No edema.    Results:   Diagnostic Data:      Labs:    Labs Last 24 Hours:   BMP     CBC    Other     Na 137 Cl 103 BUN 8 Glu 117   Hb 8.8   PTT - Procal -   K 3.9 CO2 25.0 Cr 0.51   WBC 2.8 >< .0  INR - CRP -   Renal Lytes Endo    Hct 27.5   Trop - D dim -   eGFR - Ca 9.4 POC Gluc  236    LFT   pBNP - Lactic -   eGFR AA - PO4 - A1c -   AST - APk - Prot -  LDL -     Mg 1.7 TSH -   ALT - T fletcher - Alb -        COVID-19 Lab Results    COVID-19  No results found for: \"COVID19\"    Pro-Calcitonin  No results for input(s): \"PCT\" in the last 168 hours.    Cardiac  No results for input(s): \"TROP\", \"PBNP\" in the last 168 hours.    Creatinine Kinase  No  results for input(s): \"CK\" in the last 168 hours.    Inflammatory Markers  No results for input(s): \"CRP\", \"VANNA\", \"LDH\", \"DDIMER\" in the last 168 hours.    Imaging: Imaging data reviewed in Epic.    Medications: Scheduled Medications[1]    Assessment & Plan:   ASSESSMENT / PLAN:     Yi Torres is a 74-year-old female with a history of lung cancer on chemo, who presents with dyspnea and low pulse ox readings.      # Acute hypoxemic respiratory failure  - SpO2 86% on RA  - CXR at baseline  - Pulm consulted  - Ordered CT chest for further evaluation  - Home oxygen evaluation     # Right lung cancer  - diagnosed in 01/2025; follows Dr. Goodson, Dr. Reyna and Dr. Garrido.  - last chemo is 04/15/25  - Onc consult appreciated  -Completed chemotherapy but now is due for more radiation therapy then follow-up for immunotherapy     # Pancytopenia due to chemotherapy  - Counts are stable     # SVC syndrome and left subclanivan thrombus  - s/p SVC stenting and left subclavian vein thrombectomy on 02/14/25  - continue home Eliquis  -CT scan with evidence of no flow in SVC stent  -Planning for venogram on Monday 4/21     # Hypokalemia  - K 3.0, replenish     # Hyponatremia  - Na 135     # DM2  - hold home metformin  - SSI AC/HS     # Hypertension  - ECG showed sinus tachycardia with PACs  - increase home metoprolol 25 mg BID to 50 mg BID  - continue home verapamil     Diet: regular  PT/OT: deferred  DVT ppx: Eliquis  Line: none  Code status: Full  Dispo: expect greater than 2 midnights  DVT Prophylaxis: hold eliquis prior to venogram per ir today  CODE status: full  Fabian:    Central line: n/a  Dispo: further recs pending clinical course      Will the patient be referred to TCC on discharge?: yes  Estimated date of discharge: TBD  Discharge is dependent on: clinical improvemetn  At this point Ms. Torres is expected to be discharge to: home    Plan of care discussed with patient, nursing, oncology and  pulmonology    Outpatient records or previous hospital records reviewed.   Patient and/or patient's family given opportunity to ask questions and note understanding and agreeing with therapeutic plan as outlined     Coordinated care with providers and counseling re: treatment plan and workup    MDM: High complexity     LIOR PENA MD    Supplementary Documentation:         **Certification      PHYSICIAN Certification of Need for Inpatient Hospitalization - Initial Certification    Patient will require inpatient services that will reasonably be expected to span two midnight's based on the clinical documentation in H+P.   Based on patients current state of illness, I anticipate that, after discharge, patient will require TBD.                    [1]    magnesium oxide  400 mg Oral Once    ipratropium-albuterol  3 mL Nebulization q6h    potassium chloride  40 mEq Oral Once    insulin aspart  1-7 Units Subcutaneous TID CC    apixaban  5 mg Oral BID    cetirizine  10 mg Oral Daily    furosemide  20 mg Oral Daily    metoprolol tartrate  50 mg Oral BID    verapamil ER  240 mg Oral Nightly

## 2025-04-21 NOTE — PROGRESS NOTES
[facility] Hematology/Oncology    Inpatient Progress Note    Yi Torres Patient Status:  Inpatient    1950 MRN [id number]   Location [facility] 4W/SW/SE Attending Manuel Teixeira MD   Hosp Day # 4 PCP Tammy Chan MD     Reason for consultation: SVC syndrome, lung cancer    INTERVAL HISTORY   she continues to have some dyspnea.  Scheduled for venogram today to evaluate her SVC stent.  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  Her oncologic history is detailed below.    She presented to the hospital with generalized weakness and dyspnea on exertion over the last couple of days.  In the ER she was found to be tachycardic and hypoxic with O2 sats of 86% on room air.  Chest x-ray showed some improvement of her lung mass but otherwise no evidence of pneumonia.  She was hospitalized and started on oxygen therapy and Lasix.  Feeling a little bit better but still not back to her baseline.    2025 CT angiogram showed no PE shows a decrease in the right suprahilar mass along with cavitation suggestive of tumor necrosis previously seen satellite nodule is almost completely resolved.  SVC stent was seen but no flow within it.  Thrombus within the distal left brachiocephalic is unchanged.  Chest wall collaterals consistent with SVC occlusion      Review of Systems:  Hematology/Oncology ROS performed and negative except as above in HPI    History/Other:   Past Medical History:  Past Medical History[1]    Past Surgical History:  Past Surgical History[2]    Current Medications:  Current Medications[3]    Allergies:   Allergies[4]    Family Medical History:  Family History[5]    Social History:  Social History     Socioeconomic History    Marital status:      Spouse name: Not on file    Number of children: 1    Years of education:  Not on file    Highest education level: Not on file   Occupational History    Not on file   Tobacco Use    Smoking status: Former     Current packs/day: 0.00     Average packs/day: 0.5 packs/day for 45.0 years (22.5 ttl pk-yrs)     Types: Cigarettes     Start date: 1973     Quit date: 2018     Years since quittin.2     Passive exposure: Past    Smokeless tobacco: Never    Tobacco comments:     1 pack every 3 days   Vaping Use    Vaping status: Never Used   Substance and Sexual Activity    Alcohol use: Not Currently     Comment: very rarely    Drug use: No    Sexual activity: Not on file   Other Topics Concern     Service Not Asked    Blood Transfusions Not Asked    Caffeine Concern Yes     Comment: Coffee, 2 cups per day     Occupational Exposure Not Asked    Hobby Hazards Not Asked    Sleep Concern Not Asked    Stress Concern Not Asked    Weight Concern Not Asked    Special Diet Not Asked    Back Care Not Asked    Exercise Not Asked    Bike Helmet Not Asked    Seat Belt Not Asked    Self-Exams Not Asked    Grew up on a farm Not Asked    History of tanning No    Outdoor occupation Not Asked    Breast feeding Not Asked    Reaction to local anesthetic No    Left Handed No    Right Handed Yes    Currently spends a great deal of time in the sun No    Past Sunlamp Treatments for Acne Not Asked    Hx of Spending Great Deal of Time in Sun No    Bad sunburns in the past Yes    Tanning Salons in the Past No    Hx of Radiation Treatments No    Regular use of sun block Not Asked   Social History Narrative    - lives with     1 son      Social Drivers of Health     Food Insecurity: No Food Insecurity (2025)    NCSS - Food Insecurity     Worried About Running Out of Food in the Last Year: No     Ran Out of Food in the Last Year: No   Transportation Needs: No Transportation Needs (2025)    NCSS - Transportation     Lack of Transportation: No   Housing Stability: Not At Risk  (2025)    NCSS - Housing/Utilities     Has Housing: Yes     Worried About Losing Housing: No     Unable to Get Utilities: No       Gyn History:  OB History    Para Term  AB Living   1 1 0 0 0 0   SAB IAB Ectopic Multiple Live Births   0 0 0 0 0       Objective:    /74 (BP Location: Right arm)   Pulse 108   Temp 98 °F (36.7 °C) (Temporal)   Resp 18   Wt 88.9 kg (196 lb)   SpO2 93%   BMI 33.12 kg/m²   Physical Exam:  General: A&Ox3, NAD  HEENT: PERRL, OP clear  Neck: supple, no LAD or JVD, no tenderness  CV: RRR, no murmurs, + pulses  Pulm: CTA b/l, no w/r/r,   Chest wall collaterals noted  Lymph: no palpable lymphadenopathy throughout the cervical, supraclavicular, or axillary regions  Extremities: no edema or calf tenderness  Neurological: Grossly intact    Labs:  Lab Results   Component Value Date/Time    WBC 2.8 (L) 2025 06:44 AM    RBC 2.92 (L) 2025 06:44 AM    HGB 8.8 (L) 2025 06:44 AM    HCT 27.5 (L) 2025 06:44 AM    MCV 94.2 2025 06:44 AM    MCH 30.1 2025 06:44 AM    MCHC 32.0 2025 06:44 AM    RDW 27.0 (H) 2025 06:44 AM    NEPRELIM 1.54 2025 06:44 AM    .0 2025 06:44 AM       Lab Results   Component Value Date/Time     (H) 2025 06:44 AM    BUN 8 (L) 2025 06:44 AM    CREATSERUM 0.51 (L) 2025 06:44 AM    GFRNAA 45 (L) 2022 08:02 AM    CA 9.4 2025 06:44 AM    ALB 3.7 2025 05:38 PM     2025 06:44 AM    K 3.9 2025 06:44 AM     2025 06:44 AM    CO2 25.0 2025 06:44 AM    ALKPHO 59 2025 05:38 PM    AST 20 2025 05:38 PM    ALT 20 2025 05:38 PM       Imaging:  XR CHEST AP PORTABLE  (CPT=71045)  Result Date: 2025  CONCLUSION:  1. Stable chest without radiographically evident acute intrathoracic process.  2. Large cavitary right lung mass is redemonstrated.    A preliminary report was issued by the [facility] Radiology  teleradiology service. There are no major discrepancies.   Dictated by (CST): Steven Tong MD on 4/19/2025 at 5:26 AM     Finalized by (CST): Steven Tong MD on 4/19/2025 at 5:28 AM           Assessment & Plan:    Yi Torres is a 74 year old female with locally advanced lung cancer and SVC syndrome s/p SVC stenting and nearly completed concurrent chemo RT with weekly carboplatin paclitaxel.  Now hospitalized here with hypoxia.    # CTA shows occlusion of the SVC stent.  Noted plans for venogram by internventional radiology to see if she is a candidate for any further endovascular intervention    # Neutropenia has improved.  She does not require any further G-CSF therapy    # Will likely need oxygen therapy as an outpatient.  Will arrange oncologic follow-up as an outpatient postdischarge.    # Continue anticoagulation with apixaban    Thank you  for the opportunity to participate in the care of this interesting patient. Please do contact me if I may be of any further assistance    José Miguel Goodson MD  Merged with [facility]   [facility]    This note was created using a voice-recognition transcribing system. Incorrect words or phrases may have been missed during proofreading. Please interpret accordingly.         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     Cholecystectomy      Laparoscopic incisional / umbilical / ventral hernia repair  09/19/2024    Port, indwelling, imp     [3]    [COMPLETED] magnesium oxide (Mag-Ox) tab 400 mg  400 mg Oral Once    ipratropium-albuterol (Duoneb) 0.5-2.5 (3) MG/3ML inhalation solution 3 mL  3 mL Nebulization q6h    ipratropium-albuterol (Duoneb) 0.5-2.5 (3) MG/3ML inhalation solution 3 mL  3 mL Nebulization Q4H PRN    [COMPLETED] potassium chloride (Klor-Con M20) tab 40 mEq  40 mEq Oral Q4H    [COMPLETED] iopamidol 76% (ISOVUE-370) injection for power injector  80 mL Intravenous ONCE PRN    [COMPLETED] filgrastim-aafi (Nivestym) 480 MCG/0.8ML prefilled syringe 480 mcg  480 mcg Subcutaneous Once    [COMPLETED] furosemide (Lasix) 10 mg/mL injection 40 mg  40 mg Intravenous Once    glucose (Dex4) 15 GM/59ML oral liquid 15 g  15 g Oral Q15 Min PRN    Or    glucose (Glutose) 40% oral gel 15 g  15 g Oral Q15 Min PRN    Or    glucose-vitamin C (Dex-4) chewable tab 4 tablet  4 tablet Oral Q15 Min PRN    Or    dextrose 50% injection 50 mL  50 mL Intravenous Q15 Min PRN    Or    glucose (Dex4) 15 GM/59ML oral liquid 30 g  30 g Oral Q15 Min PRN    Or    glucose (Glutose) 40% oral gel 30 g  30 g Oral Q15 Min PRN    Or    glucose-vitamin C (Dex-4) chewable tab 8 tablet  8 tablet Oral Q15 Min PRN    potassium chloride (Klor-Con M20) tab 40 mEq  40 mEq Oral Once    acetaminophen (Tylenol Extra Strength) tab 500 mg  500 mg Oral Q4H PRN    morphINE PF 2 MG/ML injection 1 mg  1 mg Intravenous Q2H PRN    Or    morphINE PF 2 MG/ML injection 2 mg  2 mg Intravenous Q2H PRN    Or    morphINE PF 4 MG/ML injection 4 mg  4 mg Intravenous Q2H PRN    melatonin tab 3 mg  3 mg Oral Nightly PRN    polyethylene glycol (PEG 3350) (Miralax) 17 g oral packet 17 g  17 g  Oral Daily PRN    sennosides (Senokot) tab 17.2 mg  17.2 mg Oral Nightly PRN    bisacodyl (Dulcolax) 10 MG rectal suppository 10 mg  10 mg Rectal Daily PRN    fleet enema (Fleet) rectal enema 133 mL  1 enema Rectal Once PRN    ondansetron (Zofran) 4 MG/2ML injection 4 mg  4 mg Intravenous Q6H PRN    metoclopramide (Reglan) 5 mg/mL injection 10 mg  10 mg Intravenous Q8H PRN    insulin aspart (NovoLOG) 100 Units/mL FlexPen 1-7 Units  1-7 Units Subcutaneous TID CC    benzonatate (Tessalon) cap 200 mg  200 mg Oral TID PRN    guaiFENesin (Robitussin) 100 MG/5 ML oral liquid 200 mg  200 mg Oral Q4H PRN    apixaban (Eliquis) tab 5 mg  5 mg Oral BID    cetirizine (ZyrTEC) tab 10 mg  10 mg Oral Daily    furosemide (Lasix) tab 20 mg  20 mg Oral Daily    metoprolol tartrate (Lopressor) tab 50 mg  50 mg Oral BID    verapamil ER (Calan-SR) tab 240 mg  240 mg Oral Nightly    prochlorperazine (Compazine) tab 10 mg  10 mg Oral Q6H PRN   [4]   Allergies  Allergen Reactions    Erythromycin DIARRHEA     Stomach pain   [5]   Family History  Problem Relation Age of Onset    Hypertension Father     Stroke Father     Heart Attack Mother

## 2025-04-21 NOTE — PROGRESS NOTES
[facility]  part of [facility]    Progress Note    Yi Torres Patient Status:  Inpatient    1950 MRN [id number]   Location [facility] 4W/SW/SE Attending Manuel Teixeira MD   Hosp Day # 4 PCP Tammy Chan MD     Subjective:   Seen and examined while sitting in chair. Admits to dyspnea. Has chronic nonproductive cough, unchanged. No wheezing. No fever or chills. On 1 L O2 (no home O2).    Objective:   Blood pressure 122/57, pulse 99, temperature 98.1 °F (36.7 °C), temperature source Oral, resp. rate 18, weight 196 lb (88.9 kg), SpO2 93%.  Physical Exam  Vitals and nursing note reviewed.   Constitutional:       General: She is awake. She is not in acute distress.     Appearance: She is obese. She is ill-appearing.      Interventions: Nasal cannula in place.   HENT:      Head: Normocephalic and atraumatic.   Cardiovascular:      Rate and Rhythm: Normal rate and regular rhythm.   Pulmonary:      Effort: No respiratory distress.      Breath sounds: No wheezing, rhonchi or rales.   Abdominal:      General: There is no distension.      Palpations: Abdomen is soft.      Tenderness: There is no abdominal tenderness.   Musculoskeletal:      Cervical back: Normal range of motion and neck supple.      Right lower leg: Edema present.      Left lower leg: Edema present.   Skin:     General: Skin is warm and dry.   Neurological:      General: No focal deficit present.      Mental Status: She is alert and oriented to person, place, and time.   Psychiatric:         Mood and Affect: Mood normal.         Behavior: Behavior is cooperative.       Results:   Lab Results   Component Value Date    WBC 2.8 (L) 2025    HGB 8.8 (L) 2025    HCT 27.5 (L) 2025    .0 2025    CREATSERUM 0.51 (L) 2025    BUN 8 (L) 2025     2025    K 3.9 2025     2025    CO2 25.0 2025     (H) 2025    CA 9.4 2025     ALB 3.7 04/17/2025    ALKPHO 59 04/17/2025    BILT 0.9 04/17/2025    TP 6.5 04/17/2025    AST 20 04/17/2025    ALT 20 04/17/2025    PTT 26.1 02/13/2025    INR 1.15 02/13/2025    T4F 1.1 06/02/2021    TSH 2.859 04/19/2024    MG 1.7 04/21/2025    PHOS 3.1 02/21/2025    TROP <0.045 10/09/2020    TROPHS 6 04/17/2025    CK 78 11/08/2019    B12 643 03/17/2025     CTA chest 4/18/25:  No pulmonary embolus in the central, main, lobar, segmental pulmonary arteries. Nondiagnostic in the subsegmental pulmonary arteries due to respiratory motion artifact.      Right suprahilar mass has decreased in size since 2/13/2025 and developed a 6.1 cm cavitation along the inferior margin.  Findings are suggestive of tumor necrosis.      Previously visualized 1.8 cm satellite nodule within the right lateral upper lobe has nearly completely resolved.      SVC stent seen.  No flow seen within the SVC stent.      Thrombus within the distal left brachiocephalic vein is unchanged.      Multiple chest wall collaterals consistent with SVC occlusion.      Patchy bilateral lower lobe airspace opacities are new and may be secondary to atelectasis or pneumonia. Short-term followup suggested to ensure resolution.      Unchanged nodularity of the bilateral adrenal glands may be secondary to a metastases.       CXR 4/19/25:  1. Stable chest without radiographically evident acute intrathoracic process.      2. Large cavitary right lung mass is redemonstrated.     Assessment & Plan:   SVC syndrome  H/o SVC syndrome s/p left innominate/subclavian thrombectomy with SVC stent placement on 2/13/25  CT chest 4/18/25 with no flow within SVC stent  IR consulted  Plan:  -IR venogram today    Squamous cell lung cancer  CT chest with decrease in size in R suprahilar mass with development of cavitation suggestive of tumor necrosis  Completed concurrent chemo RT with weekly carboplatin paclitaxel  Plan:  -Follow up oncology and radiation oncology    Acute hypoxemic  respiratory failure  Lung cancer and likely COPD  Improving - down to 1 L O2 (no O2 at home)  Plan:  -O2 and wean as tolerated  -Evaluate need for home O2 prior to discharge  -DuoNebs Q6 hours  -Outpatient PFTs    DVT prophylaxis: Patient is on Eliquis    Code status: Full code    Andrew Conway PA-C  Pulmonary Medicine  4/21/2025

## 2025-04-22 ENCOUNTER — TELEPHONE (OUTPATIENT)
Dept: RADIATION ONCOLOGY | Facility: HOSPITAL | Age: 75
End: 2025-04-22

## 2025-04-22 VITALS
DIASTOLIC BLOOD PRESSURE: 70 MMHG | HEART RATE: 94 BPM | WEIGHT: 196 LBS | TEMPERATURE: 99 F | BODY MASS INDEX: 33 KG/M2 | OXYGEN SATURATION: 92 % | SYSTOLIC BLOOD PRESSURE: 138 MMHG | RESPIRATION RATE: 20 BRPM

## 2025-04-22 LAB
ANION GAP SERPL CALC-SCNC: 7 MMOL/L (ref 0–18)
BASOPHILS # BLD: 0 X10(3) UL (ref 0–0.2)
BASOPHILS NFR BLD: 0 %
BUN BLD-MCNC: 12 MG/DL (ref 9–23)
BUN/CREAT SERPL: 22.2 (ref 10–20)
CALCIUM BLD-MCNC: 9.1 MG/DL (ref 8.7–10.4)
CHLORIDE SERPL-SCNC: 103 MMOL/L (ref 98–112)
CO2 SERPL-SCNC: 28 MMOL/L (ref 21–32)
CREAT BLD-MCNC: 0.54 MG/DL (ref 0.55–1.02)
DEPRECATED RDW RBC AUTO: 85.8 FL (ref 35.1–46.3)
EGFRCR SERPLBLD CKD-EPI 2021: 98 ML/MIN/1.73M2 (ref 60–?)
EOSINOPHIL # BLD: 0 X10(3) UL (ref 0–0.7)
EOSINOPHIL NFR BLD: 0 %
ERYTHROCYTE [DISTWIDTH] IN BLOOD BY AUTOMATED COUNT: 27 % (ref 11–15)
GLUCOSE BLD-MCNC: 164 MG/DL (ref 70–99)
GLUCOSE BLDC GLUCOMTR-MCNC: 154 MG/DL (ref 70–99)
GLUCOSE BLDC GLUCOMTR-MCNC: 159 MG/DL (ref 70–99)
HCT VFR BLD AUTO: 27 % (ref 35–48)
HGB BLD-MCNC: 8.7 G/DL (ref 12–16)
LYMPHOCYTES NFR BLD: 0.36 X10(3) UL (ref 1–4)
LYMPHOCYTES NFR BLD: 13 %
MAGNESIUM SERPL-MCNC: 1.7 MG/DL (ref 1.6–2.6)
MCH RBC QN AUTO: 29.4 PG (ref 26–34)
MCHC RBC AUTO-ENTMCNC: 32.2 G/DL (ref 31–37)
MCV RBC AUTO: 91.2 FL (ref 80–100)
MONOCYTES # BLD: 0.25 X10(3) UL (ref 0.1–1)
MONOCYTES NFR BLD: 9 %
NEUTROPHILS # BLD AUTO: 2.02 X10 (3) UL (ref 1.5–7.7)
NEUTROPHILS NFR BLD: 78 %
NEUTS HYPERSEG # BLD: 2.18 X10(3) UL (ref 1.5–7.7)
NRBC BLD MANUAL-RTO: 3 % (ref ?–1)
OSMOLALITY SERPL CALC.SUM OF ELEC: 289 MOSM/KG (ref 275–295)
PLATELET # BLD AUTO: 156 10(3)UL (ref 150–450)
PLATELET MORPHOLOGY: NORMAL
POTASSIUM SERPL-SCNC: 4.2 MMOL/L (ref 3.5–5.1)
RBC # BLD AUTO: 2.96 X10(6)UL (ref 3.8–5.3)
SODIUM SERPL-SCNC: 138 MMOL/L (ref 136–145)
TOTAL CELLS COUNTED BLD: 100
WBC # BLD AUTO: 2.8 X10(3) UL (ref 4–11)

## 2025-04-22 PROCEDURE — 99232 SBSQ HOSP IP/OBS MODERATE 35: CPT | Performed by: INTERNAL MEDICINE

## 2025-04-22 PROCEDURE — 99232 SBSQ HOSP IP/OBS MODERATE 35: CPT | Performed by: PHYSICIAN ASSISTANT

## 2025-04-22 PROCEDURE — 99239 HOSP IP/OBS DSCHRG MGMT >30: CPT | Performed by: HOSPITALIST

## 2025-04-22 RX ORDER — SPIRONOLACTONE 25 MG/1
25 TABLET ORAL DAILY
Status: DISCONTINUED | OUTPATIENT
Start: 2025-04-22 | End: 2025-04-22

## 2025-04-22 RX ORDER — IPRATROPIUM BROMIDE AND ALBUTEROL SULFATE 2.5; .5 MG/3ML; MG/3ML
3 SOLUTION RESPIRATORY (INHALATION) EVERY 6 HOURS PRN
Qty: 270 ML | Refills: 0 | Status: SHIPPED | OUTPATIENT
Start: 2025-04-22

## 2025-04-22 RX ORDER — HYDROCHLOROTHIAZIDE 25 MG/1
25 TABLET ORAL DAILY
Status: DISCONTINUED | OUTPATIENT
Start: 2025-04-22 | End: 2025-04-22

## 2025-04-22 RX ORDER — MAGNESIUM OXIDE 400 MG/1
400 TABLET ORAL ONCE
Status: COMPLETED | OUTPATIENT
Start: 2025-04-22 | End: 2025-04-22

## 2025-04-22 NOTE — DISCHARGE SUMMARY
[facility]  part of [facility]    Discharge Summary    Yi Torres Patient Status:  Inpatient    1950 MRN [id number]   Location [facility] 4W/SW/SE Attending Manuel Teixeira MD   Hosp Day # 5 PCP Tammy Chan MD     Date of Admission: 2025 Disposition: Home or Self Care     Date of Discharge: 25      Admitting Diagnosis: Acute respiratory failure with hypoxia (HCC) [J96.01]    Hospital Discharge Diagnoses:  Acute on ch. Respiratory failure    Lace+ Score: 78  59-90 High Risk  29-58 Medium Risk  0-28   Low Risk.    TCM Follow-Up Recommendation:  LACE > 58: High Risk of readmission after discharge from the hospital.      Reason for Admission:   # Acute hypoxemic respiratory failure  # Right lung cancer  # Pancytopenia due to chemotherapy  # SVC syndrome and left subclanivan thrombus  # Hypokalemia  # Hyponatremia  # DM2  # Hypertension    Physical Exam:   General appearance: alert, appears stated age and cooperative  Pulmonary:  clear to auscultation bilaterally  Cardiovascular: S1, S2 normal, no murmur, click, rub or gallop, regular rate and rhythm  Abdominal: soft, non-tender; bowel sounds normal; no masses,  no organomegaly  Extremities: extremities normal, atraumatic, no cyanosis or edema  Psychiatric: calm      History of Present Illness:   Per Dr. Fraser  Yi Torres is a 74 year old female with a history of lung cancer on chemo, who presents with dyspnea and low pulse ox readings. Patient was recently diagnosed with right lung cancer in 2025. She received thrombectomy for SVC syndrome in 2025, and is on Eliquis since. She started chemoradiation therapy in 2025. Patient follows Dr. Goodson, Dr. Reyna and Dr. Garrido. Last chemo was 2 days 04/15/25. Patient developed generalized weakness, and dyspnea on exertion since the chemo. Patient does not have oxygen at home. She denied cough, fever, chest pain, emesis or diarrhea.     In ED,  patient has HR 120s, SpO2 86% on RA. ECG showed sinus tachycardia with PACs. Blood work and CXR unremarkable. Lasix 40 mg IV given. Pulm and Onc consulted.    Hospital Course:   # Acute hypoxemic respiratory failure  - SpO2 86% on RA  - CXR at baseline  - Pulm consulted  - Ordered CT chest for further evaluation  - Home oxygen evaluation--> no need for oxygen, improved     # Right lung cancer  - diagnosed in 01/2025; follows Dr. Goodson, Dr. Reyna and Dr. Garrido.  - last chemo is 04/15/25  - Onc consult appreciated  -Completed chemotherapy but now is due for more radiation therapy then follow-up for immunotherapy     # Pancytopenia due to chemotherapy  - Counts are stable     # SVC syndrome and left subclavian thrombus  - s/p SVC stenting and left subclavian vein thrombectomy on 02/14/25  - continue home Eliquis  -CT scan with evidence of no flow in SVC stent  -s/p venogram with pta on Monday 4/21--> with excellent flow thru stent     # Hypokalemia  - K 3.0, repleted     # Hyponatremia  - Na 135--> improved     # DM2  -resume home meds on dc     # Hypertension  -resume home meds oon dc     Consultations:   Pulmonology  IR  Oncology    Procedures:   -s/p venogram with pta on Monday 4/21--> with excellent flow thru stent    Complications: n/a    Discharge Condition: Good    Discharge Medications:      Discharge Medications        START taking these medications        Instructions Prescription details   ipratropium-albuterol 0.5-2.5 (3) MG/3ML Soln  Commonly known as: Duoneb      Take 3 mL by nebulization every 6 (six) hours as needed (for shortness of breath and wheezing).   Quantity: 270 mL  Refills: 0            CHANGE how you take these medications        Instructions Prescription details   atorvastatin 20 MG Tabs  Commonly known as: Lipitor  What changed:   how much to take  how to take this  when to take this      TAKE 1 TABLET (20MG) BY MOUTH EVERY DAY   Quantity: 90 tablet  Refills: 3            CONTINUE taking these  medications        Instructions Prescription details   acetaminophen 500 MG Tabs  Commonly known as: Tylenol Extra Strength      Take 1 tablet (500 mg total) by mouth every 4 (four) hours as needed.   Refills: 0     alendronate 70 MG Tabs  Commonly known as: Fosamax      Take 1 tablet (70 mg total) by mouth once a week.   Quantity: 12 tablet  Refills: 3     apixaban 5 MG Tabs  Commonly known as: Eliquis      Take 1 tablet (5 mg total) by mouth 2 (two) times daily. After completion of lovenox injections   Quantity: 60 tablet  Refills: 3     Centrum Silver Tabs      Take 1 tablet by mouth in the morning.   Refills: 0     Cetirizine HCl 10 MG Caps      Take 10 mg by mouth nightly.   Refills: 0     cholecalciferol 50 MCG (2000 UT) Caps  Commonly known as: Vitamin D3      Take 1 capsule (2,000 Units total) by mouth in the morning.   Refills: 0     fluticasone propionate 50 MCG/ACT Susp  Commonly known as: Flonase      2 sprays by Each Nare route daily.   Quantity: 1 each  Refills: 0     furosemide 20 MG Tabs  Commonly known as: Lasix      Take 1 tablet (20 mg total) by mouth daily.   Quantity: 30 tablet  Refills: 1     lidocaine-prilocaine 2.5-2.5 % Crea  Commonly known as: Emla      Apply to site 1 hour prior to port a cath needle insertion   Quantity: 5 g  Refills: 1     metFORMIN  MG Tb24  Commonly known as: Glucophage XR      Take 1 tablet (500 mg total) by mouth daily with food.   Quantity: 90 tablet  Refills: 3     metoprolol tartrate 25 MG Tabs  Commonly known as: Lopressor      Take 1 tablet (25 mg total) by mouth 2 (two) times daily.   Quantity: 180 tablet  Refills: 3     ondansetron 8 MG tablet  Commonly known as: Zofran      Take 1 tablet (8 mg total) by mouth every 8 (eight) hours as needed for Nausea.   Quantity: 30 tablet  Refills: 3     prochlorperazine 10 mg tablet  Commonly known as: Compazine      Take 1 tablet (10 mg total) by mouth every 6 (six) hours as needed for Nausea.   Quantity: 30  tablet  Refills: 3     sennosides-docusate 8.6-50 MG Tabs  Commonly known as: Senna Plus      Take 2 tablets by mouth daily.   Quantity: 30 tablet  Refills: 1     Spironolactone-HCTZ 25-25 MG Tabs  Commonly known as: ALDACTAZIDE      Take 1 tablet by mouth daily.   Quantity: 90 tablet  Refills: 3     verapamil  MG Tbcr  Commonly known as: Calan-SR      Take 1 tablet (240 mg total) by mouth nightly.   Quantity: 90 tablet  Refills: 3            STOP taking these medications      Potassium Chloride ER 10 MEQ Tbcr                  Where to Get Your Medications        These medications were sent to [facility]/pharmacy #[id number] - [place], [place] - [address] [place] [address] AT across from Behzad Jaquez, [phone], [phone]  [address] [place] [address]      Phone: [phone]   ipratropium-albuterol 0.5-2.5 (3) MG/3ML Soln         Follow up Visits: Follow-up with pcp in 1 week    Follow up Labs: n/a     Other Discharge Instructions: follow-up with oncology as instructed    LIOR PENA MD  4/22/2025  12:30 PM    > 35 min

## 2025-04-22 NOTE — PLAN OF CARE
Patient is alert and oriented. 2L NC. Scheduled and prn nebs for SOB. Remote tele in place. General diet. ACHS accuchecks. Ambulates with SBA and a walker. Dressing to right arm clean/dry/intact. Call light and personal belongings within reach. Safety precautions in place.  at bedside.     Problem: Patient Centered Care  Goal: Patient preferences are identified and integrated in the patient's plan of care  Description: Interventions: - Provide timely, complete, and accurate information to patient/family- Incorporate patient and family knowledge, values, beliefs, and cultural backgrounds into the planning and delivery of care- Encourage patient/family to participate in care and decision-making at the level they choose- Honor patient and family perspectives and choices  Outcome: Progressing     Problem: Diabetes/Glucose Control  Goal: Glucose maintained within prescribed range  Description: INTERVENTIONS:- Monitor Blood Glucose as ordered- Assess for signs and symptoms of hyperglycemia and hypoglycemia- Administer ordered medications to maintain glucose within target range- Assess barriers to adequate nutritional intake and initiate nutrition consult as needed- Instruct patient on self management of diabetes  Outcome: Progressing     Problem: PAIN - ADULT  Goal: Verbalizes/displays adequate comfort level or patient's stated pain goal  Description: INTERVENTIONS:- Encourage pt to monitor pain and request assistance- Assess pain using appropriate pain scale- Administer analgesics based on type and severity of pain and evaluate response- Implement non-pharmacological measures as appropriate and evaluate response- Consider cultural and social influences on pain and pain management- Manage/alleviate anxiety- Utilize distraction and/or relaxation techniques- Monitor for opioid side effects- Notify MD/LIP if interventions unsuccessful or patient reports new pain- Anticipate increased pain with activity and  pre-medicate as appropriate  Outcome: Progressing     Problem: SAFETY ADULT - FALL  Goal: Free from fall injury  Description: INTERVENTIONS:- Assess pt frequently for physical needs- Identify cognitive and physical deficits and behaviors that affect risk of falls.- [place] fall precautions as indicated by assessment.- Educate pt/family on patient safety including physical limitations- Instruct pt to call for assistance with activity based on assessment- Modify environment to reduce risk of injury- Provide assistive devices as appropriate- Consider OT/PT consult to assist with strengthening/mobility- Encourage toileting schedule  Outcome: Progressing     Problem: DISCHARGE PLANNING  Goal: Discharge to home or other facility with appropriate resources  Description: INTERVENTIONS:- Identify barriers to discharge w/pt and caregiver- Include patient/family/discharge partner in discharge planning- Arrange for needed discharge resources and transportation as appropriate- Identify discharge learning needs (meds, wound care, etc)- Arrange for interpreters to assist at discharge as needed- Consider post-discharge preferences of patient/family/discharge partner- Complete POLST form as appropriate- Assess patient's ability to be responsible for managing their own health- Refer to Case Management Department for coordinating discharge planning if the patient needs post-hospital services based on physician/LIP order or complex needs related to functional status, cognitive ability or social support system  Outcome: Progressing     Problem: RESPIRATORY - ADULT  Goal: Achieves optimal ventilation and oxygenation  Description: INTERVENTIONS:- Assess for changes in respiratory status- Assess for changes in mentation and behavior- Position to facilitate oxygenation and minimize respiratory effort- Oxygen supplementation based on oxygen saturation or ABGs- Provide Smoking Cessation handout, if applicable- Encourage broncho-pulmonary  hygiene including cough, deep breathe, Incentive Spirometry- Assess the need for suctioning and perform as needed- Assess and instruct to report SOB or any respiratory difficulty- Respiratory Therapy support as indicated- Manage/alleviate anxiety- Monitor for signs/symptoms of CO2 retention  Outcome: Progressing

## 2025-04-22 NOTE — PROGRESS NOTES
[facility]  part of [facility]    Progress Note    Yi Torres Patient Status:  Inpatient    1950 MRN [id number]   Location [facility] 4W/SW/SE Attending Manuel Teixeira MD   Hosp Day # 5 PCP Tammy Chan MD     Subjective:   Seen and examined while resting in bed. Tearful. Admits to ongoing dyspnea. Has chronic cough, unchanged. No wheezing. No fever or chills. On 3 L O2 (no home O2).    Objective:   Blood pressure 141/58, pulse 98, temperature 98.5 °F (36.9 °C), temperature source Oral, resp. rate 20, weight 196 lb (88.9 kg), SpO2 93%.  Physical Exam  Vitals and nursing note reviewed.   Constitutional:       General: She is awake. She is not in acute distress.     Appearance: She is obese. She is ill-appearing.      Interventions: Nasal cannula in place.   HENT:      Head: Normocephalic and atraumatic.   Cardiovascular:      Rate and Rhythm: Normal rate and regular rhythm.   Pulmonary:      Effort: No respiratory distress.      Breath sounds: No wheezing, rhonchi or rales.      Comments: Diminished breath sounds bilaterally  Abdominal:      General: There is no distension.      Palpations: Abdomen is soft.      Tenderness: There is no abdominal tenderness.   Musculoskeletal:      Cervical back: Normal range of motion and neck supple.      Right lower leg: Edema present.      Left lower leg: Edema present.   Skin:     General: Skin is warm and dry.   Neurological:      General: No focal deficit present.      Mental Status: She is alert and oriented to person, place, and time.   Psychiatric:         Mood and Affect: Mood normal.         Behavior: Behavior is cooperative.       Results:   Lab Results   Component Value Date    WBC 2.8 (L) 2025    HGB 8.7 (L) 2025    HCT 27.0 (L) 2025    .0 2025    CREATSERUM 0.54 (L) 2025    BUN 12 2025     2025    K 4.2 2025     2025    CO2 28.0 2025      (H) 04/22/2025    CA 9.1 04/22/2025    ALB 3.7 04/17/2025    ALKPHO 59 04/17/2025    BILT 0.9 04/17/2025    TP 6.5 04/17/2025    AST 20 04/17/2025    ALT 20 04/17/2025    PTT 26.1 02/13/2025    INR 1.15 02/13/2025    T4F 1.1 06/02/2021    TSH 2.859 04/19/2024    MG 1.7 04/22/2025    PHOS 3.1 02/21/2025    TROP <0.045 10/09/2020    TROPHS 6 04/17/2025    CK 78 11/08/2019    B12 643 03/17/2025     CTA chest 4/18/25:  No pulmonary embolus in the central, main, lobar, segmental pulmonary arteries. Nondiagnostic in the subsegmental pulmonary arteries due to respiratory motion artifact.      Right suprahilar mass has decreased in size since 2/13/2025 and developed a 6.1 cm cavitation along the inferior margin.  Findings are suggestive of tumor necrosis.      Previously visualized 1.8 cm satellite nodule within the right lateral upper lobe has nearly completely resolved.      SVC stent seen.  No flow seen within the SVC stent.      Thrombus within the distal left brachiocephalic vein is unchanged.      Multiple chest wall collaterals consistent with SVC occlusion.      Patchy bilateral lower lobe airspace opacities are new and may be secondary to atelectasis or pneumonia. Short-term followup suggested to ensure resolution.      Unchanged nodularity of the bilateral adrenal glands may be secondary to a metastases.       CXR 4/19/25:  1. Stable chest without radiographically evident acute intrathoracic process.      2. Large cavitary right lung mass is redemonstrated.     Assessment & Plan:   SVC syndrome  H/o SVC syndrome s/p left innominate/subclavian thrombectomy with SVC stent placement on 2/13/25  CT chest 4/18/25 with no flow within SVC stent  S/p SVC PTA by IR 4/22/25  Plan:  -Continue Eliquis    Squamous cell lung cancer  CT chest with decrease in size in R suprahilar mass with development of cavitation suggestive of tumor necrosis  Completed chemo with weekly carboplatin paclitaxel  Undergoing RT with 2  treatments remaining  Plans for initiation of immunotherapy  Plan:  -Follow up oncology and radiation oncology    Acute hypoxemic respiratory failure  Lung cancer and COPD  On 3 L O2 (no home O2)  Plan:  -O2 and wean as tolerated  -Ambulatory oximetry prior to discharge  -DuoNebs Q6 hours  -Outpatient PFTs  -Okay to discharge from pulmonary perspective with nebulizer and home O2  -Follow up with Dr. Garrido in 2-3 weeks    DVT prophylaxis: Patient is on Eliquis    Code status: Full code    D/w hospitalist, Dr. Teixeira and RN.    MUNA Goins-JU  Pulmonary Medicine  4/22/2025

## 2025-04-22 NOTE — CM/SW NOTE
Department  notified of request for DME, aidin referrals started. Assigned CM/SW to follow up with pt/family on further discharge planning.     Angie Cannon, DSC

## 2025-04-22 NOTE — PROGRESS NOTES
Patient is alert and oriented X4, vitals stable. Denies pain. ]Continues with schedule nebs. Remote tele in place. General diet. ACHS accuchecks with insulin sliding scale coverage. Ambulates with SBA and a walker. Dressing to right arm clean/dry/intact, noted bruising surrounding site. Patient ambulated in zhang and lowest O2 noted per tech was 90% on room air. Patient cleared for discharge. Discharge instructions reviewed with patient and  at bedside including need for follow up appointments; new, continued and discontinued medications and when to seek medical attention. PIV removed. Patient transported home by [facility].

## 2025-04-22 NOTE — DISCHARGE INSTRUCTIONS
Sometimes managing your health at home requires assistance.  The [facility] team has recognized your preference to use [facility].  They can be reached by phone at [phone].  The fax number for your reference is [phone].  A representative from the home health agency will contact you or your family to schedule your first visit.      Transportation resources for your appointments:  [facility] [phone]  [phone]  [facility] [phone]    Follow up with physicians as instructed, call their office to schedule an appointment.    Seek medical attention if you develop any chest pain or shortness of breath; severe nausea vomiting, diarrhea or constipation; temperature greater than 100.5.

## 2025-04-22 NOTE — CM/SW NOTE
CM was notified by [facility] that pt will need home 02 and nebulizer      RN to add a progress note   SPO2% on Room Air at Rest:   SPO2% on Oxygen at Rest:   At rest oxygen flow (liters per minute):   SPO2% Ambulation on Oxygen:   Ambulation oxygen flow (liters per minute):       MD to document AFTER O2 sats  I had a face to face visit with this patient and I evaluated the patient's O2 saturations done today.  The patient is mobile in their home and is in need of a portable oxygen tank.  The home oxygen will improve the patient's condition.    HME liaison notified of need. Order to be formatted for PA to cosign once liter flow determined    1135  Walk test done, no 02 indicated  PA notified to cosign order, HME notified to [facility]    CM met w/ pt to discuss transport home. She confirmed she lives in a [address] [place] [address]. Pt is agreeable to medicar w/ lift asst and is aware of cost $75.    Resources added to AVS for future transport needs to/from appts    Pt agrees to HC, res'd in aidin and liaison notified    Plan  Home with [facility]  Home w/ [facility] pend choice    / to remain available for support and/or discharge planning.     Juana Ramirez, REGINE    Ext [phone]

## 2025-04-22 NOTE — PROGRESS NOTES
[facility]  part of [facility]  Progress Note    Yi Law Toni Carlton Patient Status:  Inpatient    1950 MRN [id number]   Location [facility] 4W/SW/SE Attending Manuel Teixeira MD   Hosp Day # 5 PCP Tammy Chan MD       Subjective:   SOB, somewhat improved.     Objective:   Sitting up in bed  Facial swelling improved.     Blood pressure 141/58, pulse 98, temperature 98.5 °F (36.9 °C), temperature source Oral, resp. rate 20, weight 196 lb (88.9 kg), SpO2 93%.    Results:   Labs:  Lab Results   Component Value Date    WBC 2.8 2025    RBC 2.96 2025    HGB 8.7 2025    HCT 27.0 2025    MCV 91.2 2025    MCH 29.4 2025    MCHC 32.2 2025    RDW 27.0 2025    .0 2025     Procedure: SVC venography, PTA of occluded stent to 12 mm     Pre-Procedure Diagnosis:  occluded SVC stent     Post-Procedure Diagnosis: same     Anesthesia:  General     Findings:  occluded svc stent in mid portion, after pta to 12 mm, excellent flow through stent      Assessment and Plan:   Post SVC PTA.  Breathing mildly improved.  Facial swelling much improved.    Continue anticoagulation.       BERTRAM CHURCH, APRN  2025

## 2025-04-22 NOTE — PHYSICAL THERAPY NOTE
PHYSICAL THERAPY TREATMENT NOTE - INPATIENT     Room Number: 468/468-A       Presenting Problem: Acute respiratory failure  Co-Morbidities : obesity, Anemia, neoplasm, CKD, BPPV    Problem List  Principal Problem:    Acute respiratory failure with hypoxia (HCC)  Active Problems:    Non-small cell cancer of right lung (HCC)    Thrombosis due to vascular catheter      PHYSICAL THERAPY ASSESSMENT   Patient demonstrates steady progress this session, goals  remain in progress.      Patient is requiring contact guard assist as a result of the following impairments: decreased functional strength, decreased endurance/aerobic capacity, impaired standing dynamic balance, decreased muscular endurance, and medical status.     Patient continues to function below baseline with bed mobility, transfers, gait, stair negotiation, maintaining seated position, standing prolonged periods, and performing household tasks.  Next session anticipate patient to progress bed mobility, transfers, gait, stair negotiation, maintaining seated position, standing prolonged periods, and performing household tasks.  Physical Therapy will continue to follow patient for duration of hospitalization.    Patient continues to benefit from continued skilled PT services: at discharge to promote prior level of function and safety with additional support and return home with home health PT.    PLAN DURING HOSPITALIZATION  Nursing Mobility Recommendation : 1 Assist  PT Device Recommendation: Rolling walker  PT Treatment Plan: Bed mobility, Body mechanics, Endurance, Energy conservation, Patient education, Family education, Gait training, Range of motion, Strengthening, Transfer training, Balance training, Stoop training, Stair training  Frequency (Obs): 5x/week     SUBJECTIVE  \"Now I'm really short of breath\" pt reporting during ambulation    OBJECTIVE  Precautions: Bed/chair alarm      PAIN ASSESSMENT   Ratin  Location: denies       BALANCE  Static Sitting:  Good  Dynamic Sitting: Fair +  Static Standing: Fair -  Dynamic Standing: Poor +    ACTIVITY TOLERANCE  Pulse: 96  Heart Rate Source: Monitor                    O2 WALK  Oxygen Therapy  SPO2% on Room Air at Rest: 91  SPO2% Ambulation on Room Air: 93    AM-PAC '6-Clicks' INPATIENT SHORT FORM - BASIC MOBILITY  How much difficulty does the patient currently have...  Patient Difficulty: Turning over in bed (including adjusting bedclothes, sheets and blankets)?: A Little   Patient Difficulty: Sitting down on and standing up from a chair with arms (e.g., wheelchair, bedside commode, etc.): A Little   Patient Difficulty: Moving from lying on back to sitting on the side of the bed?: A Little   How much help from another person does the patient currently need...   Help from Another: Moving to and from a bed to a chair (including a wheelchair)?: A Little   Help from Another: Need to walk in hospital room?: A Little   Help from Another: Climbing 3-5 steps with a railing?: A Little     AM-PAC Score:  Raw Score: 18   Approx Degree of Impairment: 46.58%   Standardized Score (AM-PAC Scale): 43.63   CMS Modifier (G-Code): CK    FUNCTIONAL ABILITY STATUS  Functional Mobility/Gait Assessment  Gait Assistance: Contact guard assist  Distance (ft): 60ft x 2  Assistive Device: Rolling walker  Pattern: Shuffle (decreased david speed, decreased step length. Cues for upright posture and safe management of RW with turns. Encouraged standing rest breaks as needed.Distance ambulated limited by c/o shortness of breath)  Stairs:  (pt declined stair negotiation- stated ambulette will bring her to apartment level)  Sit to Stand: contact guard assist with use of RW. Cues for appropriate UE positioning with transitional movements. Cues for pacing upon standing to allow body time to acclimate to change in position.     Skilled Therapy Provided: Patient received sitting in bedside chair with spouse present. RN approved activity. Therapist educated  pt on POC and physiological benefits of mobilization. Cues for activity pacing, energy conservation and pursed lipped breathing techniques. Introduced pt to rate of perceived exertion scale (RPE) and indication for rest breaks. Patient agreeable to participate. Denies pain. *SpO2 on room air at rest 91% with activity: 93%. HR: 96-104bpm. RPE: 5 out of 10.     The patient's Approx Degree of Impairment: 46.58% has been calculated based on documentation in the [facility] '6 clicks' Inpatient Daily Activity Short Form.  Research supports that patients with this level of impairment may benefit from home with home PT and family support. .  Final disposition will be made by interdisciplinary medical team.    THERAPEUTIC EXERCISES  Lower Extremity Ankle pumps     Position Sitting       Patient End of Session: Up in chair, Needs met, Call light within reach, RN aware of session/findings, Family present    CURRENT GOALS   Goals to be met by: 5/10/2025  Patient Goal Patient's self-stated goal is: Return home    Goal #1 Patient is able to demonstrate supine - sit EOB @ level: independent     Goal #1   Current Status NT   Goal #2 Patient is able to demonstrate transfers Sit to/from Stand at assistance level: modified independent with walker - rolling     Goal #2  Current Status progressing   Goal #3 Patient is able to ambulate 300 feet with assist device: walker - rolling at assistance level: modified independent   Goal #3   Current Status progressing   Goal #4 Patient will negotiate 12 stairs/one curb w/ assistive device and supervision   Goal #4   Current Status Pt declined   Goal #5 Patient to demonstrate independence with home activity/exercise instructions provided to patient in preparation for discharge.   Goal #5   Current Status ongoing     Gait Training: 15 minutes  Therapeutic Activity: 8 minutes

## 2025-04-22 NOTE — TELEPHONE ENCOUNTER
Called patient to let her know to come in for treatment at 9am tomorrow. She states her understanding

## 2025-04-22 NOTE — PROGRESS NOTES
Ear Wax Removal    Date/Time: 2/22/2022 11:11 AM  Performed by: Raimundo Bangura D.O.  Authorized by: Raimundo Bangura D.O.     Anesthesia:  Local Anesthetic: none  Location details: left ear  Patient tolerance: patient tolerated the procedure well with no immediate complications  Procedure type: curette              [facility] Hematology/Oncology    Inpatient Progress Note    Yi Torres Patient Status:  Inpatient    1950 MRN [id number]   Location [facility] 4W/SW/SE Attending Manuel Teixeira MD   Hosp Day # 5 PCP Tammy Chan MD     Reason for consultation: SVC syndrome, lung cancer    INTERVAL HISTORY   underwent venogram with angioplasty with restoration of flow.  Feeling a little better today off oxygen and facial swelling has decreased.  Anticipating discharge later today     she continues to have some dyspnea.  Scheduled for venogram today to evaluate her SVC stent.  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  Her oncologic history is detailed below.    She presented to the hospital with generalized weakness and dyspnea on exertion over the last couple of days.  In the ER she was found to be tachycardic and hypoxic with O2 sats of 86% on room air.  Chest x-ray showed some improvement of her lung mass but otherwise no evidence of pneumonia.  She was hospitalized and started on oxygen therapy and Lasix.  Feeling a little bit better but still not back to her baseline.    2025 CT angiogram showed no PE shows a decrease in the right suprahilar mass along with cavitation suggestive of tumor necrosis previously seen satellite nodule is almost completely resolved.  SVC stent was seen but no flow within it.  Thrombus within the distal left brachiocephalic is unchanged.  Chest wall collaterals consistent with SVC occlusion      Review of Systems:  Hematology/Oncology ROS performed and negative except as above in HPI    History/Other:   Past Medical History:  Past Medical History[1]    Past Surgical History:  Past Surgical History[2]    Current Medications:  Current Medications[3]    Allergies:   Allergies[4]    Family Medical  History:  Family History[5]    Social History:  Social History     Socioeconomic History    Marital status:      Spouse name: Not on file    Number of children: 1    Years of education: Not on file    Highest education level: Not on file   Occupational History    Not on file   Tobacco Use    Smoking status: Former     Current packs/day: 0.00     Average packs/day: 0.5 packs/day for 45.0 years (22.5 ttl pk-yrs)     Types: Cigarettes     Start date: 1973     Quit date: 2018     Years since quittin.2     Passive exposure: Past    Smokeless tobacco: Never    Tobacco comments:     1 pack every 3 days   Vaping Use    Vaping status: Never Used   Substance and Sexual Activity    Alcohol use: Not Currently     Comment: very rarely    Drug use: No    Sexual activity: Not on file   Other Topics Concern     Service Not Asked    Blood Transfusions Not Asked    Caffeine Concern Yes     Comment: Coffee, 2 cups per day     Occupational Exposure Not Asked    Hobby Hazards Not Asked    Sleep Concern Not Asked    Stress Concern Not Asked    Weight Concern Not Asked    Special Diet Not Asked    Back Care Not Asked    Exercise Not Asked    Bike Helmet Not Asked    Seat Belt Not Asked    Self-Exams Not Asked    Grew up on a farm Not Asked    History of tanning No    Outdoor occupation Not Asked    Breast feeding Not Asked    Reaction to local anesthetic No    Left Handed No    Right Handed Yes    Currently spends a great deal of time in the sun No    Past Sunlamp Treatments for Acne Not Asked    Hx of Spending Great Deal of Time in Sun No    Bad sunburns in the past Yes    Tanning Salons in the Past No    Hx of Radiation Treatments No    Regular use of sun block Not Asked   Social History Narrative    - lives with     1 son      Social Drivers of Health     Food Insecurity: No Food Insecurity (2025)    NCSS - Food Insecurity     Worried About Running Out of Food in the Last Year: No     Ran  Out of Food in the Last Year: No   Transportation Needs: No Transportation Needs (2025)    NCSS - Transportation     Lack of Transportation: No   Housing Stability: Not At Risk (2025)    NCSS - Housing/Utilities     Has Housing: Yes     Worried About Losing Housing: No     Unable to Get Utilities: No       Gyn History:  OB History    Para Term  AB Living   1 1 0 0 0 0   SAB IAB Ectopic Multiple Live Births   0 0 0 0 0       Objective:    /70 (BP Location: Left arm)   Pulse 94   Temp 98.8 °F (37.1 °C) (Oral)   Resp 20   Wt 88.9 kg (196 lb)   SpO2 92%   BMI 33.12 kg/m²   Physical Exam:  General: A&Ox3, NAD  HEENT: PERRL, OP clear  Neck: supple, no LAD or JVD, no tenderness  CV: RRR, no murmurs, + pulses  Pulm: CTA b/l, no w/r/r,   Chest wall collaterals noted  improved, facial swelling has improved  Lymph: no palpable lymphadenopathy throughout the cervical, supraclavicular, or axillary regions  Extremities: no edema or calf tenderness  Neurological: Grossly intact    Labs:  Lab Results   Component Value Date/Time    WBC 2.8 (L) 2025 06:29 AM    RBC 2.96 (L) 2025 06:29 AM    HGB 8.7 (L) 2025 06:29 AM    HCT 27.0 (L) 2025 06:29 AM    MCV 91.2 2025 06:29 AM    MCH 29.4 2025 06:29 AM    MCHC 32.2 2025 06:29 AM    RDW 27.0 (H) 2025 06:29 AM    NEPRELIM 2.02 2025 06:29 AM    .0 2025 06:29 AM       Lab Results   Component Value Date/Time     (H) 2025 06:29 AM    BUN 12 2025 06:29 AM    CREATSERUM 0.54 (L) 2025 06:29 AM    GFRNAA 45 (L) 2022 08:02 AM    CA 9.1 2025 06:29 AM    ALB 3.7 2025 05:38 PM     2025 06:29 AM    K 4.2 2025 06:29 AM     2025 06:29 AM    CO2 28.0 2025 06:29 AM    ALKPHO 59 2025 05:38 PM    AST 20 2025 05:38 PM    ALT 20 2025 05:38 PM       Imaging:  No results found.     Assessment & Plan:    Yi  Thanh Torres is a 74 year old female with locally advanced lung cancer and SVC syndrome s/p SVC stenting and nearly completed concurrent chemo RT with weekly carboplatin paclitaxel.  Now hospitalized here with hypoxia.    # CTA shows occlusion of the SVC stent.  S/p venogram with PTA of stent with restoration of flow  - will plan repeat CT in a few weeks    # Neutropenia has improved.  She does not require any further G-CSF therapy    # Continue anticoagulation with apixaban    # She will contact [facility] to reschedule her last 2 fractions of RT    Thank you  for the opportunity to participate in the care of this interesting patient. Please do contact me if I may be of any further assistance    José Miguel Goodson MD  [facility]   [facility]    This note was created using a voice-recognition transcribing system. Incorrect words or phrases may have been missed during proofreading. Please interpret accordingly.         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     Cholecystectomy      Laparoscopic incisional / umbilical / ventral hernia repair  09/19/2024    Port, indwelling,  imp     [3]    [COMPLETED] magnesium oxide (Mag-Ox) tab 400 mg  400 mg Oral Once    spironolactone (Aldactone) tab 25 mg  25 mg Oral Daily    hydroCHLOROthiazide tab 25 mg  25 mg Oral Daily    [COMPLETED] magnesium oxide (Mag-Ox) tab 400 mg  400 mg Oral Once    [COMPLETED] heparin (Porcine) 1000 UNIT/ML injection        [COMPLETED] heparin in sodium chloride 0.9% (Porcine) 2 Units/mL flush bag premix        [COMPLETED] lidocaine (Xylocaine) 2 % injection        [COMPLETED] fentaNYL (Sublimaze) 50 mcg/mL injection        [COMPLETED] midazolam (Versed) 2 MG/2ML injection        [COMPLETED] iopamidol (ISOVUE-300) 61 % injection 100 mL  100 mL Injection ONCE PRN    ipratropium-albuterol (Duoneb) 0.5-2.5 (3) MG/3ML inhalation solution 3 mL  3 mL Nebulization q6h    ipratropium-albuterol (Duoneb) 0.5-2.5 (3) MG/3ML inhalation solution 3 mL  3 mL Nebulization Q4H PRN    [COMPLETED] potassium chloride (Klor-Con M20) tab 40 mEq  40 mEq Oral Q4H    [COMPLETED] iopamidol 76% (ISOVUE-370) injection for power injector  80 mL Intravenous ONCE PRN    [COMPLETED] filgrastim-aafi (Nivestym) 480 MCG/0.8ML prefilled syringe 480 mcg  480 mcg Subcutaneous Once    [COMPLETED] furosemide (Lasix) 10 mg/mL injection 40 mg  40 mg Intravenous Once    glucose (Dex4) 15 GM/59ML oral liquid 15 g  15 g Oral Q15 Min PRN    Or    glucose (Glutose) 40% oral gel 15 g  15 g Oral Q15 Min PRN    Or    glucose-vitamin C (Dex-4) chewable tab 4 tablet  4 tablet Oral Q15 Min PRN    Or    dextrose 50% injection 50 mL  50 mL Intravenous Q15 Min PRN    Or    glucose (Dex4) 15 GM/59ML oral liquid 30 g  30 g Oral Q15 Min PRN    Or    glucose (Glutose) 40% oral gel 30 g  30 g Oral Q15 Min PRN    Or    glucose-vitamin C (Dex-4) chewable tab 8 tablet  8 tablet Oral Q15 Min PRN    potassium chloride (Klor-Con M20) tab 40 mEq  40 mEq Oral Once    acetaminophen (Tylenol Extra Strength) tab 500 mg  500 mg Oral Q4H PRN    morphINE PF 2 MG/ML injection 1 mg  1 mg  Intravenous Q2H PRN    Or    morphINE PF 2 MG/ML injection 2 mg  2 mg Intravenous Q2H PRN    Or    morphINE PF 4 MG/ML injection 4 mg  4 mg Intravenous Q2H PRN    melatonin tab 3 mg  3 mg Oral Nightly PRN    polyethylene glycol (PEG 3350) (Miralax) 17 g oral packet 17 g  17 g Oral Daily PRN    sennosides (Senokot) tab 17.2 mg  17.2 mg Oral Nightly PRN    bisacodyl (Dulcolax) 10 MG rectal suppository 10 mg  10 mg Rectal Daily PRN    fleet enema (Fleet) rectal enema 133 mL  1 enema Rectal Once PRN    ondansetron (Zofran) 4 MG/2ML injection 4 mg  4 mg Intravenous Q6H PRN    metoclopramide (Reglan) 5 mg/mL injection 10 mg  10 mg Intravenous Q8H PRN    insulin aspart (NovoLOG) 100 Units/mL FlexPen 1-7 Units  1-7 Units Subcutaneous TID CC    benzonatate (Tessalon) cap 200 mg  200 mg Oral TID PRN    guaiFENesin (Robitussin) 100 MG/5 ML oral liquid 200 mg  200 mg Oral Q4H PRN    apixaban (Eliquis) tab 5 mg  5 mg Oral BID    cetirizine (ZyrTEC) tab 10 mg  10 mg Oral Daily    furosemide (Lasix) tab 20 mg  20 mg Oral Daily    metoprolol tartrate (Lopressor) tab 50 mg  50 mg Oral BID    verapamil ER (Calan-SR) tab 240 mg  240 mg Oral Nightly    prochlorperazine (Compazine) tab 10 mg  10 mg Oral Q6H PRN   [4]   Allergies  Allergen Reactions    Erythromycin DIARRHEA     Stomach pain   [5]   Family History  Problem Relation Age of Onset    Hypertension Father     Stroke Father     Heart Attack Mother

## 2025-04-23 ENCOUNTER — PATIENT OUTREACH (OUTPATIENT)
Dept: CASE MANAGEMENT | Age: 75
End: 2025-04-23

## 2025-04-23 DIAGNOSIS — I10 ESSENTIAL HYPERTENSION, BENIGN: Chronic | ICD-10-CM

## 2025-04-23 NOTE — PROGRESS NOTES
SHAMEKA request (discharged 04/22)    Dr Tammy Chan  [facility]  [address] [place] [address]  [id number]  [place] [address] [phone]  Patient would like a call back later

## 2025-04-23 NOTE — PAYOR COMM NOTE
--------------  DISCHARGE REVIEW    Payor: [employer]  Subscriber #:  [id number]  Authorization Number: [id number]    Admit date: 25  Admit time:  11:14 PM  Discharge Date: 2025  5:12 PM     Admitting Physician: Trinidad Hathaway MD  Attending Physician:  No att. providers found  Primary Care Physician: Tammy Chan MD          Discharge Summary Notes        Discharge Summary signed by Manuel Teixeira MD at 2025 12:34 PM       Author: Manuel Teixeira MD Specialty: HOSPITALIST Author Type: Physician    Filed: 2025 12:34 PM Date of Service: 2025 12:30 PM Status: Signed    : Manuel Teixeira MD (Physician)         [facility]  [facility]    Discharge Summary    Yi Torres Patient Status:  Inpatient    1950 MRN [id number]   Location [facility] 4W/SW/SE Attending Manuel Teixeira MD   Hosp Day # 5 PCP Tammy Chan MD     Date of Admission: 2025 Disposition: Home or Self Care     Date of Discharge: 25      Admitting Diagnosis: Acute respiratory failure with hypoxia (HCC) [J96.01]    Hospital Discharge Diagnoses:  Acute on ch. Respiratory failure    Lace+ Score: 78  59-90 High Risk  29-58 Medium Risk  0-28   Low Risk.    TCM Follow-Up Recommendation:  LACE > 58: High Risk of readmission after discharge from the hospital.      Reason for Admission:   # Acute hypoxemic respiratory failure  # Right lung cancer  # Pancytopenia due to chemotherapy  # SVC syndrome and left subclanivan thrombus  # Hypokalemia  # Hyponatremia  # DM2  # Hypertension    Physical Exam:   General appearance: alert, appears stated age and cooperative  Pulmonary:  clear to auscultation bilaterally  Cardiovascular: S1, S2 normal, no murmur, click, rub or gallop, regular rate and rhythm  Abdominal: soft, non-tender; bowel sounds normal; no masses,  no organomegaly  Extremities: extremities normal, atraumatic, no cyanosis or  edema  Psychiatric: calm      History of Present Illness:   Per Dr. Fraser  Yi Torres is a 74 year old female with a history of lung cancer on chemo, who presents with dyspnea and low pulse ox readings. Patient was recently diagnosed with right lung cancer in 01/2025. She received thrombectomy for SVC syndrome in 02/2025, and is on Eliquis since. She started chemoradiation therapy in 03/2025. Patient follows Dr. Goodson, Dr. Reyna and Dr. Garrido. Last chemo was 2 days 04/15/25. Patient developed generalized weakness, and dyspnea on exertion since the chemo. Patient does not have oxygen at home. She denied cough, fever, chest pain, emesis or diarrhea.     In ED, patient has HR 120s, SpO2 86% on RA. ECG showed sinus tachycardia with PACs. Blood work and CXR unremarkable. Lasix 40 mg IV given. Pulm and Onc consulted.    Hospital Course:   # Acute hypoxemic respiratory failure  - SpO2 86% on RA  - CXR at baseline  - Pulm consulted  - Ordered CT chest for further evaluation  - Home oxygen evaluation--> no need for oxygen, improved     # Right lung cancer  - diagnosed in 01/2025; follows Dr. Goodson, Dr. Reyna and Dr. Garrido.  - last chemo is 04/15/25  - Onc consult appreciated  -Completed chemotherapy but now is due for more radiation therapy then follow-up for immunotherapy     # Pancytopenia due to chemotherapy  - Counts are stable     # SVC syndrome and left subclavian thrombus  - s/p SVC stenting and left subclavian vein thrombectomy on 02/14/25  - continue home Eliquis  -CT scan with evidence of no flow in SVC stent  -s/p venogram with pta on Monday 4/21--> with excellent flow thru stent     # Hypokalemia  - K 3.0, repleted     # Hyponatremia  - Na 135--> improved     # DM2  -resume home meds on dc     # Hypertension  -resume home meds oon dc     Consultations:   Pulmonology  IR  Oncology    Procedures:   -s/p venogram with pta on Monday 4/21--> with excellent flow thru stent    Complications: n/a    Discharge  Condition: Good    Discharge Medications:      Discharge Medications        START taking these medications        Instructions Prescription details   ipratropium-albuterol 0.5-2.5 (3) MG/3ML Soln  Commonly known as: Duoneb      Take 3 mL by nebulization every 6 (six) hours as needed (for shortness of breath and wheezing).   Quantity: 270 mL  Refills: 0            CHANGE how you take these medications        Instructions Prescription details   atorvastatin 20 MG Tabs  Commonly known as: Lipitor  What changed:   how much to take  how to take this  when to take this      TAKE 1 TABLET (20MG) BY MOUTH EVERY DAY   Quantity: 90 tablet  Refills: 3            CONTINUE taking these medications        Instructions Prescription details   acetaminophen 500 MG Tabs  Commonly known as: Tylenol Extra Strength      Take 1 tablet (500 mg total) by mouth every 4 (four) hours as needed.   Refills: 0     alendronate 70 MG Tabs  Commonly known as: Fosamax      Take 1 tablet (70 mg total) by mouth once a week.   Quantity: 12 tablet  Refills: 3     apixaban 5 MG Tabs  Commonly known as: Eliquis      Take 1 tablet (5 mg total) by mouth 2 (two) times daily. After completion of lovenox injections   Quantity: 60 tablet  Refills: 3     Centrum Silver Tabs      Take 1 tablet by mouth in the morning.   Refills: 0     Cetirizine HCl 10 MG Caps      Take 10 mg by mouth nightly.   Refills: 0     cholecalciferol 50 MCG (2000 UT) Caps  Commonly known as: Vitamin D3      Take 1 capsule (2,000 Units total) by mouth in the morning.   Refills: 0     fluticasone propionate 50 MCG/ACT Susp  Commonly known as: Flonase      2 sprays by Each Nare route daily.   Quantity: 1 each  Refills: 0     furosemide 20 MG Tabs  Commonly known as: Lasix      Take 1 tablet (20 mg total) by mouth daily.   Quantity: 30 tablet  Refills: 1     lidocaine-prilocaine 2.5-2.5 % Crea  Commonly known as: Emla      Apply to site 1 hour prior to port a cath needle insertion    Quantity: 5 g  Refills: 1     metFORMIN  MG Tb24  Commonly known as: Glucophage XR      Take 1 tablet (500 mg total) by mouth daily with food.   Quantity: 90 tablet  Refills: 3     metoprolol tartrate 25 MG Tabs  Commonly known as: Lopressor      Take 1 tablet (25 mg total) by mouth 2 (two) times daily.   Quantity: 180 tablet  Refills: 3     ondansetron 8 MG tablet  Commonly known as: Zofran      Take 1 tablet (8 mg total) by mouth every 8 (eight) hours as needed for Nausea.   Quantity: 30 tablet  Refills: 3     prochlorperazine 10 mg tablet  Commonly known as: Compazine      Take 1 tablet (10 mg total) by mouth every 6 (six) hours as needed for Nausea.   Quantity: 30 tablet  Refills: 3     sennosides-docusate 8.6-50 MG Tabs  Commonly known as: Senna Plus      Take 2 tablets by mouth daily.   Quantity: 30 tablet  Refills: 1     Spironolactone-HCTZ 25-25 MG Tabs  Commonly known as: ALDACTAZIDE      Take 1 tablet by mouth daily.   Quantity: 90 tablet  Refills: 3     verapamil  MG Tbcr  Commonly known as: Calan-SR      Take 1 tablet (240 mg total) by mouth nightly.   Quantity: 90 tablet  Refills: 3            STOP taking these medications      Potassium Chloride ER 10 MEQ Tbcr                  Where to Get Your Medications        These medications were sent to [facility]/pharmacy #[id number] - [address] AT across from Behzad Jaquez, [phone], [phone]  [address] [place] [address]      Phone: [phone]   ipratropium-albuterol 0.5-2.5 (3) MG/3ML Soln         Follow up Visits: Follow-up with pcp in 1 week    Follow up Labs: n/a     Other Discharge Instructions: follow-up with oncology as instructed    LIOR PENA MD  4/22/2025  12:30 PM    > 35 min       Electronically signed by Lior Pena MD on 4/22/2025 12:34 PM         REVIEWER COMMENTS

## 2025-04-24 NOTE — PROGRESS NOTES
SHAMEKA request (discharged 04/22)     Dr Tammy Chan  [facility]  [address]  [address]  [place] [address] [phone]  Patient would like a call back tomorrow

## 2025-04-25 ENCOUNTER — TELEPHONE (OUTPATIENT)
Dept: FAMILY MEDICINE CLINIC | Facility: CLINIC | Age: 75
End: 2025-04-25

## 2025-04-25 ENCOUNTER — TELEPHONE (OUTPATIENT)
Dept: PULMONOLOGY | Facility: CLINIC | Age: 75
End: 2025-04-25

## 2025-04-25 RX ORDER — METOPROLOL TARTRATE 25 MG/1
25 TABLET, FILM COATED ORAL 2 TIMES DAILY
Qty: 180 TABLET | Refills: 3 | OUTPATIENT
Start: 2025-04-25

## 2025-04-25 NOTE — PROGRESS NOTES
SHAMEKA request (discharged 04/22)  Patient requesting video visits because at this time she is unable to get down her stairs     Dr Tammy Chan  Family Medicine  [address]  [address]  [place], [place] [id number] [phone]  Office will call patient, to schedule appointment    Dr Gabrielle Garrido  Pulmonary Diseases  [address]  [address]  [place] [address] [phone]  Follow up 3 weeks  Office will call patient, to schedule appointment  Patient verbalized understanding  Closing encounter

## 2025-04-25 NOTE — TELEPHONE ENCOUNTER
Michelle from scheduling called and is requesting a hospital follow up appointment for the patient but patient is asking if she can have a video visit because she can not get down her stairs     Can contact patient

## 2025-04-25 NOTE — TELEPHONE ENCOUNTER
Dr. Garrido-patient requesting video visit for hospital follow-up. When do you want to see patient?

## 2025-04-25 NOTE — TELEPHONE ENCOUNTER
The patient called to ask if she can have a video visit follow up with Dr. Garrido. Please call.

## 2025-04-28 ENCOUNTER — TELEMEDICINE (OUTPATIENT)
Dept: FAMILY MEDICINE CLINIC | Facility: CLINIC | Age: 75
End: 2025-04-28
Payer: MEDICARE

## 2025-04-28 DIAGNOSIS — N18.31 TYPE 2 DIABETES MELLITUS WITH STAGE 3A CHRONIC KIDNEY DISEASE, WITHOUT LONG-TERM CURRENT USE OF INSULIN (HCC): Chronic | ICD-10-CM

## 2025-04-28 DIAGNOSIS — I10 ESSENTIAL HYPERTENSION, BENIGN: Chronic | ICD-10-CM

## 2025-04-28 DIAGNOSIS — Z09 HOSPITAL DISCHARGE FOLLOW-UP: Primary | ICD-10-CM

## 2025-04-28 DIAGNOSIS — E11.22 TYPE 2 DIABETES MELLITUS WITH STAGE 3A CHRONIC KIDNEY DISEASE, WITHOUT LONG-TERM CURRENT USE OF INSULIN (HCC): Chronic | ICD-10-CM

## 2025-04-28 PROBLEM — R60.1 ANASARCA: Status: ACTIVE | Noted: 2025-03-03

## 2025-04-28 PROBLEM — R94.31 LONG QT INTERVAL: Status: ACTIVE | Noted: 2025-03-03

## 2025-04-28 PROBLEM — E78.01 HOMOZYGOUS FAMILIAL HYPERCHOLESTEROLEMIA: Status: ACTIVE | Noted: 2025-02-26

## 2025-04-28 PROBLEM — J44.9 COPD (CHRONIC OBSTRUCTIVE PULMONARY DISEASE) (HCC): Status: ACTIVE | Noted: 2025-03-03

## 2025-04-28 PROBLEM — R94.31 ABNORMAL ECG: Status: ACTIVE | Noted: 2025-03-03

## 2025-04-28 PROCEDURE — 1160F RVW MEDS BY RX/DR IN RCRD: CPT | Performed by: FAMILY MEDICINE

## 2025-04-28 PROCEDURE — 1159F MED LIST DOCD IN RCRD: CPT | Performed by: FAMILY MEDICINE

## 2025-04-28 PROCEDURE — 99213 OFFICE O/P EST LOW 20 MIN: CPT | Performed by: FAMILY MEDICINE

## 2025-04-28 RX ORDER — FUROSEMIDE 20 MG/1
20 TABLET ORAL DAILY
Qty: 30 TABLET | Refills: 0 | Status: SHIPPED | OUTPATIENT
Start: 2025-04-28 | End: 2025-05-21

## 2025-04-28 RX ORDER — METOPROLOL TARTRATE 25 MG/1
25 TABLET, FILM COATED ORAL 2 TIMES DAILY
Qty: 180 TABLET | Refills: 3 | Status: SHIPPED | OUTPATIENT
Start: 2025-04-28

## 2025-04-28 NOTE — PROGRESS NOTES
Virtual Telephone Check-In    Yi Torres verbally consents to a Virtual/Telephone Check-In service on 04/28/25.    Patient understands and accepts financial responsibility for any deductible, co-insurance and/or co-pays associated with this service.    Duration of the service: 20 minutes  This telemedicine visit was conducted using live audio and video.     Summary of topics discussed:     Hospital follow-up.  Patient diagnosed with lung cancer January 2025.  Thrombectomy for SVC syndrome February 2025 and currently on Eliquis.  Chemoradiation therapy was started March 2025.  Developed generalized weakness dyspnea on exertion while on chemo.  Not on oxygen at home.  Presented to the ER with dyspnea and was admitted.  EKG showed sinus tachycardia with PACs.  Chest x-ray unremarkable.  Was started on Lasix.  Home oxygen evaluation did not show need for oxygen.  Her room air saturations improved.  CT chest showed evidence of lack of flow through svc stent.    Per pt swelling in legs and feet has improved.   Facial swelling gone.     + coughing and using nebulizer and robitussin otc.     Physical Exam:  General: alert, speaking in full sentences, no acute distress  Lungs: respirations sound unlabored, no audible wheezing with speaking.  Neurologic: alert, oriented x3    Assessment and plan:    1. Hospital discharge follow-up  Hospital discharge summary reviewed and appreciated.    2. Essential hypertension, benign  Needs medication refill.  - metoprolol tartrate 25 MG Oral Tab; Take 1 tablet (25 mg total) by mouth 2 (two) times daily.  Dispense: 180 tablet; Refill: 3    3. Type 2 diabetes mellitus with stage 3a chronic kidney disease, without long-term current use of insulin ([facility])  Blood glucose control is good.  Needs refill of strips  - Glucose Blood In Vitro Strip; Use to check fasting blood sugar once daily  Dispense: 100 strip; Refill: 1  - Microalb/Creat Ratio, Random Urine; Future        Advised to  call back clinic if no improvement in symptoms. Red flags discussed to go to [facility]

## 2025-04-29 NOTE — TELEPHONE ENCOUNTER
Okay to double book for virtual visit I am fine with phone visit approximately 2 weeks after discharge.

## 2025-05-01 ENCOUNTER — TELEPHONE (OUTPATIENT)
Dept: FAMILY MEDICINE CLINIC | Facility: CLINIC | Age: 75
End: 2025-05-01

## 2025-05-01 NOTE — TELEPHONE ENCOUNTER
Nurse Steiner stated   Agency will have a   to call pt , regarding home condition- hoarding   Pt also do not want clinicians  to come anymore - Nursing /PT, pt wants to be discharged

## 2025-05-01 NOTE — TELEPHONE ENCOUNTER
Spoke to patient who declined appointment at this time. Patient reports having no questions for the doctor at this time.

## 2025-05-02 NOTE — TELEPHONE ENCOUNTER
Nurse Yenni stated Pt refused All home health services , she 's a Hoarder and feels judged   Stated she's disappointed because pt needs help

## 2025-05-03 ENCOUNTER — PATIENT MESSAGE (OUTPATIENT)
Dept: PULMONOLOGY | Facility: CLINIC | Age: 75
End: 2025-05-03

## 2025-05-03 DIAGNOSIS — C34.91 NON-SMALL CELL CANCER OF RIGHT LUNG (HCC): Primary | ICD-10-CM

## 2025-05-07 NOTE — TELEPHONE ENCOUNTER
Last office visit 2/25/25  Ipratropium-albuterol 0.5-2.5 (3) mg/3 mL inhalation solution last refill 4/22/25  Rx sent to provider for sign off if agreeable.

## 2025-05-09 ENCOUNTER — TELEPHONE (OUTPATIENT)
Dept: FAMILY MEDICINE CLINIC | Facility: CLINIC | Age: 75
End: 2025-05-09

## 2025-05-09 DIAGNOSIS — C34.11 MALIGNANT NEOPLASM OF UPPER LOBE OF RIGHT LUNG (HCC): Primary | ICD-10-CM

## 2025-05-12 ENCOUNTER — HOSPITAL ENCOUNTER (OUTPATIENT)
Dept: CT IMAGING | Facility: HOSPITAL | Age: 75
Discharge: HOME | End: 2025-05-12
Attending: INTERNAL MEDICINE
Payer: MEDICARE

## 2025-05-12 DIAGNOSIS — I87.1 SVC SYNDROME: ICD-10-CM

## 2025-05-12 DIAGNOSIS — T45.1X5A CHEMOTHERAPY INDUCED NEUTROPENIA: ICD-10-CM

## 2025-05-12 DIAGNOSIS — T85.868A: ICD-10-CM

## 2025-05-12 DIAGNOSIS — Z51.11 ENCOUNTER FOR ANTINEOPLASTIC CHEMOTHERAPY: ICD-10-CM

## 2025-05-12 DIAGNOSIS — D70.1 CHEMOTHERAPY INDUCED NEUTROPENIA: ICD-10-CM

## 2025-05-12 DIAGNOSIS — C34.11 MALIGNANT NEOPLASM OF UPPER LOBE OF RIGHT LUNG (HCC): ICD-10-CM

## 2025-05-12 PROCEDURE — 74177 CT ABD & PELVIS W/CONTRAST: CPT | Performed by: INTERNAL MEDICINE

## 2025-05-12 PROCEDURE — 71260 CT THORAX DX C+: CPT | Performed by: INTERNAL MEDICINE

## 2025-05-13 RX ORDER — IPRATROPIUM BROMIDE AND ALBUTEROL SULFATE 2.5; .5 MG/3ML; MG/3ML
3 SOLUTION RESPIRATORY (INHALATION) EVERY 6 HOURS PRN
Qty: 360 ML | Refills: 5 | Status: SHIPPED | OUTPATIENT
Start: 2025-05-13

## 2025-05-15 DIAGNOSIS — I10 ESSENTIAL HYPERTENSION, BENIGN: Chronic | ICD-10-CM

## 2025-05-16 DIAGNOSIS — C34.11 MALIGNANT NEOPLASM OF UPPER LOBE OF RIGHT LUNG (HCC): Primary | ICD-10-CM

## 2025-05-16 RX ORDER — VERAPAMIL HYDROCHLORIDE 240 MG/1
240 TABLET, FILM COATED, EXTENDED RELEASE ORAL NIGHTLY
Qty: 90 TABLET | Refills: 3 | Status: SHIPPED | OUTPATIENT
Start: 2025-05-16

## 2025-05-16 NOTE — TELEPHONE ENCOUNTER
Refill Per Protocol     Requested Prescriptions   Pending Prescriptions Disp Refills    VERAPAMIL  MG Oral Tab CR [Pharmacy Med Name: VERAPAMIL  MG TABLET] 90 tablet 3     Sig: TAKE 1 TABLET BY MOUTH NIGHTLY       Hypertension Medications Protocol Passed - 5/16/2025 11:17 AM        Passed - CMP or BMP in past 12 months        Passed - Last BP reading less than 140/90     BP Readings from Last 1 Encounters:   04/22/25 138/70               Passed - In person appointment or virtual visit in the past 12 mos or appointment in next 3 mos     Recent Outpatient Visits              2 weeks ago Hospital discharge follow-up    [facility], Tammy Green MD    Telemedicine    4 weeks ago Malignant neoplasm of upper lobe of right lung (HCC)    [facility]    Nurse Only    1 month ago Malignant neoplasm of upper lobe of right lung (HCC)    [facility]    Nurse Only    1 month ago Malignant neoplasm of upper lobe of right lung (HCC)    [facility]    Office Visit    1 month ago Malignant neoplasm of upper lobe of right lung (HCC)    [facility] - Rad/Onc Abdirizak Monroy MD    Office Visit          Future Appointments         Provider Department Appt Notes    In 3 days [facility] Port flush, CBC, CMP    In 3 days José Miguel Goodson MD [facility] Hematology Oncology [place]     In 1 month Shaji Genao MD [facility] 5 months                    Passed - EGFRCR or GFRNAA > 50     GFR Evaluation  EGFRCR: 98 , resulted on 4/22/2025          Passed - Medication is active on med list

## 2025-05-19 ENCOUNTER — OFFICE VISIT (OUTPATIENT)
Age: 75
End: 2025-05-19
Attending: INTERNAL MEDICINE
Payer: MEDICARE

## 2025-05-19 ENCOUNTER — NURSE ONLY (OUTPATIENT)
Age: 75
End: 2025-05-19
Attending: INTERNAL MEDICINE
Payer: MEDICARE

## 2025-05-19 ENCOUNTER — APPOINTMENT (OUTPATIENT)
Age: 75
End: 2025-05-19
Attending: INTERNAL MEDICINE
Payer: MEDICARE

## 2025-05-19 VITALS
RESPIRATION RATE: 16 BRPM | OXYGEN SATURATION: 100 % | TEMPERATURE: 98 F | DIASTOLIC BLOOD PRESSURE: 56 MMHG | HEART RATE: 92 BPM | BODY MASS INDEX: 30.19 KG/M2 | SYSTOLIC BLOOD PRESSURE: 120 MMHG | HEIGHT: 64.5 IN | WEIGHT: 179 LBS

## 2025-05-19 DIAGNOSIS — T85.868A: ICD-10-CM

## 2025-05-19 DIAGNOSIS — C34.91 NON-SMALL CELL CANCER OF RIGHT LUNG (HCC): ICD-10-CM

## 2025-05-19 DIAGNOSIS — C34.11 MALIGNANT NEOPLASM OF UPPER LOBE OF RIGHT LUNG (HCC): ICD-10-CM

## 2025-05-19 DIAGNOSIS — D50.9 IRON DEFICIENCY ANEMIA, UNSPECIFIED IRON DEFICIENCY ANEMIA TYPE: ICD-10-CM

## 2025-05-19 DIAGNOSIS — C34.11 MALIGNANT NEOPLASM OF UPPER LOBE OF RIGHT LUNG (HCC): Primary | ICD-10-CM

## 2025-05-19 LAB
ALBUMIN SERPL-MCNC: 3.9 G/DL (ref 3.2–4.8)
ALBUMIN/GLOB SERPL: 1.3 {RATIO} (ref 1–2)
ALP LIVER SERPL-CCNC: 69 U/L (ref 55–142)
ALT SERPL-CCNC: 22 U/L (ref 10–49)
ANION GAP SERPL CALC-SCNC: 9 MMOL/L (ref 0–18)
AST SERPL-CCNC: 26 U/L (ref ?–34)
BASOPHILS # BLD AUTO: 0.04 X10(3) UL (ref 0–0.2)
BASOPHILS NFR BLD AUTO: 0.6 %
BILIRUB SERPL-MCNC: 0.7 MG/DL (ref 0.2–1.1)
BUN BLD-MCNC: 31 MG/DL (ref 9–23)
BUN/CREAT SERPL: 41.9 (ref 10–20)
CALCIUM BLD-MCNC: 9.3 MG/DL (ref 8.7–10.4)
CHLORIDE SERPL-SCNC: 97 MMOL/L (ref 98–112)
CO2 SERPL-SCNC: 30 MMOL/L (ref 21–32)
CREAT BLD-MCNC: 0.74 MG/DL (ref 0.55–1.02)
DEPRECATED RDW RBC AUTO: 82.8 FL (ref 35.1–46.3)
EGFRCR SERPLBLD CKD-EPI 2021: 86 ML/MIN/1.73M2 (ref 60–?)
EOSINOPHIL # BLD AUTO: 0.05 X10(3) UL (ref 0–0.7)
EOSINOPHIL NFR BLD AUTO: 0.7 %
ERYTHROCYTE [DISTWIDTH] IN BLOOD BY AUTOMATED COUNT: 23.1 % (ref 11–15)
GLOBULIN PLAS-MCNC: 3.1 G/DL (ref 2–3.5)
GLUCOSE BLD-MCNC: 164 MG/DL (ref 70–99)
HCT VFR BLD AUTO: 36.6 % (ref 35–48)
HGB BLD-MCNC: 11.2 G/DL (ref 12–16)
IMM GRANULOCYTES # BLD AUTO: 0.02 X10(3) UL (ref 0–1)
IMM GRANULOCYTES NFR BLD: 0.3 %
LYMPHOCYTES # BLD AUTO: 1.45 X10(3) UL (ref 1–4)
LYMPHOCYTES NFR BLD AUTO: 21.2 %
MCH RBC QN AUTO: 29.8 PG (ref 26–34)
MCHC RBC AUTO-ENTMCNC: 30.6 G/DL (ref 31–37)
MCV RBC AUTO: 97.3 FL (ref 80–100)
MONOCYTES # BLD AUTO: 0.59 X10(3) UL (ref 0.1–1)
MONOCYTES NFR BLD AUTO: 8.6 %
NEUTROPHILS # BLD AUTO: 4.7 X10 (3) UL (ref 1.5–7.7)
NEUTROPHILS # BLD AUTO: 4.7 X10(3) UL (ref 1.5–7.7)
NEUTROPHILS NFR BLD AUTO: 68.6 %
OSMOLALITY SERPL CALC.SUM OF ELEC: 292 MOSM/KG (ref 275–295)
PLATELET # BLD AUTO: 132 10(3)UL (ref 150–450)
PLATELET MORPHOLOGY: NORMAL
POTASSIUM SERPL-SCNC: 3.1 MMOL/L (ref 3.5–5.1)
PROT SERPL-MCNC: 7 G/DL (ref 5.7–8.2)
RBC # BLD AUTO: 3.76 X10(6)UL (ref 3.8–5.3)
SODIUM SERPL-SCNC: 136 MMOL/L (ref 136–145)
WBC # BLD AUTO: 6.9 X10(3) UL (ref 4–11)

## 2025-05-19 RX ORDER — POTASSIUM CHLORIDE 1500 MG/1
20 TABLET, EXTENDED RELEASE ORAL DAILY
Qty: 30 TABLET | Refills: 2 | Status: SHIPPED | OUTPATIENT
Start: 2025-05-19

## 2025-05-19 NOTE — PROGRESS NOTES
[facility] Office Note      Diagnosis  1. Malignant neoplasm of upper lobe of right lung (HCC)    2. Thrombosis due to vascular catheter    3. Iron deficiency anemia, unspecified iron deficiency anemia type      Current Therapy  To start Infinzi  S/p SVC stenting  S/p Concurrent chemo-RT with carbo/taxol     INTERVAL HISTORY  She returns for follow-up after completion of concurrent chemoRT with weekly carboplatin paclitaxel.  Since her last visit here she was having significant anorexia but this has finally started to improve.  Continues to struggle with mild neuropathy.  She had a CT scan done and is here to discuss the results.  Continues on Eliquis without any bleeding issues.    Past Oncologic History  1/9/25 she had complained of increasing dyspnea for about 3 months.  Saw PCP and underwent a CT chest that showed a very large centrally necrotic right upper lobe lung mass measuring 8.2 x 7.4 x 10.8 cm with extension to the right paratracheal upper mediastinum.  Extensive right upper lobe bronchovascular structure encasement.  Mildly enlarged right paratracheal mediastinal lymph nodes bilateral adrenal nodules were noted.     1/17/2025 PET/CT showed a 9 x 8 cm hypermetabolic mass within the right upper lobe extending into the mediastinum and right hilum peripherally hypermetabolic and centrally cystic and necrotic.  Effacement of the IVC.  Subcentimeter pulmonary nodules bilaterally were not FDG avid  Hypermetabolic lymph nodes within the anterior superior mediastinum subcarinal right paratracheal area and right clavicle chains were all compatible with metastatic disease.     1/22/25 she underwent a robotic navigational bronchoscopy with EBUS bx of RUL mass and  TBNA of lymph node station 7.  BAL from the right upper lobe was consistent with squamous cell carcinoma as was the needle biopsy from the right upper lobe mass.  EBUS sampling from station node 7 showed no malignant cells.   Lymphocytes were present. PD-L1 TPS was 0, EGFR was negative, ALK is negative    2/13/2025 She had a port placed  and then developed increasing redness and swelling as well as inability to lie flat.  She was seen in the oncology clinic earlier today and referred to the ED.  CT chest showed thrombus within the left brachiocephalic and left subclavian vein which is new since prior study. The right upper lobe mass is causing circumferential encasement and as well as invasion and narrowing of the SVC which is completely obliterated    2/14 she underwent SVC stenting as well as thrombectomy from left subclavian vein on 2/13/2025.  Her port was still functional.  She is now on enoxaparin.  She is feeling much better.  Her dyspnea has improved.  No longer has any postural flushing congestion.    3/2025 started concurrent chemo RT with weekly CarboTaxol    4/2025 completed concurrent chemo RT with interruptions due to hospitalizations from hypoxia and infection      Review of Systems:  Hematology/Oncology ROS performed and negative except as above in HPI    History/Other:   Past Medical History:  Past Medical History:    Anesthesia complication    body aches for 3 days    Anxiety state    Cancer (HCC)    skin cancer    Cataract    COPD (chronic obstructive pulmonary disease) (HCC)    Diabetes (HCC)    Diabetes mellitus (HCC)    Essential hypertension    Exposure to medical diagnostic radiation    High blood pressure    High cholesterol    Incontinence    bladder    Neuropathy    neuropathy    Obesity, unspecified    Osteoarthritis    In knees    Personal history of antineoplastic chemotherapy    Problems with swallowing    pills    Renal disorder    CKD 2    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     Cholecystectomy      Laparoscopic incisional / umbilical / ventral hernia repair  2024    Port, indwelling, imp         Current Medications:  No current facility-administered medications on file as of 2025.       Allergies:   Allergies[1]    Family Medical History:  Family History   Problem Relation Age of Onset    Hypertension Father     Stroke Father     Heart Attack Mother        Social History:  Social History     Socioeconomic History    Marital status:      Spouse name: Not on file    Number of children: 1    Years of education: Not on file    Highest education level: Not on file   Occupational History    Not on file   Tobacco Use    Smoking status: Former     Current packs/day: 0.00     Average packs/day: 0.5 packs/day for 45.0 years (22.5 ttl pk-yrs)     Types: Cigarettes     Start date: 1973     Quit date: 2018     Years since quittin.3     Passive exposure: Past    Smokeless tobacco: Never    Tobacco comments:     1 pack every 3 days   Vaping Use    Vaping status: Never Used   Substance and Sexual Activity    Alcohol use: Not Currently     Comment: very rarely    Drug use: No    Sexual activity: Not on file   Other Topics Concern     Service Not Asked    Blood Transfusions Not Asked    Caffeine Concern Yes     Comment: Coffee, 2 cups per day     Occupational Exposure Not Asked    Hobby Hazards Not Asked    Sleep Concern Not Asked    Stress Concern Not Asked    Weight Concern Not Asked    Special Diet Not Asked    Back Care Not Asked    Exercise Not Asked    Bike Helmet Not Asked    Seat Belt Not Asked    Self-Exams Not Asked    Grew up on a farm Not Asked    History of tanning No    Outdoor occupation Not Asked    Breast feeding Not Asked    Reaction to local anesthetic No    Left Handed No    Right Handed Yes    Currently spends a great deal of time in the sun No    Past Sunlamp Treatments for Acne Not Asked    Hx of  Spending Great Deal of Time in Sun No    Bad sunburns in the past Yes    Tanning Salons in the Past No    Hx of Radiation Treatments No    Regular use of sun block Not Asked   Social History Narrative    - lives with     1 son      Social Drivers of Health     Food Insecurity: No Food Insecurity (2025)    NCSS - Food Insecurity     Worried About Running Out of Food in the Last Year: No     Ran Out of Food in the Last Year: No   Transportation Needs: No Transportation Needs (2025)    NCSS - Transportation     Lack of Transportation: No   Housing Stability: Not At Risk (2025)    NCSS - Housing/Utilities     Has Housing: Yes     Worried About Losing Housing: No     Unable to Get Utilities: No       Gyn History:  OB History    Para Term  AB Living   1 1 0 0 0 0   SAB IAB Ectopic Multiple Live Births   0 0 0 0 0       Objective:    Physical Exam:  General: A&Ox3, NAD, uses wheelchair but able to stand and walk short distances  HEENT: PERRL, OP clear  Face flushing has resolved  Neck: supple, no LAD   swelling left IJ vein has resolved ,Left s/c port site healed  CV: RRR, no murmurs, + pulses  Pulm: CTA b/l, no w/r/r, normal effort  Neurological: Grossly intact    Labs:  Lab Results   Component Value Date/Time    WBC 6.9 2025 07:33 AM    RBC 3.76 (L) 2025 07:33 AM    HGB 11.2 (L) 2025 07:33 AM    HCT 36.6 2025 07:33 AM    MCV 97.3 2025 07:33 AM    MCH 29.8 2025 07:33 AM    MCHC 30.6 (L) 2025 07:33 AM    RDW 23.1 (H) 2025 07:33 AM    NEPRELIM 4.70 2025 07:33 AM    .0 (L) 2025 07:33 AM       Lab Results   Component Value Date/Time     (H) 2025 07:33 AM    BUN 31 (H) 2025 07:33 AM    CREATSERUM 0.74 2025 07:33 AM    GFRNAA 45 (L) 2022 08:02 AM    CA 9.3 2025 07:33 AM    ALB 3.9 2025 07:33 AM     2025 07:33 AM    K 3.1 (L) 2025 07:33 AM    CL 97 (L)  2025 07:33 AM    CO2 30.0 2025 07:33 AM    ALKPHO 69 2025 07:33 AM    AST 26 2025 07:33 AM    ALT 22 2025 07:33 AM       Imagin/25 Reviewed CT films personally and with pt/family  Necrotic right upper lobe pulmonary malignancy continues to decrease in size.    Impacted right lower lobe bronchus with consolidative right lower lobe airspace opacity has increased in size.  Aspiration pneumonia is the differential.  Continued follow-up surveillance imaging recommended to exclude pulmonary metastases.      (Adrenal lesions were not FDG avid on prior PET)      Assessment & Plan:    Yi Torres is a 74 year old female with locally advanced Rt lung cancer, with mediastinal invasion, now with SVC syndrome and left subclavian thrombus   Cancer Staging   Malignant neoplasm of upper lobe of right lung (HCC)  Staging form: Lung, AJCC 8th Edition  - Clinical stage from 2025: Stage IIIC (cT4, cN3, cM0) - Signed by José Miguel Goodson MD on 2025      # SVC syndrome and left subclavian thrombus   s/p SVC stenting and left thrombectomy, with symptomatic improvement.   Continue Eliquis    # Lung cancer  S/p concurrent chemo-RT with weekly carbo/taxol.   - CT shows significant improvement of the large right upper lobe lung mass.  - NGS shows no  mutations.  Will plan to start maintenance durvalumab.  - Anticipated side effects of this therapy including autoimmune dermatitis colitis and endocrinopathies etc. 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 Our clinic is continuing focal point for all oncologic services the patient needs.         [1]   Allergies  Allergen Reactions    Erythromycin DIARRHEA     Stomach pain

## 2025-05-20 RX ORDER — IPRATROPIUM BROMIDE AND ALBUTEROL SULFATE 2.5; .5 MG/3ML; MG/3ML
3 SOLUTION RESPIRATORY (INHALATION) EVERY 6 HOURS PRN
Qty: 270 ML | Refills: 0 | OUTPATIENT
Start: 2025-05-20

## 2025-05-21 RX ORDER — FUROSEMIDE 20 MG/1
20 TABLET ORAL DAILY
Qty: 30 TABLET | Refills: 0 | Status: SHIPPED | OUTPATIENT
Start: 2025-05-21 | End: 2025-05-23

## 2025-05-23 ENCOUNTER — TELEPHONE (OUTPATIENT)
Facility: LOCATION | Age: 75
End: 2025-05-23

## 2025-05-23 DIAGNOSIS — R60.9 FLUID RETENTION: Primary | ICD-10-CM

## 2025-05-23 RX ORDER — FUROSEMIDE 20 MG/1
20 TABLET ORAL DAILY
Qty: 90 TABLET | Refills: 0 | Status: SHIPPED | OUTPATIENT
Start: 2025-05-23

## 2025-05-30 ENCOUNTER — TELEPHONE (OUTPATIENT)
Dept: FAMILY MEDICINE CLINIC | Facility: CLINIC | Age: 75
End: 2025-05-30

## 2025-05-30 ENCOUNTER — OFFICE VISIT (OUTPATIENT)
Age: 75
End: 2025-05-30
Attending: INTERNAL MEDICINE
Payer: MEDICARE

## 2025-05-30 ENCOUNTER — NURSE ONLY (OUTPATIENT)
Age: 75
End: 2025-05-30
Attending: INTERNAL MEDICINE
Payer: MEDICARE

## 2025-05-30 VITALS
WEIGHT: 181 LBS | OXYGEN SATURATION: 95 % | RESPIRATION RATE: 16 BRPM | SYSTOLIC BLOOD PRESSURE: 115 MMHG | BODY MASS INDEX: 31 KG/M2 | DIASTOLIC BLOOD PRESSURE: 81 MMHG | HEART RATE: 110 BPM

## 2025-05-30 DIAGNOSIS — Z51.11 ENCOUNTER FOR ANTINEOPLASTIC CHEMOTHERAPY: Primary | ICD-10-CM

## 2025-05-30 DIAGNOSIS — C34.11 MALIGNANT NEOPLASM OF UPPER LOBE OF RIGHT LUNG (HCC): Primary | ICD-10-CM

## 2025-05-30 DIAGNOSIS — D50.9 IRON DEFICIENCY ANEMIA, UNSPECIFIED IRON DEFICIENCY ANEMIA TYPE: ICD-10-CM

## 2025-05-30 DIAGNOSIS — C34.11 MALIGNANT NEOPLASM OF UPPER LOBE OF RIGHT LUNG (HCC): ICD-10-CM

## 2025-05-30 LAB
ALBUMIN SERPL-MCNC: 4.2 G/DL (ref 3.2–4.8)
ALBUMIN/GLOB SERPL: 1.3 {RATIO} (ref 1–2)
ALP LIVER SERPL-CCNC: 73 U/L (ref 55–142)
ALT SERPL-CCNC: 24 U/L (ref 10–49)
ANION GAP SERPL CALC-SCNC: 8 MMOL/L (ref 0–18)
AST SERPL-CCNC: 24 U/L (ref ?–34)
BASOPHILS # BLD AUTO: 0.02 X10(3) UL (ref 0–0.2)
BASOPHILS NFR BLD AUTO: 0.3 %
BILIRUB SERPL-MCNC: 0.9 MG/DL (ref 0.2–1.1)
BUN BLD-MCNC: 22 MG/DL (ref 9–23)
BUN/CREAT SERPL: 28.2 (ref 10–20)
CALCIUM BLD-MCNC: 9.5 MG/DL (ref 8.7–10.4)
CHLORIDE SERPL-SCNC: 100 MMOL/L (ref 98–112)
CO2 SERPL-SCNC: 26 MMOL/L (ref 21–32)
CREAT BLD-MCNC: 0.78 MG/DL (ref 0.55–1.02)
DEPRECATED RDW RBC AUTO: 71.9 FL (ref 35.1–46.3)
EGFRCR SERPLBLD CKD-EPI 2021: 81 ML/MIN/1.73M2 (ref 60–?)
EOSINOPHIL # BLD AUTO: 0.06 X10(3) UL (ref 0–0.7)
EOSINOPHIL NFR BLD AUTO: 0.9 %
ERYTHROCYTE [DISTWIDTH] IN BLOOD BY AUTOMATED COUNT: 20.2 % (ref 11–15)
GLOBULIN PLAS-MCNC: 3.2 G/DL (ref 2–3.5)
GLUCOSE BLD-MCNC: 132 MG/DL (ref 70–99)
HCT VFR BLD AUTO: 35.1 % (ref 35–48)
HGB BLD-MCNC: 11 G/DL (ref 12–16)
IMM GRANULOCYTES # BLD AUTO: 0.02 X10(3) UL (ref 0–1)
IMM GRANULOCYTES NFR BLD: 0.3 %
LYMPHOCYTES # BLD AUTO: 1.33 X10(3) UL (ref 1–4)
LYMPHOCYTES NFR BLD AUTO: 19.1 %
MCH RBC QN AUTO: 29.7 PG (ref 26–34)
MCHC RBC AUTO-ENTMCNC: 31.3 G/DL (ref 31–37)
MCV RBC AUTO: 94.9 FL (ref 80–100)
MONOCYTES # BLD AUTO: 0.41 X10(3) UL (ref 0.1–1)
MONOCYTES NFR BLD AUTO: 5.9 %
NEUTROPHILS # BLD AUTO: 5.13 X10 (3) UL (ref 1.5–7.7)
NEUTROPHILS # BLD AUTO: 5.13 X10(3) UL (ref 1.5–7.7)
NEUTROPHILS NFR BLD AUTO: 73.5 %
OSMOLALITY SERPL CALC.SUM OF ELEC: 283 MOSM/KG (ref 275–295)
PLATELET # BLD AUTO: 161 10(3)UL (ref 150–450)
PLATELET MORPHOLOGY: NORMAL
POTASSIUM SERPL-SCNC: 3 MMOL/L (ref 3.5–5.1)
PROT SERPL-MCNC: 7.4 G/DL (ref 5.7–8.2)
RBC # BLD AUTO: 3.7 X10(6)UL (ref 3.8–5.3)
SODIUM SERPL-SCNC: 134 MMOL/L (ref 136–145)
T4 FREE SERPL-MCNC: 1.4 NG/DL (ref 0.8–1.7)
TSI SER-ACNC: 4.72 UIU/ML (ref 0.55–4.78)
WBC # BLD AUTO: 7 X10(3) UL (ref 4–11)

## 2025-05-30 RX ORDER — SODIUM CHLORIDE 9 MG/ML
10 INJECTION, SOLUTION INTRAMUSCULAR; INTRAVENOUS; SUBCUTANEOUS ONCE
OUTPATIENT
Start: 2025-05-30

## 2025-05-30 RX ORDER — WATER 10 ML/10ML
INJECTION INTRAMUSCULAR; INTRAVENOUS; SUBCUTANEOUS
Status: DISCONTINUED
Start: 2025-05-30 | End: 2025-05-30

## 2025-05-30 NOTE — TELEPHONE ENCOUNTER
Arom, pharmacist - [facility] called, verified patient's Name and . States patient has a prescription for atorvastatin, however, patient is insisting that she is taking rosuvastatin. Chart reviewed. No historical prescription for rosuvastatin.    Attempted to call the patient for clarification, left message to call back.

## 2025-05-30 NOTE — PROGRESS NOTES
Pt here for C1D1 Drug(s)Imfinzi.  Arrives Via wheelchair, accompanied by Spouse     Patient was evaluated today by WENDY.  Oral medications included in this regimen:  no  Patient confirms comprehension of cancer treatment schedule:  yes  Pregnancy screening:  Not applicable  Modifications in dose or schedule:  No  Medications appearance and physical integrity checked by RN: yes.  Chemotherapy IV pump settings verified by 2 RNs:  No due to targeted therapy IV administration.  Frequency of blood return and site check throughout administration: Prior to administration, Prior to each drug, and At completion of therapy   Infusion/treatment outcome:  patient tolerated treatment without incident    Education Record    Learner:  Patient and Spouse  Barriers / Limitations:  None  Method:  Reinforcement  Education / instructions given:  Plan of care and medications.  Outcome:  Shows understanding    Discharged Home, Via wheelchair, accompanied by:Spouse    Patient/family verbalized understanding of future appointments: by printed AVS

## 2025-05-30 NOTE — PROGRESS NOTES
Medication Education Record: IV Therapy    Learner:  Patient and Spouse    Barriers / Limitations:  None    Psychosocial Assessment:  patient psychosocial response appropriate    Diagnosis:   Lung Cancer     IV Cancer Treatment Name(s): Durvalumab  IV Cancer Treatment Frequency Every 28 days    Number of cycles planned 12  Plan for appointments and lab testing Prior to each cycle  Verified Consent to Chemotherapy/Biotherapy Cancer Treatment form signed by patient and provider:  Yes    Confirm patient informs his/her Cancer Care team of any treatment received in a setting other than at the [facility] (such as inpatient or outpatient at another hospital or clinic, locally or out of state) so that this medical information can accurately be reflected in his/her medical record.  This vital information will provide an accurate picture for the physician prescribing the current cancer treatment.Yes  Cancer Treatment Side Effects (refer to Chemo Care Handouts for further information):  Allergic reactions  Blood Clots  Constipation  Diarrhea  Eye disorders  Fatigue  Heart effects  Hormone gland changes involving the thyroid, pituitary, adrenal, and pancreas  Kidney / Bladder effects  Liver effects  Loss of appetite  Lung Effects  Nausea / Vomiting  Nerve Effects  Sexual Effects  Skin Effects    IV administration risks:  Potential leaking of drug outside of vein during administration   Signs/symptoms include redness, swelling, pain, burning at the site of administration  Notify Infusion Nurse immediately if any of these symptoms occur during or after the infusion  Allergic reaction: there is a chance for allergic reaction with some medications.  If your prescribed therapy has a higher risk for this, steps will be taken to prevent and minimize this from occurring.    Recommended Anti-nausea medications (as directed by your provider):  Prochloperazine (Compazine) 10 mg every 6 hours    Helpful hints during  cancer treatment:    Diet:  Avoid greasy or spicy foods on days surrounding treatment  Eat small frequent meals per day (6-7 meals) rather than 3 large meals  Choose high calorie/high protein foods (chicken, hard cooked eggs, peanut butter, cheese)  If nauseated, try dry foods, such as toast, crackers or pretzels; light or bland foods, such as applesauce or oatmeal.    Fluid intake:  Drink 8-10 cups of liquid a day and take a water bottle wherever you go.  Any fluid is acceptable, but caffeinated products do not count towards your intake and should be limited to 1-2 drinks/day.    Physical Activity    If your doctor approves, be as physically active as you can, but start out slowly, and increase your activity over time as you feel stronger.  Listen to your body and rest when you need to.  Do what you can when you feel up to it.      Oral Care  Keep your mouth clean.  Rinse your mouth before and after meals with plain water or with a mild mouth rinse (made with 1 quart water, 1 teaspoon salt, and 1 teaspoon baking soda, shake before using)   Avoid commercial mouthwashes that contain alcohol, alcoholic or acidic drinks or tobacco  An acceptable mouthwash is Biotene®   If soreness or sores develop, contact the office.    Diarrhea or Constipation    Imodium A-D use for diarrhea:  Take 2 tabs (4mg) at the first sign of diarrhea; then take 1 tab (2mg) every 2 hours until you have had no diarrhea for at least 12 hours; during the night take 2 tabs (4mg) every 4 hours as needed.  Maximum of 8 tablets in 24 hours.   Constipation: Over-the-counter recommendations include: Senokot, Ducolax, Milk of Magnesia or Miralax (generics are ok)    If you have persistent diarrhea or constipation, please contact the triage nurse for further instructions.  Skin Care  Avoid direct sunlight.  Wear a broad-spectrum sunscreen with an SPF of 30 or higher on any skin exposed to the sun.  Re-apply every 2 hours if in the sun and after bathing or  sweating.  For dry skin, use an alcohol-free lotion twice per day, especially after baths.  If scalp hair loss has occurred, protect the scalp from the sun by wearing a hat and use sunscreen.  Apply alcohol-free moisturizer as needed.    When to call the doctor:  Fever of 100.4 or greater or shaking chills  Nausea/vomiting not controlled with anti-nausea medications: Unable to drink for 24 hours or have signs of dehydration: tiredness, thirst, dry mouth, dark and decreased amount of urine  Diarrhea - not controlled with Imodium AD or more than 6 episodes in 24 hours  Constipation -no bowel movement x 3 days, no response to stool softeners or laxatives  Mouth sores, sore throat or blisters on the lips affecting oral intake  Difficulty breathing, chest pain, or new cough  Excessive tiredness or weakness, confusion or loss of balance  New rash  Tingling or burning, redness, swelling of the palms of hands or soles of feet  Sudden new unexpected symptom -such as change in vision, swelling in arm or leg  Increase in numbness and tingling of hands or feet  Unusual bleeding (nose bleeds, blood in urine, stool or phlegm)  Pain with urination  Persistent mood changes, depression, nervousness, difficulty sleeping   Pain, redness, swelling or blistering at the IV site  If you go to Emergency Room for any reason or seek medical attention elsewhere  If you should need to cancel or reschedule any visit, it is important that you contact the office    What Phone Number to Call:   Cancer Center [phone] / Triage Nurse  The triage nurse will call you the day after your first treatment (or Monday following first Friday treatments) to check on you and answer any questions. You may call this number 24/7 to reach the on-call physician.   Teaching Materials Provided:   Chemo Care chemotherapy information sheets  and List names of drugs providedDurvalumab     Additional Patient Resources:    [facility] [phone]  Integrative  Medicine    Please refer to the following link if you are interested in additional information about chemotherapy for yourself or family members: [email or web address]      Safety and Handling of Targeted Immunotherapy  While you or your family member is receiving chemotherapy, whether in the clinic or at home, the following precautions must be taken to lessen any exposure to the medication.     Handling Body Waste:   Wash your hands after using the toilet.  Wash any skin area that has come into contact with urine or stool.  Safety for my family and friends:  Due to safety concerns and the nature of this treatment environment, children are not permitted in the infusion center.  Please make appropriate arrangements.   Hugging and kissing is safe for you and your partner or family members.  You can visit, sit with, hug and kiss the children in your life.    You can be around pregnant women, though (if possible) they should not clean up any of your body fluids after you have treatment.   Sexual activity is safe while throughout treatment.  It is possible that traces of the oral chemotherapy may be present in vaginal fluid or semen for 48 hours after taking.  A condom should be used during this time.  Effective birth control should be used throughout treatment to prevent pregnancy while on these medications and for several months or years after therapy.  Chemotherapy can have harmful side effects to the fetus, especially in the first trimester.  In addition, menstrual cycles can become irregular during and after treatment, so you may not know if you are at a time in your cycle when you could become pregnant or if you are actually pregnant.      Other: Recommend stopping Lasix given low potassium, limit high sodium foods, elevate feet, continue 20mEq potassium supplements daily  Cancer treatment education, including treatment plan, supportive medications, and post-treatment care was provided  to the patient.  The patient/support person  was attentive during education, verbalized understanding, all questions were answered and patient was instructed to call with further questions.     Reinforcement of education is needed at next visit? Yes  30 minute appointment spent on education and counseling on above plan along with supportive care

## 2025-05-30 NOTE — TELEPHONE ENCOUNTER
Pt called back  Stated she have a lot of medications , not sure what she's suppose to take, have not taken them, mentioned some are blood thinners, high BP meds,have been in Immune therapy, relayed pharmacy message , stated she's not sure what she should take   Advised an Appt and to bring all meds, pt declined ,stated her Dr should know what she need to take and she did not want to cart all these meds to the office please advise

## 2025-05-30 NOTE — PATIENT INSTRUCTIONS
Medication Education Record: IV Therapy    Learner:  Patient and Spouse    Barriers / Limitations:  None    Psychosocial Assessment:  patient psychosocial response appropriate    Diagnosis:   Lung Cancer     IV Cancer Treatment Name(s): Durvalumab  IV Cancer Treatment Frequency Every 28 days    Number of cycles planned 12  Plan for appointments and lab testing Prior to each cycle  Verified Consent to Chemotherapy/Biotherapy Cancer Treatment form signed by patient and provider:  Yes    Confirm patient informs his/her Cancer Care team of any treatment received in a setting other than at the [facility] (such as inpatient or outpatient at another hospital or clinic, locally or out of state) so that this medical information can accurately be reflected in his/her medical record.  This vital information will provide an accurate picture for the physician prescribing the current cancer treatment.Yes  Cancer Treatment Side Effects (refer to Chemo Care Handouts for further information):  Allergic reactions  Blood Clots  Constipation  Diarrhea  Eye disorders  Fatigue  Heart effects  Hormone gland changes involving the thyroid, pituitary, adrenal, and pancreas  Kidney / Bladder effects  Liver effects  Loss of appetite  Lung Effects  Nausea / Vomiting  Nerve Effects  Sexual Effects  Skin Effects    IV administration risks:  Potential leaking of drug outside of vein during administration   Signs/symptoms include redness, swelling, pain, burning at the site of administration  Notify Infusion Nurse immediately if any of these symptoms occur during or after the infusion  Allergic reaction: there is a chance for allergic reaction with some medications.  If your prescribed therapy has a higher risk for this, steps will be taken to prevent and minimize this from occurring.    Recommended Anti-nausea medications (as directed by your provider):  Prochloperazine (Compazine) 10 mg every 6 hours    Helpful hints during  cancer treatment:    Diet:  Avoid greasy or spicy foods on days surrounding treatment  Eat small frequent meals per day (6-7 meals) rather than 3 large meals  Choose high calorie/high protein foods (chicken, hard cooked eggs, peanut butter, cheese)  If nauseated, try dry foods, such as toast, crackers or pretzels; light or bland foods, such as applesauce or oatmeal.    Fluid intake:  Drink 8-10 cups of liquid a day and take a water bottle wherever you go.  Any fluid is acceptable, but caffeinated products do not count towards your intake and should be limited to 1-2 drinks/day.    Physical Activity    If your doctor approves, be as physically active as you can, but start out slowly, and increase your activity over time as you feel stronger.  Listen to your body and rest when you need to.  Do what you can when you feel up to it.      Oral Care  Keep your mouth clean.  Rinse your mouth before and after meals with plain water or with a mild mouth rinse (made with 1 quart water, 1 teaspoon salt, and 1 teaspoon baking soda, shake before using)   Avoid commercial mouthwashes that contain alcohol, alcoholic or acidic drinks or tobacco  An acceptable mouthwash is Biotene®   If soreness or sores develop, contact the office.    Diarrhea or Constipation    Imodium A-D use for diarrhea:  Take 2 tabs (4mg) at the first sign of diarrhea; then take 1 tab (2mg) every 2 hours until you have had no diarrhea for at least 12 hours; during the night take 2 tabs (4mg) every 4 hours as needed.  Maximum of 8 tablets in 24 hours.   Constipation: Over-the-counter recommendations include: Senokot, Ducolax, Milk of Magnesia or Miralax (generics are ok)    If you have persistent diarrhea or constipation, please contact the triage nurse for further instructions.  Skin Care  Avoid direct sunlight.  Wear a broad-spectrum sunscreen with an SPF of 30 or higher on any skin exposed to the sun.  Re-apply every 2 hours if in the sun and after bathing or  sweating.  For dry skin, use an alcohol-free lotion twice per day, especially after baths.  If scalp hair loss has occurred, protect the scalp from the sun by wearing a hat and use sunscreen.  Apply alcohol-free moisturizer as needed.    When to call the doctor:  Fever of 100.4 or greater or shaking chills  Nausea/vomiting not controlled with anti-nausea medications: Unable to drink for 24 hours or have signs of dehydration: tiredness, thirst, dry mouth, dark and decreased amount of urine  Diarrhea - not controlled with Imodium AD or more than 6 episodes in 24 hours  Constipation -no bowel movement x 3 days, no response to stool softeners or laxatives  Mouth sores, sore throat or blisters on the lips affecting oral intake  Difficulty breathing, chest pain, or new cough  Excessive tiredness or weakness, confusion or loss of balance  New rash  Tingling or burning, redness, swelling of the palms of hands or soles of feet  Sudden new unexpected symptom -such as change in vision, swelling in arm or leg  Increase in numbness and tingling of hands or feet  Unusual bleeding (nose bleeds, blood in urine, stool or phlegm)  Pain with urination  Persistent mood changes, depression, nervousness, difficulty sleeping   Pain, redness, swelling or blistering at the IV site  If you go to Emergency Room for any reason or seek medical attention elsewhere  If you should need to cancel or reschedule any visit, it is important that you contact the office    What Phone Number to Call:   Cancer Center [phone] / Triage Nurse  The triage nurse will call you the day after your first treatment (or Monday following first Friday treatments) to check on you and answer any questions. You may call this number 24/7 to reach the on-call physician.   Teaching Materials Provided:   Chemo Care chemotherapy information sheets  and List names of drugs providedDurvalumab     Additional Patient Resources:    [facility] [phone]  Integrative  Medicine    Please refer to the following link if you are interested in additional information about chemotherapy for yourself or family members: [email or web address]      Safety and Handling of Targeted Immunotherapy  While you or your family member is receiving chemotherapy, whether in the clinic or at home, the following precautions must be taken to lessen any exposure to the medication.     Handling Body Waste:   Wash your hands after using the toilet.  Wash any skin area that has come into contact with urine or stool.  Safety for my family and friends:  Due to safety concerns and the nature of this treatment environment, children are not permitted in the infusion center.  Please make appropriate arrangements.   Hugging and kissing is safe for you and your partner or family members.  You can visit, sit with, hug and kiss the children in your life.    You can be around pregnant women, though (if possible) they should not clean up any of your body fluids after you have treatment.   Sexual activity is safe while throughout treatment.  It is possible that traces of the oral chemotherapy may be present in vaginal fluid or semen for 48 hours after taking.  A condom should be used during this time.  Effective birth control should be used throughout treatment to prevent pregnancy while on these medications and for several months or years after therapy.  Chemotherapy can have harmful side effects to the fetus, especially in the first trimester.  In addition, menstrual cycles can become irregular during and after treatment, so you may not know if you are at a time in your cycle when you could become pregnant or if you are actually pregnant.    Other: Recommend stopping Lasix given low potassium, continue 20mEq potassium supplements daily

## 2025-06-02 ENCOUNTER — TELEPHONE (OUTPATIENT)
Age: 75
End: 2025-06-02

## 2025-06-02 NOTE — TELEPHONE ENCOUNTER
Toxicities: C1 D1 Durvalumab on 5/30/2025    Elva reports that she is feeling \"ok.\" No side effects at this time. I encouraged her to please call the office if she is not feeling well or she has any questions or concerns. She agreed and thanked me for checking on her.

## 2025-06-03 NOTE — TELEPHONE ENCOUNTER
Patient contacted. Verified name and date of birth.  Patient states her  takes rosuvastatin.  Patient takes atorvastatin. It was a mistake on her part.  Called [facility]- no answer at this time.    Please try calling [facility] again.

## 2025-06-05 NOTE — TELEPHONE ENCOUNTER
Called [facility] spoke to pharmacist. Advised patient takes atorvastatin. Pharmacist verbalized understanding. No further questions at this time.

## 2025-06-13 ENCOUNTER — MED REC SCAN ONLY (OUTPATIENT)
Dept: FAMILY MEDICINE CLINIC | Facility: CLINIC | Age: 75
End: 2025-06-13

## 2025-06-16 ENCOUNTER — TELEPHONE (OUTPATIENT)
Facility: CLINIC | Age: 75
End: 2025-06-16

## 2025-06-16 NOTE — TELEPHONE ENCOUNTER
Spoke with patient no signs/symptoms of infection, bleeding has stopped. Scheduled her for appointment 6/17/25 at 2:30 pm. Location and directions given, repeated back

## 2025-06-17 ENCOUNTER — OFFICE VISIT (OUTPATIENT)
Dept: PODIATRY CLINIC | Facility: CLINIC | Age: 75
End: 2025-06-17
Payer: MEDICARE

## 2025-06-17 DIAGNOSIS — B35.1 ONYCHOMYCOSIS: ICD-10-CM

## 2025-06-17 DIAGNOSIS — L03.032 CELLULITIS OF LEFT TOE: ICD-10-CM

## 2025-06-17 DIAGNOSIS — E11.42 TYPE 2 DIABETES MELLITUS WITH POLYNEUROPATHY (HCC): Primary | ICD-10-CM

## 2025-06-17 PROCEDURE — 1159F MED LIST DOCD IN RCRD: CPT | Performed by: STUDENT IN AN ORGANIZED HEALTH CARE EDUCATION/TRAINING PROGRAM

## 2025-06-17 PROCEDURE — 99214 OFFICE O/P EST MOD 30 MIN: CPT | Performed by: STUDENT IN AN ORGANIZED HEALTH CARE EDUCATION/TRAINING PROGRAM

## 2025-06-17 RX ORDER — CEPHALEXIN 500 MG/1
500 CAPSULE ORAL 3 TIMES DAILY
Qty: 21 CAPSULE | Refills: 0 | Status: SHIPPED | OUTPATIENT
Start: 2025-06-17

## 2025-06-17 RX ORDER — MUPIROCIN 2 %
1 OINTMENT (GRAM) TOPICAL 2 TIMES DAILY
Qty: 30 G | Refills: 0 | Status: SHIPPED | OUTPATIENT
Start: 2025-06-17

## 2025-06-17 NOTE — PROGRESS NOTES
[facility] Podiatry  Progress Note      Yi Torres is a 74 year old female.   Chief Complaint   Patient presents with    Diabetic Foot Care     Nail trim and foot check f/u- previous pt of Dr. Conway on 01/31/2024-  last A1c= 5.8 on 12/4/24- LOV shan/ MISA Andrews on 2/21/2025- also stated the  was trying to cut the nail but injured the R hallux and bled yesterday- pt is on blood thinner and immunotherapy             HPI:     Patient is a pleasant 74-year-old diabetic female presents to clinic accompanied by her  for bilateral foot evaluation.  Admits to elongated toenails that she has hard time trimming.  Patient states that she is on blood thinners and is currently undergoing immunotherapy for lung cancer.  Patient relates that her  tried trimming her toenails and accidentally injured her left hallux great toenail.  Admits to bleeding.    Allergies: Erythromycin    Current Medications[1]   Past Medical History[2]   Past Surgical History[3]   Family History[4]   Social Hx on file[5]        REVIEW OF SYSTEMS:     Denies nause, fever, chills  No calf pain  Denies chest pain or SOB      EXAM:   There were no vitals taken for this visit.  GENERAL: well developed, well nourished, in no apparent distress  EXTREMITIES:   1. Integument: Normal skin temperature and turgor.  Toenails x 10 are elongated.  Erythema with skin abrasion to the left hallux toenail.  2. Vascular: Dorsalis pedis two out of four bilateral and posterior tibial pulses two out of   four bilateral, capillary refill normal.   3. Musculoskeletal: All muscle groups are graded 5 out of 5 in the foot and ankle.   4. Neurological: Normal sharp dull sensation; reflexes normal.             ASSESSMENT AND PLAN:   Diagnoses and all orders for this visit:    Type 2 diabetes mellitus with polyneuropathy (HCC)    Onychomycosis    Cellulitis of left toe    Other orders  -     cephALEXin 500 MG Oral Cap; Take 1 capsule (500 mg  total) by mouth 3 (three) times daily.  -     mupirocin 2 % External Ointment; Apply 1 Application topically 2 (two) times daily.        Plan:       -Patient examined, chart history reviewed.  -Discussed importance of proper pedal hygiene, regular foot checks, and tight glucose control.  -Sharply debrided nails x10 with a sterile nail nipper achieving a 20% reduction in thickness and length, without incident.   -Left hallux toenail was cleansed and dressed with Neosporin and a large Band-Aid.  Prescription sent to pharmacy for antibiotics to be taken as directed and topical mupirocin to keep the left hallux covered daily with mupirocin and a Band-Aid.  -Ambulate with supportive shoes and inserts and avoid walking barefoot.  -Educated patient on acute signs of infection advised patient to seek immediate medical attention if symptoms arise.    Follow-up in 3 to 4 months      The patient indicates understanding of these issues and agrees to the plan.        Nayely Purdy DPM        [1]   Current Outpatient Medications   Medication Sig Dispense Refill    cephALEXin 500 MG Oral Cap Take 1 capsule (500 mg total) by mouth 3 (three) times daily. 21 capsule 0    mupirocin 2 % External Ointment Apply 1 Application topically 2 (two) times daily. 30 g 0    SENEXON-S 8.6-50 MG Oral Tab TAKE 2 TABLETS BY MOUTH EVERY DAY 30 tablet 1    furosemide 20 MG Oral Tab Take 1 tablet (20 mg total) by mouth daily. 90 tablet 0    potassium chloride 20 MEQ Oral Tab CR Take 1 tablet (20 mEq total) by mouth daily. 30 tablet 2    verapamil  MG Oral Tab CR Take 1 tablet (240 mg total) by mouth nightly. 90 tablet 3    ipratropium-albuterol 0.5-2.5 (3) MG/3ML Inhalation Solution Take 3 mL by nebulization every 6 (six) hours as needed (for shortness of breath and wheezing). 360 mL 5    apixaban (ELIQUIS) 5 MG Oral Tab Take 1 tablet (5 mg total) by mouth 2 (two) times daily. 60 tablet 3    metoprolol tartrate 25 MG Oral Tab Take 1  tablet (25 mg total) by mouth 2 (two) times daily. 180 tablet 3    Glucose Blood In Vitro Strip Use to check fasting blood sugar once daily 100 strip 1    alendronate 70 MG Oral Tab Take 1 tablet (70 mg total) by mouth once a week. 12 tablet 3    lidocaine-prilocaine 2.5-2.5 % External Cream Apply to site 1 hour prior to port a cath needle insertion 5 g 1    acetaminophen 500 MG Oral Tab Take 1 tablet (500 mg total) by mouth every 4 (four) hours as needed.      fluticasone propionate 50 MCG/ACT Nasal Suspension 2 sprays by Each Nare route daily. 1 each 0    prochlorperazine (COMPAZINE) 10 mg tablet Take 1 tablet (10 mg total) by mouth every 6 (six) hours as needed for Nausea. 30 tablet 3    ondansetron (ZOFRAN) 8 MG tablet Take 1 tablet (8 mg total) by mouth every 8 (eight) hours as needed for Nausea. 30 tablet 3    Spironolactone-HCTZ 25-25 MG Oral Tab Take 1 tablet by mouth daily. 90 tablet 3    atorvastatin 20 MG Oral Tab TAKE 1 TABLET (20MG) BY MOUTH EVERY DAY (Patient taking differently: Take 1 tablet (20 mg total) by mouth nightly. TAKE 1 TABLET (20MG) BY MOUTH EVERY DAY) 90 tablet 3    Cetirizine HCl 10 MG Oral Cap Take 10 mg by mouth nightly.      metFORMIN  MG Oral Tablet 24 Hr Take 1 tablet (500 mg total) by mouth daily with food. 90 tablet 3    cholecalciferol 50 MCG (2000 UT) Oral Cap Take 1 capsule (2,000 Units total) by mouth in the morning.      Multiple Vitamins-Minerals (CENTRUM SILVER) Oral Tab Take 1 tablet by mouth in the morning.     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     Cholecystectomy      Laparoscopic incisional / umbilical / ventral hernia repair  2024    Port, indwelling, imp     [4]   Family History  Problem Relation Age of Onset    Hypertension Father     Stroke Father     Heart Attack Mother    [5]   Social History  Socioeconomic History    Marital status:     Number of children: 1   Tobacco Use    Smoking status: Former     Current packs/day: 0.00     Average packs/day: 0.5 packs/day for 45.0 years (22.5 ttl pk-yrs)     Types: Cigarettes     Start date: 1973     Quit date: 2018     Years since quittin.3     Passive exposure: Past    Smokeless tobacco: Never    Tobacco comments:     1 pack every 3 days   Vaping Use    Vaping status: Never Used   Substance and Sexual Activity    Alcohol use: Not Currently     Comment: very rarely    Drug use: No   Other Topics Concern    Caffeine Concern Yes     Comment: Coffee, 2 cups per day     History of tanning No    Reaction to local anesthetic No    Left Handed No    Right Handed Yes    Currently spends a great deal of time in the sun No    Hx of Spending Great Deal of Time in Sun No    Bad sunburns in the past Yes    Tanning Salons in the Past No    Hx of Radiation Treatments No

## 2025-06-18 ENCOUNTER — TELEPHONE (OUTPATIENT)
Dept: PODIATRY CLINIC | Facility: CLINIC | Age: 75
End: 2025-06-18

## 2025-06-18 NOTE — TELEPHONE ENCOUNTER
Patient seen provider yesterday and was prescribed 2 prescriptions.  Patient states she contacted the [facility] in [place] they told her they have not received the prescription orders.      Patient verified that her Pharmacy is the [facility] on [address], [place].

## 2025-06-20 ENCOUNTER — OFFICE VISIT (OUTPATIENT)
Dept: NEPHROLOGY | Facility: CLINIC | Age: 75
End: 2025-06-20
Payer: MEDICARE

## 2025-06-20 VITALS
DIASTOLIC BLOOD PRESSURE: 66 MMHG | SYSTOLIC BLOOD PRESSURE: 128 MMHG | HEART RATE: 114 BPM | BODY MASS INDEX: 31 KG/M2 | HEIGHT: 64.5 IN

## 2025-06-20 DIAGNOSIS — N18.31 TYPE 2 DIABETES MELLITUS WITH STAGE 3A CHRONIC KIDNEY DISEASE, WITHOUT LONG-TERM CURRENT USE OF INSULIN (HCC): Primary | Chronic | ICD-10-CM

## 2025-06-20 DIAGNOSIS — C34.91 NON-SMALL CELL CANCER OF RIGHT LUNG (HCC): ICD-10-CM

## 2025-06-20 DIAGNOSIS — E83.42 HYPOMAGNESEMIA: ICD-10-CM

## 2025-06-20 DIAGNOSIS — N18.2 CKD (CHRONIC KIDNEY DISEASE) STAGE 2, GFR 60-89 ML/MIN: ICD-10-CM

## 2025-06-20 DIAGNOSIS — E11.22 TYPE 2 DIABETES MELLITUS WITH STAGE 3A CHRONIC KIDNEY DISEASE, WITHOUT LONG-TERM CURRENT USE OF INSULIN (HCC): Primary | Chronic | ICD-10-CM

## 2025-06-20 DIAGNOSIS — I10 ESSENTIAL HYPERTENSION, BENIGN: Chronic | ICD-10-CM

## 2025-06-20 PROCEDURE — 99214 OFFICE O/P EST MOD 30 MIN: CPT | Performed by: INTERNAL MEDICINE

## 2025-06-20 NOTE — PATIENT INSTRUCTIONS
Please work on nutrition and protein try to have about 80 g of protein per day    You need to stay on the potassium but still low    Please see me back in 4 months    The tumor appears to have shrunk on the scan in May      Please call me if you need anything like a home health aide please be careful with getting up and around    Hang in there Yi

## 2025-06-24 ENCOUNTER — HOSPITAL ENCOUNTER (OUTPATIENT)
Facility: HOSPITAL | Age: 75
Setting detail: OBSERVATION
Discharge: HOME HEALTH | End: 2025-06-26
Attending: STUDENT IN AN ORGANIZED HEALTH CARE EDUCATION/TRAINING PROGRAM | Admitting: HOSPITALIST
Payer: MEDICARE

## 2025-06-24 ENCOUNTER — APPOINTMENT (OUTPATIENT)
Dept: GENERAL RADIOLOGY | Facility: HOSPITAL | Age: 75
End: 2025-06-24
Attending: STUDENT IN AN ORGANIZED HEALTH CARE EDUCATION/TRAINING PROGRAM
Payer: MEDICARE

## 2025-06-24 ENCOUNTER — APPOINTMENT (OUTPATIENT)
Dept: CT IMAGING | Facility: HOSPITAL | Age: 75
End: 2025-06-24
Attending: STUDENT IN AN ORGANIZED HEALTH CARE EDUCATION/TRAINING PROGRAM
Payer: MEDICARE

## 2025-06-24 ENCOUNTER — NURSE TRIAGE (OUTPATIENT)
Dept: FAMILY MEDICINE CLINIC | Facility: CLINIC | Age: 75
End: 2025-06-24

## 2025-06-24 DIAGNOSIS — R06.02 SHORTNESS OF BREATH: Primary | ICD-10-CM

## 2025-06-24 PROBLEM — E11.22 TYPE 2 DIABETES MELLITUS WITH STAGE 3 CHRONIC KIDNEY DISEASE, WITHOUT LONG-TERM CURRENT USE OF INSULIN (HCC): Chronic | Status: RESOLVED | Noted: 2019-04-17 | Resolved: 2025-06-24

## 2025-06-24 PROBLEM — N18.30 TYPE 2 DIABETES MELLITUS WITH STAGE 3 CHRONIC KIDNEY DISEASE, WITHOUT LONG-TERM CURRENT USE OF INSULIN (HCC): Chronic | Status: RESOLVED | Noted: 2019-04-17 | Resolved: 2025-06-24

## 2025-06-24 LAB
ANION GAP SERPL CALC-SCNC: 6 MMOL/L (ref 0–18)
BASOPHILS # BLD AUTO: 0.05 X10(3) UL (ref 0–0.2)
BASOPHILS NFR BLD AUTO: 0.5 %
BILIRUB UR QL: NEGATIVE
BUN BLD-MCNC: 27 MG/DL (ref 9–23)
BUN/CREAT SERPL: 42.9 (ref 10–20)
CALCIUM BLD-MCNC: 9.6 MG/DL (ref 8.7–10.4)
CHLORIDE SERPL-SCNC: 98 MMOL/L (ref 98–112)
CLARITY UR: CLEAR
CO2 SERPL-SCNC: 29 MMOL/L (ref 21–32)
COLOR UR: YELLOW
CREAT BLD-MCNC: 0.63 MG/DL (ref 0.55–1.02)
DEPRECATED RDW RBC AUTO: 57.6 FL (ref 35.1–46.3)
EGFRCR SERPLBLD CKD-EPI 2021: 94 ML/MIN/1.73M2 (ref 60–?)
EOSINOPHIL # BLD AUTO: 0.12 X10(3) UL (ref 0–0.7)
EOSINOPHIL NFR BLD AUTO: 1.3 %
ERYTHROCYTE [DISTWIDTH] IN BLOOD BY AUTOMATED COUNT: 18 % (ref 11–15)
FLUAV + FLUBV RNA SPEC NAA+PROBE: NEGATIVE
FLUAV + FLUBV RNA SPEC NAA+PROBE: NEGATIVE
GLUCOSE BLD-MCNC: 132 MG/DL (ref 70–99)
GLUCOSE UR-MCNC: NEGATIVE MG/DL
HCT VFR BLD AUTO: 40 % (ref 35–48)
HGB BLD-MCNC: 12.9 G/DL (ref 12–16)
HGB UR QL STRIP.AUTO: NEGATIVE
IMM GRANULOCYTES # BLD AUTO: 0.03 X10(3) UL (ref 0–1)
IMM GRANULOCYTES NFR BLD: 0.3 %
KETONES UR-MCNC: NEGATIVE MG/DL
LEUKOCYTE ESTERASE UR QL STRIP.AUTO: NEGATIVE
LYMPHOCYTES # BLD AUTO: 1.69 X10(3) UL (ref 1–4)
LYMPHOCYTES NFR BLD AUTO: 18.2 %
MAGNESIUM SERPL-MCNC: 1.9 MG/DL (ref 1.6–2.6)
MCH RBC QN AUTO: 28.4 PG (ref 26–34)
MCHC RBC AUTO-ENTMCNC: 32.3 G/DL (ref 31–37)
MCV RBC AUTO: 88.1 FL (ref 80–100)
MONOCYTES # BLD AUTO: 0.86 X10(3) UL (ref 0.1–1)
MONOCYTES NFR BLD AUTO: 9.3 %
NEUTROPHILS # BLD AUTO: 6.54 X10 (3) UL (ref 1.5–7.7)
NEUTROPHILS # BLD AUTO: 6.54 X10(3) UL (ref 1.5–7.7)
NEUTROPHILS NFR BLD AUTO: 70.4 %
NITRITE UR QL STRIP.AUTO: NEGATIVE
NT-PROBNP SERPL-MCNC: 142 PG/ML (ref ?–125)
OSMOLALITY SERPL CALC.SUM OF ELEC: 283 MOSM/KG (ref 275–295)
PH UR: 5.5 [PH] (ref 5–8)
PLATELET # BLD AUTO: 227 10(3)UL (ref 150–450)
POTASSIUM SERPL-SCNC: 4.2 MMOL/L (ref 3.5–5.1)
PROT UR-MCNC: NEGATIVE MG/DL
RBC # BLD AUTO: 4.54 X10(6)UL (ref 3.8–5.3)
RSV RNA SPEC NAA+PROBE: NEGATIVE
SARS-COV-2 RNA RESP QL NAA+PROBE: NOT DETECTED
SODIUM SERPL-SCNC: 133 MMOL/L (ref 136–145)
SP GR UR STRIP: 1.02 (ref 1–1.03)
TROPONIN I SERPL HS-MCNC: 5 NG/L (ref ?–34)
TSI SER-ACNC: 4.04 UIU/ML (ref 0.55–4.78)
UROBILINOGEN UR STRIP-ACNC: 0.2
WBC # BLD AUTO: 9.3 X10(3) UL (ref 4–11)

## 2025-06-24 PROCEDURE — 99223 1ST HOSP IP/OBS HIGH 75: CPT | Performed by: HOSPITALIST

## 2025-06-24 PROCEDURE — 71260 CT THORAX DX C+: CPT | Performed by: STUDENT IN AN ORGANIZED HEALTH CARE EDUCATION/TRAINING PROGRAM

## 2025-06-24 RX ORDER — IPRATROPIUM BROMIDE AND ALBUTEROL SULFATE 2.5; .5 MG/3ML; MG/3ML
3 SOLUTION RESPIRATORY (INHALATION) ONCE
Status: COMPLETED | OUTPATIENT
Start: 2025-06-24 | End: 2025-06-24

## 2025-06-24 RX ORDER — LORAZEPAM 2 MG/ML
1 INJECTION INTRAMUSCULAR ONCE
Status: COMPLETED | OUTPATIENT
Start: 2025-06-24 | End: 2025-06-24

## 2025-06-24 NOTE — TELEPHONE ENCOUNTER
Action Requested: Summary for Provider     []  Critical Lab, Recommendations Needed  [] Need Additional Advice  [x]   FYI    []   Need Orders  [] Need Medications Sent to Pharmacy  []  Other     SUMMARY: Patient calling, \"I think I have a UTI\". Patient has frequency of urination along with urgency. Feels the urge to go, tries to get the bathroom but does not make it in time.   When asked if drinking fluids, she says as little as possible because she just cannot make it to the bathroom in time. She has accidents and \"pees all over the floor before she can get to the toilet\".   Denies fever, back pain, blood or any other symptoms   Patient does have DM, reports her sugars are controlled and is not checking at home.   Patient says she is on a program where when she feels she has to go, she had to do something with her phone and then go but again does not make it in time.   Advised should be seen today, she is asking for ABT. Advised would need visit or at very least urine testing if MD agreeable to order it. She says she would not be able to come to clinic, cannot get out and has not way to get there today. Agreeable to IC but says \"I will go tomorrow\". Offered to see if MD would order urine testing, she reports she \"has no way to get that to the clinic/lab and she will not be able to give specimen because she cannot get to the bathroom in time\". She indicated she \"would need to wring out her underpants\" to give speciman.  Advised IC or ER, she refuses ER, and again states she will go to IC tomorrow but she does understand that recommendation is that she go today for care.         Reason for call: Urinary Symptoms  Onset: \"about a week or more\"       Reason for Disposition   Urinating more frequently than usual (i.e., frequency)    Protocols used: Urinary Symptoms-A-OH

## 2025-06-24 NOTE — ED PROVIDER NOTES
Patient Seen in: [facility] Emergency Department        History  Chief Complaint   Patient presents with    Difficulty Breathing           Weakness     Stated Complaint: MIRIAM    Subjective:   HPI            74-year-old female presented for evaluation of shortness of breath.  She states a 1 week history of relatively constant shortness of breath.  Worse with lying flat.  No chest pain.  Does admit to have some lower extremity swelling.  Has been reportedly compliant to diuretics, did note that she ate 2 hotdogs yesterday and has trouble adhering to low-salt diet due to poor appetite and nausea.  She notes nonproductive cough.  No fevers chills or night sweats.  No hemoptysis is on immunotherapy due to history of lung CA.  Has history of SVC syndrome status post repeat stenting recently, is on Eliquis.  Also was concern for UTI due to frequency.      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     Cholecystectomy      Laparoscopic incisional / umbilical / ventral hernia repair  09/19/2024    Port, indwelling, imp                  No pertinent social history.                              Physical  Exam    ED Triage Vitals   BP 06/24/25 1747 129/86   Pulse 06/24/25 1746 116   Resp 06/24/25 1746 (!) 28   Temp 06/24/25 1747 97.7 °F (36.5 °C)   Temp src 06/24/25 1747 Temporal   SpO2 06/24/25 1746 94 %   O2 Device 06/24/25 1746 None (Room air)       Current Vitals:   Vital Signs  BP: 137/78  Pulse: 113  Resp: 20  Temp: 98.7 °F (37.1 °C)  Temp src: Oral  MAP (mmHg): 96    Oxygen Therapy  SpO2: 92 %  O2 Device: None (Room air)            Physical Exam  Constitutional: awake, alert, no sig distress  HENT: mmm, no lesions,  Neck: normal range of motion, no tenderness, supple.  Eyes: PERRL, EOMI, conjunctiva normal, no discharge. Sclera anicteric.  Cardiovascular: rr no murmur  Respiratory: Normal breath sounds, no respiratory distress, no wheezing, no chest tenderness.  GI: Bowel sounds normal, Soft, no tenderness, no masses, no pulsatile masses.  : No CVA tenderness.  Skin: Warm, dry, no erythema, no rash.  Musculoskeletal: Intact distal pulses, no edema, no tenderness, no cyanosis, no clubbing. Good range of motion in all major joints. No tenderness to palpation or major deformities noted. Back- No tenderness.  Neurologic: Alert & oriented x 3, normal motor function, normal sensory function, no focal deficits noted.  Psych: Calm, cooperative, nl affect            ED Course  Labs Reviewed   BASIC METABOLIC PANEL (8) - Abnormal; Notable for the following components:       Result Value    Glucose 132 (*)     Sodium 133 (*)     BUN 27 (*)     BUN/CREA Ratio 42.9 (*)     All other components within normal limits   CBC WITH DIFFERENTIAL WITH PLATELET - Abnormal; Notable for the following components:    RDW-SD 57.6 (*)     RDW 18.0 (*)     All other components within normal limits   PRO BETA NATRIURETIC PEPTIDE - Abnormal; Notable for the following components:    Pro-Beta Natriuretic Peptide 142 (*)     All other components within normal limits   TROPONIN I HIGH SENSITIVITY - Normal   MAGNESIUM - Normal   TSH W REFLEX TO  FREE T4 - Normal   SARS-COV-2/FLU A AND B/RSV BY PCR (GENEXPERT) - Normal    Narrative:     This test is intended for the qualitative detection and differentiation of SARS-CoV-2, influenza A, influenza B, and respiratory syncytial virus (RSV) viral RNA in nasopharyngeal or nares swabs from individuals suspected of respiratory viral infection consistent with COVID-19 by their healthcare provider. Signs and symptoms of respiratory viral infection due to SARS-CoV-2, influenza, and RSV can be similar.    Test performed using the Xpert Xpress SARS-CoV-2/FLU/RSV (real time RT-PCR)  assay on the GeneXpert instrument, [employer], [place] [address].   This test is being used under the Food and Drug Administration's Emergency Use Authorization.    The authorized Fact Sheet for Healthcare Providers for this assay is available upon request from the laboratory.   URINALYSIS WITH CULTURE REFLEX   RAINBOW DRAW LAVENDER   RAINBOW DRAW LIGHT GREEN   RAINBOW DRAW BLUE     EKG    Rate, intervals and axes as noted on EKG Report.  Rate: 118  Rhythm: Sinus Rhythm  Reading: ST, left axis, PVC, low voltage study, no st change, no stemi. Qtc 423ms                                MDM     74F as documented above presenting for evaluation of shortness of breath.  On arrival she was tachypneic other vital signs are remarkable for tachycardia.    Admission disposition: 6/24/2025  9:58 PM       Bedside limited echocardiogram which showed normal ejection fraction, slightly dilated RV, no hemodynamically significant pericardial effusion.      Patient noted to have persistent tachycardia and dyspnea throughout ER stay.  No O2 requirement.  Labs reviewed there is no significant finding.  I reviewed CT chest independently, there is no large central pulmonary embolus, SVC stent appears patent, overall mass is decreased in size.  There is no pericardial effusion.  There is a right lower lobe infiltrate but per radiology interpretation this is improved  from previous study.  Overall unclear etiology patient's symptoms, however in light of persistent tachycardia, will observe overnight.  Endorsed to hospitalist.          Medical Decision Making      Disposition and Plan     Clinical Impression:  1. Shortness of breath         Disposition:  Admit  6/24/2025  9:58 pm    Follow-up:  No follow-up provider specified.  We recommend that you schedule follow up care with a primary care provider within the next three months to obtain basic health screening including reassessment of your blood pressure.      Medications Prescribed:  Current Discharge Medication List                Supplementary Documentation:         Hospital Problems       Present on Admission  Date Reviewed: 6/24/2025          ICD-10-CM Noted POA    Shortness of breath R06.02 6/24/2025 Unknown

## 2025-06-24 NOTE — ED INITIAL ASSESSMENT (HPI)
Patient to ed via ems [facility] of [facility] x 1 week. Hx of copd and chf. Patient also co of possible uti due to increase weakness     Hx of lung ca

## 2025-06-25 LAB
ANION GAP SERPL CALC-SCNC: 8 MMOL/L (ref 0–18)
ATRIAL RATE: 118 BPM
BASOPHILS # BLD AUTO: 0.03 X10(3) UL (ref 0–0.2)
BASOPHILS NFR BLD AUTO: 0.4 %
BUN BLD-MCNC: 19 MG/DL (ref 9–23)
BUN/CREAT SERPL: 34.5 (ref 10–20)
CALCIUM BLD-MCNC: 9.1 MG/DL (ref 8.7–10.4)
CHLORIDE SERPL-SCNC: 99 MMOL/L (ref 98–112)
CO2 SERPL-SCNC: 27 MMOL/L (ref 21–32)
CREAT BLD-MCNC: 0.55 MG/DL (ref 0.55–1.02)
DEPRECATED RDW RBC AUTO: 58.6 FL (ref 35.1–46.3)
EGFRCR SERPLBLD CKD-EPI 2021: 97 ML/MIN/1.73M2 (ref 60–?)
EOSINOPHIL # BLD AUTO: 0.01 X10(3) UL (ref 0–0.7)
EOSINOPHIL NFR BLD AUTO: 0.1 %
ERYTHROCYTE [DISTWIDTH] IN BLOOD BY AUTOMATED COUNT: 18 % (ref 11–15)
EST. AVERAGE GLUCOSE BLD GHB EST-MCNC: 94 MG/DL (ref 68–126)
GLUCOSE BLD-MCNC: 150 MG/DL (ref 70–99)
GLUCOSE BLDC GLUCOMTR-MCNC: 133 MG/DL (ref 70–99)
GLUCOSE BLDC GLUCOMTR-MCNC: 161 MG/DL (ref 70–99)
GLUCOSE BLDC GLUCOMTR-MCNC: 170 MG/DL (ref 70–99)
GLUCOSE BLDC GLUCOMTR-MCNC: 188 MG/DL (ref 70–99)
GLUCOSE BLDC GLUCOMTR-MCNC: 208 MG/DL (ref 70–99)
HBA1C MFR BLD: 4.9 % (ref ?–5.7)
HCT VFR BLD AUTO: 41.1 % (ref 35–48)
HGB BLD-MCNC: 13.2 G/DL (ref 12–16)
IMM GRANULOCYTES # BLD AUTO: 0.04 X10(3) UL (ref 0–1)
IMM GRANULOCYTES NFR BLD: 0.5 %
LYMPHOCYTES # BLD AUTO: 1.13 X10(3) UL (ref 1–4)
LYMPHOCYTES NFR BLD AUTO: 13.3 %
MCH RBC QN AUTO: 28.3 PG (ref 26–34)
MCHC RBC AUTO-ENTMCNC: 32.1 G/DL (ref 31–37)
MCV RBC AUTO: 88.2 FL (ref 80–100)
MONOCYTES # BLD AUTO: 0.16 X10(3) UL (ref 0.1–1)
MONOCYTES NFR BLD AUTO: 1.9 %
NEUTROPHILS # BLD AUTO: 7.14 X10 (3) UL (ref 1.5–7.7)
NEUTROPHILS # BLD AUTO: 7.14 X10(3) UL (ref 1.5–7.7)
NEUTROPHILS NFR BLD AUTO: 83.8 %
OSMOLALITY SERPL CALC.SUM OF ELEC: 283 MOSM/KG (ref 275–295)
P AXIS: 59 DEGREES
P-R INTERVAL: 192 MS
PLATELET # BLD AUTO: 197 10(3)UL (ref 150–450)
POTASSIUM SERPL-SCNC: 3.6 MMOL/L (ref 3.5–5.1)
Q-T INTERVAL: 302 MS
QRS DURATION: 72 MS
QTC CALCULATION (BEZET): 423 MS
R AXIS: -46 DEGREES
RBC # BLD AUTO: 4.66 X10(6)UL (ref 3.8–5.3)
SODIUM SERPL-SCNC: 134 MMOL/L (ref 136–145)
T AXIS: 53 DEGREES
VENTRICULAR RATE: 118 BPM
WBC # BLD AUTO: 8.5 X10(3) UL (ref 4–11)

## 2025-06-25 PROCEDURE — 99233 SBSQ HOSP IP/OBS HIGH 50: CPT | Performed by: INTERNAL MEDICINE

## 2025-06-25 RX ORDER — ZOLPIDEM TARTRATE 5 MG/1
5 TABLET ORAL NIGHTLY PRN
Status: DISCONTINUED | OUTPATIENT
Start: 2025-06-25 | End: 2025-06-26

## 2025-06-25 RX ORDER — MAGNESIUM HYDROXIDE/ALUMINUM HYDROXICE/SIMETHICONE 120; 1200; 1200 MG/30ML; MG/30ML; MG/30ML
30 SUSPENSION ORAL 4 TIMES DAILY PRN
Status: DISCONTINUED | OUTPATIENT
Start: 2025-06-25 | End: 2025-06-26

## 2025-06-25 RX ORDER — CODEINE PHOSPHATE AND GUAIFENESIN 10; 100 MG/5ML; MG/5ML
5 SOLUTION ORAL EVERY 4 HOURS PRN
Status: DISCONTINUED | OUTPATIENT
Start: 2025-06-25 | End: 2025-06-26

## 2025-06-25 RX ORDER — SODIUM CHLORIDE 9 MG/ML
INJECTION, SOLUTION INTRAVENOUS CONTINUOUS
Status: DISCONTINUED | OUTPATIENT
Start: 2025-06-25 | End: 2025-06-26

## 2025-06-25 RX ORDER — VERAPAMIL HYDROCHLORIDE 120 MG/1
240 TABLET, FILM COATED, EXTENDED RELEASE ORAL NIGHTLY
Status: DISCONTINUED | OUTPATIENT
Start: 2025-06-25 | End: 2025-06-26

## 2025-06-25 RX ORDER — NICOTINE POLACRILEX 4 MG
15 LOZENGE BUCCAL
Status: DISCONTINUED | OUTPATIENT
Start: 2025-06-25 | End: 2025-06-26

## 2025-06-25 RX ORDER — FUROSEMIDE 10 MG/ML
40 INJECTION INTRAMUSCULAR; INTRAVENOUS ONCE
Status: COMPLETED | OUTPATIENT
Start: 2025-06-25 | End: 2025-06-25

## 2025-06-25 RX ORDER — ONDANSETRON 2 MG/ML
4 INJECTION INTRAMUSCULAR; INTRAVENOUS EVERY 6 HOURS PRN
Status: DISCONTINUED | OUTPATIENT
Start: 2025-06-25 | End: 2025-06-26

## 2025-06-25 RX ORDER — METHYLPREDNISOLONE SODIUM SUCCINATE 40 MG/ML
40 INJECTION INTRAMUSCULAR; INTRAVENOUS EVERY 12 HOURS
Status: DISCONTINUED | OUTPATIENT
Start: 2025-06-25 | End: 2025-06-26

## 2025-06-25 RX ORDER — ATORVASTATIN CALCIUM 20 MG/1
20 TABLET, FILM COATED ORAL NIGHTLY
Status: DISCONTINUED | OUTPATIENT
Start: 2025-06-25 | End: 2025-06-26

## 2025-06-25 RX ORDER — METOPROLOL TARTRATE 25 MG/1
25 TABLET, FILM COATED ORAL 2 TIMES DAILY
Status: DISCONTINUED | OUTPATIENT
Start: 2025-06-25 | End: 2025-06-26

## 2025-06-25 RX ORDER — SENNA AND DOCUSATE SODIUM 50; 8.6 MG/1; MG/1
2 TABLET, FILM COATED ORAL DAILY
Status: DISCONTINUED | OUTPATIENT
Start: 2025-06-25 | End: 2025-06-26

## 2025-06-25 RX ORDER — HYDROCODONE BITARTRATE AND ACETAMINOPHEN 5; 325 MG/1; MG/1
1 TABLET ORAL EVERY 6 HOURS PRN
Status: DISCONTINUED | OUTPATIENT
Start: 2025-06-25 | End: 2025-06-26

## 2025-06-25 RX ORDER — IPRATROPIUM BROMIDE AND ALBUTEROL SULFATE 2.5; .5 MG/3ML; MG/3ML
3 SOLUTION RESPIRATORY (INHALATION) EVERY 6 HOURS PRN
Status: DISCONTINUED | OUTPATIENT
Start: 2025-06-25 | End: 2025-06-26

## 2025-06-25 RX ORDER — IPRATROPIUM BROMIDE AND ALBUTEROL SULFATE 2.5; .5 MG/3ML; MG/3ML
3 SOLUTION RESPIRATORY (INHALATION)
Status: DISCONTINUED | OUTPATIENT
Start: 2025-06-25 | End: 2025-06-26

## 2025-06-25 RX ORDER — NICOTINE POLACRILEX 4 MG
30 LOZENGE BUCCAL
Status: DISCONTINUED | OUTPATIENT
Start: 2025-06-25 | End: 2025-06-26

## 2025-06-25 RX ORDER — HYDRALAZINE HYDROCHLORIDE 20 MG/ML
10 INJECTION INTRAMUSCULAR; INTRAVENOUS EVERY 4 HOURS PRN
Status: DISCONTINUED | OUTPATIENT
Start: 2025-06-25 | End: 2025-06-26

## 2025-06-25 RX ORDER — DEXTROSE MONOHYDRATE 25 G/50ML
50 INJECTION, SOLUTION INTRAVENOUS
Status: DISCONTINUED | OUTPATIENT
Start: 2025-06-25 | End: 2025-06-26

## 2025-06-25 RX ORDER — PANTOPRAZOLE SODIUM 40 MG/1
40 TABLET, DELAYED RELEASE ORAL
Status: DISCONTINUED | OUTPATIENT
Start: 2025-06-25 | End: 2025-06-26

## 2025-06-25 RX ORDER — ACETAMINOPHEN 325 MG/1
650 TABLET ORAL EVERY 6 HOURS PRN
Status: DISCONTINUED | OUTPATIENT
Start: 2025-06-25 | End: 2025-06-26

## 2025-06-25 NOTE — OCCUPATIONAL THERAPY NOTE
OCCUPATIONAL THERAPY EVALUATION - INPATIENT     Room Number: 449/449-A  Evaluation Date: 6/25/2025  Type of Evaluation: Initial  Presenting Problem: SOB    Physician Order: IP Consult to Occupational Therapy  Reason for Therapy: ADL/IADL Dysfunction and Discharge Planning    OCCUPATIONAL THERAPY ASSESSMENT   Patient is a 74 year old female admitted 6/24/2025 for SOB; medical status signifcant for lung CA, COPD, CHF; pt currently undergoing immunotherapy; has good support from spouse.  Prior to admission, patient's baseline is home with spouse who assists with ADLs, IADLS, PRN; ambulatory with cane or RW depending on the day.  Patient is currently functioning likely near baseline with self care and basic mobility.  Patient is requiring up to Max A for ADLs (toileting); Min-CGA for fxl mobiltiy, transfers  as a result of the following impairments: endurance, activity tolerance, deconditioning. Occupational Therapy will continue to follow for duration of hospitalization.    Patient will benefit from continued skilled OT Services at discharge to promote prior level of function and safety with additional support and return home with home health OT.    PLAN DURING HOSPITALIZATION  OT Device Recommendations: Shower chair  OT Treatment Plan: Balance activities, Energy conservation/work simplification techniques, ADL training, Functional transfer training, Endurance training, Patient/Family education, Patient/Family training, Compensatory technique education     OCCUPATIONAL THERAPY MEDICAL/SOCIAL HISTORY   Problem List   Principal Problem:    Shortness of breath    HOME SITUATION  Type of Home: Apartment  Home Layout: -- (2nd floor walkup)  Lives With: Spouse  Toilet and Equipment: Standard height toilet  Shower/Tub and Equipment: Tub-shower combo  Drives: No  Patient Regularly Uses: Rolling walker; Cane      SUBJECTIVE  I need to use the bathroom     OCCUPATIONAL THERAPY EXAMINATION   OBJECTIVE  Precautions: Bed/chair  alarm  Fall Risk: Standard fall risk    WEIGHT BEARING RESTRICTION     PAIN ASSESSMENT  Ratin    ACTIVITY TOLERANCE  Pulse: 105        BP: 102/61             O2 SATURATIONS  Oxygen Therapy  SPO2% on Room Air at Rest: 97    COGNITION  WNL    RANGE OF MOTION   Upper extremity ROM is within functional limits     STRENGTH ASSESSMENT  Upper extremity strength is within functional limits     ACTIVITIES OF DAILY LIVING ASSESSMENT  AM-PAC ‘6-Clicks’ Inpatient Daily Activity Short Form  How much help from another person does the patient currently need…  -   Putting on and taking off regular lower body clothing?: A Lot  -   Bathing (including washing, rinsing, drying)?: A Lot  -   Toileting, which includes using toilet, bedpan or urinal? : A Lot  -   Putting on and taking off regular upper body clothing?: A Little  -   Taking care of personal grooming such as brushing teeth?: None  -   Eating meals?: None    AM-PAC Score:  Score: 17  Approx Degree of Impairment: 50.11%  Standardized Score (AM-PAC Scale): 37.26  CMS Modifier (G-Code): CK    FUNCTIONAL TRANSFER ASSESSMENT  Sit to Stand: Edge of Bed  Edge of Bed: Contact Guard Assist  Toilet Transfer: Contact Guard Assist    BED MOBILITY  Rolling: Contact Guard Assist  Supine to Sit : Contact Guard Assist  Sit to Supine (OT): Contact Guard Assist  Scooting: CGA    BALANCE ASSESSMENT  Static Sitting: Stand-by Assist  Static Standing: Contact Guard Assist    FUNCTIONAL ADL ASSESSMENT  Eating: Independent  Grooming Seated: Stand-by Assist  Bathing Seated: Minimal Assist  UB Dressing Seated: Stand-by Assist  LB Dressing Seated: Minimal Assist  Toileting Seated: Maximum Assist    THERAPEUTIC EXERCISE     Skilled Therapy Provided: Pt rcvd in bed with supportive spouse present; pt completing bed mobility with CGA; demo'd good balance at EOB with overall SBA-CGA; pt stood to RW with cGA; ambulatory to bathroom with CGA; pt requiring Max A to for toileting tasks 2/2 bladder  incontinent and thoroughness with hygiene; toilet t/f with CGA; provided assist to doff soiled socks; Min A to place and cues to patient to complete task; pt progressing to hallway mobility and tolerating >household distances with RW with overall CGA; pt is on track to dc home with support; may benefit from HH services to prevent further deconditioning and maximize function; Continue skilled occupational therapy while IP to maximize patient function and increase patient participation, safety, and independence with basic ADL and everyday activities.         EDUCATION PROVIDED  Patient Education : Role of Occupational Therapy; Plan of Care; Discharge Recommendations; Functional Transfer Techniques; Fall Prevention; Posture/Positioning; Energy Conservation  Patient's Response to Education: Verbalized Understanding    The patient's Approx Degree of Impairment: 50.11% has been calculated based on documentation in the [facility] '6 clicks' Inpatient Daily Activity Short Form.  Research supports that patients with this level of impairment may benefit from HH.  Final disposition will be made by interdisciplinary medical team.    Patient End of Session: Up in chair, Needs met    OT Goals  Patient self-stated goal is: to return home     Patient will complete LE dressing with SBA   Comment:     Patient will complete toilet transfer with SBA   Comment:     Patient will complete self care task at sink level with SBA    Comment:     Comment:         Goals  on:   Frequency:3x week    Patient Evaluation Complexity Level:   Occupational Profile/Medical History MODERATE - Expanded review of history including review of medical or therapy record   Specific performance deficits impacting engagement in ADL/IADL MODERATE  3 - 5 performance deficits   Client Assessment/Performance Deficits MODERATE - Comorbidities and min to mod modifications of tasks    Clinical Decision Making MODERATE - Analysis of occupational profile, detailed  assessments, several treatment options    Overall Complexity MODERATE     OT Session Time: 25 minutes  Self-Care Home Management: 15 minutes  Therapeutic Activity: 10 minutes    Roberto Parada, Occupational Therapist, OTR/L ext [phone]

## 2025-06-25 NOTE — ED QUICK NOTES
Rounding Completed    Plan of Care reviewed. Waiting for plan of care.  Elimination needs assessed.    Report received from RN [facility]    Bed is locked and in lowest position. Call light within reach.

## 2025-06-25 NOTE — H&P
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  Describes onset as gradual progressive worsening now barely able to get up and move around at home which she claims is new.  She denies any fever or chills denies any chest pain or palpitations.  Received neb treatments in ER with marked improvement in respiratory status.    History:  Past Medical History[1]  Past Surgical History[2]  Family History[3]   reports that she quit smoking about 7 years ago. Her smoking use included cigarettes. She started smoking about 52 years ago. She has a 22.5 pack-year smoking history. She has been exposed to tobacco smoke. She has never used smokeless tobacco. She reports that she does not currently use alcohol. She reports that she does not use drugs.    Allergies:  Allergies[4]    Home Medications:  Prior to Admission Medications   Prescriptions Last Dose Informant Patient Reported? Taking?   Cetirizine HCl 10 MG Oral Cap   Yes No   Sig: Take 10 mg by mouth nightly.   Glucose Blood In Vitro Strip   No No   Sig: Use to check fasting blood sugar once daily   Multiple Vitamins-Minerals (CENTRUM SILVER) Oral Tab   Yes No   Sig: Take 1 tablet by mouth in the morning.   SENEXON-S 8.6-50 MG Oral Tab   No No   Sig: TAKE 2  TABLETS BY MOUTH EVERY DAY   Spironolactone-HCTZ 25-25 MG Oral Tab   No No   Sig: Take 1 tablet by mouth daily.   acetaminophen 500 MG Oral Tab   Yes No   Sig: Take 1 tablet (500 mg total) by mouth every 4 (four) hours as needed.   alendronate 70 MG Oral Tab   No No   Sig: Take 1 tablet (70 mg total) by mouth once a week.   apixaban (ELIQUIS) 5 MG Oral Tab   No No   Sig: Take 1 tablet (5 mg total) by mouth 2 (two) times daily.   atorvastatin 20 MG Oral Tab   No No   Sig: TAKE 1 TABLET (20MG) BY MOUTH EVERY DAY   Patient taking differently: Take 1 tablet (20 mg total) by mouth nightly. TAKE 1 TABLET (20MG) BY MOUTH EVERY DAY   cephALEXin 500 MG Oral Cap   No No   Sig: Take 1 capsule (500 mg total) by mouth 3 (three) times daily.   cholecalciferol 50 MCG (2000 UT) Oral Cap   Yes No   Sig: Take 1 capsule (2,000 Units total) by mouth in the morning.   fluticasone propionate 50 MCG/ACT Nasal Suspension   No No   Si sprays by Each Nare route daily.   furosemide 20 MG Oral Tab   No No   Sig: Take 1 tablet (20 mg total) by mouth daily.   ipratropium-albuterol 0.5-2.5 (3) MG/3ML Inhalation Solution   No No   Sig: Take 3 mL by nebulization every 6 (six) hours as needed (for shortness of breath and wheezing).   lidocaine-prilocaine 2.5-2.5 % External Cream   No No   Sig: Apply to site 1 hour prior to port a cath needle insertion   metFORMIN  MG Oral Tablet 24 Hr   No No   Sig: Take 1 tablet (500 mg total) by mouth daily with food.   metoprolol tartrate 25 MG Oral Tab   No No   Sig: Take 1 tablet (25 mg total) by mouth 2 (two) times daily.   mupirocin 2 % External Ointment   No No   Sig: Apply 1 Application topically 2 (two) times daily.   ondansetron (ZOFRAN) 8 MG tablet   No No   Sig: Take 1 tablet (8 mg total) by mouth every 8 (eight) hours as needed for Nausea.   potassium chloride 20 MEQ Oral Tab CR   No No   Sig: Take 1 tablet (20 mEq total) by mouth daily.   prochlorperazine (COMPAZINE) 10 mg tablet   No No    Sig: Take 1 tablet (10 mg total) by mouth every 6 (six) hours as needed for Nausea.   verapamil  MG Oral Tab CR   No No   Sig: Take 1 tablet (240 mg total) by mouth nightly.      Facility-Administered Medications: None       Review of Systems:  Constitutional:  Weakness, Fatigue.  Eye:  Negative.  Ear/Nose/Mouth/Throat:  Negative.  Respiratory: Shortness of breath on exertion  Cardiovascular: Negative  Gastrointestinal:  Negative.  Genitourinary:  Negative  Endocrine:  Negative.  Immunologic:  Negative.  Musculoskeletal:  Negative.  Integumentary:  Negative.  Neurologic:  Negative.  Psychiatric:  Negative.  ROS reviewed as documented in chart    Physical Exam:  Temp:  [97.7 °F (36.5 °C)-98.7 °F (37.1 °C)] 98.7 °F (37.1 °C)  Pulse:  [108-118] 113  Resp:  [17-31] 20  BP: (128-160)/() 137/78  SpO2:  [92 %-95 %] 92 %    General:  Alert and oriented.  Diffuse skin problem:  None.  Eye:  Pupils are equal, round and reactive to light, extraocular movements are intact, Normal conjunctiva.  HENT:  Normocephalic, oral mucosa is moist.  Head:  Normocephalic, atraumatic.  Neck:  Supple, non-tender, no carotid bruit, no jugular venous distention, no lymphadenopathy, no thyromegaly.  Respiratory:  Lungs are clear to auscultation, respirations are non-labored, breath sounds are equal, symmetrical chest wall expansion.  Cardiovascular:  Normal rate, regular rhythm, no murmur, no edema.  Gastrointestinal:  Soft, non-tender, non-distended, normal bowel sounds, no organomegaly.  Lymphatics:  No lymphadenopathy neck, axilla, groin.  Musculoskeletal: Normal range of motion.  normal strength.  Feet:  Normal pulses.  Neurologic:  Alert, oriented, no focal deficits, cranial nerves II-XII are grossly intact.  Cognition and Speech:  Oriented, speech clear and coherent.  Psychiatric:  Cooperative, appropriate mood & affect.      Laboratory Data:   Lab Results   Component Value Date    WBC 9.3 06/24/2025    HGB 12.9 06/24/2025     HCT 40.0 06/24/2025    .0 06/24/2025    CREATSERUM 0.63 06/24/2025    BUN 27 06/24/2025     06/24/2025    K 4.2 06/24/2025    CL 98 06/24/2025    CO2 29.0 06/24/2025     06/24/2025    CA 9.6 06/24/2025    TSH 4.035 06/24/2025    MG 1.9 06/24/2025       Imaging:  CT CHEST PE AORTA (IV ONLY) (CPT=71260)  Result Date: 6/24/2025  CONCLUSION: No pulmonary embolus. 6.3 cm right upper lobe suprahilar malignancy has decreased in size but not resolved. Patchy right lower lobe airspace opacity has significantly decreased in size suggestive of resolving pneumonia. Electronically Verified and Signed by Attending Radiologist: Eugenio French MD 6/24/2025 8:09 PM Workstation: [employer]       Assessment and Plan:    Likely COPD exacerbation  Marked improvement in respiratory status post neb treatment in ER, will continue the same, start Solu-Medrol 40 mg IV twice daily and evaluate response.    Generalized asthenia  Likely multifactorial, with deconditioning being a major component.  PT/OT to evaluate and treat.    Hyponatremia  IV fluids initiated, check urine for sodium and osmolality.  Hold diuretics for now.    Lung cancer with SVC syndrome  Favorable progression based on CT scan findings, continue follow-up with heme-onc    Obesity with likely obstructive sleep apnea and diabetes  BMI 30.7.  Will  patient regarding lifestyle modifications, consider CPAP at night.    Prophylaxis  Subcutaneous heparin    CODE STATUS  Full    Primary care physician  Tammy Chan MD    MDM: High, acute illness/severe exacerbation of chronic illness posing threat to life.  IV medications requiring close inpatient monitoring  60 minutes spent on this admission - examining patient, obtaining history, reviewing previous medical records, going over test results/imaging and discussing plan of care. All questions answered.     Disposition  Clinical course will dictate outcome      GARCÍA MCKEON MD  6/24/2025  11:07 PM          [1]   Past Medical History:   Anesthesia complication    body aches for 3 days    Anxiety state    Cancer (HCC)    skin cancer    Cataract    COPD (chronic obstructive pulmonary disease) (HCC)    Diabetes (HCC)    Diabetes mellitus (HCC)    Essential hypertension    Exposure to medical diagnostic radiation    High blood pressure    High cholesterol    Incontinence    bladder    Neuropathy    neuropathy    Obesity, unspecified    Osteoarthritis    In knees    Personal history of antineoplastic chemotherapy    Problems with swallowing    pills    Renal disorder    CKD 2    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     Cholecystectomy      Laparoscopic incisional / umbilical / ventral hernia repair  09/19/2024    Port, indwelling, imp     [3]   Family History  Problem Relation Age of Onset    Hypertension Father     Stroke Father     Heart Attack Mother    [4]   Allergies  Allergen Reactions    Erythromycin DIARRHEA     Stomach pain

## 2025-06-25 NOTE — ED QUICK NOTES
Orders for admission, patient is aware of plan and ready to go upstairs. Any questions, please call ED RN ke at extension [phone].     Patient Covid vaccination status: Fully vaccinated     COVID Test Ordered in ED: SARS-CoV-2/Flu A and B/RSV by PCR (GeneXpert)    COVID Suspicion at Admission: N/A    Running Infusions: Medication Infusions[1] None    Mental Status/LOC at time of transport: a&oX4    Other pertinent information:   CIWA score: N/A   NIH score:  N/A             [1]

## 2025-06-25 NOTE — PLAN OF CARE
IV solumedrol as ordered. Still reports feeling fatigued and short of breath especially with movement. Up in chair today with PT. No pain. Remains on room air.     Problem: Patient Centered Care  Goal: Patient preferences are identified and integrated in the patient's plan of care  Description: Interventions:  - What would you like us to know as we care for you?   - Provide timely, complete, and accurate information to patient/family  - Incorporate patient and family knowledge, values, beliefs, and cultural backgrounds into the planning and delivery of care  - Encourage patient/family to participate in care and decision-making at the level they choose  - Honor patient and family perspectives and choices  Outcome: Progressing     Problem: PAIN - ADULT  Goal: Verbalizes/displays adequate comfort level or patient's stated pain goal  Description: INTERVENTIONS:  - Encourage pt to monitor pain and request assistance  - Assess pain using appropriate pain scale  - Administer analgesics based on type and severity of pain and evaluate response  - Implement non-pharmacological measures as appropriate and evaluate response  - Consider cultural and social influences on pain and pain management  - Manage/alleviate anxiety  - Utilize distraction and/or relaxation techniques  - Monitor for opioid side effects  - Notify MD/LIP if interventions unsuccessful or patient reports new pain  - Anticipate increased pain with activity and pre-medicate as appropriate  Outcome: Progressing     Problem: SAFETY ADULT - FALL  Goal: Free from fall injury  Description: INTERVENTIONS:  - Assess pt frequently for physical needs  - Identify cognitive and physical deficits and behaviors that affect risk of falls.  - Freedom fall precautions as indicated by assessment.  - Educate pt/family on patient safety including physical limitations  - Instruct pt to call for assistance with activity based on assessment  - Modify environment to reduce risk of  injury  - Provide assistive devices as appropriate  - Consider OT/PT consult to assist with strengthening/mobility  - Encourage toileting schedule  Outcome: Progressing     Problem: DISCHARGE PLANNING  Goal: Discharge to home or other facility with appropriate resources  Description: INTERVENTIONS:  - Identify barriers to discharge w/pt and caregiver  - Include patient/family/discharge partner in discharge planning  - Arrange for needed discharge resources and transportation as appropriate  - Identify discharge learning needs (meds, wound care, etc)  - Arrange for interpreters to assist at discharge as needed  - Consider post-discharge preferences of patient/family/discharge partner  - Complete POLST form as appropriate  - Assess patient's ability to be responsible for managing their own health  - Refer to Case Management Department for coordinating discharge planning if the patient needs post-hospital services based on physician/LIP order or complex needs related to functional status, cognitive ability or social support system  Outcome: Progressing     Problem: RESPIRATORY - ADULT  Goal: Achieves optimal ventilation and oxygenation  Description: INTERVENTIONS:  - Assess for changes in respiratory status  - Assess for changes in mentation and behavior  - Position to facilitate oxygenation and minimize respiratory effort  - Oxygen supplementation based on oxygen saturation or ABGs  - Provide Smoking Cessation handout, if applicable  - Encourage broncho-pulmonary hygiene including cough, deep breathe, Incentive Spirometry  - Assess the need for suctioning and perform as needed  - Assess and instruct to report SOB or any respiratory difficulty  - Respiratory Therapy support as indicated  - Manage/alleviate anxiety  - Monitor for signs/symptoms of CO2 retention  Outcome: Progressing

## 2025-06-25 NOTE — PHYSICAL THERAPY NOTE
PHYSICAL THERAPY EVALUATION - INPATIENT     Room Number: 449/449-A  Evaluation Date: 2025  Type of Evaluation: Initial   Physician Order: PT Eval and Treat    Presenting Problem:  (SOB, lung CA)     Reason for Therapy: Mobility Dysfunction and Discharge Planning    PHYSICAL THERAPY ASSESSMENT   Patient is a 74 year old female admitted 2025.  Prior to admission, patient's baseline is mod ind assist of spouse of some ADL's.  Patient is currently functioning near baseline with bed mobility, transfers, and gait.  Patient is requiring contact guard assist as a result of the following impairments: decreased functional strength.  Physical Therapy will continue to follow for duration of hospitalization.    Patient will benefit from continued skilled PT Services at discharge to promote prior level of function and safety with additional support and return home with home health PT but pt refuses secondary to being a hoarder.    PLAN DURING HOSPITALIZATION  Nursing Mobility Recommendation : 1 Assist     PT Treatment Plan: Bed mobility, Patient education, Family education, Gait training, Transfer training  Rehab Potential : Fair  Frequency (Obs): 5x/week     PHYSICAL THERAPY MEDICAL/SOCIAL HISTORY   History related to current admission:      Problem List  Principal Problem:    Shortness of breath      HOME SITUATION  Type of Home: Apartment     Stairs to Enter : 12                  Lives With: Spouse              Prior Level of [place]: ind    SUBJECTIVE  \"I want to go home\"    PHYSICAL THERAPY EXAMINATION   OBJECTIVE      PAIN ASSESSMENT  Ratin          COGNITION  Overall Cognitive Status:  WFL - within functional limits    RANGE OF MOTION AND STRENGTH ASSESSMENT  Upper extremity ROM and strength are within functional limits   Lower extremity ROM is within functional limits   Lower extremity strength is within functional limits     BALANCE  Static Sitting: Fair  Dynamic Sitting: Fair  Static Standing: Fair  -  Dynamic Standing: Fair -    ACTIVITY TOLERANCE           BP: 102/61           AM-PAC '6-Clicks' INPATIENT SHORT FORM - BASIC MOBILITY  How much difficulty does the patient currently have...  Patient Difficulty: Turning over in bed (including adjusting bedclothes, sheets and blankets)?: A Little   Patient Difficulty: Sitting down on and standing up from a chair with arms (e.g., wheelchair, bedside commode, etc.): A Little   Patient Difficulty: Moving from lying on back to sitting on the side of the bed?: A Little   How much help from another person does the patient currently need...   Help from Another: Moving to and from a bed to a chair (including a wheelchair)?: A Little   Help from Another: Need to walk in hospital room?: A Little   Help from Another: Climbing 3-5 steps with a railing?: A Little     AM-PAC Score:  Raw Score: 18   Approx Degree of Impairment: 46.58%   Standardized Score (AM-PAC Scale): 43.63   CMS Modifier (G-Code): CK    FUNCTIONAL ABILITY STATUS  Functional Mobility/Gait Assessment  Gait Assistance: Contact guard assist  Distance (ft): 125x2  Assistive Device: Rolling walker  Stairs: Stairs  How Many Stairs:  (8)  Device: 1 Rail  Assist: Contact guard assist  Pattern: Ascend and Descend  Ascend and Descend : Side step  Rolling: supervision  Supine to Sit: supervision  Sit to Supine: supervision  Sit to Stand: supervision    Exercise/Education Provided:  Bed mobility  Gait training  Transfer training    Skilled Therapy Provided: activity pacing & stair training    The patient's Approx Degree of Impairment: 46.58% has been calculated based on documentation in the [facility] '6 clicks' Inpatient Basic Mobility Short Form.  Research supports that patients with this level of impairment may benefit from HH. Final disposition will be made by interdisciplinary medical team.    Patient End of Session: Up in chair    CURRENT GOALS  Goals to be met by: 7/2  Patient Goal Patient's self-stated goal is: to go  home   Goal #1 Patient is able to demonstrate supine - sit EOB @ level: supervision     Goal #1   Current Status    Goal #2 Patient is able to demonstrate transfers Sit to/from Stand at assistance level: supervision with walker - rolling     Goal #2  Current Status    Goal #3 Patient is able to ambulate 150 feet with assist device: walker - rolling at assistance level: supervision   Goal #3   Current Status    Goal #4 Patient will negotiate 3 stairs/one curb w/ assistive device and supervision   Goal #4   Current Status    Goal #5 Patient to demonstrate independence with home activity/exercise instructions provided to patient in preparation for discharge.   Goal #5   Current Status    Goal #6    Goal #6  Current Status      Patient Evaluation Complexity Level:  History Low - no personal factors and/or co-morbidities   Examination of body systems Low -  addressing 1-2 elements   Clinical Presentation Low- Stable   Clinical Decision Making  Low Complexity     Gait Trainin minutes

## 2025-06-25 NOTE — CM/SW NOTE
06/25/25 1100   CM/SW Referral Data   Referral Source Social Work (self-referral)   Reason for Referral Discharge planning   Informant Patient   Medical Hx   Does patient have an established PCP? Yes   Patient Info   Patient's Current Mental Status at Time of Assessment Alert;Oriented   Patient's Home Environment Condo/Apt no elevator   Number of Levels in Home   (lives on 2nd floor)   Number of Stair in Home about 12 stairs   Patient lives with Spouse/Significant other   Patient Status Prior to Admission   Independent with ADLs and Mobility No   Pt. requires assistance with Bathing;Driving;Ambulating;Dressing;Toileting   Discharge Needs   Anticipated D/C needs Home health care     SW initiated self-referral for discharge planning.    SW introduced self and role to pt and  who was at bedside.    Pt confirmed information above and was alert and oriented at time of assessment.  Pt reports she lives in an apartment on the second floor with her  w/ about 12 stairs. Pt reports the use of a walker no additional DME in the home.    Pt reports she needs help with ADL's and usually has her  help her but would be interested in  services.   SW reached out to [facility] services who will contact pt.    SW discussed PT/OT recommendations which would be for HH.  Pt reports, she tried HH last time but felt judged by agency as she has a hoarder situation but is working on that.     Pt is agreeable to home health.    SW sent referrals via [employer].    PLAN: Home w/ spouse and HHC *P.list/choice/f2f    SW/CM to remain available for support and/or discharge planning.    [place], MSW, LSW [phone]

## 2025-06-25 NOTE — PLAN OF CARE
Problem: Patient Centered Care  Goal: Patient preferences are identified and integrated in the patient's plan of care  Description: Interventions:  - What would you like us to know as we care for you?  - Provide timely, complete, and accurate information to patient/family  - Incorporate patient and family knowledge, values, beliefs, and cultural backgrounds into the planning and delivery of care  - Encourage patient/family to participate in care and decision-making at the level they choose  - Honor patient and family perspectives and choices  Outcome: Progressing     Pt admitted to room 449. Generalized weakness. Denies pain or nausea. RA. Neb treatments scheduled. Congested cough. Robitussin prn. Continuous pulse ox. Waiting for remote tele box. Purewick in place. Accuchecks ACHS.  Safety measures in place. Frequent rounding being done.  at bedside most of night.

## 2025-06-25 NOTE — PROGRESS NOTES
Progress Note     Yi Torres    Is here w   ckd 2  borderline dm   Htn now w non small cell ca of r lung  Has received xrt now on carboplat. Paclitaxel  Not eating well   On eliquis for blood clots   Squalmos cell ca..  is dec insize on recent ct scan  Urinating well had hypermetabolic lymph nodes  HISTORY:  Past Medical History[1]   Past Surgical History[2]   Social History     Tobacco Use    Smoking status: Former     Current packs/day: 0.00     Average packs/day: 0.5 packs/day for 45.0 years (22.5 ttl pk-yrs)     Types: Cigarettes     Start date: 1973     Quit date: 2018     Years since quittin.4     Passive exposure: Past    Smokeless tobacco: Never    Tobacco comments:     1 pack every 3 days   Substance Use Topics    Alcohol use: Not Currently     Comment: very rarely         Medications (Active prior to today's visit):  Current Medications[3]    Allergies:  Allergies[4]      ROS:     Constitutional:  feels weak w chemo  ENMT:  Negative for ear drainage, hearing loss and nasal drainage  Eyes:  Negative for eye discharge and vision loss  Cardiovascular:  Negative for chest pain, sob  Respiratory:  Negative for cough, dyspnea and wheezing  Gastrointestinal:  appetite pooor  Genitourinary:  Negative for dysuria and hematuria  Endocrine:  Negative for abnormal sleep patterns  Hema/Lymph:  Negative for easy bleeding and easy bruising  Integumentary:  Negative for pruritus and rash  Musculoskeletal:  Negative for bone/joint symptoms  Neurological:  Negative for gait disturbance  Psychiatric:  somewhat depressed      Vitals:    25 1514   BP: 128/66   Pulse: 114       PHYSICAL EXAM:   Constitutional: appears thin    Head/Face: normocephalic  Eyes/Vision: normal extraocular motion is intact  Nose/Mouth/Throat:mucous membranes are moist   Neck/Thyroid: neck is supple without adenopathy  Lymphatic: no abnormal cervical, supraclavicular adenopathy is noted  Respiratory:  lungs  are clear to auscultation bilaterally  Cardiovascular: regular rate and rhythm   Abdomen: soft, non-tender, non-distended, BS normal  Skin/Hair: no unusual rashes present, no abnormal bruising noted  Back/Spine: no abnormalities noted  Musculoskeletal: no deformities  Extremities:trace edema  Neurological:  Grossly normal       ASSESSMENT/PLAN:   Assessment   Encounter Diagnoses   Name Primary?    Type 2 diabetes mellitus with stage 3a chronic kidney disease, without long-term current use of insulin (HCC) Yes    Non-small cell cancer of right lung (HCC)     CKD (chronic kidney disease) stage 2, GFR 60-89 ml/min     Essential hypertension, benign        1 squamous cell ca lung  per dr faria  2. Nutritoin  work on proein supplements   3. Htn controlled  4. Dm bordelrine  5  a nemia mild likley from chemo  6. Renal fx n ow normal  Gfr 81  7.. low k  on aldactone   On k supplemnt   May be losing k thru chemo  Check mag levels  Seeme four months  Follow w onc.     Orders This Visit:  No orders of the defined types were placed in this encounter.      Meds This Visit:  Requested Prescriptions      No prescriptions requested or ordered in this encounter       Imaging & Referrals:  None     6/24/2025  Shaji Genao MD               [1]   Past Medical History:   Anesthesia complication    body aches for 3 days    Anxiety state    Cancer (HCC)    skin cancer    Cataract    COPD (chronic obstructive pulmonary disease) (HCC)    Diabetes (HCC)    Diabetes mellitus (HCC)    Essential hypertension    Exposure to medical diagnostic radiation    High blood pressure    High cholesterol    Incontinence    bladder    Neuropathy    neuropathy    Obesity, unspecified    Osteoarthritis    In knees    Personal history of antineoplastic chemotherapy    Problems with swallowing    pills    Renal disorder    CKD 2    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     Cholecystectomy      Laparoscopic incisional / umbilical / ventral hernia repair  09/19/2024    Port, indwelling, imp     [3]   Current Outpatient Medications   Medication Sig Dispense Refill    cephALEXin 500 MG Oral Cap Take 1 capsule (500 mg total) by mouth 3 (three) times daily. 21 capsule 0    mupirocin 2 % External Ointment Apply 1 Application topically 2 (two) times daily. 30 g 0    SENEXON-S 8.6-50 MG Oral Tab TAKE 2 TABLETS BY MOUTH EVERY DAY 30 tablet 1    furosemide 20 MG Oral Tab Take 1 tablet (20 mg total) by mouth daily. 90 tablet 0    potassium chloride 20 MEQ Oral Tab CR Take 1 tablet (20 mEq total) by mouth daily. 30 tablet 2    verapamil  MG Oral Tab CR Take 1 tablet (240 mg total) by mouth nightly. 90 tablet 3    ipratropium-albuterol 0.5-2.5 (3) MG/3ML Inhalation Solution Take 3 mL by nebulization every 6 (six) hours as needed (for shortness of breath and wheezing). 360 mL 5    apixaban (ELIQUIS) 5 MG Oral Tab Take 1 tablet (5 mg total) by mouth 2 (two) times daily. 60 tablet 3    metoprolol tartrate 25 MG Oral Tab Take 1 tablet (25 mg total) by mouth 2 (two) times daily. 180 tablet 3    Glucose Blood In Vitro Strip Use to check fasting blood sugar once daily 100 strip 1    alendronate 70 MG Oral Tab Take 1 tablet (70 mg total) by mouth once a week. 12 tablet 3    lidocaine-prilocaine 2.5-2.5 % External Cream Apply to site 1 hour prior to port a cath needle insertion 5 g 1    acetaminophen 500 MG Oral Tab Take 1 tablet (500 mg total) by mouth every 4 (four) hours as needed.      fluticasone propionate 50 MCG/ACT Nasal Suspension 2 sprays by Each Nare route daily. 1 each 0    prochlorperazine (COMPAZINE) 10 mg tablet Take 1 tablet (10 mg total) by mouth every 6 (six) hours as needed for Nausea. 30 tablet 3    ondansetron (ZOFRAN) 8  MG tablet Take 1 tablet (8 mg total) by mouth every 8 (eight) hours as needed for Nausea. 30 tablet 3    Spironolactone-HCTZ 25-25 MG Oral Tab Take 1 tablet by mouth daily. 90 tablet 3    atorvastatin 20 MG Oral Tab TAKE 1 TABLET (20MG) BY MOUTH EVERY DAY (Patient taking differently: Take 1 tablet (20 mg total) by mouth nightly. TAKE 1 TABLET (20MG) BY MOUTH EVERY DAY) 90 tablet 3    Cetirizine HCl 10 MG Oral Cap Take 10 mg by mouth nightly.      metFORMIN  MG Oral Tablet 24 Hr Take 1 tablet (500 mg total) by mouth daily with food. 90 tablet 3    cholecalciferol 50 MCG (2000 UT) Oral Cap Take 1 capsule (2,000 Units total) by mouth in the morning.      Multiple Vitamins-Minerals (CENTRUM SILVER) Oral Tab Take 1 tablet by mouth in the morning.     [4]   Allergies  Allergen Reactions    Erythromycin DIARRHEA     Stomach pain

## 2025-06-25 NOTE — PROGRESS NOTES
[facility]  part of [facility]     Hospitalist Progress Note     Yi Torres Patient Status:  Observation    1950 MRN [id number]   Location [facility] 4W/SW/SE Attending Andrea Redd MD   Hosp Day # 0 PCP Tammy Chan MD     Subjective:     Patient patient seen and examined at bedside.  Sitting in the chair in no apparent discomfort.  States that she continues to have some difficulty breathing and also continues to have  Weakness and fatigue.  She worked with therapy earlier today and said that she got very tired by the end of it.    Objective:    Review of Systems:   ROS completed; pertinent positive and negatives stated in subjective.      Vital signs:  Temp:  [97.6 °F (36.4 °C)-98.7 °F (37.1 °C)] 98.2 °F (36.8 °C)  Pulse:  [] 88  Resp:  [17-31] 20  BP: (102-160)/() 113/67  SpO2:  [90 %-100 %] 93 %      Physical Exam:    Gen: NAD AO x3  Chest: good air entry CTABL  CVS: normal s1 and s2 RR  Abd: NABS soft NT ND  Neuro: CN 2-12 grossly intact  Ext: no edema in bilateral LE      Diagnostic Data:    Labs:  Recent Labs   Lab 25  1749 25  0527   WBC 9.3 8.5   HGB 12.9 13.2   MCV 88.1 88.2   .0 197.0       Recent Labs   Lab 25  1749 25  0527   * 150*   BUN 27* 19   CREATSERUM 0.63 0.55   CA 9.6 9.1   * 134*   K 4.2 3.6   CL 98 99   CO2 29.0 27.0       Estimated Creatinine Clearance: 81.6 mL/min (based on SCr of 0.55 mg/dL).    No results for input(s): \"PTP\", \"INR\" in the last 168 hours.    Lab Results   Component Value Date    TSH 4.035 2025         Imaging: Imaging data reviewed in Epic.    Medications: Scheduled Medications[1]    Assessment & Plan:     Acute on chronic shortness of breath  History of lung cancer with SVC syndrome  History of COPD  Presented to the emergency department complaints of generalized weakness, fatigue and shortness of breath.  Chest x-ray done in the emergency department did  not show any acute findings did show patient's known large right lung mass.  CT a of the chest done in the emergency department did not show evidence of PE and again showed patient's known right lung malignancy.  Patient was found to be wheezing on exam, started on DuoNebs, IV steroids    Plan  Continue IV steroids, DuoNebs  Supplemental oxygen if needed  PT and OT consult  Continue Eliquis  Outpatient f.u with oncology    Generalized weakness and fatigue  Most likely secondary to malignancy, chemotherapy and immunotherapy  UA negative for UTI and no other signs of infection  PT and OT consulted    History of GERD  Continue home PPI    History of hypertension  Continue home metoprolol and Verapamil    History of hyperlipidemia  Continue home statin  Plan of care discussed with patient or family at bedside.      Supplementary Documentation:     Quality:  DVT Prophylaxis: Eliquis  CODE status: Full  Fabian: No  Central line: No      Estimated date of discharge: TBD  Discharge is dependent on: clinical stability  At this point Ms. Torres is expected to be discharge to: home                   Andrea Redd MD  Hospitalist    MDM: High, I personally reviewed the available laboratories, imaging including CBC, CMP. I discussed the case with patient, RN, . I ordered laboratories, studies including CBC, CMP. I adjusted medications  Medical decision making high, risk is high.         The 21st Century Cures Act makes medical notes like these available to patients in the interest of transparency. Please be advised this is a medical document. Medical documents are intended to carry relevant information, facts as evident, and the clinical opinion of the practitioner. The medical note is intended as peer to peer communication and may appear blunt or direct. It is written in medical language and may contain abbreviations or verbiage that are unfamiliar.        [1]    apixaban  5 mg Oral BID    atorvastatin  20 mg Oral  Nightly    metoprolol tartrate  25 mg Oral BID    senna-docusate  2 tablet Oral Daily    verapamil ER  240 mg Oral Nightly    ipratropium-albuterol  3 mL Nebulization 6 times per day    methylPREDNISolone  40 mg Intravenous Q12H    pantoprazole  40 mg Oral QAM AC    insulin aspart  1-5 Units Subcutaneous TID CC

## 2025-06-26 VITALS
HEIGHT: 64.5 IN | WEIGHT: 182 LBS | OXYGEN SATURATION: 96 % | TEMPERATURE: 97 F | DIASTOLIC BLOOD PRESSURE: 65 MMHG | BODY MASS INDEX: 30.69 KG/M2 | SYSTOLIC BLOOD PRESSURE: 131 MMHG | HEART RATE: 89 BPM | RESPIRATION RATE: 18 BRPM

## 2025-06-26 LAB
ALBUMIN SERPL-MCNC: 4.1 G/DL (ref 3.2–4.8)
ALBUMIN/GLOB SERPL: 1.4 {RATIO} (ref 1–2)
ALP LIVER SERPL-CCNC: 58 U/L (ref 55–142)
ALT SERPL-CCNC: 21 U/L (ref 10–49)
ANION GAP SERPL CALC-SCNC: 7 MMOL/L (ref 0–18)
AST SERPL-CCNC: 20 U/L (ref ?–34)
BASOPHILS # BLD AUTO: 0.01 X10(3) UL (ref 0–0.2)
BASOPHILS NFR BLD AUTO: 0.1 %
BILIRUB SERPL-MCNC: 0.4 MG/DL (ref 0.2–1.1)
BUN BLD-MCNC: 13 MG/DL (ref 9–23)
BUN/CREAT SERPL: 26.5 (ref 10–20)
CALCIUM BLD-MCNC: 9.1 MG/DL (ref 8.7–10.4)
CHLORIDE SERPL-SCNC: 101 MMOL/L (ref 98–112)
CO2 SERPL-SCNC: 26 MMOL/L (ref 21–32)
CREAT BLD-MCNC: 0.49 MG/DL (ref 0.55–1.02)
DEPRECATED RDW RBC AUTO: 60.7 FL (ref 35.1–46.3)
EGFRCR SERPLBLD CKD-EPI 2021: 100 ML/MIN/1.73M2 (ref 60–?)
EOSINOPHIL # BLD AUTO: 0 X10(3) UL (ref 0–0.7)
EOSINOPHIL NFR BLD AUTO: 0 %
ERYTHROCYTE [DISTWIDTH] IN BLOOD BY AUTOMATED COUNT: 17.4 % (ref 11–15)
GLOBULIN PLAS-MCNC: 3 G/DL (ref 2–3.5)
GLUCOSE BLD-MCNC: 150 MG/DL (ref 70–99)
GLUCOSE BLDC GLUCOMTR-MCNC: 168 MG/DL (ref 70–99)
GLUCOSE BLDC GLUCOMTR-MCNC: 185 MG/DL (ref 70–99)
HCT VFR BLD AUTO: 41.1 % (ref 35–48)
HGB BLD-MCNC: 12.7 G/DL (ref 12–16)
IMM GRANULOCYTES # BLD AUTO: 0.03 X10(3) UL (ref 0–1)
IMM GRANULOCYTES NFR BLD: 0.4 %
LYMPHOCYTES # BLD AUTO: 0.76 X10(3) UL (ref 1–4)
LYMPHOCYTES NFR BLD AUTO: 9.3 %
MAGNESIUM SERPL-MCNC: 2 MG/DL (ref 1.6–2.6)
MCH RBC QN AUTO: 28.9 PG (ref 26–34)
MCHC RBC AUTO-ENTMCNC: 30.9 G/DL (ref 31–37)
MCV RBC AUTO: 93.4 FL (ref 80–100)
MONOCYTES # BLD AUTO: 0.17 X10(3) UL (ref 0.1–1)
MONOCYTES NFR BLD AUTO: 2.1 %
NEUTROPHILS # BLD AUTO: 7.16 X10 (3) UL (ref 1.5–7.7)
NEUTROPHILS # BLD AUTO: 7.16 X10(3) UL (ref 1.5–7.7)
NEUTROPHILS NFR BLD AUTO: 88.1 %
OSMOLALITY SERPL CALC.SUM OF ELEC: 281 MOSM/KG (ref 275–295)
PHOSPHATE SERPL-MCNC: 2.7 MG/DL (ref 2.4–5.1)
PLATELET # BLD AUTO: 192 10(3)UL (ref 150–450)
POTASSIUM SERPL-SCNC: 3.6 MMOL/L (ref 3.5–5.1)
PROT SERPL-MCNC: 7.1 G/DL (ref 5.7–8.2)
RBC # BLD AUTO: 4.4 X10(6)UL (ref 3.8–5.3)
SODIUM SERPL-SCNC: 134 MMOL/L (ref 136–145)
WBC # BLD AUTO: 8.1 X10(3) UL (ref 4–11)

## 2025-06-26 PROCEDURE — 99239 HOSP IP/OBS DSCHRG MGMT >30: CPT | Performed by: INTERNAL MEDICINE

## 2025-06-26 RX ORDER — PREDNISONE 10 MG/1
TABLET ORAL
Qty: 30 TABLET | Refills: 0 | Status: SHIPPED | OUTPATIENT
Start: 2025-06-26 | End: 2025-07-08

## 2025-06-26 RX ORDER — IPRATROPIUM BROMIDE AND ALBUTEROL SULFATE 2.5; .5 MG/3ML; MG/3ML
3 SOLUTION RESPIRATORY (INHALATION)
Status: DISCONTINUED | OUTPATIENT
Start: 2025-06-26 | End: 2025-06-26

## 2025-06-26 NOTE — PLAN OF CARE
No acute changes over night. Pt is alert and oriented on RA. Remote tele in place. IV steroids given as ordered. Scheduled nebs as ordered. Plan for home with [facility].    Problem: PAIN - ADULT  Goal: Verbalizes/displays adequate comfort level or patient's stated pain goal  Description: INTERVENTIONS:  - Encourage pt to monitor pain and request assistance  - Assess pain using appropriate pain scale  - Administer analgesics based on type and severity of pain and evaluate response  - Implement non-pharmacological measures as appropriate and evaluate response  - Consider cultural and social influences on pain and pain management  - Manage/alleviate anxiety  - Utilize distraction and/or relaxation techniques  - Monitor for opioid side effects  - Notify MD/LIP if interventions unsuccessful or patient reports new pain  - Anticipate increased pain with activity and pre-medicate as appropriate  Outcome: Progressing     Problem: SAFETY ADULT - FALL  Goal: Free from fall injury  Description: INTERVENTIONS:  - Assess pt frequently for physical needs  - Identify cognitive and physical deficits and behaviors that affect risk of falls.  - [place] fall precautions as indicated by assessment.  - Educate pt/family on patient safety including physical limitations  - Instruct pt to call for assistance with activity based on assessment  - Modify environment to reduce risk of injury  - Provide assistive devices as appropriate  - Consider OT/PT consult to assist with strengthening/mobility  - Encourage toileting schedule  Outcome: Progressing     Problem: DISCHARGE PLANNING  Goal: Discharge to home or other facility with appropriate resources  Description: INTERVENTIONS:  - Identify barriers to discharge w/pt and caregiver  - Include patient/family/discharge partner in discharge planning  - Arrange for needed discharge resources and transportation as appropriate  - Identify discharge learning needs (meds, wound care, etc)  - Arrange for  interpreters to assist at discharge as needed  - Consider post-discharge preferences of patient/family/discharge partner  - Complete POLST form as appropriate  - Assess patient's ability to be responsible for managing their own health  - Refer to Case Management Department for coordinating discharge planning if the patient needs post-hospital services based on physician/LIP order or complex needs related to functional status, cognitive ability or social support system  Outcome: Progressing     Problem: RESPIRATORY - ADULT  Goal: Achieves optimal ventilation and oxygenation  Description: INTERVENTIONS:  - Assess for changes in respiratory status  - Assess for changes in mentation and behavior  - Position to facilitate oxygenation and minimize respiratory effort  - Oxygen supplementation based on oxygen saturation or ABGs  - Provide Smoking Cessation handout, if applicable  - Encourage broncho-pulmonary hygiene including cough, deep breathe, Incentive Spirometry  - Assess the need for suctioning and perform as needed  - Assess and instruct to report SOB or any respiratory difficulty  - Respiratory Therapy support as indicated  - Manage/alleviate anxiety  - Monitor for signs/symptoms of CO2 retention  Outcome: Progressing

## 2025-06-26 NOTE — PLAN OF CARE
Orders received for patient discharge. Patient transported home via . All discharge instructions were discussed with patient and  at bedside. Patient verbalizes understanding and agreeable to plan of care & follow-up plan as outlined in discharge summary / AVS and had no further questions regarding discharge instruction. All patient belongings were transported with patient/family at time of discharge.     Reviewed prednisone plan. Verbalizes understanding - plans on picking up script on way home and will start course today. She reports feeling discharge ready. She wants to go home.

## 2025-06-26 NOTE — CM/SW NOTE
06/26/25 1100   Discharge disposition   Expected discharge disposition Home or Self   Additional Home Care/Hospice Provider   ([facility])   Discharge transportation Private car     Sw met with pt to provide HH list.  Pt is agreeable to [facility], reserved on [facility] and information has been added to AVS.    PLAN: Home w/ spouse & [facility]    SW/CM to remain available for support and/or discharge planning.    [place] MSW, LSW [phone]

## 2025-06-26 NOTE — DISCHARGE SUMMARY
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  Describes onset as gradual progressive worsening now barely able to get up and move around at home which she claims is new.  She denies any fever or chills denies any chest pain or palpitations.  Received neb treatments in ER with marked improvement in respiratory status.    Brief Synopsis:     Acute on chronic shortness of breath  History of lung cancer with SVC syndrome  History of COPD  Presented to the emergency department complaints of generalized weakness, fatigue and shortness of breath.  Chest x-ray done in the emergency department did not show any acute findings did show patient's known large right lung mass.  CT a of the chest done in the emergency department did not show evidence of PE and again showed patient's known right lung malignancy.  Patient was found to be wheezing on exam, started on DuoNebs, IV steroids     Plan  Continue PO steroids  PT and OT at home  Continue Eliquis  Outpatient f.u with oncology     Generalized weakness and fatigue  Most likely secondary to malignancy, chemotherapy and immunotherapy  UA negative for UTI and no other signs of infection  PT and OT consulted, recommended home health  Sw set up home health     History of GERD  Continue home PPI     History  of hypertension  Continue home metoprolol and Verapamil     History of hyperlipidemia  Continue home statin    Patient is to remain compliant with all discharge medications and instructions and to follow up as advised.   Patient encouraged to make healthy lifestyle and dietary changes.    Lace+ Score: 78  59-90 High Risk  29-58 Medium Risk  0-28   Low Risk       TCM Follow-Up Recommendation:  LACE > 58: High Risk of readmission after discharge from the hospital.      Procedures during hospitalization:   None    Incidental or significant findings and recommendations (brief descriptions):  None    Lab/Test results pending at Discharge:   None    Consultants:      Discharge Medication List:     Discharge Medications        START taking these medications        Instructions Prescription details   predniSONE 10 MG Tabs  Commonly known as: Deltasone  Start taking on: June 26, 2025      Take 4 tablets (40 mg total) by mouth daily for 3 days, THEN 3 tablets (30 mg total) daily for 3 days, THEN 2 tablets (20 mg total) daily for 3 days, THEN 1 tablet (10 mg total) daily for 3 days.   Stop taking on: July 8, 2025  Quantity: 30 tablet  Refills: 0            CHANGE how you take these medications        Instructions Prescription details   atorvastatin 20 MG Tabs  Commonly known as: Lipitor  What changed:   how much to take  how to take this  when to take this      TAKE 1 TABLET (20MG) BY MOUTH EVERY DAY   Quantity: 90 tablet  Refills: 3            CONTINUE taking these medications        Instructions Prescription details   acetaminophen 500 MG Tabs  Commonly known as: Tylenol Extra Strength      Take 1 tablet (500 mg total) by mouth every 4 (four) hours as needed.   Refills: 0     alendronate 70 MG Tabs  Commonly known as: Fosamax      Take 1 tablet (70 mg total) by mouth once a week.   Quantity: 12 tablet  Refills: 3     apixaban 5 MG Tabs  Commonly known as: Eliquis      Take 1 tablet (5 mg total) by mouth 2 (two) times  daily.   Quantity: 60 tablet  Refills: 3     Centrum Silver Tabs      Take 1 tablet by mouth in the morning.   Refills: 0     Cetirizine HCl 10 MG Caps      Take 10 mg by mouth nightly.   Refills: 0     cholecalciferol 50 MCG (2000 UT) Caps  Commonly known as: Vitamin D3      Take 1 capsule (2,000 Units total) by mouth in the morning.   Refills: 0     fluticasone propionate 50 MCG/ACT Susp  Commonly known as: Flonase      2 sprays by Each Nare route daily.   Quantity: 1 each  Refills: 0     furosemide 20 MG Tabs  Commonly known as: Lasix      Take 1 tablet (20 mg total) by mouth daily.   Quantity: 90 tablet  Refills: 0     Glucose Blood Strp      Use to check fasting blood sugar once daily   Quantity: 100 strip  Refills: 1     ipratropium-albuterol 0.5-2.5 (3) MG/3ML Soln  Commonly known as: Duoneb      Take 3 mL by nebulization every 6 (six) hours as needed (for shortness of breath and wheezing).   Quantity: 360 mL  Refills: 5     metFORMIN  MG Tb24  Commonly known as: Glucophage XR      Take 1 tablet (500 mg total) by mouth daily with food.   Quantity: 90 tablet  Refills: 3     metoprolol tartrate 25 MG Tabs  Commonly known as: Lopressor      Take 1 tablet (25 mg total) by mouth 2 (two) times daily.   Quantity: 180 tablet  Refills: 3     ondansetron 8 MG tablet  Commonly known as: Zofran      Take 1 tablet (8 mg total) by mouth every 8 (eight) hours as needed for Nausea.   Quantity: 30 tablet  Refills: 3     potassium chloride 20 MEQ Tbcr  Commonly known as: Klor-Con M20      Take 1 tablet (20 mEq total) by mouth daily.   Quantity: 30 tablet  Refills: 2     prochlorperazine 10 mg tablet  Commonly known as: Compazine      Take 1 tablet (10 mg total) by mouth every 6 (six) hours as needed for Nausea.   Quantity: 30 tablet  Refills: 3     Senexon-S 8.6-50 MG Tabs  Generic drug: sennosides-docusate      TAKE 2 TABLETS BY MOUTH EVERY DAY   Quantity: 30 tablet  Refills: 1     Spironolactone-HCTZ 25-25 MG  Tabs  Commonly known as: ALDACTAZIDE      Take 1 tablet by mouth daily.   Quantity: 90 tablet  Refills: 3     verapamil  MG Tbcr  Commonly known as: Calan-SR      Take 1 tablet (240 mg total) by mouth nightly.   Quantity: 90 tablet  Refills: 3               Where to Get Your Medications        These medications were sent to [facility]pharm[facility] #[id number] - [place], [place] - [address] across from [address], [phone], [phone]  [address] [place] [address]      Phone: [phone]   predniSONE 10 MG Tabs         [place]    Follow-up appointment:   José Miguel Goodson MD  [address] [place], [place] [id number] [phone]    Follow up      Appointments for Next 30 Days 6/26/2025 - 7/26/2025        Date Arrival Time Visit Type Length Department Provider     6/27/2025  9:15 AM  LAB - EM CC [[id number]] [facility]    Patient Instructions:         Location Instructions:     Your appointment is at the [facility]. The address is [address] [place] [address].   Expansion at the [facility] at [place] has started. Currently, the entrance from the [facility] to the Cancer Center is closed. Please enter and exit through the front doors of the Cancer Center. Parking may be cumbersome during this time - please take advantage of our complimentary  parking.   Masks are optional for all patients and visitors, unless otherwise indicated. No care partners/visitors under 18 years of age are allowed in the infusion room.               6/27/2025 10:00 AM  PRE CHEMO VISIT [[id number]] 30 min [facility] Hematology Oncology [place] Naye Perry APRN    Patient Instructions:         Location Instructions:     Your appointment is at the [facility]. The address is [address] [place] [address].   Expansion at the  [facility] has started. Currently, the entrance from the [facility] to the Cancer Center is closed. Please enter and exit through the front doors of the Cancer Center. Parking may be cumbersome during this time - please take advantage of our complimentary  parking.   Masks are optional for all patients and visitors, unless otherwise indicated. No care partners/visitors under 18 years of age are allowed in the infusion room.               6/27/2025 11:30 AM  CHEMOTHERAPY [[id number]] 120 min [facility] CC INFRN 5    Patient Instructions:         Location Instructions:     Your appointment is at the [facility]. The address is [address] [place] [address].   Expansion at the [facility] has started. Currently, the entrance from the [facility] to the Cancer Center is closed. Please enter and exit through the front doors of the Cancer Center. Parking may be cumbersome during this time - please take advantage of our complimentary  parking.   Masks are optional for all patients and visitors, unless otherwise indicated. No care partners/visitors under 18 years of age are allowed in the infusion room.               7/25/2025  9:45 AM  [facility] CC [[id number]] 15 min [facility] CC LAB1    Patient Instructions:         Location Instructions:     Your appointment is at the [facility]. The address is [address] [place] [address].   Expansion at the [facility] has started. Currently, the entrance from the [facility] to the Cancer Center is closed. Please enter and exit through the front doors of the Cancer Center. Parking may be cumbersome during this time - please take advantage of our complimentary  parking.   Masks are optional for  all patients and visitors, unless otherwise indicated. No care partners/visitors under 18 years of age are allowed in the infusion room.               7/25/2025 10:45 AM  PRE CHEMO VISIT [[id number]] 30 min [facility] Hematology Oncology [place] José Miguel Goodson MD    Patient Instructions:         Location Instructions:     Your appointment is at the [facility]. The address is [address] [place] [address].   Expansion at the [facility] [place] has started. Currently, the entrance from the [facility] to the Cancer Center is closed. Please enter and exit through the front doors of the Cancer Center. Parking may be cumbersome during this time - please take advantage of our complimentary  parking.   Masks are optional for all patients and visitors, unless otherwise indicated. No care partners/visitors under 18 years of age are allowed in the infusion room.               7/25/2025 11:30 AM  CHEMOTHERAPY [[id number]] 120 min [facility] CC INFRN 5    Patient Instructions:         Location Instructions:     Your appointment is at the [facility]. The address is [address] [place] [address].   Expansion at the [facility] [place] has started. Currently, the entrance from the [facility] to the Cancer Center is closed. Please enter and exit through the front doors of the Cancer Center. Parking may be cumbersome during this time - please take advantage of our complimentary  parking.   Masks are optional for all patients and visitors, unless otherwise indicated. No care partners/visitors under 18 years of age are allowed in the infusion room.                      Vital signs:  Temp:  [97.3 °F (36.3 °C)-98.6 °F (37 °C)] 97.3 °F (36.3 °C)  Pulse:  [] 80  Resp:  [18-20] 18  BP: (131-138)/(65-68) 131/65  SpO2:  [92  %-100 %] 96 %  FiO2 (%):  [21 %] 21 %    Physical Exam:    Gen: NAD AO x3  Chest: good air entry CTABL  CVS: normal s1 and s2 RR  Abd: NABS soft NT ND  Neuro: CN 2-12 grossly intact  Ext: no edema in bilateral LE    -----------------------------------------------------------------------------------------------  PATIENT DISCHARGE INSTRUCTIONS: See electronic chart    Andrea Redd MD  Hospitalist    Time spent:  > 30 minutes    The 21st Century Cures Act makes medical notes like these available to patients in the interest of transparency. Please be advised this is a medical document. Medical documents are intended to carry relevant information, facts as evident, and the clinical opinion of the practitioner. The medical note is intended as peer to peer communication and may appear blunt or direct. It is written in medical language and may contain abbreviations or verbiage that are unfamiliar.

## 2025-06-26 NOTE — PLAN OF CARE
Plan for discharge today.    Problem: Patient Centered Care  Goal: Patient preferences are identified and integrated in the patient's plan of care  Description: Interventions:  - What would you like us to know as we care for you?   - Provide timely, complete, and accurate information to patient/family  - Incorporate patient and family knowledge, values, beliefs, and cultural backgrounds into the planning and delivery of care  - Encourage patient/family to participate in care and decision-making at the level they choose  - Honor patient and family perspectives and choices  Outcome: Adequate for Discharge     Problem: PAIN - ADULT  Goal: Verbalizes/displays adequate comfort level or patient's stated pain goal  Description: INTERVENTIONS:  - Encourage pt to monitor pain and request assistance  - Assess pain using appropriate pain scale  - Administer analgesics based on type and severity of pain and evaluate response  - Implement non-pharmacological measures as appropriate and evaluate response  - Consider cultural and social influences on pain and pain management  - Manage/alleviate anxiety  - Utilize distraction and/or relaxation techniques  - Monitor for opioid side effects  - Notify MD/LIP if interventions unsuccessful or patient reports new pain  - Anticipate increased pain with activity and pre-medicate as appropriate  Outcome: Adequate for Discharge     Problem: SAFETY ADULT - FALL  Goal: Free from fall injury  Description: INTERVENTIONS:  - Assess pt frequently for physical needs  - Identify cognitive and physical deficits and behaviors that affect risk of falls.  - [place] fall precautions as indicated by assessment.  - Educate pt/family on patient safety including physical limitations  - Instruct pt to call for assistance with activity based on assessment  - Modify environment to reduce risk of injury  - Provide assistive devices as appropriate  - Consider OT/PT consult to assist with  strengthening/mobility  - Encourage toileting schedule  Outcome: Adequate for Discharge     Problem: DISCHARGE PLANNING  Goal: Discharge to home or other facility with appropriate resources  Description: INTERVENTIONS:  - Identify barriers to discharge w/pt and caregiver  - Include patient/family/discharge partner in discharge planning  - Arrange for needed discharge resources and transportation as appropriate  - Identify discharge learning needs (meds, wound care, etc)  - Arrange for interpreters to assist at discharge as needed  - Consider post-discharge preferences of patient/family/discharge partner  - Complete POLST form as appropriate  - Assess patient's ability to be responsible for managing their own health  - Refer to Case Management Department for coordinating discharge planning if the patient needs post-hospital services based on physician/LIP order or complex needs related to functional status, cognitive ability or social support system  Outcome: Adequate for Discharge     Problem: RESPIRATORY - ADULT  Goal: Achieves optimal ventilation and oxygenation  Description: INTERVENTIONS:  - Assess for changes in respiratory status  - Assess for changes in mentation and behavior  - Position to facilitate oxygenation and minimize respiratory effort  - Oxygen supplementation based on oxygen saturation or ABGs  - Provide Smoking Cessation handout, if applicable  - Encourage broncho-pulmonary hygiene including cough, deep breathe, Incentive Spirometry  - Assess the need for suctioning and perform as needed  - Assess and instruct to report SOB or any respiratory difficulty  - Respiratory Therapy support as indicated  - Manage/alleviate anxiety  - Monitor for signs/symptoms of CO2 retention  Outcome: Adequate for Discharge

## 2025-06-26 NOTE — DISCHARGE INSTRUCTIONS
[facility]  [facility] - Will contact you    [address] [place] [address]  Phone: [phone]

## 2025-06-27 ENCOUNTER — APPOINTMENT (OUTPATIENT)
Age: 75
End: 2025-06-27
Attending: INTERNAL MEDICINE
Payer: MEDICARE

## 2025-06-27 ENCOUNTER — PATIENT OUTREACH (OUTPATIENT)
Dept: CASE MANAGEMENT | Age: 75
End: 2025-06-27

## 2025-06-27 ENCOUNTER — PATIENT OUTREACH (OUTPATIENT)
Age: 75
End: 2025-06-27

## 2025-06-27 NOTE — PROGRESS NOTES
Voicemail received; called patient back    PCP appointment request (discharged 06/26)     Dr Tammy Chan  Family Medicine  [address] [place] [address]  [phone]  Patient declined appointment; advised she is following up with Oncology  Closing encounter

## 2025-06-27 NOTE — PROGRESS NOTES
Community Health Worker SDOH Outreach Call  Initial call on 06/27/25    Call Details:  Call Attempt #: 1  SDOH Domain(s) with needs: Other (Comment) (Caregiver)    SDOH Details:       Resources Provided:             Notes: Patient states she is working closely with [facility] on finding a caregiver who can care for her 7 days a week. CHW offered a list of organizations who can assist based on insurance eligibility and offered to help with outreach calls via three-way call. Patient declined states she does not want a bunch of numbers she wants someone to actually call and get her the help- patient was upset. Patient wants to continue working with [facility] as she states they are working very hard to get her the help she needs. Patient agreed to have a follow-up call Monday morning to assist her with outreach should she not get a caregiver.     Signed by Wanda JOAQUIN

## 2025-06-27 NOTE — PROGRESS NOTES
PCP appointment request (discharged 06/26)    Dr Tammy Chan  Family Medicine  [address] [place] [address]  [phone]    Attempt #1:  Left message on voicemail for patient to call transitions specialist back to schedule follow up appointments. Provided Transitions specialist scheduling phone number [phone].

## 2025-06-29 ENCOUNTER — HOSPITAL ENCOUNTER (EMERGENCY)
Facility: HOSPITAL | Age: 75
Discharge: HOME | End: 2025-06-29
Attending: STUDENT IN AN ORGANIZED HEALTH CARE EDUCATION/TRAINING PROGRAM
Payer: MEDICARE

## 2025-06-29 ENCOUNTER — APPOINTMENT (OUTPATIENT)
Dept: GENERAL RADIOLOGY | Facility: HOSPITAL | Age: 75
End: 2025-06-29
Attending: STUDENT IN AN ORGANIZED HEALTH CARE EDUCATION/TRAINING PROGRAM
Payer: MEDICARE

## 2025-06-29 VITALS
TEMPERATURE: 98 F | HEART RATE: 77 BPM | OXYGEN SATURATION: 93 % | DIASTOLIC BLOOD PRESSURE: 71 MMHG | RESPIRATION RATE: 22 BRPM | SYSTOLIC BLOOD PRESSURE: 112 MMHG

## 2025-06-29 DIAGNOSIS — R06.02 SHORTNESS OF BREATH: Primary | ICD-10-CM

## 2025-06-29 LAB
ALBUMIN SERPL-MCNC: 4.3 G/DL (ref 3.2–4.8)
ALBUMIN/GLOB SERPL: 1.4 {RATIO} (ref 1–2)
ALP LIVER SERPL-CCNC: 58 U/L (ref 55–142)
ALT SERPL-CCNC: 40 U/L (ref 10–49)
ANION GAP SERPL CALC-SCNC: 9 MMOL/L (ref 0–18)
AST SERPL-CCNC: 30 U/L (ref ?–34)
BASOPHILS # BLD AUTO: 0.02 X10(3) UL (ref 0–0.2)
BASOPHILS NFR BLD AUTO: 0.2 %
BILIRUB SERPL-MCNC: 0.5 MG/DL (ref 0.2–1.1)
BUN BLD-MCNC: 31 MG/DL (ref 9–23)
BUN/CREAT SERPL: 39.2 (ref 10–20)
CALCIUM BLD-MCNC: 9.9 MG/DL (ref 8.7–10.4)
CHLORIDE SERPL-SCNC: 97 MMOL/L (ref 98–112)
CO2 SERPL-SCNC: 27 MMOL/L (ref 21–32)
CREAT BLD-MCNC: 0.79 MG/DL (ref 0.55–1.02)
DEPRECATED RDW RBC AUTO: 60.2 FL (ref 35.1–46.3)
EGFRCR SERPLBLD CKD-EPI 2021: 79 ML/MIN/1.73M2 (ref 60–?)
EOSINOPHIL # BLD AUTO: 0.07 X10(3) UL (ref 0–0.7)
EOSINOPHIL NFR BLD AUTO: 0.7 %
ERYTHROCYTE [DISTWIDTH] IN BLOOD BY AUTOMATED COUNT: 17.6 % (ref 11–15)
GLOBULIN PLAS-MCNC: 3 G/DL (ref 2–3.5)
GLUCOSE BLD-MCNC: 116 MG/DL (ref 70–99)
HCT VFR BLD AUTO: 46.1 % (ref 35–48)
HGB BLD-MCNC: 14.2 G/DL (ref 12–16)
IMM GRANULOCYTES # BLD AUTO: 0.04 X10(3) UL (ref 0–1)
IMM GRANULOCYTES NFR BLD: 0.4 %
LYMPHOCYTES # BLD AUTO: 1.75 X10(3) UL (ref 1–4)
LYMPHOCYTES NFR BLD AUTO: 17.1 %
MCH RBC QN AUTO: 28.4 PG (ref 26–34)
MCHC RBC AUTO-ENTMCNC: 30.8 G/DL (ref 31–37)
MCV RBC AUTO: 92.2 FL (ref 80–100)
MONOCYTES # BLD AUTO: 0.84 X10(3) UL (ref 0.1–1)
MONOCYTES NFR BLD AUTO: 8.2 %
NEUTROPHILS # BLD AUTO: 7.5 X10 (3) UL (ref 1.5–7.7)
NEUTROPHILS # BLD AUTO: 7.5 X10(3) UL (ref 1.5–7.7)
NEUTROPHILS NFR BLD AUTO: 73.4 %
NT-PROBNP SERPL-MCNC: 666 PG/ML (ref ?–125)
OSMOLALITY SERPL CALC.SUM OF ELEC: 284 MOSM/KG (ref 275–295)
PLATELET # BLD AUTO: 223 10(3)UL (ref 150–450)
POTASSIUM SERPL-SCNC: 3.9 MMOL/L (ref 3.5–5.1)
PROT SERPL-MCNC: 7.3 G/DL (ref 5.7–8.2)
RBC # BLD AUTO: 5 X10(6)UL (ref 3.8–5.3)
SODIUM SERPL-SCNC: 133 MMOL/L (ref 136–145)
TROPONIN I SERPL HS-MCNC: 7 NG/L (ref ?–34)
WBC # BLD AUTO: 10.2 X10(3) UL (ref 4–11)

## 2025-06-29 PROCEDURE — 93005 ELECTROCARDIOGRAM TRACING: CPT

## 2025-06-29 PROCEDURE — 93010 ELECTROCARDIOGRAM REPORT: CPT

## 2025-06-29 PROCEDURE — 96374 THER/PROPH/DIAG INJ IV PUSH: CPT

## 2025-06-29 PROCEDURE — 84484 ASSAY OF TROPONIN QUANT: CPT | Performed by: STUDENT IN AN ORGANIZED HEALTH CARE EDUCATION/TRAINING PROGRAM

## 2025-06-29 PROCEDURE — 85025 COMPLETE CBC W/AUTO DIFF WBC: CPT | Performed by: STUDENT IN AN ORGANIZED HEALTH CARE EDUCATION/TRAINING PROGRAM

## 2025-06-29 PROCEDURE — 99284 EMERGENCY DEPT VISIT MOD MDM: CPT

## 2025-06-29 PROCEDURE — 94640 AIRWAY INHALATION TREATMENT: CPT

## 2025-06-29 PROCEDURE — 80053 COMPREHEN METABOLIC PANEL: CPT | Performed by: STUDENT IN AN ORGANIZED HEALTH CARE EDUCATION/TRAINING PROGRAM

## 2025-06-29 PROCEDURE — 83880 ASSAY OF NATRIURETIC PEPTIDE: CPT | Performed by: STUDENT IN AN ORGANIZED HEALTH CARE EDUCATION/TRAINING PROGRAM

## 2025-06-29 PROCEDURE — 96375 TX/PRO/DX INJ NEW DRUG ADDON: CPT

## 2025-06-29 PROCEDURE — 71045 X-RAY EXAM CHEST 1 VIEW: CPT | Performed by: STUDENT IN AN ORGANIZED HEALTH CARE EDUCATION/TRAINING PROGRAM

## 2025-06-29 PROCEDURE — 99285 EMERGENCY DEPT VISIT HI MDM: CPT

## 2025-06-29 RX ORDER — IPRATROPIUM BROMIDE AND ALBUTEROL SULFATE 2.5; .5 MG/3ML; MG/3ML
3 SOLUTION RESPIRATORY (INHALATION) ONCE
Status: COMPLETED | OUTPATIENT
Start: 2025-06-29 | End: 2025-06-29

## 2025-06-29 RX ORDER — LORAZEPAM 2 MG/ML
1 INJECTION INTRAMUSCULAR ONCE
Status: COMPLETED | OUTPATIENT
Start: 2025-06-29 | End: 2025-06-29

## 2025-06-29 RX ORDER — FUROSEMIDE 10 MG/ML
40 INJECTION INTRAMUSCULAR; INTRAVENOUS ONCE
Status: COMPLETED | OUTPATIENT
Start: 2025-06-29 | End: 2025-06-29

## 2025-06-29 NOTE — CM/SW NOTE
Met with patient at [facility]. Patient states  is in the ER room 21  Patient lives in a condo on the second floor with 7-8 steps to walk up to. Patient states \"I stay in the chair all day and the only way I can get up is if my  helps me up or else I am stuck in the chair all day and I cannot get to the bathroom or even walk anywhere. I need someone to get me up.\"  Patient states her condo is also a hoarder unit. \"When my  was diagnosed with cancer I just ordered stuff from [employer] for 5 years and the packages are all over my condo.\"    Patient states someone was at the condo yesterday and set up home health care Rn PT and aide and her son is working on setting up a caregiver for a couple hours a day    Respite explained to patient  Referrals sent in carly  Call out to son- left message    Spoke with son Fran who is agreeable to respite care for mom    [facility]- rate $385 per day minimum stay 5 days  [facility]- $235 day minimum stay 14 days  [facility]- $235 day minimum stay 14 days  [facility]- $350 day minumum stay 5 days  [facility]- $425 day 1 week minimum    Patient chose [facility] calling son arranging payment  PASSR completed and attached to carly    Bed available at [facility]  [address] [facility] [address]  Room 108  Rn to Rn report= [phone]  [facility] for transport, patient 2 max assist   ETA 12:00pm  PCS completed    Jacinda Forrester [facility], CCM, MSN    Emergency Room  [facility]  Clinical Transitions Leader  [phone]                        Resources given:    Family resources for oncology care  Community care program  Case coordination units including [facility]  Department of rehab services assist to live at home independently  Resources for patients of oncology care   Grocery delivery and curbside pickup  Other meal delivery  programs  Non emergent transportation options  Respite options  Caregiver list  A place for mom

## 2025-06-29 NOTE — ED PROVIDER NOTES
Patient Seen in: [facility] Emergency Department        History  Chief Complaint   Patient presents with    Difficulty Breathing     Stated Complaint: Difficulty Breathing    Subjective:   HPI            73 yo female, with hx COPD, HTN HLD, DM, presented for evaluation of shortness of breath onset of symptoms last night.  She states she always has shortness of breath with lying flat but now is constant.  No chest pain.  Was recently admitted for shortness of breath and COPD exacerbation is currently tapering prednisone.  Has a slight cough no fever.  Does admit to some stress/anxiety due to her  is currently ill who normally takes care of her.  She is on immunotherapy due to history of lung CA, her initial course was complicated by SVC syndrome status post stenting.  She is on Eliquis.      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     Cholecystectomy      Laparoscopic incisional / umbilical / ventral hernia repair  09/19/2024    Port, indwelling, imp                  Social History     Socioeconomic History    Marital status:     Number of children: 1   Tobacco Use    Smoking  status: Former     Current packs/day: 0.00     Average packs/day: 0.5 packs/day for 45.0 years (22.5 ttl pk-yrs)     Types: Cigarettes     Start date: 1973     Quit date: 2018     Years since quittin.4     Passive exposure: Past    Smokeless tobacco: Never    Tobacco comments:     1 pack every 3 days   Vaping Use    Vaping status: Never Used   Substance and Sexual Activity    Alcohol use: Not Currently     Comment: very rarely    Drug use: No   Other Topics Concern    Caffeine Concern Yes     Comment: Coffee, 2 cups per day     History of tanning No    Reaction to local anesthetic No    Left Handed No    Right Handed Yes    Currently spends a great deal of time in the sun No    Hx of Spending Great Deal of Time in Sun No    Bad sunburns in the past Yes    Tanning Salons in the Past No    Hx of Radiation Treatments No   Social History Narrative    - lives with     1 son      Social Drivers of Health     Food Insecurity: No Food Insecurity (2025)    NCSS - Food Insecurity     Worried About Running Out of Food in the Last Year: No     Ran Out of Food in the Last Year: No   Transportation Needs: No Transportation Needs (2025)    NCSS - Transportation     Lack of Transportation: No   Housing Stability: Not At Risk (2025)    NCSS - Housing/Utilities     Has Housing: Yes     Worried About Losing Housing: No     Unable to Get Utilities: No                                Physical Exam    ED Triage Vitals   BP 25 0609 128/63   Pulse 25 0609 92   Resp 25 0609 18   Temp 25 0613 97.5 °F (36.4 °C)   Temp src 25 0613 Temporal   SpO2 25 0609 93 %   O2 Device 25 0609 None (Room air)       Current Vitals:   Vital Signs  BP: 112/71  Pulse: 77  Resp: 22  Temp: 97.5 °F (36.4 °C)  Temp src: Temporal  MAP (mmHg): 84    Oxygen Therapy  SpO2: 93 %  O2 Device: None (Room air)            Physical Exam  Constitutional: awake, alert, no sig distress  HENT: mmm,  no lesions,  Neck: normal range of motion, no tenderness, supple.  Eyes: PERRL, EOMI, conjunctiva normal, no discharge. Sclera anicteric.  Cardiovascular: rr no murmur  Respiratory: Normal breath sounds, no respiratory distress, no wheezing, no chest tenderness.  GI: Bowel sounds normal, Soft, no tenderness, no masses, no pulsatile masses.  : No CVA tenderness.  Skin: Warm, dry, no erythema, no rash.  Musculoskeletal: Intact distal pulses, no edema, no tenderness, no cyanosis, no clubbing. Good range of motion in all major joints. No tenderness to palpation or major deformities noted. Back- No tenderness.  Neurologic: Alert & oriented x 3, normal motor function, normal sensory function, no focal deficits noted.  Psych: Calm, cooperative, nl affect            ED Course  Labs Reviewed   CBC WITH DIFFERENTIAL WITH PLATELET - Abnormal; Notable for the following components:       Result Value    MCHC 30.8 (*)     RDW-SD 60.2 (*)     RDW 17.6 (*)     All other components within normal limits   COMP METABOLIC PANEL (14) - Abnormal; Notable for the following components:    Glucose 116 (*)     Sodium 133 (*)     Chloride 97 (*)     BUN 31 (*)     BUN/CREA Ratio 39.2 (*)     All other components within normal limits   PRO BETA NATRIURETIC PEPTIDE - Abnormal; Notable for the following components:    Pro-Beta Natriuretic Peptide 666 (*)     All other components within normal limits   TROPONIN I HIGH SENSITIVITY - Normal   RAINBOW DRAW BLUE   RAINBOW DRAW GOLD     EKG    Rate, intervals and axes as noted on EKG Report.  Rate: 95  Rhythm: Sinus Rhythm  Reading: left axis, no st change, no stemi. Qtc 459ms           ED Course as of 06/29/25 1417  ------------------------------------------------------------  Time: 06/29 0813  Comment: BNP slighlty above recent prior, no radiographic pulmonary edema on my independent review of chest Xray. No improvement with duoneb, will trial lasix given recent steroid initiation may have some  fluid retention.   ------------------------------------------------------------  Time: 06/29 0938  Comment: Case management involved with patient.  Patient will go to respite care as  is being hospitalized.  Comfortable with this plan.  Family aware.                       MDM     74M hx as above who presents with SOB.  On arrival vss, reassuring   On exam she is resting comfortably satting well on room air with relatively normal work of breathing.  She has some basilar rales, scant cough  Ddx: COPD, PNA, PTX, CHF, Anxiety  Reviewed documentation including ER provider note and labs and imaging following recent admission      Medical Decision Making      Disposition and Plan     Clinical Impression:  1. Shortness of breath         Disposition:  Discharge  6/29/2025  9:38 am    Follow-up:  Tammy Chan MD  [address] [place] [address]  [address]  [facility] [id number]  [phone]    Follow up in 2 day(s)      [facility] Emergency Department  [address] [facility] [address]  [phone]  Follow up  As needed, If symptoms worsen    We recommend that you schedule follow up care with a primary care provider within the next three months to obtain basic health screening including reassessment of your blood pressure.      Medications Prescribed:  Discharge Medication List as of 6/29/2025 11:31 AM                Supplementary Documentation:

## 2025-06-29 NOTE — ED QUICK NOTES
[facility] at bedside; patient aware of DC and plan to go to [facility].   Report given to medics.   Purewick and PIV removed, full bed change done.

## 2025-06-29 NOTE — ED INITIAL ASSESSMENT (HPI)
Pt to ED via EMS for difficulty breathing that started last night, has history of lung cancer. Pt denies any chest pain

## 2025-06-29 NOTE — ED QUICK NOTES
Assumed care of patient at this time.   Patient awaiting transport to [facility] to [facility]. ETA 12pm.

## 2025-06-30 ENCOUNTER — APPOINTMENT (OUTPATIENT)
Age: 75
End: 2025-06-30
Attending: INTERNAL MEDICINE
Payer: MEDICARE

## 2025-06-30 ENCOUNTER — VIRTUAL PHONE E/M (OUTPATIENT)
Age: 75
End: 2025-06-30
Attending: INTERNAL MEDICINE
Payer: MEDICARE

## 2025-06-30 ENCOUNTER — PATIENT OUTREACH (OUTPATIENT)
Age: 75
End: 2025-06-30

## 2025-06-30 DIAGNOSIS — I82.B12 ACUTE EMBOLISM AND THROMBOSIS OF LEFT SUBCLAVIAN VEIN (HCC): ICD-10-CM

## 2025-06-30 DIAGNOSIS — Z51.11 ENCOUNTER FOR ANTINEOPLASTIC CHEMOTHERAPY: ICD-10-CM

## 2025-06-30 DIAGNOSIS — C34.11 MALIGNANT NEOPLASM OF UPPER LOBE OF RIGHT LUNG (HCC): Primary | ICD-10-CM

## 2025-06-30 LAB
ATRIAL RATE: 95 BPM
P AXIS: 45 DEGREES
P-R INTERVAL: 186 MS
Q-T INTERVAL: 366 MS
QRS DURATION: 84 MS
QTC CALCULATION (BEZET): 459 MS
R AXIS: -43 DEGREES
T AXIS: 57 DEGREES
VENTRICULAR RATE: 95 BPM

## 2025-06-30 NOTE — PROGRESS NOTES
06/30/25 0843   Call Details   Call Attempt # 2   SDOH Domain(s) with needs Other (Comment)  (Caregiver)   Outreach Outcome   SDOH Overall Outreach Outcome Patient declined       Follow-up call- patient states she is currently in a facility for extended care as her  is sick. Per patient her son is searching on [email or web address] for a caregiver who can assist her. Patient declines further assistance at this time.

## 2025-06-30 NOTE — PROGRESS NOTES
Virtual Telephone Check-In    Yi Torres verbally consents to a Virtual/Telephone Check-In visit on 06/30/25.  Patient has been referred to the [facility] website at [email or web address] to review the yearly Consent to Treat document.    Patient understands and accepts financial responsibility for any deductible, co-insurance and/or co-pays associated with this service.    [facility] Office Note      Diagnosis  No diagnosis found.    Current Therapy  S/p SVC stenting  S/p Concurrent chemo-RT with carbo/taxol   S/p cycle 1 Durvalumab on 5/30/25    INTERVAL HISTORY  She is being assessed today while in a rehab facility after her recent [facility] hospitalization from 6/24/25 - 6/26/25 for COPD exacerbation.  Unfortunately, her social support is limited and her spouse is currently admitted at [facility] so she need to be transferred to a rehab facility  Both are on the mend.  She is currently on her 2nd dose of her prednisone 30 mg taper (highest was 40 mg).  She endorses compliance with Apixaban .  She has transportation coordinated for her next cycle of Durvalumab.  She has an occasional dry cough.  She reports that her current weight is 165# but a staff member entered her room and they want to reweigh her to confirm.  States she's been receiving furosemide - reason unclear to patient.      She denies fever, sweats, chills, headache, dizziness, anorexia, chest pain, worsening dyspnea, abdominal pain, nausea, diarrhea, constipation, abnormal bleeding and rash.     Past Oncologic History  1/9/25 she had complained of increasing dyspnea for about 3 months.  Saw PCP and underwent a CT chest that showed a very large centrally necrotic right upper lobe lung mass measuring 8.2 x 7.4 x 10.8 cm with extension to the right paratracheal upper mediastinum.  Extensive right upper lobe bronchovascular structure encasement.  Mildly enlarged right paratracheal mediastinal lymph nodes bilateral adrenal  nodules were noted.     1/17/2025 PET/CT showed a 9 x 8 cm hypermetabolic mass within the right upper lobe extending into the mediastinum and right hilum peripherally hypermetabolic and centrally cystic and necrotic.  Effacement of the IVC.  Subcentimeter pulmonary nodules bilaterally were not FDG avid  Hypermetabolic lymph nodes within the anterior superior mediastinum subcarinal right paratracheal area and right clavicle chains were all compatible with metastatic disease.     1/22/25 she underwent a robotic navigational bronchoscopy with EBUS bx of RUL mass and  TBNA of lymph node station 7.  BAL from the right upper lobe was consistent with squamous cell carcinoma as was the needle biopsy from the right upper lobe mass.  EBUS sampling from station node 7 showed no malignant cells.  Lymphocytes were present. PD-L1 TPS was 0, EGFR was negative, ALK is negative    2/13/2025 She had a port placed  and then developed increasing redness and swelling as well as inability to lie flat.  She was seen in the oncology clinic earlier today and referred to the ED.  CT chest showed thrombus within the left brachiocephalic and left subclavian vein which is new since prior study. The right upper lobe mass is causing circumferential encasement and as well as invasion and narrowing of the SVC which is completely obliterated    2/14 she underwent SVC stenting as well as thrombectomy from left subclavian vein on 2/13/2025.  Her port was still functional.  She is now on enoxaparin.  She is feeling much better.  Her dyspnea has improved.  No longer has any postural flushing congestion.    3/2025 started concurrent chemo RT with weekly CarboTaxol    4/2025 completed concurrent chemo RT with interruptions due to hospitalizations from hypoxia and infection      Review of Systems:  Hematology/Oncology ROS performed and negative except as above in HPI    History/Other:   Past Medical History:  Past Medical History:    Anesthesia  complication    body aches for 3 days    Anxiety state    Cancer (HCC)    skin cancer    Cataract    COPD (chronic obstructive pulmonary disease) (HCC)    Diabetes (HCC)    Diabetes mellitus (HCC)    Essential hypertension    Exposure to medical diagnostic radiation    High blood pressure    High cholesterol    Incontinence    bladder    Neuropathy    neuropathy    Obesity, unspecified    Osteoarthritis    In knees    Personal history of antineoplastic chemotherapy    Problems with swallowing    pills    Renal disorder    CKD 2    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     Cholecystectomy      Laparoscopic incisional / umbilical / ventral hernia repair  2024    Port, indwelling, imp         Current Medications:  No current facility-administered medications on file as of 2025.       Allergies:   Allergies[1]    Family Medical History:  Family History   Problem Relation Age of Onset    Hypertension Father     Stroke Father     Heart Attack Mother        Social History:  Social History     Socioeconomic History    Marital status:      Spouse name: Not on file    Number of children: 1    Years of education: Not on file    Highest education level: Not on file   Occupational History    Not on file   Tobacco Use    Smoking status: Former     Current packs/day: 0.00     Average packs/day: 0.5 packs/day for 45.0 years (22.5 ttl pk-yrs)     Types: Cigarettes     Start date: 1973     Quit date: 2018     Years since quittin.4     Passive exposure: Past    Smokeless tobacco: Never    Tobacco comments:     1 pack every 3 days   Vaping Use    Vaping status: Never Used   Substance and Sexual Activity    Alcohol use: Not Currently     Comment:  very rarely    Drug use: No    Sexual activity: Not on file   Other Topics Concern     Service Not Asked    Blood Transfusions Not Asked    Caffeine Concern Yes     Comment: Coffee, 2 cups per day     Occupational Exposure Not Asked    Hobby Hazards Not Asked    Sleep Concern Not Asked    Stress Concern Not Asked    Weight Concern Not Asked    Special Diet Not Asked    Back Care Not Asked    Exercise Not Asked    Bike Helmet Not Asked    Seat Belt Not Asked    Self-Exams Not Asked    Grew up on a farm Not Asked    History of tanning No    Outdoor occupation Not Asked    Breast feeding Not Asked    Reaction to local anesthetic No    Left Handed No    Right Handed Yes    Currently spends a great deal of time in the sun No    Past Sunlamp Treatments for Acne Not Asked    Hx of Spending Great Deal of Time in Sun No    Bad sunburns in the past Yes    Tanning Salons in the Past No    Hx of Radiation Treatments No    Regular use of sun block Not Asked   Social History Narrative    - lives with     1 son      Social Drivers of Health     Food Insecurity: No Food Insecurity (2025)    NCSS - Food Insecurity     Worried About Running Out of Food in the Last Year: No     Ran Out of Food in the Last Year: No   Transportation Needs: No Transportation Needs (2025)    NCSS - Transportation     Lack of Transportation: No   Housing Stability: Not At Risk (2025)    NCSS - Housing/Utilities     Has Housing: Yes     Worried About Losing Housing: No     Unable to Get Utilities: No       Gyn History:  OB History    Para Term  AB Living   1 1 0 0 0 0   SAB IAB Ectopic Multiple Live Births   0 0 0 0 0       Objective:    Physical Exam:  General: A&Ox3, NAD via virtual phone visit, not audibly dyspneic.  Unable to complete video visit today - patient at rehab center and using mobile phone.     Labs:  Lab Results   Component Value Date/Time    WBC 10.2 2025 06:16 AM    RBC 5.00  2025 06:16 AM    HGB 14.2 2025 06:16 AM    HCT 46.1 2025 06:16 AM    MCV 92.2 2025 06:16 AM    MCH 28.4 2025 06:16 AM    MCHC 30.8 (L) 2025 06:16 AM    RDW 17.6 (H) 2025 06:16 AM    NEPRELIM 7.50 2025 06:16 AM    .0 2025 06:16 AM       Lab Results   Component Value Date/Time     (H) 2025 06:15 AM    BUN 31 (H) 2025 06:15 AM    CREATSERUM 0.79 2025 06:15 AM    GFRNAA 45 (L) 2022 08:02 AM    CA 9.9 2025 06:15 AM    ALB 4.3 2025 06:15 AM     (L) 2025 06:15 AM    K 3.9 2025 06:15 AM    CL 97 (L) 2025 06:15 AM    CO2 27.0 2025 06:15 AM    ALKPHO 58 2025 06:15 AM    AST 30 2025 06:15 AM    ALT 40 2025 06:15 AM       Imagin/25 Reviewed CT films personally and with pt/family  Necrotic right upper lobe pulmonary malignancy continues to decrease in size.    Impacted right lower lobe bronchus with consolidative right lower lobe airspace opacity has increased in size.  Aspiration pneumonia is the differential.  Continued follow-up surveillance imaging recommended to exclude pulmonary metastases.      (Adrenal lesions were not FDG avid on prior PET)      Assessment & Plan:    Yi Torres is a 74 year old female with locally advanced Rt lung cancer, with mediastinal invasion, now with SVC syndrome and left subclavian thrombus   Cancer Staging   Malignant neoplasm of upper lobe of right lung (HCC)  Staging form: Lung, AJCC 8th Edition  - Clinical stage from 2025: Stage IIIC (cT4, cN3, cM0) - Signed by José Miguel Goodson MD on 2025      # SVC syndrome and left subclavian thrombus   s/p SVC stenting and left thrombectomy, with symptomatic improvement.   Continue Eliquis    # Lung cancer  S/p concurrent chemo-RT with weekly carbo/taxol.   - 25 CT shows significant improvement of the large right upper lobe lung mass.  - NGS shows no  mutations.   - s/p  cycle 1 maintenance Durvalumab on 5/30/25.  Cycle 2 deferred due to [facility] admission for COPD exacerbation.  Prednisone 40 mg taper started.  She states she is on her 2nd day of 30 mg.  Attn to weight on 7/11/25 appt given discrepancy at rehab facility.  Per patient, on Furosemide.  Reason unclear to patient, however, 6/29/25 BNP was 666.  No effusion on 6/24/25 CT chest PE protocol nor 6/29/25 portable CXR.  CT notes RUL malignant mass has decreased in size, no PE and resolving pneumonia.    - At prior visit with Dr. Goodson, anticipated side effects of this therapy including autoimmune dermatitis colitis and endocrinopathies etc. were discussed in detail    #anemia  - resolved, s/p 1 dose of InFED on 3/18/25.  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      Duration of the service: 30 minutes                         [1]   Allergies  Allergen Reactions    Erythromycin DIARRHEA     Stomach pain

## 2025-07-02 ENCOUNTER — TELEPHONE (OUTPATIENT)
Dept: FAMILY MEDICINE CLINIC | Facility: CLINIC | Age: 75
End: 2025-07-02

## 2025-07-02 ENCOUNTER — NURSE TRIAGE (OUTPATIENT)
Dept: FAMILY MEDICINE CLINIC | Facility: CLINIC | Age: 75
End: 2025-07-02

## 2025-07-02 NOTE — TELEPHONE ENCOUNTER
Ernestine from [facility] indicated that she spoke to patient last week and she seemed off and spoke to her yesterday and mentioned sliting her wrist. Wanted nurse to call patient to check on her. Thinks patient might be in a facility but wasn't sure.

## 2025-07-02 NOTE — TELEPHONE ENCOUNTER
ED  Current  7/2/2025 - present  [facility] Emergency Department       Anayeli Blevins MD  Last attending  Treatment team Eval-P  Chief complaint     Encounter Notes    All notes

## 2025-07-02 NOTE — TELEPHONE ENCOUNTER
Action Requested: Summary for Provider     []  Critical Lab, Recommendations Needed  [] Need Additional Advice  [x]   FYI    []   Need Orders  [] Need Medications Sent to Pharmacy  []  Other     SUMMARY: 911 called, patient in agreement with plan. Patient dropped off the line during the call to 911, 911 did confirm they were able to make contact with the patient.     Reason for call: Difficulty Walking/Suicidal thoughts   Onset: unknown       Patient states she was in the hospital in June, she was discharged home but after  went to the hospital she went to [facility] Recently in emergency room but was sent home. Patient states she can not stand to stay where she is and she will do something to harm herself if she has to stay there. CSSR score of 7- high risk. Patient states she has had thoughts of killing self in the past 30 days with intent (if she is not able to get help she believes she needs medically). When asked about means patient stated \"I dont have access to anything sharp, maybe a butter knife\"     Reason for Disposition   Patient is threatening suicide now    Protocols used: Suicide [id number]

## 2025-07-02 NOTE — TELEPHONE ENCOUNTER
Tried calling mobile number listed but spouse Obed stated that patient is currently at [facility] but that she would like to be transferred to rehab. Stated to call patient at [phone]. Tried calling patient but sounds like someone picks up but just fuzzy and no one talking.     Called patient again. Patient stated that she was at [place] emergency room on 6/29/2025 but they did not keep her, but did keep her spouse at the hospital. Instead she stated that she was transferred to an extended care facility in [place]. Patient stated that she can not walk and is bed bound. States she needs to be in a rehab facility so she can learn to walk again and not where she currently is at. States that she sits in her urine all day and does not feel her needs are being met where she is at. States she feels depressed and wanted to slit her wrist but states that she only has a butter knife. I asked the patient the address but she did not know the address.  While on the phone with patient a person came into her room stated that patient is at [facility] care [address].  was called regarding patient's concern for self harm.  came on the phone and advised of patient's depression and wanting to harm herself.  stated that ambulance is on its way and will transport patient to [place] emergency room.    See triage encounter.

## 2025-07-03 NOTE — TELEPHONE ENCOUNTER
See triage encounter from yesterday.     Patient was seen at [place] emergency room yesterday and transferred to a psychiatric facility.

## 2025-07-10 ENCOUNTER — TELEPHONE (OUTPATIENT)
Dept: FAMILY MEDICINE CLINIC | Facility: CLINIC | Age: 75
End: 2025-07-10

## 2025-07-10 NOTE — TELEPHONE ENCOUNTER
Left detailed voice message to call office back.  Office phone number provided with office telephone hours.  Sent MyChart message as well  First attempt

## 2025-07-11 ENCOUNTER — APPOINTMENT (OUTPATIENT)
Facility: LOCATION | Age: 75
End: 2025-07-11
Attending: INTERNAL MEDICINE

## 2025-07-11 NOTE — TELEPHONE ENCOUNTER
Obed/ (Last signed Verbal Release verified) called back, he was made aware REGINE Hoang is trying to get a hold of patient. He states patient is in the hospital--> see Care Everywhere. The phone number to REGINE Hoang was provided, he states he will call him. He verbalized understanding. No further questions or concerns at this time.

## 2025-07-18 ENCOUNTER — TELEPHONE (OUTPATIENT)
Age: 75
End: 2025-07-18

## 2025-07-18 NOTE — TELEPHONE ENCOUNTER
Writer called patient to schedule a follow up with Dr. Goodson. Patient stated that she now lives in assisted living facility and will not be coming to [facility] anymore for her cancer care. She stated that her son is working on transferring all of her doctors to [facility].    LVM for patient's son requesting a call back on Monday to confirm transfer of her cancer care and if any assistance is needed from Dr. Goodson's team.

## 2025-07-25 ENCOUNTER — APPOINTMENT (OUTPATIENT)
Facility: LOCATION | Age: 75
End: 2025-07-25
Attending: INTERNAL MEDICINE

## 2025-07-28 ENCOUNTER — TELEPHONE (OUTPATIENT)
Dept: RADIATION ONCOLOGY | Facility: HOSPITAL | Age: 75
End: 2025-07-28

## 2025-07-28 DIAGNOSIS — N18.31 TYPE 2 DIABETES MELLITUS WITH STAGE 3A CHRONIC KIDNEY DISEASE, WITHOUT LONG-TERM CURRENT USE OF INSULIN (HCC): Chronic | ICD-10-CM

## 2025-07-28 DIAGNOSIS — E11.22 TYPE 2 DIABETES MELLITUS WITH STAGE 3A CHRONIC KIDNEY DISEASE, WITHOUT LONG-TERM CURRENT USE OF INSULIN (HCC): Chronic | ICD-10-CM

## 2025-07-28 NOTE — TELEPHONE ENCOUNTER
Called patient to check if she plans on coming to appointment tomorrow. Patient's  answered the phone, Yi was in the background. They state they moved further away and will be transferring care to [facility] (they think). Asked if they have seen an oncologist since moving, they said no, but they will schedule an appointment. Asked if they would like me to call back in few weeks to check if they have appointment squared away, they said no they will get it done. Notified Dr. Yony Alvarado patient will not be here tomorrow for follow up or future follow ups.

## 2025-07-29 ENCOUNTER — APPOINTMENT (OUTPATIENT)
Dept: RADIATION ONCOLOGY | Facility: HOSPITAL | Age: 75
End: 2025-07-29
Attending: RADIOLOGY
Payer: MEDICARE

## 2025-07-31 ENCOUNTER — TELEPHONE (OUTPATIENT)
Dept: FAMILY MEDICINE CLINIC | Facility: CLINIC | Age: 75
End: 2025-07-31

## 2025-07-31 DIAGNOSIS — I87.1 SVC SYNDROME: ICD-10-CM

## 2025-07-31 DIAGNOSIS — C34.11 MALIGNANT NEOPLASM OF UPPER LOBE OF RIGHT LUNG (HCC): Primary | ICD-10-CM

## 2025-07-31 RX ORDER — METFORMIN HYDROCHLORIDE 500 MG/1
500 TABLET, EXTENDED RELEASE ORAL
Qty: 90 TABLET | Refills: 3 | Status: SHIPPED | OUTPATIENT
Start: 2025-07-31

## 2025-08-26 ENCOUNTER — TELEPHONE (OUTPATIENT)
Facility: LOCATION | Age: 75
End: 2025-08-26

## 2025-08-26 DIAGNOSIS — C34.11 MALIGNANT NEOPLASM OF UPPER LOBE OF RIGHT LUNG (HCC): Primary | ICD-10-CM

## 2025-08-27 ENCOUNTER — TELEPHONE (OUTPATIENT)
Facility: LOCATION | Age: 75
End: 2025-08-27

## (undated) DIAGNOSIS — E78.00 PURE HYPERCHOLESTEROLEMIA: ICD-10-CM

## (undated) DIAGNOSIS — N18.32 STAGE 3B CHRONIC KIDNEY DISEASE (HCC): ICD-10-CM

## (undated) DIAGNOSIS — N18.31 TYPE 2 DIABETES MELLITUS WITH STAGE 3A CHRONIC KIDNEY DISEASE, WITHOUT LONG-TERM CURRENT USE OF INSULIN (HCC): Primary | ICD-10-CM

## (undated) DIAGNOSIS — E11.22 TYPE 2 DIABETES MELLITUS WITH STAGE 3A CHRONIC KIDNEY DISEASE, WITHOUT LONG-TERM CURRENT USE OF INSULIN (HCC): Primary | ICD-10-CM

## (undated) DEVICE — KIT CLEAN ENDOKIT 1.1OZ GOWNX2

## (undated) DEVICE — DRAPE SHEET LG

## (undated) DEVICE — STAPLER INT STPL 5MM W/ 25 ABSRB STRP DISP

## (undated) DEVICE — SINGLE-USE BIOPSY FORCEPS: Brand: RADIAL JAW 4

## (undated) DEVICE — BIOPSY NEEDLE, 23G: Brand: FLEXISION

## (undated) DEVICE — TROCAR: Brand: KII SHIELDED BLADED ACCESS SYSTEM

## (undated) DEVICE — TROCAR: Brand: KII FIOS FIRST ENTRY

## (undated) DEVICE — SINGLE USE BIOPSY VALVE MAJ-210: Brand: SINGLE USE BIOPSY VALVE (STERILE)

## (undated) DEVICE — PEN 6\" SURGICAL MARKING PURPL

## (undated) DEVICE — CLIPPER BLADE 3M

## (undated) DEVICE — 60 ML SYRINGE REGULAR TIP: Brand: MONOJECT

## (undated) DEVICE — 3M™ STERI-STRIP™ REINFORCED ADHESIVE SKIN CLOSURES, R1547, 1/2 IN X 4 IN (12 MM X 100 MM), 6 STRIPS/ENVELOPE: Brand: 3M™ STERI-STRIP™

## (undated) DEVICE — MEDI-VAC NON-CONDUCTIVE SUCTION TUBING: Brand: CARDINAL HEALTH

## (undated) DEVICE — ADAPTER BRONCHSCP 15MM FBROPT SWVL 2 AXIS

## (undated) DEVICE — V2 SPECIMEN COLLECTION MANIFOLD KIT: Brand: NEPTUNE

## (undated) DEVICE — GLOVE SUR 7 SENSICARE PI MIC PIP CRM PWD F

## (undated) DEVICE — OUTPATIENT: Brand: MEDLINE INDUSTRIES, INC.

## (undated) DEVICE — SYRINGE MED 10ML LL TIP W/O SFTY DISP

## (undated) DEVICE — Device: Brand: BALLOON

## (undated) DEVICE — MARYLAND JAW LAPAROSCOPIC SEALER/DIVIDER COATED: Brand: LIGASURE

## (undated) DEVICE — [HIGH FLOW INSUFFLATOR,  DO NOT USE IF PACKAGE IS DAMAGED,  KEEP DRY,  KEEP AWAY FROM SUNLIGHT,  PROTECT FROM HEAT AND RADIOACTIVE SOURCES.]: Brand: PNEUMOSURE

## (undated) DEVICE — SUTURE SILK 4-0 P-3

## (undated) DEVICE — GLOVE SUR 8 SENSICARE PI PIP GRN PWD F

## (undated) DEVICE — SOLUTION IRRIG 1000ML 0.9% NACL USP BTL

## (undated) DEVICE — STERILE LATEX POWDER-FREE SURGICAL GLOVESWITH NITRILE COATING: Brand: PROTEXIS

## (undated) DEVICE — SUT MCRYL 3-0 18IN PS-2 ABSRB UD 19MM 3/8 CIR

## (undated) DEVICE — Device: Brand: SUTURE PASSOR PRO

## (undated) DEVICE — SUTURE VICRYL 4-0 J494G

## (undated) DEVICE — SUTURE ETHILON 5-0 698G

## (undated) DEVICE — PROXIMATE RH ROTATING HEAD SKIN STAPLERS (35 WIDE) CONTAINS 35 STAINLESS STEEL STAPLES: Brand: PROXIMATE

## (undated) DEVICE — CONMED SCOPE SAVER BITE BLOCK, 20X27 MM: Brand: SCOPE SAVER

## (undated) DEVICE — ADHESIVE LIQ 2/3ML VI MASTISOL

## (undated) DEVICE — YANKAUER,BULB TIP,W/O VENT,RIGID,STERILE: Brand: MEDLINE

## (undated) DEVICE — DRAPE SRG 50X36IN HD TRBN STRL

## (undated) DEVICE — SINGLE USE SUCTION VALVE MAJ-209: Brand: SINGLE USE SUCTION VALVE (STERILE)

## (undated) DEVICE — HEX-LOCKING BLADE ELECTRODE: Brand: EDGE

## (undated) DEVICE — INSUFFLATION NEEDLE TO ESTABLISH PNEUMOPERITONEUM.: Brand: INSUFFLATION NEEDLE

## (undated) DEVICE — LAP CHOLE: Brand: MEDLINE INDUSTRIES, INC.

## (undated) DEVICE — GLOVE SUR 8 SENSICARE PI PIP CRM PWD F

## (undated) DEVICE — SOL  .9 1000ML BTL

## (undated) NOTE — LETTER
[place] [address]  Authorization for Invasive Procedures  Date: 4/21/25           Time: 0600    I hereby authorize Dr Loaiza/Dr Johansen, my physician and his/her assistants (if applicable), which may include medical students, residents, and/or fellows, to perform the following surgical operation/ procedure and administer such anesthesia as may be determined necessary by my physician: Venogram with possible intervention- thrombectomy, angioplasty, possible stent insertion  on Yi Romerogregory Torres  2.   I recognize that during the surgical operation/procedure, unforeseen conditions may necessitate additional or different procedures than those listed above.  I, therefore, further authorize and request that the above-named surgeon, assistants, or designees perform such procedures as are, in their judgment, necessary and desirable.    3.   My surgeon/physician has discussed prior to my surgery the potential benefits, risks and side effects of this procedure; the likelihood of achieving goals; and potential problems that might occur during recuperation.  They also discussed reasonable alternatives to the procedure, including risks, benefits, and side effects related to the alternatives and risks related to not receiving this procedure.  I have had all my questions answered and I acknowledge that no guarantee has been made as to the result that may be obtained.    4.   Should the need arise during my operation/procedure, which includes change of level of care prior to discharge, I also consent to the administration of blood and/or blood products.  Further, I understand that despite careful testing and screening of blood or blood products by collecting agencies, I may still be subject to ill effects as a result of receiving a blood transfusion and/or blood products.  The following are some, but not all, of the potential risks that can occur: fever and allergic reactions, hemolytic reactions,  transmission of diseases such as Hepatitis, AIDS and Cytomegalovirus (CMV) and fluid overload.  In the event that I wish to have an autologous transfusion of my own blood, or a directed donor transfusion, I will discuss this with my physician.   Check only if Refusing Blood or Blood Products  I understand refusal of blood or blood products as deemed necessary by my physician may have serious consequences to my condition to include possible death. I hereby assume responsibility for my refusal and release the hospital, its personnel, and my physicians from any responsibility for the consequences of my refusal.         o  Refuse         5.   I authorize the use of any specimen, organs, tissues, body parts or foreign objects that may be removed from my body during the operation/procedure for diagnosis, research or teaching purposes and their subsequent disposal by hospital authorities.  I also authorize the release of specimen test results and/or written reports to my treating physician on the hospital medical staff or other referring or consulting physicians involved in my care, at the discretion of the Pathologist or my treating physician.    6.   I consent to the photographing or videotaping of the operations or procedures to be performed, including appropriate portions of my body for medical, scientific, or educational purposes, provided my identity is not revealed by the pictures or by descriptive texts accompanying them.  If the procedure has been photographed/videotaped, the surgeon will obtain the original picture, image, videotape or CD.  The hospital will not be responsible for storage, release or maintenance of the picture, image, tape or CD.    7.   I consent to the presence of a  or observers in the operating room as deemed necessary by my physician or their designees.    8.   I recognize that in the event my procedure results in extended X-Ray/fluoroscopy time, I may develop a skin  reaction.    9. If I have a Do Not Attempt Resuscitation (DNAR) order in place, that status will be suspended while in the operating room, procedural suite, and during the recovery period unless otherwise explicitly stated by me (or a person authorized to consent on my behalf). The surgeon or my attending physician will determine when the applicable recovery period ends for purposes of reinstating the DNAR order.  10. Patients having a sterilization procedure: I understand that if the procedure is successful the results will be permanent and it will therefore be impossible for me to inseminate, conceive, or bear children.  I also understand that the procedure is intended to result in sterility, although the result has not been guaranteed.   11. I acknowledge that my physician has explained sedation/analgesia administration to me including the risk and benefits I consent to the administration of sedation/analgesia as may be necessary or desirable in the judgment of my physician.    I CERTIFY THAT I HAVE READ AND FULLY UNDERSTAND THE ABOVE CONSENT TO OPERATION and/or OTHER PROCEDURE.        ____________________________________       _________________________________      ______________________________  Signature of Patient         Signature of Responsible Person        Printed Name of Responsible Person        ____________________________________      _________________________________      ______________________________       Signature of Witness          Relationship to Patient                       Date                                       Time  Patient Name: Yi Law Toni Vincent  : 1950    Reviewed: 2024   Printed: 2025  Medical Record #: [id number] Page 1 of 2             STATEMENT OF PHYSICIAN My signature below affirms that prior to the time of the procedure; I have explained to the patient and/or his/her legal representative, the risks and benefits involved in the proposed treatment  and any reasonable alternative to the proposed treatment. I have also explained the risks and benefits involved in refusal of the proposed treatment and alternatives to the proposed treatment and have answered the patient's questions. If I have a significant financial interest in a co-management agreement or a significant financial interest in any product or implant, or other significant relationship used in this procedure/surgery, I have disclosed this and had a discussion with my patient.     _______________________________________________________________ _____________________________  (Signature of Physician)                                                                                         (Date)                                   (Time)  Patient Name: Yi Law Toni Vincent  : 1950    Reviewed: 2024   Printed: 2025  Medical Record #: [id number] Page 2 of 2

## (undated) NOTE — LETTER
07/17/20    Rita Adamson [facility]  [address]  [place] [address]      Dear Luis Enrique Clemens records indicate that you have outstanding lab work and or testing that was ordered for you and has not yet been completed:  Orders Placed This Encount

## (undated) NOTE — LETTER
12/11/19        Marylene Cooks [facility]  [address]  [address]  [facility] [address]      Dear Bertrand Sanchez records indicate that you have outstanding lab work and or testing that was ordered for you and has not yet been completed:  Orders Placed This

## (undated) NOTE — LETTER
[facility]  Administration of Anesthesia  I Yi Law Toni Vincent agree to be cared for by a physician anesthesiologist alone and/or with a nurse anesthetist, who is specially trained to monitor me and give me medicine to put me to sleep or keep me comfortable during my procedure    I understand that my anesthesiologist and/or anesthetist is not an employee or agent of [facility] or [employer]. He or she works for [facility]    As the patient asking for anesthesia services, I agree to:  Allow the anesthesiologist (anesthesia doctor) to give me medicine and do additional procedures as necessary. Some examples are: Starting or using an “IV” to give me medicine, fluids or blood during my procedure, and having a breathing tube placed to help me breathe when I’m asleep (intubation). In the event that my heart stops working properly, I understand that my anesthesiologist will make every effort to sustain my life, unless otherwise directed by [facility] Do Not Resuscitate documents.  Tell my anesthesia doctor before my procedure:  If I am pregnant.  The last time that I ate or drank.  iii. All of the medicines I take (including prescriptions, herbal supplements, and pills I can buy without a prescription (including street drugs/illegal medications). Failure to inform my anesthesiologist about these medicines may increase my risk of anesthetic complications.  iv.If I am allergic to anything or have had a reaction to anesthesia before.  I understand how the anesthesia medicine will help me (benefits).  I understand that with any type of anesthesia medicine there are risks:  The most common risks are: nausea, vomiting, sore throat, muscle soreness, damage to my eyes, mouth, or teeth (from breathing tube placement).  Rare risks include: remembering what happened during my procedure, allergic reactions to medications, injury to my airway, heart, lungs, vision, nerves,  or muscles and in extremely rare instances death.  My doctor has explained to me other choices available to me for my care (alternatives).  Pregnant Patients (“epidural”):  I understand that the risks of having an epidural (medicine given into my back to help control pain during labor), include itching, low blood pressure, difficulty urinating, headache or slowing of the baby’s heart. Very rare risks include infection, bleeding, seizure, irregular heart rhythms and nerve injury.  Regional Anesthesia (“spinal”, “epidural”, & “nerve blocks”):  I understand that rare but potential complications include headache, bleeding, infection, seizure, irregular heart rhythms, and nerve injury.    _____________________________________________________________________________  Patient (or Representative) Signature/Relationship to Patient  Date   Time    _____________________________________________________________________________   Name (if used)    Language/Organization   Time    _____________________________________________________________________________  Nurse Anesthetist Signature     Date   Time  _____________________________________________________________________________  Anesthesiologist Signature     Date   Time  I have discussed the procedure and information above with the patient (or patient’s representative) and answered their questions. The patient or their representative has agreed to have anesthesia services.    _____________________________________________________________________________  Witness        Date   Time  I have verified that the signature is that of the patient or patient’s representative, and that it was signed before the procedure  Patient Name: Yi Law Toni Vincent     : 1950                 Printed: 2025 at 4:00 PM    Medical Record #: [id number]                                            Page 1 of 1  ----------ANESTHESIA CONSENT----------

## (undated) NOTE — Clinical Note
[address]  [address]  Consent for Procedure/Sedation  Date: ***         Time: ***    {[facility] ivs consent:[id number]}

## (undated) NOTE — LETTER
[address]  [address]  Consent for Procedure/Sedation  Date: 02/13/2025         Time:     I hereby authorize Dr. Loaiza, Dr. Maravilla, my physician and his/her assistants (if applicable), which may include medical students, residents, and/or fellows, to perform the following surgical operation/ procedure and administer such anesthesia as may be determined necessary by my physician: Upper extremity venogram, possible thrombectomy, superior vena cava stent  on Yi Thanh Torres  2.   I recognize that during the surgical operation/procedure, unforeseen conditions may necessitate additional or different procedures than those listed above.  I, therefore, further authorize and request that the above-named surgeon, assistants, or designees perform such procedures as are, in their judgment, necessary and desirable.    3.   My surgeon/physician has discussed prior to my surgery the potential benefits, risks and side effects of this procedure; the likelihood of achieving goals; and potential problems that might occur during recuperation.  They also discussed reasonable alternatives to the procedure, including risks, benefits, and side effects related to the alternatives and risks related to not receiving this procedure.  I have had all my questions answered and I acknowledge that no guarantee has been made as to the result that may be obtained.    4.   Should the need arise during my operation/procedure, which includes change of level of care prior to discharge, I also consent to the administration of blood and/or blood products.  Further, I understand that despite careful testing and screening of blood or blood products by collecting agencies, I may still be subject to ill effects as a result of receiving a blood transfusion and/or blood products.  The following are some, but not all, of the potential risks that can occur: fever and allergic reactions, hemolytic reactions,  transmission of diseases such as Hepatitis, AIDS and Cytomegalovirus (CMV) and fluid overload.  In the event that I wish to have an autologous transfusion of my own blood, or a directed donor transfusion, I will discuss this with my physician.   Check only if Refusing Blood or Blood Products  I understand refusal of blood or blood products as deemed necessary by my physician may have serious consequences to my condition to include possible death. I hereby assume responsibility for my refusal and release the hospital, its personnel, and my physicians from any responsibility for the consequences of my refusal.         o  Refuse         5.   I authorize the use of any specimen, organs, tissues, body parts or foreign objects that may be removed from my body during the operation/procedure for diagnosis, research or teaching purposes and their subsequent disposal by hospital authorities.  I also authorize the release of specimen test results and/or written reports to my treating physician on the hospital medical staff or other referring or consulting physicians involved in my care, at the discretion of the Pathologist or my treating physician.    6.   I consent to the photographing or videotaping of the operations or procedures to be performed, including appropriate portions of my body for medical, scientific, or educational purposes, provided my identity is not revealed by the pictures or by descriptive texts accompanying them.  If the procedure has been photographed/videotaped, the surgeon will obtain the original picture, image, videotape or CD.  The hospital will not be responsible for storage, release or maintenance of the picture, image, tape or CD.    7.   I consent to the presence of a  or observers in the operating room as deemed necessary by my physician or their designees.    8.   I recognize that in the event my procedure results in extended X-Ray/fluoroscopy time, I may develop a skin  reaction.    9. If I have a Do Not Attempt Resuscitation (DNAR) order in place, that status will be suspended while in the operating room, procedural suite, and during the recovery period unless otherwise explicitly stated by me (or a person authorized to consent on my behalf). The surgeon or my attending physician will determine when the applicable recovery period ends for purposes of reinstating the DNAR order.  10. Patients having a sterilization procedure: I understand that if the procedure is successful the results will be permanent and it will therefore be impossible for me to inseminate, conceive, or bear children.  I also understand that the procedure is intended to result in sterility, although the result has not been guaranteed.   11. I acknowledge that my physician has explained sedation/analgesia administration to me including the risk and benefits I consent to the administration of sedation/analgesia as may be necessary or desirable in the judgment of my physician.    I CERTIFY THAT I HAVE READ AND FULLY UNDERSTAND THE ABOVE CONSENT TO OPERATION and/or OTHER PROCEDURE.        ____________________________________       _________________________________      ______________________________  Signature of Patient         Signature of Responsible Person        Printed Name of Responsible Person        ____________________________________      _________________________________      ______________________________       Signature of Witness          Relationship to Patient                       Date                                       Time  Patient Name: Yi Law Toni Vincent  : 1950    Reviewed: 2024   Printed: 2025  Medical Record #: [id number] Page 1 of 1

## (undated) NOTE — LETTER
[facility]  Administration of Anesthesia  I Yi Law Toni Vincent agree to be cared for by a physician anesthesiologist alone and/or with a nurse anesthetist, who is specially trained to monitor me and give me medicine to put me to sleep or keep me comfortable during my procedure    I understand that my anesthesiologist and/or anesthetist is not an employee or agent of [facility] or [employer]. He or she works for [facility]    As the patient asking for anesthesia services, I agree to:  Allow the anesthesiologist (anesthesia doctor) to give me medicine and do additional procedures as necessary. Some examples are: Starting or using an “IV” to give me medicine, fluids or blood during my procedure, and having a breathing tube placed to help me breathe when I’m asleep (intubation). In the event that my heart stops working properly, I understand that my anesthesiologist will make every effort to sustain my life, unless otherwise directed by [facility] Do Not Resuscitate documents.  Tell my anesthesia doctor before my procedure:  If I am pregnant.  The last time that I ate or drank.  iii. All of the medicines I take (including prescriptions, herbal supplements, and pills I can buy without a prescription (including street drugs/illegal medications). Failure to inform my anesthesiologist about these medicines may increase my risk of anesthetic complications.  iv.If I am allergic to anything or have had a reaction to anesthesia before.  I understand how the anesthesia medicine will help me (benefits).  I understand that with any type of anesthesia medicine there are risks:  The most common risks are: nausea, vomiting, sore throat, muscle soreness, damage to my eyes, mouth, or teeth (from breathing tube placement).  Rare risks include: remembering what happened during my procedure, allergic reactions to medications, injury to my airway, heart, lungs, vision, nerves,  or muscles and in extremely rare instances death.  My doctor has explained to me other choices available to me for my care (alternatives).  Pregnant Patients (“epidural”):  I understand that the risks of having an epidural (medicine given into my back to help control pain during labor), include itching, low blood pressure, difficulty urinating, headache or slowing of the baby’s heart. Very rare risks include infection, bleeding, seizure, irregular heart rhythms and nerve injury.  Regional Anesthesia (“spinal”, “epidural”, & “nerve blocks”):  I understand that rare but potential complications include headache, bleeding, infection, seizure, irregular heart rhythms, and nerve injury.    _____________________________________________________________________________  Patient (or Representative) Signature/Relationship to Patient  Date   Time    _____________________________________________________________________________   Name (if used)    Language/Organization   Time    _____________________________________________________________________________  Nurse Anesthetist Signature     Date   Time  _____________________________________________________________________________  Anesthesiologist Signature     Date   Time  I have discussed the procedure and information above with the patient (or patient’s representative) and answered their questions. The patient or their representative has agreed to have anesthesia services.    _____________________________________________________________________________  Witness        Date   Time  I have verified that the signature is that of the patient or patient’s representative, and that it was signed before the procedure  Patient Name: Yi Law Toni Vincent     : 1950                 Printed: 2025 at 3:42 PM    Medical Record #: [id number]                                            Page 1 of 1  ----------ANESTHESIA CONSENT----------

## (undated) NOTE — LETTER
[employer] [facility]  Administration of Anesthesia  I Yi Law Toni Vincent agree to be cared for by a physician anesthesiologist alone and/or with a nurse anesthetist, who is specially trained to monitor me and give me medicine to put me to sleep or keep me comfortable during my procedure    I understand that my anesthesiologist and/or anesthetist is not an employee or agent of [facility] or [employer]. He or she works for [facility] [employer]    As the patient asking for anesthesia services, I agree to:  Allow the anesthesiologist (anesthesia doctor) to give me medicine and do additional procedures as necessary. Some examples are: Starting or using an “IV” to give me medicine, fluids or blood during my procedure, and having a breathing tube placed to help me breathe when I’m asleep (intubation). In the event that my heart stops working properly, I understand that my anesthesiologist will make every effort to sustain my life, unless otherwise directed by [facility] Do Not Resuscitate documents.  Tell my anesthesia doctor before my procedure:  If I am pregnant.  The last time that I ate or drank.  iii. All of the medicines I take (including prescriptions, herbal supplements, and pills I can buy without a prescription (including street drugs/illegal medications). Failure to inform my anesthesiologist about these medicines may increase my risk of anesthetic complications.  iv.If I am allergic to anything or have had a reaction to anesthesia before.  I understand how the anesthesia medicine will help me (benefits).  I understand that with any type of anesthesia medicine there are risks:  The most common risks are: nausea, vomiting, sore throat, muscle soreness, damage to my eyes, mouth, or teeth (from breathing tube placement).  Rare risks include: remembering what happened during my procedure, allergic reactions to medications, injury to my airway, heart, lungs, vision, nerves,  or muscles and in extremely rare instances death.  My doctor has explained to me other choices available to me for my care (alternatives).  Pregnant Patients (“epidural”):  I understand that the risks of having an epidural (medicine given into my back to help control pain during labor), include itching, low blood pressure, difficulty urinating, headache or slowing of the baby’s heart. Very rare risks include infection, bleeding, seizure, irregular heart rhythms and nerve injury.  Regional Anesthesia (“spinal”, “epidural”, & “nerve blocks”):  I understand that rare but potential complications include headache, bleeding, infection, seizure, irregular heart rhythms, and nerve injury.    _____________________________________________________________________________  Patient (or Representative) Signature/Relationship to Patient  Date   Time    _____________________________________________________________________________   Name (if used)    Language/Organization   Time    _____________________________________________________________________________  Nurse Anesthetist Signature     Date   Time  _____________________________________________________________________________  Anesthesiologist Signature     Date   Time  I have discussed the procedure and information above with the patient (or patient’s representative) and answered their questions. The patient or their representative has agreed to have anesthesia services.    _____________________________________________________________________________  Witness        Date   Time  I have verified that the signature is that of the patient or patient’s representative, and that it was signed before the procedure  Patient Name: Yi Law Toni Vincent     : 1950                 Printed: 2025 at 8:32 AM    Medical Record #: [id number]                                            Page 1 of 1  ----------ANESTHESIA CONSENT----------

## (undated) NOTE — Clinical Note
[facility]  [address], [place], [place]    Authorization for Surgical Operation and Procedure                               1. I hereby authorize * Surgery not found *, my physician and his/her assistants (if applicable), which may include medical students, residents, and/or fellows, to perform the following surgical operation/ procedure and administer such anesthesia as may be determined necessary by my physician: Operation/Procedure name (s)  on Yi Torres   2.   I recognize that during the surgical operation/procedure, unforeseen conditions may necessitate additional or different procedures than those listed above.  I, therefore, further authorize and request that the above-named surgeon, assistants, or designees perform such procedures as are, in their judgment, necessary and desirable.    3.   My surgeon/physician has discussed prior to my surgery the potential benefits, risks and side effects of this procedure; the likelihood of achieving goals; and potential problems that might occur during recuperation.  They also discussed reasonable alternatives to the procedure, including risks, benefits, and side effects related to the alternatives and risks related to not receiving this procedure.  I have had all my questions answered and I acknowledge that no guarantee has been made as to the result that may be obtained.    4.   Should the need arise during my operation/procedure, which includes change of level of care prior to discharge, I also consent to the administration of blood and/or blood products.  Further, I understand that despite careful testing and screening of blood or blood products by collecting agencies, I may still be subject to ill effects as a result of receiving a blood transfusion and/or blood products.  The following are some, but not all, of the potential risks that can occur: fever and allergic reactions, hemolytic reactions, transmission of diseases such as  Hepatitis, AIDS and Cytomegalovirus (CMV) and fluid overload.  In the event that I wish to have an autologous transfusion of my own blood, or a directed donor transfusion, I will discuss this with my physician.  Check only if Refusing Blood or Blood Products  I understand refusal of blood or blood products as deemed necessary by my physician may have serious consequences to my condition to include possible death. I hereby assume responsibility for my refusal and release the hospital, its personnel, and my physicians from any responsibility for the consequences of my refusal.    o  Refuse   5.   I authorize the use of any specimen, organs, tissues, body parts or foreign objects that may be removed from my body during the operation/procedure for diagnosis, research or teaching purposes and their subsequent disposal by hospital authorities.  I also authorize the release of specimen test results and/or written reports to my treating physician on the hospital medical staff or other referring or consulting physicians involved in my care, at the discretion of the Pathologist or my treating physician.    6.   I consent to the photographing or videotaping of the operations or procedures to be performed, including appropriate portions of my body for medical, scientific, or educational purposes, provided my identity is not revealed by the pictures or by descriptive texts accompanying them.  If the procedure has been photographed/videotaped, the surgeon will obtain the original picture, image, videotape or CD.  The hospital will not be responsible for storage, release or maintenance of the picture, image, tape or CD.    7.   I consent to the presence of a  or observers in the operating room as deemed necessary by my physician or their designees.    8.   I recognize that in the event my procedure results in extended X-Ray/fluoroscopy time, I may develop a skin reaction.    9. If I have a Do Not Attempt  Resuscitation (DNAR) order in place, that status will be suspended while in the operating room, procedural suite, and during the recovery period unless otherwise explicitly stated by me (or a person authorized to consent on my behalf). The surgeon or my attending physician will determine when the applicable recovery period ends for purposes of reinstating the DNAR order.  10. Patients having a sterilization procedure: I understand that if the procedure is successful the results will be permanent and it will therefore be impossible for me to inseminate, conceive, or bear children.  I also understand that the procedure is intended to result in sterility, although the result has not been guaranteed.   11. I acknowledge that my physician has explained sedation/analgesia administration to me including the risk and benefits I consent to the administration of sedation/analgesia as may be necessary or desirable in the judgment of my physician.    I CERTIFY THAT I HAVE READ AND FULLY UNDERSTAND THE ABOVE CONSENT TO OPERATION and/or OTHER PROCEDURE.     ____________________________________  _________________________________        ______________________________  Signature of Patient    Signature of Responsible Person                Printed Name of Responsible Person                                      ____________________________________  _____________________________                ________________________________  Signature of Witness        Date  Time         Relationship to Patient    STATEMENT OF PHYSICIAN My signature below affirms that prior to the time of the procedure; I have explained to the patient and/or his/her legal representative, the risks and benefits involved in the proposed treatment and any reasonable alternative to the proposed treatment. I have also explained the risks and benefits involved in refusal of the proposed treatment and alternatives to the proposed treatment and have answered the patient's  questions. If I have a significant financial interest in a co-management agreement or a significant financial interest in any product or implant, or other significant relationship used in this procedure/surgery, I have disclosed this and had a discussion with my patient.     _____________________________________________________              _____________________________  (Signature of Physician)                                                                                         (Date)                                   (Time)  Patient Name: Yi Law Toni Vincent      : 1950      Printed: 2025     Medical Record #: [id number]                                      Page 1 of 1

## (undated) NOTE — Clinical Note
Thank you for the opportunity to participate in the care of this interesting patient. Please do contact me if I may be of any further assistance  José Miguel Goodson MD [facility]

## (undated) NOTE — MR AVS SNAPSHOT
[facility]  [address], [address]  [phone]               Thank you for choosing us for your health care visit with CHONG Quiñones MD.  We are glad to serve you and happy to provide you with this summary of days from the date of this order for the referral to be processed. Your physician or the clinic staff will provide you with the specialist information so you can schedule your appointment.  Please wait 3 working days to schedule your appointment unless not Verapamil HCl  MG Tbcr   Take 1 tablet (240 mg total) by mouth once daily. Commonly known as:  CALAN-SR           Vitamin D 1000 units Caps   Take 1 tablet by mouth daily.                 Where to Get Your Medications      These medications were se

## (undated) NOTE — LETTER
02/04/21        Reed Barrientos  [address]  [place] [address] [facility] [address]      Dear Daina Han records indicate that you have outstanding lab work and or testing that was ordered for you and has not yet been completed:  Orders Placed This E

## (undated) NOTE — Clinical Note
[place] [address]  Authorization for Invasive Procedures  Date: ***           Time: ***    {[facility] ivs consent:[id number]}

## (undated) NOTE — LETTER
05/06/21        Marnie Zhang [facility]  [address]  [facility] [address]      Dear Logan Mason records indicate that you have outstanding lab work and or testing that was ordered for you and has not yet been completed:  Orders Placed This E

## (undated) NOTE — LETTER
09/18/20        Marge Barrientos  [address]  [facility] [address]      Dear Donna Lo records indicate that you have outstanding lab work and or testing that was ordered for you and has not yet been completed:  Orders Placed This E

## (undated) NOTE — MR AVS SNAPSHOT
[facility]  [address], [address]  [address] [facility]  [phone]               Thank you for choosing us for your health care visit with CHONG Shirley MD.  We are glad to serve you and happy to provide you with this summar Spironolactone-HCTZ 25-25 MG Tabs   Take 1 tablet by mouth once daily. Commonly known as:  ALDACTAZIDE           Verapamil HCl  MG Tbcr   Take 1 tablet (240 mg total) by mouth once daily.    Commonly known as:  CALAN-SR           Vitamin D 1000 UNI Eat plenty of protein, keep the fat content low Sugars:  sodas and sports drinks, candies and desserts   Eat plenty of low-fat dairy products High fat meats and dairy   Choose whole grain products Foods high in sodium   Water is best for hydration Fast pedro luis

## (undated) NOTE — LETTER
10/22/18      Patient: Nilda Lyons  : 1950 Visit date: 10/22/2018    Dear Alvin Bernheim,      I examined your patient in consultation today. She has a squamous cell of the right temple.   We will plan on wide excision under frozen sec

## (undated) NOTE — LETTER
11/04/20        Dennis Barrientos  [address]  [facility] [address]      Dear Annika Mukherjee records indicate that you have outstanding lab work and or testing that was ordered for you and has not yet been completed:  Orders Placed This E

## (undated) NOTE — LETTER
11/09/21        Alie Barrientos  [address] [facility] [address]  [facility] [address]      Dear Jennifer [place] records indicate that you have outstanding lab work and or testing that was ordered for you and has not yet been completed:  Orders Placed This E

## (undated) NOTE — LETTER
[facility]  [address], [place], [place]    Authorization for Surgical Operation and Procedure                               I hereby authorize Gabrielle Garrido DO, my physician and his/her assistants (if applicable), which may include medical students, residents, and/or fellows, to perform the following surgical operation/ procedure and administer such anesthesia as may be determined necessary by my physician: Operation/Procedure name (s) ION ROBOT-ASSISTED NAVIGATIONAL BRONCHOSCOPY on Yi Law Tnoi Vincent   2.   I recognize that during the surgical operation/procedure, unforeseen conditions may necessitate additional or different procedures than those listed above.  I, therefore, further authorize and request that the above-named surgeon, assistants, or designees perform such procedures as are, in their judgment, necessary and desirable.    3.   My surgeon/physician has discussed prior to my surgery the potential benefits, risks and side effects of this procedure; the likelihood of achieving goals; and potential problems that might occur during recuperation.  They also discussed reasonable alternatives to the procedure, including risks, benefits, and side effects related to the alternatives and risks related to not receiving this procedure.  I have had all my questions answered and I acknowledge that no guarantee has been made as to the result that may be obtained.    4.   Should the need arise during my operation/procedure, which includes change of level of care prior to discharge, I also consent to the administration of blood and/or blood products.  Further, I understand that despite careful testing and screening of blood or blood products by collecting agencies, I may still be subject to ill effects as a result of receiving a blood transfusion and/or blood products.  The following are some, but not all, of the potential risks that can occur: fever and allergic reactions, hemolytic  reactions, transmission of diseases such as Hepatitis, AIDS and Cytomegalovirus (CMV) and fluid overload.  In the event that I wish to have an autologous transfusion of my own blood, or a directed donor transfusion, I will discuss this with my physician.  Check only if Refusing Blood or Blood Products  I understand refusal of blood or blood products as deemed necessary by my physician may have serious consequences to my condition to include possible death. I hereby assume responsibility for my refusal and release the hospital, its personnel, and my physicians from any responsibility for the consequences of my refusal.    o  Refuse   5.   I authorize the use of any specimen, organs, tissues, body parts or foreign objects that may be removed from my body during the operation/procedure for diagnosis, research or teaching purposes and their subsequent disposal by hospital authorities.  I also authorize the release of specimen test results and/or written reports to my treating physician on the hospital medical staff or other referring or consulting physicians involved in my care, at the discretion of the Pathologist or my treating physician.    6.   I consent to the photographing or videotaping of the operations or procedures to be performed, including appropriate portions of my body for medical, scientific, or educational purposes, provided my identity is not revealed by the pictures or by descriptive texts accompanying them.  If the procedure has been photographed/videotaped, the surgeon will obtain the original picture, image, videotape or CD.  The hospital will not be responsible for storage, release or maintenance of the picture, image, tape or CD.    7.   I consent to the presence of a  or observers in the operating room as deemed necessary by my physician or their designees.    8.   I recognize that in the event my procedure results in extended X-Ray/fluoroscopy time, I may develop a skin  reaction.    9. If I have a Do Not Attempt Resuscitation (DNAR) order in place, that status will be suspended while in the operating room, procedural suite, and during the recovery period unless otherwise explicitly stated by me (or a person authorized to consent on my behalf). The surgeon or my attending physician will determine when the applicable recovery period ends for purposes of reinstating the DNAR order.  10. Patients having a sterilization procedure: I understand that if the procedure is successful the results will be permanent and it will therefore be impossible for me to inseminate, conceive, or bear children.  I also understand that the procedure is intended to result in sterility, although the result has not been guaranteed.   11. I acknowledge that my physician has explained sedation/analgesia administration to me including the risk and benefits I consent to the administration of sedation/analgesia as may be necessary or desirable in the judgment of my physician.    I CERTIFY THAT I HAVE READ AND FULLY UNDERSTAND THE ABOVE CONSENT TO OPERATION and/or OTHER PROCEDURE.     ____________________________________  _________________________________        ______________________________  Signature of Patient    Signature of Responsible Person                Printed Name of Responsible Person                                      ____________________________________  _____________________________                ________________________________  Signature of Witness        Date  Time         Relationship to Patient    STATEMENT OF PHYSICIAN My signature below affirms that prior to the time of the procedure; I have explained to the patient and/or his/her legal representative, the risks and benefits involved in the proposed treatment and any reasonable alternative to the proposed treatment. I have also explained the risks and benefits involved in refusal of the proposed treatment and alternatives to the proposed  treatment and have answered the patient's questions. If I have a significant financial interest in a co-management agreement or a significant financial interest in any product or implant, or other significant relationship used in this procedure/surgery, I have disclosed this and had a discussion with my patient.     _____________________________________________________              _____________________________  (Signature of Physician)                                                                                         (Date)                                   (Time)  Patient Name: Yi Law Toni Vincent      : 1950      Printed: 2025     Medical Record #: [id number]                                      Page 1 of 1

## (undated) NOTE — LETTER
03/17/20        Dennis Barrientos  [address] [facility] [address]  [facility] [address]      Dear Annika Mukherjee records indicate that you have outstanding lab work and or testing that was ordered for you and has not yet been completed:  Orders Placed This E